# Patient Record
Sex: MALE | Race: WHITE | NOT HISPANIC OR LATINO | Employment: OTHER | URBAN - METROPOLITAN AREA
[De-identification: names, ages, dates, MRNs, and addresses within clinical notes are randomized per-mention and may not be internally consistent; named-entity substitution may affect disease eponyms.]

---

## 2017-01-01 ENCOUNTER — ANESTHESIA (OUTPATIENT)
Dept: PERIOP | Facility: HOSPITAL | Age: 82
End: 2017-01-01

## 2017-01-01 ENCOUNTER — APPOINTMENT (OUTPATIENT)
Dept: GENERAL RADIOLOGY | Facility: HOSPITAL | Age: 82
End: 2017-01-01

## 2017-01-01 ENCOUNTER — TELEPHONE (OUTPATIENT)
Dept: INFECTIOUS DISEASES | Facility: CLINIC | Age: 82
End: 2017-01-01

## 2017-01-01 ENCOUNTER — DOCUMENTATION (OUTPATIENT)
Dept: NEUROSURGERY | Facility: CLINIC | Age: 82
End: 2017-01-01

## 2017-01-01 ENCOUNTER — HOSPITAL ENCOUNTER (INPATIENT)
Facility: HOSPITAL | Age: 82
LOS: 20 days | Discharge: NURSING, REHAB OR LONG-TERM CARE | End: 2017-07-10
Attending: EMERGENCY MEDICINE | Admitting: INTERNAL MEDICINE

## 2017-01-01 ENCOUNTER — ANESTHESIA (OUTPATIENT)
Dept: PAIN MEDICINE | Facility: HOSPITAL | Age: 82
End: 2017-01-01

## 2017-01-01 ENCOUNTER — TELEPHONE (OUTPATIENT)
Dept: NEUROSURGERY | Facility: CLINIC | Age: 82
End: 2017-01-01

## 2017-01-01 ENCOUNTER — APPOINTMENT (OUTPATIENT)
Dept: CT IMAGING | Facility: HOSPITAL | Age: 82
End: 2017-01-01
Attending: INTERNAL MEDICINE

## 2017-01-01 ENCOUNTER — APPOINTMENT (OUTPATIENT)
Dept: CARDIOLOGY | Facility: HOSPITAL | Age: 82
End: 2017-01-01
Attending: INTERNAL MEDICINE

## 2017-01-01 ENCOUNTER — ANESTHESIA EVENT (OUTPATIENT)
Dept: PERIOP | Facility: HOSPITAL | Age: 82
End: 2017-01-01

## 2017-01-01 ENCOUNTER — APPOINTMENT (OUTPATIENT)
Dept: MRI IMAGING | Facility: HOSPITAL | Age: 82
End: 2017-01-01

## 2017-01-01 ENCOUNTER — APPOINTMENT (OUTPATIENT)
Dept: GENERAL RADIOLOGY | Facility: HOSPITAL | Age: 82
End: 2017-01-01
Attending: HOSPITALIST

## 2017-01-01 ENCOUNTER — APPOINTMENT (OUTPATIENT)
Dept: CARDIOLOGY | Facility: HOSPITAL | Age: 82
End: 2017-01-01

## 2017-01-01 ENCOUNTER — HOSPITAL ENCOUNTER (INPATIENT)
Facility: HOSPITAL | Age: 82
LOS: 2 days | Discharge: NURSING, REHAB OR LONG-TERM CARE | End: 2017-08-31
Attending: EMERGENCY MEDICINE | Admitting: HOSPITALIST

## 2017-01-01 ENCOUNTER — APPOINTMENT (OUTPATIENT)
Dept: PAIN MEDICINE | Facility: HOSPITAL | Age: 82
End: 2017-01-01

## 2017-01-01 ENCOUNTER — HOSPITAL ENCOUNTER (INPATIENT)
Facility: HOSPITAL | Age: 82
LOS: 5 days | Discharge: NURSING, REHAB OR LONG-TERM CARE | End: 2017-08-14
Attending: EMERGENCY MEDICINE | Admitting: INTERNAL MEDICINE

## 2017-01-01 ENCOUNTER — ANESTHESIA EVENT (OUTPATIENT)
Dept: PAIN MEDICINE | Facility: HOSPITAL | Age: 82
End: 2017-01-01

## 2017-01-01 ENCOUNTER — APPOINTMENT (OUTPATIENT)
Dept: CT IMAGING | Facility: HOSPITAL | Age: 82
End: 2017-01-01

## 2017-01-01 ENCOUNTER — APPOINTMENT (OUTPATIENT)
Dept: CARDIOLOGY | Facility: HOSPITAL | Age: 82
End: 2017-01-01
Attending: HOSPITALIST

## 2017-01-01 ENCOUNTER — HOSPITAL ENCOUNTER (INPATIENT)
Facility: HOSPITAL | Age: 82
LOS: 1 days | End: 2017-09-13
Attending: HOSPITALIST | Admitting: HOSPITALIST

## 2017-01-01 ENCOUNTER — APPOINTMENT (OUTPATIENT)
Dept: GENERAL RADIOLOGY | Facility: HOSPITAL | Age: 82
End: 2017-01-01
Attending: INTERNAL MEDICINE

## 2017-01-01 ENCOUNTER — OFFICE VISIT (OUTPATIENT)
Dept: NEUROSURGERY | Facility: CLINIC | Age: 82
End: 2017-01-01

## 2017-01-01 ENCOUNTER — HOSPITAL ENCOUNTER (INPATIENT)
Facility: HOSPITAL | Age: 82
LOS: 6 days | End: 2017-09-12
Attending: EMERGENCY MEDICINE | Admitting: HOSPITALIST

## 2017-01-01 ENCOUNTER — APPOINTMENT (OUTPATIENT)
Dept: MRI IMAGING | Facility: HOSPITAL | Age: 82
End: 2017-01-01
Attending: INTERNAL MEDICINE

## 2017-01-01 VITALS
TEMPERATURE: 97.5 F | OXYGEN SATURATION: 99 % | RESPIRATION RATE: 16 BRPM | WEIGHT: 166 LBS | BODY MASS INDEX: 23.24 KG/M2 | SYSTOLIC BLOOD PRESSURE: 107 MMHG | HEIGHT: 71 IN | HEART RATE: 69 BPM | DIASTOLIC BLOOD PRESSURE: 67 MMHG

## 2017-01-01 VITALS
HEIGHT: 72 IN | TEMPERATURE: 97.6 F | DIASTOLIC BLOOD PRESSURE: 59 MMHG | OXYGEN SATURATION: 98 % | WEIGHT: 183.5 LBS | SYSTOLIC BLOOD PRESSURE: 119 MMHG | BODY MASS INDEX: 24.85 KG/M2 | HEART RATE: 74 BPM | RESPIRATION RATE: 18 BRPM

## 2017-01-01 VITALS
BODY MASS INDEX: 25.59 KG/M2 | OXYGEN SATURATION: 92 % | WEIGHT: 188.93 LBS | SYSTOLIC BLOOD PRESSURE: 80 MMHG | HEIGHT: 72 IN | RESPIRATION RATE: 32 BRPM | DIASTOLIC BLOOD PRESSURE: 48 MMHG | HEART RATE: 109 BPM | TEMPERATURE: 98.2 F

## 2017-01-01 VITALS — TEMPERATURE: 97.7 F | SYSTOLIC BLOOD PRESSURE: 71 MMHG | DIASTOLIC BLOOD PRESSURE: 44 MMHG | OXYGEN SATURATION: 56 %

## 2017-01-01 VITALS
BODY MASS INDEX: 24.79 KG/M2 | TEMPERATURE: 97.5 F | SYSTOLIC BLOOD PRESSURE: 132 MMHG | HEART RATE: 80 BPM | DIASTOLIC BLOOD PRESSURE: 64 MMHG | HEIGHT: 72 IN | WEIGHT: 183 LBS | RESPIRATION RATE: 18 BRPM

## 2017-01-01 VITALS
SYSTOLIC BLOOD PRESSURE: 120 MMHG | WEIGHT: 175.04 LBS | BODY MASS INDEX: 23.71 KG/M2 | HEIGHT: 72 IN | DIASTOLIC BLOOD PRESSURE: 64 MMHG | HEART RATE: 105 BPM | TEMPERATURE: 98.6 F | OXYGEN SATURATION: 87 % | RESPIRATION RATE: 18 BRPM

## 2017-01-01 DIAGNOSIS — G06.2 EPIDURAL ABSCESS: ICD-10-CM

## 2017-01-01 DIAGNOSIS — R52 PAIN: ICD-10-CM

## 2017-01-01 DIAGNOSIS — T81.31XA POSTOPERATIVE WOUND DEHISCENCE, INITIAL ENCOUNTER: ICD-10-CM

## 2017-01-01 DIAGNOSIS — L89.154 DECUBITUS ULCER OF SACRAL REGION, STAGE 4 (HCC): ICD-10-CM

## 2017-01-01 DIAGNOSIS — R53.1 WEAKNESS: ICD-10-CM

## 2017-01-01 DIAGNOSIS — D72.829 LEUKOCYTOSIS, UNSPECIFIED TYPE: ICD-10-CM

## 2017-01-01 DIAGNOSIS — R26.81 UNSTEADINESS ON FEET: ICD-10-CM

## 2017-01-01 DIAGNOSIS — IMO0002 COMPRESSION FRACTURE: ICD-10-CM

## 2017-01-01 DIAGNOSIS — Z09 POSTOP CHECK: Primary | ICD-10-CM

## 2017-01-01 DIAGNOSIS — R50.9 FEVER IN ADULT: ICD-10-CM

## 2017-01-01 DIAGNOSIS — R29.898 LEFT LEG WEAKNESS: Chronic | ICD-10-CM

## 2017-01-01 DIAGNOSIS — G06.1 EPIDURAL ABSCESS, L2-L5: ICD-10-CM

## 2017-01-01 DIAGNOSIS — R53.1 GENERALIZED WEAKNESS: Primary | ICD-10-CM

## 2017-01-01 DIAGNOSIS — R53.1 GENERALIZED WEAKNESS: ICD-10-CM

## 2017-01-01 DIAGNOSIS — D72.829 LEUKOCYTOSIS, UNSPECIFIED TYPE: Primary | ICD-10-CM

## 2017-01-01 DIAGNOSIS — E86.0 DEHYDRATION: ICD-10-CM

## 2017-01-01 DIAGNOSIS — N30.01 ACUTE CYSTITIS WITH HEMATURIA: Primary | ICD-10-CM

## 2017-01-01 DIAGNOSIS — L89.159 SACRAL DECUBITUS ULCER, UNSPECIFIED PRESSURE ULCER STAGE: ICD-10-CM

## 2017-01-01 DIAGNOSIS — L89.159 SACRAL DECUBITUS ULCER, UNSPECIFIED PRESSURE ULCER STAGE: Primary | ICD-10-CM

## 2017-01-01 DIAGNOSIS — T82.898A OCCLUDED PICC LINE, INITIAL ENCOUNTER (HCC): ICD-10-CM

## 2017-01-01 DIAGNOSIS — D32.9 MENINGIOMA (HCC): Primary | ICD-10-CM

## 2017-01-01 DIAGNOSIS — G06.2 EPIDURAL ABSCESS: Primary | ICD-10-CM

## 2017-01-01 LAB
25(OH)D3 SERPL-MCNC: 20.4 NG/ML (ref 30–100)
ABO + RH BLD: NORMAL
ABO GROUP BLD: NORMAL
ACANTHOCYTES BLD QL SMEAR: NORMAL
ACANTHOCYTES BLD QL SMEAR: NORMAL
ALBUMIN SERPL-MCNC: 1.5 G/DL (ref 3.5–5.2)
ALBUMIN SERPL-MCNC: 1.9 G/DL (ref 3.5–5.2)
ALBUMIN SERPL-MCNC: 2 G/DL (ref 3.5–5.2)
ALBUMIN SERPL-MCNC: 2.1 G/DL (ref 3.5–5.2)
ALBUMIN SERPL-MCNC: 2.4 G/DL (ref 3.5–5.2)
ALBUMIN SERPL-MCNC: 2.5 G/DL (ref 3.5–5.2)
ALBUMIN SERPL-MCNC: 2.6 G/DL (ref 3.5–5.2)
ALBUMIN SERPL-MCNC: 2.7 G/DL (ref 3.5–5.2)
ALBUMIN SERPL-MCNC: 2.7 G/DL (ref 3.5–5.2)
ALBUMIN SERPL-MCNC: 2.8 G/DL (ref 3.5–5.2)
ALBUMIN SERPL-MCNC: 2.8 G/DL (ref 3.5–5.2)
ALBUMIN SERPL-MCNC: 2.9 G/DL (ref 3.5–5.2)
ALBUMIN SERPL-MCNC: 3 G/DL (ref 3.5–5.2)
ALBUMIN SERPL-MCNC: 3 G/DL (ref 3.5–5.2)
ALBUMIN SERPL-MCNC: 3.4 G/DL (ref 3.5–5.2)
ALBUMIN/GLOB SERPL: 0.6 G/DL
ALBUMIN/GLOB SERPL: 0.7 G/DL
ALBUMIN/GLOB SERPL: 0.7 G/DL
ALBUMIN/GLOB SERPL: 0.8 G/DL
ALBUMIN/GLOB SERPL: 1 G/DL
ALBUMIN/GLOB SERPL: 1.1 G/DL
ALP SERPL-CCNC: 56 U/L (ref 39–117)
ALP SERPL-CCNC: 68 U/L (ref 39–117)
ALP SERPL-CCNC: 73 U/L (ref 39–117)
ALP SERPL-CCNC: 81 U/L (ref 39–117)
ALP SERPL-CCNC: 82 U/L (ref 39–117)
ALP SERPL-CCNC: 92 U/L (ref 39–117)
ALT SERPL W P-5'-P-CCNC: 15 U/L (ref 1–41)
ALT SERPL W P-5'-P-CCNC: 19 U/L (ref 1–41)
ALT SERPL W P-5'-P-CCNC: 19 U/L (ref 1–41)
ALT SERPL W P-5'-P-CCNC: 20 U/L (ref 1–41)
ALT SERPL W P-5'-P-CCNC: 21 U/L (ref 1–41)
ALT SERPL W P-5'-P-CCNC: 23 U/L (ref 1–41)
AMORPH URATE CRY URNS QL MICRO: ABNORMAL /HPF
ANION GAP SERPL CALCULATED.3IONS-SCNC: 10.2 MMOL/L
ANION GAP SERPL CALCULATED.3IONS-SCNC: 10.4 MMOL/L
ANION GAP SERPL CALCULATED.3IONS-SCNC: 10.6 MMOL/L
ANION GAP SERPL CALCULATED.3IONS-SCNC: 10.7 MMOL/L
ANION GAP SERPL CALCULATED.3IONS-SCNC: 10.7 MMOL/L
ANION GAP SERPL CALCULATED.3IONS-SCNC: 11 MMOL/L
ANION GAP SERPL CALCULATED.3IONS-SCNC: 11.3 MMOL/L
ANION GAP SERPL CALCULATED.3IONS-SCNC: 11.3 MMOL/L
ANION GAP SERPL CALCULATED.3IONS-SCNC: 11.4 MMOL/L
ANION GAP SERPL CALCULATED.3IONS-SCNC: 11.6 MMOL/L
ANION GAP SERPL CALCULATED.3IONS-SCNC: 11.7 MMOL/L
ANION GAP SERPL CALCULATED.3IONS-SCNC: 11.7 MMOL/L
ANION GAP SERPL CALCULATED.3IONS-SCNC: 12 MMOL/L
ANION GAP SERPL CALCULATED.3IONS-SCNC: 12 MMOL/L
ANION GAP SERPL CALCULATED.3IONS-SCNC: 12.3 MMOL/L
ANION GAP SERPL CALCULATED.3IONS-SCNC: 12.4 MMOL/L
ANION GAP SERPL CALCULATED.3IONS-SCNC: 12.4 MMOL/L
ANION GAP SERPL CALCULATED.3IONS-SCNC: 12.5 MMOL/L
ANION GAP SERPL CALCULATED.3IONS-SCNC: 12.6 MMOL/L
ANION GAP SERPL CALCULATED.3IONS-SCNC: 12.6 MMOL/L
ANION GAP SERPL CALCULATED.3IONS-SCNC: 12.7 MMOL/L
ANION GAP SERPL CALCULATED.3IONS-SCNC: 12.7 MMOL/L
ANION GAP SERPL CALCULATED.3IONS-SCNC: 12.8 MMOL/L
ANION GAP SERPL CALCULATED.3IONS-SCNC: 13.1 MMOL/L
ANION GAP SERPL CALCULATED.3IONS-SCNC: 13.1 MMOL/L
ANION GAP SERPL CALCULATED.3IONS-SCNC: 13.2 MMOL/L
ANION GAP SERPL CALCULATED.3IONS-SCNC: 13.3 MMOL/L
ANION GAP SERPL CALCULATED.3IONS-SCNC: 13.4 MMOL/L
ANION GAP SERPL CALCULATED.3IONS-SCNC: 13.5 MMOL/L
ANION GAP SERPL CALCULATED.3IONS-SCNC: 13.5 MMOL/L
ANION GAP SERPL CALCULATED.3IONS-SCNC: 13.8 MMOL/L
ANION GAP SERPL CALCULATED.3IONS-SCNC: 13.9 MMOL/L
ANION GAP SERPL CALCULATED.3IONS-SCNC: 14.2 MMOL/L
ANION GAP SERPL CALCULATED.3IONS-SCNC: 14.3 MMOL/L
ANION GAP SERPL CALCULATED.3IONS-SCNC: 14.5 MMOL/L
ANION GAP SERPL CALCULATED.3IONS-SCNC: 14.5 MMOL/L
ANION GAP SERPL CALCULATED.3IONS-SCNC: 14.6 MMOL/L
ANION GAP SERPL CALCULATED.3IONS-SCNC: 14.7 MMOL/L
ANION GAP SERPL CALCULATED.3IONS-SCNC: 16.1 MMOL/L
ANION GAP SERPL CALCULATED.3IONS-SCNC: 16.4 MMOL/L
ANION GAP SERPL CALCULATED.3IONS-SCNC: 16.8 MMOL/L
ANION GAP SERPL CALCULATED.3IONS-SCNC: 8.9 MMOL/L
ANISOCYTOSIS BLD QL: NORMAL
APPEARANCE FLD: ABNORMAL
APTT PPP: 28.7 SECONDS (ref 22.7–35.4)
APTT PPP: 30.1 SECONDS (ref 22.7–35.4)
AST SERPL-CCNC: 12 U/L (ref 1–40)
AST SERPL-CCNC: 17 U/L (ref 1–40)
AST SERPL-CCNC: 18 U/L (ref 1–40)
AST SERPL-CCNC: 20 U/L (ref 1–40)
AST SERPL-CCNC: 28 U/L (ref 1–40)
AST SERPL-CCNC: 29 U/L (ref 1–40)
B PERT DNA SPEC QL NAA+PROBE: NOT DETECTED
BACTERIA SPEC AEROBE CULT: ABNORMAL
BACTERIA SPEC AEROBE CULT: NO GROWTH
BACTERIA SPEC AEROBE CULT: NORMAL
BACTERIA SPEC ANAEROBE CULT: NORMAL
BACTERIA UR QL AUTO: ABNORMAL /HPF
BASOPHILS # BLD AUTO: 0 10*3/MM3 (ref 0–0.2)
BASOPHILS # BLD AUTO: 0.01 10*3/MM3 (ref 0–0.2)
BASOPHILS # BLD AUTO: 0.02 10*3/MM3 (ref 0–0.2)
BASOPHILS # BLD AUTO: 0.03 10*3/MM3 (ref 0–0.2)
BASOPHILS # BLD AUTO: 0.03 10*3/MM3 (ref 0–0.2)
BASOPHILS # BLD AUTO: 0.04 10*3/MM3 (ref 0–0.2)
BASOPHILS # BLD AUTO: 0.04 10*3/MM3 (ref 0–0.2)
BASOPHILS NFR BLD AUTO: 0 % (ref 0–1.5)
BASOPHILS NFR BLD AUTO: 0.1 % (ref 0–1.5)
BASOPHILS NFR BLD AUTO: 0.2 % (ref 0–1.5)
BASOPHILS NFR BLD AUTO: 0.3 % (ref 0–1.5)
BASOPHILS NFR BLD AUTO: 0.4 % (ref 0–1.5)
BH BB BLOOD EXPIRATION DATE: NORMAL
BH BB BLOOD TYPE BARCODE: 600
BH BB DISPENSE STATUS: NORMAL
BH BB PRODUCT CODE: NORMAL
BH BB UNIT NUMBER: NORMAL
BH CV ECHO MEAS - ACS: 2.4 CM
BH CV ECHO MEAS - AO MEAN PG (FULL): 2.7 MMHG
BH CV ECHO MEAS - AO MEAN PG: 5 MMHG
BH CV ECHO MEAS - AO V2 MAX: 134 CM/SEC
BH CV ECHO MEAS - AO V2 MEAN: 101 CM/SEC
BH CV ECHO MEAS - AO V2 VTI: 15.7 CM
BH CV ECHO MEAS - AVA(I,A): 3.3 CM^2
BH CV ECHO MEAS - AVA(I,D): 3.3 CM^2
BH CV ECHO MEAS - BSA(HAYCOCK): 2 M^2
BH CV ECHO MEAS - BSA: 2 M^2
BH CV ECHO MEAS - BZI_BMI: 23.7 KILOGRAMS/M^2
BH CV ECHO MEAS - BZI_METRIC_HEIGHT: 182.9 CM
BH CV ECHO MEAS - BZI_METRIC_WEIGHT: 79.4 KG
BH CV ECHO MEAS - CONTRAST EF 4CH: 57.9 ML/M^2
BH CV ECHO MEAS - EDV(CUBED): 125 ML
BH CV ECHO MEAS - EDV(MOD-SP4): 114 ML
BH CV ECHO MEAS - EDV(TEICH): 118.2 ML
BH CV ECHO MEAS - EF(CUBED): 62.7 %
BH CV ECHO MEAS - EF(MOD-SP4): 57.9 %
BH CV ECHO MEAS - EF(TEICH): 54 %
BH CV ECHO MEAS - ESV(CUBED): 46.7 ML
BH CV ECHO MEAS - ESV(MOD-SP4): 48 ML
BH CV ECHO MEAS - ESV(TEICH): 54.4 ML
BH CV ECHO MEAS - FS: 28 %
BH CV ECHO MEAS - IVS/LVPW: 1.3
BH CV ECHO MEAS - IVSD: 1 CM
BH CV ECHO MEAS - LA DIMENSION: 6 CM
BH CV ECHO MEAS - LAT PEAK E' VEL: 6 CM/SEC
BH CV ECHO MEAS - LV DIASTOLIC VOL/BSA (35-75): 56.6 ML/M^2
BH CV ECHO MEAS - LV MASS(C)D: 158.2 GRAMS
BH CV ECHO MEAS - LV MASS(C)DI: 78.6 GRAMS/M^2
BH CV ECHO MEAS - LV MEAN PG: 2.3 MMHG
BH CV ECHO MEAS - LV SYSTOLIC VOL/BSA (12-30): 23.8 ML/M^2
BH CV ECHO MEAS - LV V1 MAX: 114 CM/SEC
BH CV ECHO MEAS - LV V1 MEAN: 72.9 CM/SEC
BH CV ECHO MEAS - LV V1 VTI: 16.3 CM
BH CV ECHO MEAS - LVIDD: 5 CM
BH CV ECHO MEAS - LVIDS: 3.6 CM
BH CV ECHO MEAS - LVLD AP4: 8.7 CM
BH CV ECHO MEAS - LVLS AP4: 7.6 CM
BH CV ECHO MEAS - LVOT AREA (M): 3.1 CM^2
BH CV ECHO MEAS - LVOT AREA: 3.1 CM^2
BH CV ECHO MEAS - LVOT DIAM: 2 CM
BH CV ECHO MEAS - LVPWD: 0.8 CM
BH CV ECHO MEAS - MED PEAK E' VEL: 11 CM/SEC
BH CV ECHO MEAS - MV A DUR: 0.11 SEC
BH CV ECHO MEAS - MV A MAX VEL: 65.8 CM/SEC
BH CV ECHO MEAS - MV DEC SLOPE: 467 CM/SEC^2
BH CV ECHO MEAS - MV DEC TIME: 0.23 SEC
BH CV ECHO MEAS - MV E MAX VEL: 50.3 CM/SEC
BH CV ECHO MEAS - MV E/A: 0.76
BH CV ECHO MEAS - MV MEAN PG: 1 MMHG
BH CV ECHO MEAS - MV P1/2T MAX VEL: 61.5 CM/SEC
BH CV ECHO MEAS - MV P1/2T: 38.6 MSEC
BH CV ECHO MEAS - MV V2 MEAN: 47.8 CM/SEC
BH CV ECHO MEAS - MV V2 VTI: 12.2 CM
BH CV ECHO MEAS - MVA P1/2T LCG: 3.6 CM^2
BH CV ECHO MEAS - MVA(P1/2T): 5.7 CM^2
BH CV ECHO MEAS - MVA(VTI): 4.2 CM^2
BH CV ECHO MEAS - PA ACC SLOPE: 17.5 CM/SEC^2
BH CV ECHO MEAS - PA ACC TIME: 0.08 SEC
BH CV ECHO MEAS - PA MAX PG: 5.1 MMHG
BH CV ECHO MEAS - PA PR(ACCEL): 44.4 MMHG
BH CV ECHO MEAS - PA V2 MAX: 113 CM/SEC
BH CV ECHO MEAS - SI(CUBED): 38.9 ML/M^2
BH CV ECHO MEAS - SI(LVOT): 25.4 ML/M^2
BH CV ECHO MEAS - SI(MOD-SP4): 32.8 ML/M^2
BH CV ECHO MEAS - SI(TEICH): 31.7 ML/M^2
BH CV ECHO MEAS - SV(CUBED): 78.3 ML
BH CV ECHO MEAS - SV(LVOT): 51.1 ML
BH CV ECHO MEAS - SV(MOD-SP4): 66 ML
BH CV ECHO MEAS - SV(TEICH): 63.8 ML
BH CV ECHO MEAS - TAPSE (>1.6): 2.4 CM2
BH CV LOWER VASCULAR LEFT COMMON FEMORAL AUGMENT: NORMAL
BH CV LOWER VASCULAR LEFT COMMON FEMORAL AUGMENT: NORMAL
BH CV LOWER VASCULAR LEFT COMMON FEMORAL COMPETENT: NORMAL
BH CV LOWER VASCULAR LEFT COMMON FEMORAL COMPETENT: NORMAL
BH CV LOWER VASCULAR LEFT COMMON FEMORAL COMPRESS: NORMAL
BH CV LOWER VASCULAR LEFT COMMON FEMORAL COMPRESS: NORMAL
BH CV LOWER VASCULAR LEFT COMMON FEMORAL PHASIC: NORMAL
BH CV LOWER VASCULAR LEFT COMMON FEMORAL PHASIC: NORMAL
BH CV LOWER VASCULAR LEFT COMMON FEMORAL SPONT: NORMAL
BH CV LOWER VASCULAR LEFT COMMON FEMORAL SPONT: NORMAL
BH CV LOWER VASCULAR LEFT DISTAL FEMORAL COMPRESS: NORMAL
BH CV LOWER VASCULAR LEFT DISTAL FEMORAL COMPRESS: NORMAL
BH CV LOWER VASCULAR LEFT GASTRONEMIUS COMPRESS: NORMAL
BH CV LOWER VASCULAR LEFT GASTRONEMIUS COMPRESS: NORMAL
BH CV LOWER VASCULAR LEFT GREATER SAPH AK COMPRESS: NORMAL
BH CV LOWER VASCULAR LEFT GREATER SAPH AK COMPRESS: NORMAL
BH CV LOWER VASCULAR LEFT GREATER SAPH BK COMPRESS: NORMAL
BH CV LOWER VASCULAR LEFT GREATER SAPH BK COMPRESS: NORMAL
BH CV LOWER VASCULAR LEFT LESSER SAPH COMPRESS: NORMAL
BH CV LOWER VASCULAR LEFT MID FEMORAL AUGMENT: NORMAL
BH CV LOWER VASCULAR LEFT MID FEMORAL AUGMENT: NORMAL
BH CV LOWER VASCULAR LEFT MID FEMORAL COMPETENT: NORMAL
BH CV LOWER VASCULAR LEFT MID FEMORAL COMPETENT: NORMAL
BH CV LOWER VASCULAR LEFT MID FEMORAL COMPRESS: NORMAL
BH CV LOWER VASCULAR LEFT MID FEMORAL COMPRESS: NORMAL
BH CV LOWER VASCULAR LEFT MID FEMORAL PHASIC: NORMAL
BH CV LOWER VASCULAR LEFT MID FEMORAL PHASIC: NORMAL
BH CV LOWER VASCULAR LEFT MID FEMORAL SPONT: NORMAL
BH CV LOWER VASCULAR LEFT MID FEMORAL SPONT: NORMAL
BH CV LOWER VASCULAR LEFT PERONEAL COMPRESS: NORMAL
BH CV LOWER VASCULAR LEFT PERONEAL COMPRESS: NORMAL
BH CV LOWER VASCULAR LEFT POPLITEAL AUGMENT: NORMAL
BH CV LOWER VASCULAR LEFT POPLITEAL AUGMENT: NORMAL
BH CV LOWER VASCULAR LEFT POPLITEAL COMPETENT: NORMAL
BH CV LOWER VASCULAR LEFT POPLITEAL COMPETENT: NORMAL
BH CV LOWER VASCULAR LEFT POPLITEAL COMPRESS: NORMAL
BH CV LOWER VASCULAR LEFT POPLITEAL COMPRESS: NORMAL
BH CV LOWER VASCULAR LEFT POPLITEAL PHASIC: NORMAL
BH CV LOWER VASCULAR LEFT POPLITEAL PHASIC: NORMAL
BH CV LOWER VASCULAR LEFT POPLITEAL SPONT: NORMAL
BH CV LOWER VASCULAR LEFT POPLITEAL SPONT: NORMAL
BH CV LOWER VASCULAR LEFT POSTERIOR TIBIAL COMPRESS: NORMAL
BH CV LOWER VASCULAR LEFT POSTERIOR TIBIAL COMPRESS: NORMAL
BH CV LOWER VASCULAR LEFT PROXIMAL FEMORAL COMPRESS: NORMAL
BH CV LOWER VASCULAR LEFT PROXIMAL FEMORAL COMPRESS: NORMAL
BH CV LOWER VASCULAR LEFT SAPHENOFEMORAL JUNCTION AUGMENT: NORMAL
BH CV LOWER VASCULAR LEFT SAPHENOFEMORAL JUNCTION AUGMENT: NORMAL
BH CV LOWER VASCULAR LEFT SAPHENOFEMORAL JUNCTION COMPETENT: NORMAL
BH CV LOWER VASCULAR LEFT SAPHENOFEMORAL JUNCTION COMPRESS: NORMAL
BH CV LOWER VASCULAR LEFT SAPHENOFEMORAL JUNCTION COMPRESS: NORMAL
BH CV LOWER VASCULAR LEFT SAPHENOFEMORAL JUNCTION PHASIC: NORMAL
BH CV LOWER VASCULAR LEFT SAPHENOFEMORAL JUNCTION PHASIC: NORMAL
BH CV LOWER VASCULAR LEFT SAPHENOFEMORAL JUNCTION SPONT: NORMAL
BH CV LOWER VASCULAR LEFT SAPHENOFEMORAL JUNCTION SPONT: NORMAL
BH CV LOWER VASCULAR RIGHT COMMON FEMORAL AUGMENT: NORMAL
BH CV LOWER VASCULAR RIGHT COMMON FEMORAL AUGMENT: NORMAL
BH CV LOWER VASCULAR RIGHT COMMON FEMORAL COMPETENT: NORMAL
BH CV LOWER VASCULAR RIGHT COMMON FEMORAL COMPETENT: NORMAL
BH CV LOWER VASCULAR RIGHT COMMON FEMORAL COMPRESS: NORMAL
BH CV LOWER VASCULAR RIGHT COMMON FEMORAL COMPRESS: NORMAL
BH CV LOWER VASCULAR RIGHT COMMON FEMORAL PHASIC: NORMAL
BH CV LOWER VASCULAR RIGHT COMMON FEMORAL PHASIC: NORMAL
BH CV LOWER VASCULAR RIGHT COMMON FEMORAL SPONT: NORMAL
BH CV LOWER VASCULAR RIGHT COMMON FEMORAL SPONT: NORMAL
BH CV LOWER VASCULAR RIGHT DISTAL FEMORAL COMPRESS: NORMAL
BH CV LOWER VASCULAR RIGHT GASTRONEMIUS COMPRESS: NORMAL
BH CV LOWER VASCULAR RIGHT GREATER SAPH AK COMPRESS: NORMAL
BH CV LOWER VASCULAR RIGHT GREATER SAPH BK COMPRESS: NORMAL
BH CV LOWER VASCULAR RIGHT LESSER SAPH COMPRESS: NORMAL
BH CV LOWER VASCULAR RIGHT MID FEMORAL AUGMENT: NORMAL
BH CV LOWER VASCULAR RIGHT MID FEMORAL COMPETENT: NORMAL
BH CV LOWER VASCULAR RIGHT MID FEMORAL COMPRESS: NORMAL
BH CV LOWER VASCULAR RIGHT MID FEMORAL PHASIC: NORMAL
BH CV LOWER VASCULAR RIGHT MID FEMORAL SPONT: NORMAL
BH CV LOWER VASCULAR RIGHT PERONEAL COMPRESS: NORMAL
BH CV LOWER VASCULAR RIGHT POPLITEAL AUGMENT: NORMAL
BH CV LOWER VASCULAR RIGHT POPLITEAL COMPETENT: NORMAL
BH CV LOWER VASCULAR RIGHT POPLITEAL COMPRESS: NORMAL
BH CV LOWER VASCULAR RIGHT POPLITEAL PHASIC: NORMAL
BH CV LOWER VASCULAR RIGHT POPLITEAL SPONT: NORMAL
BH CV LOWER VASCULAR RIGHT POSTERIOR TIBIAL COMPRESS: NORMAL
BH CV LOWER VASCULAR RIGHT PROXIMAL FEMORAL COMPRESS: NORMAL
BH CV LOWER VASCULAR RIGHT SAPHENOFEMORAL JUNCTION AUGMENT: NORMAL
BH CV LOWER VASCULAR RIGHT SAPHENOFEMORAL JUNCTION COMPRESS: NORMAL
BH CV LOWER VASCULAR RIGHT SAPHENOFEMORAL JUNCTION PHASIC: NORMAL
BH CV LOWER VASCULAR RIGHT SAPHENOFEMORAL JUNCTION SPONT: NORMAL
BH CV UPPER VENOUS LEFT AXILLARY AUGMENT: NORMAL
BH CV UPPER VENOUS LEFT AXILLARY AUGMENT: NORMAL
BH CV UPPER VENOUS LEFT AXILLARY COMPETENT: NORMAL
BH CV UPPER VENOUS LEFT AXILLARY COMPETENT: NORMAL
BH CV UPPER VENOUS LEFT AXILLARY COMPRESS: NORMAL
BH CV UPPER VENOUS LEFT AXILLARY COMPRESS: NORMAL
BH CV UPPER VENOUS LEFT AXILLARY PHASIC: NORMAL
BH CV UPPER VENOUS LEFT AXILLARY PHASIC: NORMAL
BH CV UPPER VENOUS LEFT AXILLARY SPONT: NORMAL
BH CV UPPER VENOUS LEFT AXILLARY SPONT: NORMAL
BH CV UPPER VENOUS LEFT BASILIC FOREARM COMPRESS: NORMAL
BH CV UPPER VENOUS LEFT BASILIC FOREARM COMPRESS: NORMAL
BH CV UPPER VENOUS LEFT BASILIC UPPER COMPRESS: NORMAL
BH CV UPPER VENOUS LEFT BASILIC UPPER COMPRESS: NORMAL
BH CV UPPER VENOUS LEFT BRACHIAL COMPRESS: NORMAL
BH CV UPPER VENOUS LEFT BRACHIAL COMPRESS: NORMAL
BH CV UPPER VENOUS LEFT CEPHALIC FOREARM COMPRESS: NORMAL
BH CV UPPER VENOUS LEFT CEPHALIC FOREARM COMPRESS: NORMAL
BH CV UPPER VENOUS LEFT CEPHALIC UPPER COMPRESS: NORMAL
BH CV UPPER VENOUS LEFT CEPHALIC UPPER COMPRESS: NORMAL
BH CV UPPER VENOUS LEFT INTERNAL JUGULAR AUGMENT: NORMAL
BH CV UPPER VENOUS LEFT INTERNAL JUGULAR AUGMENT: NORMAL
BH CV UPPER VENOUS LEFT INTERNAL JUGULAR COMPETENT: NORMAL
BH CV UPPER VENOUS LEFT INTERNAL JUGULAR COMPETENT: NORMAL
BH CV UPPER VENOUS LEFT INTERNAL JUGULAR COMPRESS: NORMAL
BH CV UPPER VENOUS LEFT INTERNAL JUGULAR COMPRESS: NORMAL
BH CV UPPER VENOUS LEFT INTERNAL JUGULAR PHASIC: NORMAL
BH CV UPPER VENOUS LEFT INTERNAL JUGULAR PHASIC: NORMAL
BH CV UPPER VENOUS LEFT INTERNAL JUGULAR SPONT: NORMAL
BH CV UPPER VENOUS LEFT INTERNAL JUGULAR SPONT: NORMAL
BH CV UPPER VENOUS LEFT RADIAL COMPRESS: NORMAL
BH CV UPPER VENOUS LEFT RADIAL COMPRESS: NORMAL
BH CV UPPER VENOUS LEFT SUBCLAVIAN AUGMENT: NORMAL
BH CV UPPER VENOUS LEFT SUBCLAVIAN AUGMENT: NORMAL
BH CV UPPER VENOUS LEFT SUBCLAVIAN COMPETENT: NORMAL
BH CV UPPER VENOUS LEFT SUBCLAVIAN COMPETENT: NORMAL
BH CV UPPER VENOUS LEFT SUBCLAVIAN COMPRESS: NORMAL
BH CV UPPER VENOUS LEFT SUBCLAVIAN COMPRESS: NORMAL
BH CV UPPER VENOUS LEFT SUBCLAVIAN PHASIC: NORMAL
BH CV UPPER VENOUS LEFT SUBCLAVIAN PHASIC: NORMAL
BH CV UPPER VENOUS LEFT SUBCLAVIAN SPONT: NORMAL
BH CV UPPER VENOUS LEFT SUBCLAVIAN SPONT: NORMAL
BH CV UPPER VENOUS LEFT ULNAR COMPRESS: NORMAL
BH CV UPPER VENOUS LEFT ULNAR COMPRESS: NORMAL
BH CV UPPER VENOUS RIGHT AXILLARY AUGMENT: NORMAL
BH CV UPPER VENOUS RIGHT AXILLARY COMPETENT: NORMAL
BH CV UPPER VENOUS RIGHT AXILLARY COMPRESS: NORMAL
BH CV UPPER VENOUS RIGHT AXILLARY PHASIC: NORMAL
BH CV UPPER VENOUS RIGHT AXILLARY SPONT: NORMAL
BH CV UPPER VENOUS RIGHT BASILIC FOREARM COMPRESS: NORMAL
BH CV UPPER VENOUS RIGHT BASILIC UPPER COMPRESS: NORMAL
BH CV UPPER VENOUS RIGHT BRACHIAL COMPRESS: NORMAL
BH CV UPPER VENOUS RIGHT CEPHALIC FOREARM COLOR: 1
BH CV UPPER VENOUS RIGHT CEPHALIC FOREARM COMPRESS: NORMAL
BH CV UPPER VENOUS RIGHT CEPHALIC FOREARM THROMBUS: NORMAL
BH CV UPPER VENOUS RIGHT CEPHALIC UPPER COMPRESS: NORMAL
BH CV UPPER VENOUS RIGHT INTERNAL JUGULAR AUGMENT: NORMAL
BH CV UPPER VENOUS RIGHT INTERNAL JUGULAR AUGMENT: NORMAL
BH CV UPPER VENOUS RIGHT INTERNAL JUGULAR COMPETENT: NORMAL
BH CV UPPER VENOUS RIGHT INTERNAL JUGULAR COMPETENT: NORMAL
BH CV UPPER VENOUS RIGHT INTERNAL JUGULAR COMPRESS: NORMAL
BH CV UPPER VENOUS RIGHT INTERNAL JUGULAR COMPRESS: NORMAL
BH CV UPPER VENOUS RIGHT INTERNAL JUGULAR PHASIC: NORMAL
BH CV UPPER VENOUS RIGHT INTERNAL JUGULAR PHASIC: NORMAL
BH CV UPPER VENOUS RIGHT INTERNAL JUGULAR SPONT: NORMAL
BH CV UPPER VENOUS RIGHT INTERNAL JUGULAR SPONT: NORMAL
BH CV UPPER VENOUS RIGHT RADIAL COMPRESS: NORMAL
BH CV UPPER VENOUS RIGHT SUBCLAVIAN AUGMENT: NORMAL
BH CV UPPER VENOUS RIGHT SUBCLAVIAN AUGMENT: NORMAL
BH CV UPPER VENOUS RIGHT SUBCLAVIAN COMPETENT: NORMAL
BH CV UPPER VENOUS RIGHT SUBCLAVIAN COMPETENT: NORMAL
BH CV UPPER VENOUS RIGHT SUBCLAVIAN COMPRESS: NORMAL
BH CV UPPER VENOUS RIGHT SUBCLAVIAN COMPRESS: NORMAL
BH CV UPPER VENOUS RIGHT SUBCLAVIAN PHASIC: NORMAL
BH CV UPPER VENOUS RIGHT SUBCLAVIAN PHASIC: NORMAL
BH CV UPPER VENOUS RIGHT SUBCLAVIAN SPONT: NORMAL
BH CV UPPER VENOUS RIGHT SUBCLAVIAN SPONT: NORMAL
BH CV UPPER VENOUS RIGHT ULNAR COMPRESS: NORMAL
BH CV XLRA - RV BASE: 3.7 CM
BH CV XLRA - TDI S': 20 CM/SEC
BILIRUB SERPL-MCNC: 0.5 MG/DL (ref 0.1–1.2)
BILIRUB SERPL-MCNC: 0.6 MG/DL (ref 0.1–1.2)
BILIRUB SERPL-MCNC: 0.6 MG/DL (ref 0.1–1.2)
BILIRUB SERPL-MCNC: 0.7 MG/DL (ref 0.1–1.2)
BILIRUB SERPL-MCNC: 0.8 MG/DL (ref 0.1–1.2)
BILIRUB SERPL-MCNC: 1.9 MG/DL (ref 0.1–1.2)
BILIRUB UR QL STRIP: NEGATIVE
BLD GP AB SCN SERPL QL: NEGATIVE
BUN BLD-MCNC: 15 MG/DL (ref 8–23)
BUN BLD-MCNC: 16 MG/DL (ref 8–23)
BUN BLD-MCNC: 16 MG/DL (ref 8–23)
BUN BLD-MCNC: 17 MG/DL (ref 8–23)
BUN BLD-MCNC: 18 MG/DL (ref 8–23)
BUN BLD-MCNC: 19 MG/DL (ref 8–23)
BUN BLD-MCNC: 20 MG/DL (ref 8–23)
BUN BLD-MCNC: 20 MG/DL (ref 8–23)
BUN BLD-MCNC: 21 MG/DL (ref 8–23)
BUN BLD-MCNC: 22 MG/DL (ref 8–23)
BUN BLD-MCNC: 23 MG/DL (ref 8–23)
BUN BLD-MCNC: 24 MG/DL (ref 8–23)
BUN BLD-MCNC: 24 MG/DL (ref 8–23)
BUN BLD-MCNC: 25 MG/DL (ref 8–23)
BUN BLD-MCNC: 25 MG/DL (ref 8–23)
BUN BLD-MCNC: 26 MG/DL (ref 8–23)
BUN BLD-MCNC: 26 MG/DL (ref 8–23)
BUN BLD-MCNC: 27 MG/DL (ref 8–23)
BUN BLD-MCNC: 29 MG/DL (ref 8–23)
BUN BLD-MCNC: 30 MG/DL (ref 8–23)
BUN BLD-MCNC: 33 MG/DL (ref 8–23)
BUN BLD-MCNC: 39 MG/DL (ref 8–23)
BUN BLD-MCNC: 43 MG/DL (ref 8–23)
BUN BLD-MCNC: 54 MG/DL (ref 8–23)
BUN/CREAT SERPL: 19.8 (ref 7–25)
BUN/CREAT SERPL: 20.5 (ref 7–25)
BUN/CREAT SERPL: 21.1 (ref 7–25)
BUN/CREAT SERPL: 21.6 (ref 7–25)
BUN/CREAT SERPL: 21.8 (ref 7–25)
BUN/CREAT SERPL: 22.1 (ref 7–25)
BUN/CREAT SERPL: 22.4 (ref 7–25)
BUN/CREAT SERPL: 23.1 (ref 7–25)
BUN/CREAT SERPL: 23.3 (ref 7–25)
BUN/CREAT SERPL: 23.5 (ref 7–25)
BUN/CREAT SERPL: 23.9 (ref 7–25)
BUN/CREAT SERPL: 23.9 (ref 7–25)
BUN/CREAT SERPL: 24.2 (ref 7–25)
BUN/CREAT SERPL: 24.4 (ref 7–25)
BUN/CREAT SERPL: 25 (ref 7–25)
BUN/CREAT SERPL: 25 (ref 7–25)
BUN/CREAT SERPL: 25.3 (ref 7–25)
BUN/CREAT SERPL: 26.1 (ref 7–25)
BUN/CREAT SERPL: 26.2 (ref 7–25)
BUN/CREAT SERPL: 26.2 (ref 7–25)
BUN/CREAT SERPL: 26.5 (ref 7–25)
BUN/CREAT SERPL: 26.7 (ref 7–25)
BUN/CREAT SERPL: 26.8 (ref 7–25)
BUN/CREAT SERPL: 26.9 (ref 7–25)
BUN/CREAT SERPL: 26.9 (ref 7–25)
BUN/CREAT SERPL: 28.2 (ref 7–25)
BUN/CREAT SERPL: 28.8 (ref 7–25)
BUN/CREAT SERPL: 30.5 (ref 7–25)
BUN/CREAT SERPL: 31 (ref 7–25)
BUN/CREAT SERPL: 31.1 (ref 7–25)
BUN/CREAT SERPL: 31.4 (ref 7–25)
BUN/CREAT SERPL: 31.5 (ref 7–25)
BUN/CREAT SERPL: 31.7 (ref 7–25)
BUN/CREAT SERPL: 31.9 (ref 7–25)
BUN/CREAT SERPL: 32.9 (ref 7–25)
BUN/CREAT SERPL: 33.8 (ref 7–25)
BUN/CREAT SERPL: 36.5 (ref 7–25)
BUN/CREAT SERPL: 36.7 (ref 7–25)
BUN/CREAT SERPL: 36.8 (ref 7–25)
BUN/CREAT SERPL: 40.9 (ref 7–25)
BUN/CREAT SERPL: 41.8 (ref 7–25)
BUN/CREAT SERPL: 43.4 (ref 7–25)
BUN/CREAT SERPL: 49.2 (ref 7–25)
BUN/CREAT SERPL: 50 (ref 7–25)
C PNEUM DNA NPH QL NAA+NON-PROBE: NOT DETECTED
CA-I BLD-MCNC: 4.5 MG/DL (ref 4.6–5.4)
CA-I BLD-MCNC: 4.5 MG/DL (ref 4.6–5.4)
CA-I BLD-MCNC: 4.6 MG/DL (ref 4.6–5.4)
CA-I SERPL ISE-MCNC: 1.12 MMOL/L (ref 1.1–1.35)
CA-I SERPL ISE-MCNC: 1.13 MMOL/L (ref 1.1–1.35)
CA-I SERPL ISE-MCNC: 1.14 MMOL/L (ref 1.1–1.35)
CALCIUM SPEC-SCNC: 7 MG/DL (ref 8.6–10.5)
CALCIUM SPEC-SCNC: 7.1 MG/DL (ref 8.6–10.5)
CALCIUM SPEC-SCNC: 7.2 MG/DL (ref 8.6–10.5)
CALCIUM SPEC-SCNC: 7.6 MG/DL (ref 8.6–10.5)
CALCIUM SPEC-SCNC: 7.7 MG/DL (ref 8.6–10.5)
CALCIUM SPEC-SCNC: 7.7 MG/DL (ref 8.6–10.5)
CALCIUM SPEC-SCNC: 7.8 MG/DL (ref 8.6–10.5)
CALCIUM SPEC-SCNC: 7.9 MG/DL (ref 8.6–10.5)
CALCIUM SPEC-SCNC: 8 MG/DL (ref 8.6–10.5)
CALCIUM SPEC-SCNC: 8.1 MG/DL (ref 8.6–10.5)
CALCIUM SPEC-SCNC: 8.2 MG/DL (ref 8.6–10.5)
CALCIUM SPEC-SCNC: 8.3 MG/DL (ref 8.6–10.5)
CALCIUM SPEC-SCNC: 8.4 MG/DL (ref 8.6–10.5)
CALCIUM SPEC-SCNC: 8.5 MG/DL (ref 8.6–10.5)
CALCIUM SPEC-SCNC: 8.5 MG/DL (ref 8.6–10.5)
CALCIUM SPEC-SCNC: 8.7 MG/DL (ref 8.6–10.5)
CHLORIDE SERPL-SCNC: 87 MMOL/L (ref 98–107)
CHLORIDE SERPL-SCNC: 87 MMOL/L (ref 98–107)
CHLORIDE SERPL-SCNC: 88 MMOL/L (ref 98–107)
CHLORIDE SERPL-SCNC: 89 MMOL/L (ref 98–107)
CHLORIDE SERPL-SCNC: 90 MMOL/L (ref 98–107)
CHLORIDE SERPL-SCNC: 91 MMOL/L (ref 98–107)
CHLORIDE SERPL-SCNC: 92 MMOL/L (ref 98–107)
CHLORIDE SERPL-SCNC: 92 MMOL/L (ref 98–107)
CHLORIDE SERPL-SCNC: 93 MMOL/L (ref 98–107)
CHLORIDE SERPL-SCNC: 94 MMOL/L (ref 98–107)
CHLORIDE SERPL-SCNC: 95 MMOL/L (ref 98–107)
CHLORIDE SERPL-SCNC: 95 MMOL/L (ref 98–107)
CHLORIDE SERPL-SCNC: 96 MMOL/L (ref 98–107)
CHLORIDE SERPL-SCNC: 97 MMOL/L (ref 98–107)
CHLORIDE SERPL-SCNC: 99 MMOL/L (ref 98–107)
CK SERPL-CCNC: 62 U/L (ref 20–200)
CLARITY UR: ABNORMAL
CLARITY UR: CLEAR
CO2 SERPL-SCNC: 16.2 MMOL/L (ref 22–29)
CO2 SERPL-SCNC: 18.9 MMOL/L (ref 22–29)
CO2 SERPL-SCNC: 19 MMOL/L (ref 22–29)
CO2 SERPL-SCNC: 19.7 MMOL/L (ref 22–29)
CO2 SERPL-SCNC: 19.8 MMOL/L (ref 22–29)
CO2 SERPL-SCNC: 20.1 MMOL/L (ref 22–29)
CO2 SERPL-SCNC: 20.6 MMOL/L (ref 22–29)
CO2 SERPL-SCNC: 20.6 MMOL/L (ref 22–29)
CO2 SERPL-SCNC: 21.2 MMOL/L (ref 22–29)
CO2 SERPL-SCNC: 21.4 MMOL/L (ref 22–29)
CO2 SERPL-SCNC: 21.7 MMOL/L (ref 22–29)
CO2 SERPL-SCNC: 21.7 MMOL/L (ref 22–29)
CO2 SERPL-SCNC: 22 MMOL/L (ref 22–29)
CO2 SERPL-SCNC: 22.3 MMOL/L (ref 22–29)
CO2 SERPL-SCNC: 22.4 MMOL/L (ref 22–29)
CO2 SERPL-SCNC: 22.5 MMOL/L (ref 22–29)
CO2 SERPL-SCNC: 22.5 MMOL/L (ref 22–29)
CO2 SERPL-SCNC: 22.6 MMOL/L (ref 22–29)
CO2 SERPL-SCNC: 22.6 MMOL/L (ref 22–29)
CO2 SERPL-SCNC: 22.7 MMOL/L (ref 22–29)
CO2 SERPL-SCNC: 22.7 MMOL/L (ref 22–29)
CO2 SERPL-SCNC: 22.9 MMOL/L (ref 22–29)
CO2 SERPL-SCNC: 23 MMOL/L (ref 22–29)
CO2 SERPL-SCNC: 23.3 MMOL/L (ref 22–29)
CO2 SERPL-SCNC: 23.4 MMOL/L (ref 22–29)
CO2 SERPL-SCNC: 23.5 MMOL/L (ref 22–29)
CO2 SERPL-SCNC: 23.6 MMOL/L (ref 22–29)
CO2 SERPL-SCNC: 23.7 MMOL/L (ref 22–29)
CO2 SERPL-SCNC: 23.9 MMOL/L (ref 22–29)
CO2 SERPL-SCNC: 24.5 MMOL/L (ref 22–29)
CO2 SERPL-SCNC: 24.5 MMOL/L (ref 22–29)
CO2 SERPL-SCNC: 24.6 MMOL/L (ref 22–29)
CO2 SERPL-SCNC: 24.7 MMOL/L (ref 22–29)
CO2 SERPL-SCNC: 24.8 MMOL/L (ref 22–29)
CO2 SERPL-SCNC: 25.2 MMOL/L (ref 22–29)
CO2 SERPL-SCNC: 25.3 MMOL/L (ref 22–29)
CO2 SERPL-SCNC: 25.3 MMOL/L (ref 22–29)
CO2 SERPL-SCNC: 26.1 MMOL/L (ref 22–29)
CO2 SERPL-SCNC: 26.8 MMOL/L (ref 22–29)
COLOR FLD: ABNORMAL
COLOR UR: ABNORMAL
COLOR UR: ABNORMAL
COLOR UR: YELLOW
CORTIS SERPL-MCNC: 18.35 MCG/DL
CORTIS SERPL-MCNC: 2.67 MCG/DL
CREAT BLD-MCNC: 0.61 MG/DL (ref 0.76–1.27)
CREAT BLD-MCNC: 0.62 MG/DL (ref 0.76–1.27)
CREAT BLD-MCNC: 0.66 MG/DL (ref 0.76–1.27)
CREAT BLD-MCNC: 0.67 MG/DL (ref 0.76–1.27)
CREAT BLD-MCNC: 0.68 MG/DL (ref 0.76–1.27)
CREAT BLD-MCNC: 0.69 MG/DL (ref 0.76–1.27)
CREAT BLD-MCNC: 0.71 MG/DL (ref 0.76–1.27)
CREAT BLD-MCNC: 0.71 MG/DL (ref 0.76–1.27)
CREAT BLD-MCNC: 0.72 MG/DL (ref 0.76–1.27)
CREAT BLD-MCNC: 0.72 MG/DL (ref 0.76–1.27)
CREAT BLD-MCNC: 0.73 MG/DL (ref 0.76–1.27)
CREAT BLD-MCNC: 0.74 MG/DL (ref 0.76–1.27)
CREAT BLD-MCNC: 0.75 MG/DL (ref 0.76–1.27)
CREAT BLD-MCNC: 0.78 MG/DL (ref 0.76–1.27)
CREAT BLD-MCNC: 0.78 MG/DL (ref 0.76–1.27)
CREAT BLD-MCNC: 0.79 MG/DL (ref 0.76–1.27)
CREAT BLD-MCNC: 0.8 MG/DL (ref 0.76–1.27)
CREAT BLD-MCNC: 0.81 MG/DL (ref 0.76–1.27)
CREAT BLD-MCNC: 0.82 MG/DL (ref 0.76–1.27)
CREAT BLD-MCNC: 0.83 MG/DL (ref 0.76–1.27)
CREAT BLD-MCNC: 0.84 MG/DL (ref 0.76–1.27)
CREAT BLD-MCNC: 0.84 MG/DL (ref 0.76–1.27)
CREAT BLD-MCNC: 0.85 MG/DL (ref 0.76–1.27)
CREAT BLD-MCNC: 0.85 MG/DL (ref 0.76–1.27)
CREAT BLD-MCNC: 0.86 MG/DL (ref 0.76–1.27)
CREAT BLD-MCNC: 0.87 MG/DL (ref 0.76–1.27)
CREAT BLD-MCNC: 0.93 MG/DL (ref 0.76–1.27)
CREAT BLD-MCNC: 0.96 MG/DL (ref 0.76–1.27)
CREAT BLD-MCNC: 0.99 MG/DL (ref 0.76–1.27)
CREAT BLD-MCNC: 1.06 MG/DL (ref 0.76–1.27)
CREAT BLD-MCNC: 1.08 MG/DL (ref 0.76–1.27)
CREAT BLD-MCNC: 1.1 MG/DL (ref 0.76–1.27)
CRP SERPL-MCNC: 11.59 MG/DL (ref 0–0.5)
CRP SERPL-MCNC: 11.93 MG/DL (ref 0–0.5)
CRP SERPL-MCNC: 14.51 MG/DL (ref 0–0.5)
CRP SERPL-MCNC: 18.72 MG/DL (ref 0–0.5)
CRP SERPL-MCNC: 7.68 MG/DL (ref 0–0.5)
CRP SERPL-MCNC: 8.93 MG/DL (ref 0–0.5)
D-LACTATE SERPL-SCNC: 0.9 MMOL/L (ref 0.5–2)
D-LACTATE SERPL-SCNC: 1.2 MMOL/L (ref 0.5–2)
D-LACTATE SERPL-SCNC: 1.7 MMOL/L (ref 0.5–2)
D-LACTATE SERPL-SCNC: 1.8 MMOL/L (ref 0.5–2)
DEPRECATED RDW RBC AUTO: 45.2 FL (ref 37–54)
DEPRECATED RDW RBC AUTO: 45.9 FL (ref 37–54)
DEPRECATED RDW RBC AUTO: 46.2 FL (ref 37–54)
DEPRECATED RDW RBC AUTO: 46.4 FL (ref 37–54)
DEPRECATED RDW RBC AUTO: 46.7 FL (ref 37–54)
DEPRECATED RDW RBC AUTO: 46.9 FL (ref 37–54)
DEPRECATED RDW RBC AUTO: 47.1 FL (ref 37–54)
DEPRECATED RDW RBC AUTO: 47.7 FL (ref 37–54)
DEPRECATED RDW RBC AUTO: 47.8 FL (ref 37–54)
DEPRECATED RDW RBC AUTO: 47.9 FL (ref 37–54)
DEPRECATED RDW RBC AUTO: 48 FL (ref 37–54)
DEPRECATED RDW RBC AUTO: 48.1 FL (ref 37–54)
DEPRECATED RDW RBC AUTO: 48.3 FL (ref 37–54)
DEPRECATED RDW RBC AUTO: 48.4 FL (ref 37–54)
DEPRECATED RDW RBC AUTO: 48.4 FL (ref 37–54)
DEPRECATED RDW RBC AUTO: 48.5 FL (ref 37–54)
DEPRECATED RDW RBC AUTO: 48.6 FL (ref 37–54)
DEPRECATED RDW RBC AUTO: 49 FL (ref 37–54)
DEPRECATED RDW RBC AUTO: 50.8 FL (ref 37–54)
DEPRECATED RDW RBC AUTO: 51.2 FL (ref 37–54)
DEPRECATED RDW RBC AUTO: 51.7 FL (ref 37–54)
DEPRECATED RDW RBC AUTO: 51.8 FL (ref 37–54)
DEPRECATED RDW RBC AUTO: 52.3 FL (ref 37–54)
DEPRECATED RDW RBC AUTO: 52.4 FL (ref 37–54)
DEPRECATED RDW RBC AUTO: 52.6 FL (ref 37–54)
DEPRECATED RDW RBC AUTO: 52.9 FL (ref 37–54)
DEPRECATED RDW RBC AUTO: 53 FL (ref 37–54)
DEPRECATED RDW RBC AUTO: 53.2 FL (ref 37–54)
DEPRECATED RDW RBC AUTO: 53.3 FL (ref 37–54)
DEPRECATED RDW RBC AUTO: 53.4 FL (ref 37–54)
DEPRECATED RDW RBC AUTO: 55.7 FL (ref 37–54)
DEPRECATED RDW RBC AUTO: 55.8 FL (ref 37–54)
E/E' RATIO: 8
EOSINOPHIL # BLD AUTO: 0 10*3/MM3 (ref 0–0.7)
EOSINOPHIL # BLD AUTO: 0.02 10*3/MM3 (ref 0–0.7)
EOSINOPHIL # BLD AUTO: 0.03 10*3/MM3 (ref 0–0.7)
EOSINOPHIL # BLD AUTO: 0.03 10*3/MM3 (ref 0–0.7)
EOSINOPHIL # BLD AUTO: 0.04 10*3/MM3 (ref 0–0.7)
EOSINOPHIL # BLD AUTO: 0.05 10*3/MM3 (ref 0–0.7)
EOSINOPHIL # BLD AUTO: 0.06 10*3/MM3 (ref 0–0.7)
EOSINOPHIL # BLD AUTO: 0.06 10*3/MM3 (ref 0–0.7)
EOSINOPHIL # BLD AUTO: 0.08 10*3/MM3 (ref 0–0.7)
EOSINOPHIL # BLD AUTO: 0.09 10*3/MM3 (ref 0–0.7)
EOSINOPHIL # BLD AUTO: 0.09 10*3/MM3 (ref 0–0.7)
EOSINOPHIL # BLD AUTO: 0.12 10*3/MM3 (ref 0–0.7)
EOSINOPHIL # BLD AUTO: 0.17 10*3/MM3 (ref 0–0.7)
EOSINOPHIL # BLD AUTO: 0.19 10*3/MM3 (ref 0–0.7)
EOSINOPHIL # BLD AUTO: 0.29 10*3/MM3 (ref 0–0.7)
EOSINOPHIL # BLD AUTO: 0.3 10*3/MM3 (ref 0–0.7)
EOSINOPHIL # BLD AUTO: 0.3 10*3/MM3 (ref 0–0.7)
EOSINOPHIL # BLD AUTO: 0.31 10*3/MM3 (ref 0–0.7)
EOSINOPHIL NFR BLD AUTO: 0 % (ref 0.3–6.2)
EOSINOPHIL NFR BLD AUTO: 0.1 % (ref 0.3–6.2)
EOSINOPHIL NFR BLD AUTO: 0.1 % (ref 0.3–6.2)
EOSINOPHIL NFR BLD AUTO: 0.2 % (ref 0.3–6.2)
EOSINOPHIL NFR BLD AUTO: 0.2 % (ref 0.3–6.2)
EOSINOPHIL NFR BLD AUTO: 0.3 % (ref 0.3–6.2)
EOSINOPHIL NFR BLD AUTO: 0.4 % (ref 0.3–6.2)
EOSINOPHIL NFR BLD AUTO: 0.5 % (ref 0.3–6.2)
EOSINOPHIL NFR BLD AUTO: 0.7 % (ref 0.3–6.2)
EOSINOPHIL NFR BLD AUTO: 0.8 % (ref 0.3–6.2)
EOSINOPHIL NFR BLD AUTO: 1 % (ref 0.3–6.2)
EOSINOPHIL NFR BLD AUTO: 1.4 % (ref 0.3–6.2)
EOSINOPHIL NFR BLD AUTO: 1.6 % (ref 0.3–6.2)
EOSINOPHIL NFR BLD AUTO: 2.1 % (ref 0.3–6.2)
EOSINOPHIL NFR BLD AUTO: 2.8 % (ref 0.3–6.2)
EOSINOPHIL NFR BLD AUTO: 3 % (ref 0.3–6.2)
ERYTHROCYTE [DISTWIDTH] IN BLOOD BY AUTOMATED COUNT: 12.4 % (ref 11.5–14.5)
ERYTHROCYTE [DISTWIDTH] IN BLOOD BY AUTOMATED COUNT: 12.6 % (ref 11.5–14.5)
ERYTHROCYTE [DISTWIDTH] IN BLOOD BY AUTOMATED COUNT: 12.7 % (ref 11.5–14.5)
ERYTHROCYTE [DISTWIDTH] IN BLOOD BY AUTOMATED COUNT: 12.8 % (ref 11.5–14.5)
ERYTHROCYTE [DISTWIDTH] IN BLOOD BY AUTOMATED COUNT: 12.8 % (ref 11.5–14.5)
ERYTHROCYTE [DISTWIDTH] IN BLOOD BY AUTOMATED COUNT: 12.9 % (ref 11.5–14.5)
ERYTHROCYTE [DISTWIDTH] IN BLOOD BY AUTOMATED COUNT: 13 % (ref 11.5–14.5)
ERYTHROCYTE [DISTWIDTH] IN BLOOD BY AUTOMATED COUNT: 13 % (ref 11.5–14.5)
ERYTHROCYTE [DISTWIDTH] IN BLOOD BY AUTOMATED COUNT: 13.2 % (ref 11.5–14.5)
ERYTHROCYTE [DISTWIDTH] IN BLOOD BY AUTOMATED COUNT: 13.3 % (ref 11.5–14.5)
ERYTHROCYTE [DISTWIDTH] IN BLOOD BY AUTOMATED COUNT: 13.4 % (ref 11.5–14.5)
ERYTHROCYTE [DISTWIDTH] IN BLOOD BY AUTOMATED COUNT: 14.8 % (ref 11.5–14.5)
ERYTHROCYTE [DISTWIDTH] IN BLOOD BY AUTOMATED COUNT: 14.9 % (ref 11.5–14.5)
ERYTHROCYTE [DISTWIDTH] IN BLOOD BY AUTOMATED COUNT: 15.1 % (ref 11.5–14.5)
ERYTHROCYTE [DISTWIDTH] IN BLOOD BY AUTOMATED COUNT: 16.2 % (ref 11.5–14.5)
ERYTHROCYTE [DISTWIDTH] IN BLOOD BY AUTOMATED COUNT: 16.3 % (ref 11.5–14.5)
ERYTHROCYTE [DISTWIDTH] IN BLOOD BY AUTOMATED COUNT: 16.5 % (ref 11.5–14.5)
ERYTHROCYTE [DISTWIDTH] IN BLOOD BY AUTOMATED COUNT: 16.5 % (ref 11.5–14.5)
ERYTHROCYTE [DISTWIDTH] IN BLOOD BY AUTOMATED COUNT: 16.6 % (ref 11.5–14.5)
ERYTHROCYTE [DISTWIDTH] IN BLOOD BY AUTOMATED COUNT: 16.6 % (ref 11.5–14.5)
ERYTHROCYTE [DISTWIDTH] IN BLOOD BY AUTOMATED COUNT: 16.7 % (ref 11.5–14.5)
ERYTHROCYTE [DISTWIDTH] IN BLOOD BY AUTOMATED COUNT: 16.7 % (ref 11.5–14.5)
ERYTHROCYTE [DISTWIDTH] IN BLOOD BY AUTOMATED COUNT: 16.8 % (ref 11.5–14.5)
ERYTHROCYTE [DISTWIDTH] IN BLOOD BY AUTOMATED COUNT: 16.8 % (ref 11.5–14.5)
ERYTHROCYTE [DISTWIDTH] IN BLOOD BY AUTOMATED COUNT: 16.9 % (ref 11.5–14.5)
ERYTHROCYTE [SEDIMENTATION RATE] IN BLOOD: 36 MM/HR (ref 0–20)
ERYTHROCYTE [SEDIMENTATION RATE] IN BLOOD: 45 MM/HR (ref 0–20)
ERYTHROCYTE [SEDIMENTATION RATE] IN BLOOD: 45 MM/HR (ref 0–20)
FERRITIN SERPL-MCNC: 422.2 NG/ML (ref 30–400)
FLUAV H1 2009 PAND RNA NPH QL NAA+PROBE: NOT DETECTED
FLUAV H1 HA GENE NPH QL NAA+PROBE: NOT DETECTED
FLUAV H3 RNA NPH QL NAA+PROBE: NOT DETECTED
FLUAV SUBTYP SPEC NAA+PROBE: NOT DETECTED
FLUBV RNA ISLT QL NAA+PROBE: NOT DETECTED
FOLATE BLD-MCNC: 292.8 NG/ML
FOLATE RBC-MCNC: 983 NG/ML
FOLATE SERPL-MCNC: 13.09 NG/ML (ref 4.78–24.2)
GFR SERPL CREATININE-BSD FRML MDRD: 101 ML/MIN/1.73
GFR SERPL CREATININE-BSD FRML MDRD: 103 ML/MIN/1.73
GFR SERPL CREATININE-BSD FRML MDRD: 104 ML/MIN/1.73
GFR SERPL CREATININE-BSD FRML MDRD: 104 ML/MIN/1.73
GFR SERPL CREATININE-BSD FRML MDRD: 106 ML/MIN/1.73
GFR SERPL CREATININE-BSD FRML MDRD: 106 ML/MIN/1.73
GFR SERPL CREATININE-BSD FRML MDRD: 110 ML/MIN/1.73
GFR SERPL CREATININE-BSD FRML MDRD: 111 ML/MIN/1.73
GFR SERPL CREATININE-BSD FRML MDRD: 113 ML/MIN/1.73
GFR SERPL CREATININE-BSD FRML MDRD: 115 ML/MIN/1.73
GFR SERPL CREATININE-BSD FRML MDRD: 124 ML/MIN/1.73
GFR SERPL CREATININE-BSD FRML MDRD: 126 ML/MIN/1.73
GFR SERPL CREATININE-BSD FRML MDRD: 64 ML/MIN/1.73
GFR SERPL CREATININE-BSD FRML MDRD: 65 ML/MIN/1.73
GFR SERPL CREATININE-BSD FRML MDRD: 67 ML/MIN/1.73
GFR SERPL CREATININE-BSD FRML MDRD: 72 ML/MIN/1.73
GFR SERPL CREATININE-BSD FRML MDRD: 75 ML/MIN/1.73
GFR SERPL CREATININE-BSD FRML MDRD: 78 ML/MIN/1.73
GFR SERPL CREATININE-BSD FRML MDRD: 84 ML/MIN/1.73
GFR SERPL CREATININE-BSD FRML MDRD: 85 ML/MIN/1.73
GFR SERPL CREATININE-BSD FRML MDRD: 86 ML/MIN/1.73
GFR SERPL CREATININE-BSD FRML MDRD: 86 ML/MIN/1.73
GFR SERPL CREATININE-BSD FRML MDRD: 87 ML/MIN/1.73
GFR SERPL CREATININE-BSD FRML MDRD: 87 ML/MIN/1.73
GFR SERPL CREATININE-BSD FRML MDRD: 88 ML/MIN/1.73
GFR SERPL CREATININE-BSD FRML MDRD: 90 ML/MIN/1.73
GFR SERPL CREATININE-BSD FRML MDRD: 91 ML/MIN/1.73
GFR SERPL CREATININE-BSD FRML MDRD: 92 ML/MIN/1.73
GFR SERPL CREATININE-BSD FRML MDRD: 94 ML/MIN/1.73
GFR SERPL CREATININE-BSD FRML MDRD: 95 ML/MIN/1.73
GFR SERPL CREATININE-BSD FRML MDRD: 95 ML/MIN/1.73
GFR SERPL CREATININE-BSD FRML MDRD: 99 ML/MIN/1.73
GLOBULIN UR ELPH-MCNC: 2.6 GM/DL
GLOBULIN UR ELPH-MCNC: 2.8 GM/DL
GLOBULIN UR ELPH-MCNC: 2.9 GM/DL
GLOBULIN UR ELPH-MCNC: 3 GM/DL
GLOBULIN UR ELPH-MCNC: 3.1 GM/DL
GLOBULIN UR ELPH-MCNC: 3.1 GM/DL
GLUCOSE BLD-MCNC: 100 MG/DL (ref 65–99)
GLUCOSE BLD-MCNC: 103 MG/DL (ref 65–99)
GLUCOSE BLD-MCNC: 106 MG/DL (ref 65–99)
GLUCOSE BLD-MCNC: 107 MG/DL (ref 65–99)
GLUCOSE BLD-MCNC: 108 MG/DL (ref 65–99)
GLUCOSE BLD-MCNC: 110 MG/DL (ref 65–99)
GLUCOSE BLD-MCNC: 111 MG/DL (ref 65–99)
GLUCOSE BLD-MCNC: 112 MG/DL (ref 65–99)
GLUCOSE BLD-MCNC: 113 MG/DL (ref 65–99)
GLUCOSE BLD-MCNC: 114 MG/DL (ref 65–99)
GLUCOSE BLD-MCNC: 116 MG/DL (ref 65–99)
GLUCOSE BLD-MCNC: 118 MG/DL (ref 65–99)
GLUCOSE BLD-MCNC: 121 MG/DL (ref 65–99)
GLUCOSE BLD-MCNC: 122 MG/DL (ref 65–99)
GLUCOSE BLD-MCNC: 124 MG/DL (ref 65–99)
GLUCOSE BLD-MCNC: 126 MG/DL (ref 65–99)
GLUCOSE BLD-MCNC: 127 MG/DL (ref 65–99)
GLUCOSE BLD-MCNC: 128 MG/DL (ref 65–99)
GLUCOSE BLD-MCNC: 133 MG/DL (ref 65–99)
GLUCOSE BLD-MCNC: 134 MG/DL (ref 65–99)
GLUCOSE BLD-MCNC: 139 MG/DL (ref 65–99)
GLUCOSE BLD-MCNC: 151 MG/DL (ref 65–99)
GLUCOSE BLD-MCNC: 172 MG/DL (ref 65–99)
GLUCOSE BLD-MCNC: 90 MG/DL (ref 65–99)
GLUCOSE BLD-MCNC: 92 MG/DL (ref 65–99)
GLUCOSE BLD-MCNC: 92 MG/DL (ref 65–99)
GLUCOSE BLD-MCNC: 95 MG/DL (ref 65–99)
GLUCOSE BLD-MCNC: 95 MG/DL (ref 65–99)
GLUCOSE BLD-MCNC: 96 MG/DL (ref 65–99)
GLUCOSE BLD-MCNC: 98 MG/DL (ref 65–99)
GLUCOSE BLD-MCNC: 98 MG/DL (ref 65–99)
GLUCOSE BLD-MCNC: 99 MG/DL (ref 65–99)
GLUCOSE BLDC GLUCOMTR-MCNC: 114 MG/DL (ref 70–130)
GLUCOSE BLDC GLUCOMTR-MCNC: 119 MG/DL (ref 70–130)
GLUCOSE BLDC GLUCOMTR-MCNC: 123 MG/DL (ref 70–130)
GLUCOSE BLDC GLUCOMTR-MCNC: 124 MG/DL (ref 70–130)
GLUCOSE BLDC GLUCOMTR-MCNC: 136 MG/DL (ref 70–130)
GLUCOSE UR STRIP-MCNC: NEGATIVE MG/DL
GRAM STN SPEC: ABNORMAL
GRAM STN SPEC: NORMAL
GRAN CASTS URNS QL MICRO: ABNORMAL /LPF
HADV DNA SPEC NAA+PROBE: NOT DETECTED
HAPTOGLOB SERPL-MCNC: 319 MG/DL (ref 30–200)
HCOV 229E RNA SPEC QL NAA+PROBE: NOT DETECTED
HCOV HKU1 RNA SPEC QL NAA+PROBE: NOT DETECTED
HCOV NL63 RNA SPEC QL NAA+PROBE: NOT DETECTED
HCOV OC43 RNA SPEC QL NAA+PROBE: NOT DETECTED
HCT VFR BLD AUTO: 20.3 % (ref 40.4–52.2)
HCT VFR BLD AUTO: 21.2 % (ref 40.4–52.2)
HCT VFR BLD AUTO: 21.8 % (ref 40.4–52.2)
HCT VFR BLD AUTO: 22 % (ref 40.4–52.2)
HCT VFR BLD AUTO: 22.4 % (ref 40.4–52.2)
HCT VFR BLD AUTO: 22.5 % (ref 40.4–52.2)
HCT VFR BLD AUTO: 22.9 % (ref 40.4–52.2)
HCT VFR BLD AUTO: 23.5 % (ref 40.4–52.2)
HCT VFR BLD AUTO: 23.5 % (ref 40.4–52.2)
HCT VFR BLD AUTO: 24.3 % (ref 40.4–52.2)
HCT VFR BLD AUTO: 24.4 % (ref 40.4–52.2)
HCT VFR BLD AUTO: 24.4 % (ref 40.4–52.2)
HCT VFR BLD AUTO: 24.5 % (ref 40.4–52.2)
HCT VFR BLD AUTO: 25.3 % (ref 40.4–52.2)
HCT VFR BLD AUTO: 25.3 % (ref 40.4–52.2)
HCT VFR BLD AUTO: 26.3 % (ref 40.4–52.2)
HCT VFR BLD AUTO: 26.4 % (ref 40.4–52.2)
HCT VFR BLD AUTO: 26.4 % (ref 40.4–52.2)
HCT VFR BLD AUTO: 26.7 % (ref 40.4–52.2)
HCT VFR BLD AUTO: 26.9 % (ref 40.4–52.2)
HCT VFR BLD AUTO: 27.2 % (ref 40.4–52.2)
HCT VFR BLD AUTO: 27.6 % (ref 40.4–52.2)
HCT VFR BLD AUTO: 27.7 % (ref 40.4–52.2)
HCT VFR BLD AUTO: 28.5 % (ref 40.4–52.2)
HCT VFR BLD AUTO: 28.9 % (ref 40.4–52.2)
HCT VFR BLD AUTO: 29.5 % (ref 40.4–52.2)
HCT VFR BLD AUTO: 29.6 % (ref 40.4–52.2)
HCT VFR BLD AUTO: 29.6 % (ref 40.4–52.2)
HCT VFR BLD AUTO: 29.8 % (ref 37.5–51)
HCT VFR BLD AUTO: 30.3 % (ref 40.4–52.2)
HCT VFR BLD AUTO: 30.9 % (ref 40.4–52.2)
HCT VFR BLD AUTO: 31 % (ref 40.4–52.2)
HGB BLD-MCNC: 10.2 G/DL (ref 13.7–17.6)
HGB BLD-MCNC: 10.3 G/DL (ref 13.7–17.6)
HGB BLD-MCNC: 10.4 G/DL (ref 13.7–17.6)
HGB BLD-MCNC: 10.4 G/DL (ref 13.7–17.6)
HGB BLD-MCNC: 10.7 G/DL (ref 13.7–17.6)
HGB BLD-MCNC: 10.9 G/DL (ref 13.7–17.6)
HGB BLD-MCNC: 11 G/DL (ref 13.7–17.6)
HGB BLD-MCNC: 6.7 G/DL (ref 13.7–17.6)
HGB BLD-MCNC: 6.9 G/DL (ref 13.7–17.6)
HGB BLD-MCNC: 7.1 G/DL (ref 13.7–17.6)
HGB BLD-MCNC: 7.3 G/DL (ref 13.7–17.6)
HGB BLD-MCNC: 7.4 G/DL (ref 13.7–17.6)
HGB BLD-MCNC: 7.7 G/DL (ref 13.7–17.6)
HGB BLD-MCNC: 7.9 G/DL (ref 13.7–17.6)
HGB BLD-MCNC: 8 G/DL (ref 13.7–17.6)
HGB BLD-MCNC: 8.1 G/DL (ref 13.7–17.6)
HGB BLD-MCNC: 8.1 G/DL (ref 13.7–17.6)
HGB BLD-MCNC: 8.2 G/DL (ref 13.7–17.6)
HGB BLD-MCNC: 8.8 G/DL (ref 13.7–17.6)
HGB BLD-MCNC: 8.9 G/DL (ref 13.7–17.6)
HGB BLD-MCNC: 8.9 G/DL (ref 13.7–17.6)
HGB BLD-MCNC: 9 G/DL (ref 13.7–17.6)
HGB BLD-MCNC: 9.2 G/DL (ref 13.7–17.6)
HGB BLD-MCNC: 9.3 G/DL (ref 13.7–17.6)
HGB BLD-MCNC: 9.4 G/DL (ref 13.7–17.6)
HGB BLD-MCNC: 9.6 G/DL (ref 13.7–17.6)
HGB BLD-MCNC: 9.7 G/DL (ref 13.7–17.6)
HGB BLD-MCNC: 9.9 G/DL (ref 13.7–17.6)
HGB BLD-MCNC: 9.9 G/DL (ref 13.7–17.6)
HGB UR QL STRIP.AUTO: ABNORMAL
HGB UR QL STRIP.AUTO: ABNORMAL
HGB UR QL STRIP.AUTO: NEGATIVE
HMPV RNA NPH QL NAA+NON-PROBE: NOT DETECTED
HOLD SPECIMEN: NORMAL
HPIV1 RNA SPEC QL NAA+PROBE: NOT DETECTED
HPIV2 RNA SPEC QL NAA+PROBE: NOT DETECTED
HPIV3 RNA NPH QL NAA+PROBE: NOT DETECTED
HPIV4 P GENE NPH QL NAA+PROBE: NOT DETECTED
HYALINE CASTS UR QL AUTO: ABNORMAL /LPF
HYPOCHROMIA BLD QL: NORMAL
IMM GRANULOCYTES # BLD: 0.02 10*3/MM3 (ref 0–0.03)
IMM GRANULOCYTES # BLD: 0.02 10*3/MM3 (ref 0–0.03)
IMM GRANULOCYTES # BLD: 0.03 10*3/MM3 (ref 0–0.03)
IMM GRANULOCYTES # BLD: 0.04 10*3/MM3 (ref 0–0.03)
IMM GRANULOCYTES # BLD: 0.04 10*3/MM3 (ref 0–0.03)
IMM GRANULOCYTES # BLD: 0.05 10*3/MM3 (ref 0–0.03)
IMM GRANULOCYTES # BLD: 0.06 10*3/MM3 (ref 0–0.03)
IMM GRANULOCYTES # BLD: 0.08 10*3/MM3 (ref 0–0.03)
IMM GRANULOCYTES # BLD: 0.09 10*3/MM3 (ref 0–0.03)
IMM GRANULOCYTES # BLD: 0.09 10*3/MM3 (ref 0–0.03)
IMM GRANULOCYTES # BLD: 0.11 10*3/MM3 (ref 0–0.03)
IMM GRANULOCYTES # BLD: 0.11 10*3/MM3 (ref 0–0.03)
IMM GRANULOCYTES # BLD: 0.19 10*3/MM3 (ref 0–0.03)
IMM GRANULOCYTES # BLD: 0.2 10*3/MM3 (ref 0–0.03)
IMM GRANULOCYTES # BLD: 0.22 10*3/MM3 (ref 0–0.03)
IMM GRANULOCYTES # BLD: 0.24 10*3/MM3 (ref 0–0.03)
IMM GRANULOCYTES # BLD: 0.3 10*3/MM3 (ref 0–0.03)
IMM GRANULOCYTES # BLD: 0.36 10*3/MM3 (ref 0–0.03)
IMM GRANULOCYTES # BLD: 0.37 10*3/MM3 (ref 0–0.03)
IMM GRANULOCYTES NFR BLD: 0.2 % (ref 0–0.5)
IMM GRANULOCYTES NFR BLD: 0.3 % (ref 0–0.5)
IMM GRANULOCYTES NFR BLD: 0.4 % (ref 0–0.5)
IMM GRANULOCYTES NFR BLD: 0.5 % (ref 0–0.5)
IMM GRANULOCYTES NFR BLD: 0.6 % (ref 0–0.5)
IMM GRANULOCYTES NFR BLD: 0.6 % (ref 0–0.5)
IMM GRANULOCYTES NFR BLD: 0.7 % (ref 0–0.5)
IMM GRANULOCYTES NFR BLD: 0.8 % (ref 0–0.5)
IMM GRANULOCYTES NFR BLD: 0.9 % (ref 0–0.5)
IMM GRANULOCYTES NFR BLD: 1 % (ref 0–0.5)
IMM GRANULOCYTES NFR BLD: 1.2 % (ref 0–0.5)
IMM GRANULOCYTES NFR BLD: 1.7 % (ref 0–0.5)
IMM GRANULOCYTES NFR BLD: 1.8 % (ref 0–0.5)
IMM GRANULOCYTES NFR BLD: 2.4 % (ref 0–0.5)
INR PPP: 1.11 (ref 0.9–1.1)
INR PPP: 1.16 (ref 0.9–1.1)
INR PPP: 1.27 (ref 0.9–1.1)
INR PPP: 1.35 (ref 0.9–1.1)
IRON 24H UR-MRATE: 10 MCG/DL (ref 59–158)
IRON SATN MFR SERPL: 8 % (ref 20–50)
KETONES UR QL STRIP: ABNORMAL
KETONES UR QL STRIP: NEGATIVE
LDH SERPL-CCNC: 164 U/L (ref 135–225)
LEFT ATRIUM VOLUME INDEX: 19 ML/M2
LEFT ATRIUM VOLUME: 41 CM3
LEUKOCYTE ESTERASE UR QL STRIP.AUTO: ABNORMAL
LEUKOCYTE ESTERASE UR QL STRIP.AUTO: NEGATIVE
LIPASE SERPL-CCNC: 17 U/L (ref 13–60)
LYMPHOCYTES # BLD AUTO: 0.61 10*3/MM3 (ref 0.9–4.8)
LYMPHOCYTES # BLD AUTO: 0.74 10*3/MM3 (ref 0.9–4.8)
LYMPHOCYTES # BLD AUTO: 0.83 10*3/MM3 (ref 0.9–4.8)
LYMPHOCYTES # BLD AUTO: 0.84 10*3/MM3 (ref 0.9–4.8)
LYMPHOCYTES # BLD AUTO: 0.96 10*3/MM3 (ref 0.9–4.8)
LYMPHOCYTES # BLD AUTO: 1 10*3/MM3 (ref 0.9–4.8)
LYMPHOCYTES # BLD AUTO: 1.06 10*3/MM3 (ref 0.9–4.8)
LYMPHOCYTES # BLD AUTO: 1.15 10*3/MM3 (ref 0.9–4.8)
LYMPHOCYTES # BLD AUTO: 1.19 10*3/MM3 (ref 0.9–4.8)
LYMPHOCYTES # BLD AUTO: 1.21 10*3/MM3 (ref 0.9–4.8)
LYMPHOCYTES # BLD AUTO: 1.27 10*3/MM3 (ref 0.9–4.8)
LYMPHOCYTES # BLD AUTO: 1.31 10*3/MM3 (ref 0.9–4.8)
LYMPHOCYTES # BLD AUTO: 1.42 10*3/MM3 (ref 0.9–4.8)
LYMPHOCYTES # BLD AUTO: 1.42 10*3/MM3 (ref 0.9–4.8)
LYMPHOCYTES # BLD AUTO: 1.47 10*3/MM3 (ref 0.9–4.8)
LYMPHOCYTES # BLD AUTO: 1.52 10*3/MM3 (ref 0.9–4.8)
LYMPHOCYTES # BLD AUTO: 1.53 10*3/MM3 (ref 0.9–4.8)
LYMPHOCYTES # BLD AUTO: 1.62 10*3/MM3 (ref 0.9–4.8)
LYMPHOCYTES # BLD AUTO: 1.65 10*3/MM3 (ref 0.9–4.8)
LYMPHOCYTES # BLD AUTO: 1.73 10*3/MM3 (ref 0.9–4.8)
LYMPHOCYTES # BLD AUTO: 1.75 10*3/MM3 (ref 0.9–4.8)
LYMPHOCYTES # BLD AUTO: 1.81 10*3/MM3 (ref 0.9–4.8)
LYMPHOCYTES # BLD AUTO: 1.95 10*3/MM3 (ref 0.9–4.8)
LYMPHOCYTES NFR BLD AUTO: 10.4 % (ref 19.6–45.3)
LYMPHOCYTES NFR BLD AUTO: 11.8 % (ref 19.6–45.3)
LYMPHOCYTES NFR BLD AUTO: 12.2 % (ref 19.6–45.3)
LYMPHOCYTES NFR BLD AUTO: 13.2 % (ref 19.6–45.3)
LYMPHOCYTES NFR BLD AUTO: 15 % (ref 19.6–45.3)
LYMPHOCYTES NFR BLD AUTO: 15.4 % (ref 19.6–45.3)
LYMPHOCYTES NFR BLD AUTO: 18 % (ref 19.6–45.3)
LYMPHOCYTES NFR BLD AUTO: 3.5 % (ref 19.6–45.3)
LYMPHOCYTES NFR BLD AUTO: 4.9 % (ref 19.6–45.3)
LYMPHOCYTES NFR BLD AUTO: 5.5 % (ref 19.6–45.3)
LYMPHOCYTES NFR BLD AUTO: 5.8 % (ref 19.6–45.3)
LYMPHOCYTES NFR BLD AUTO: 6 % (ref 19.6–45.3)
LYMPHOCYTES NFR BLD AUTO: 7 % (ref 19.6–45.3)
LYMPHOCYTES NFR BLD AUTO: 7.1 % (ref 19.6–45.3)
LYMPHOCYTES NFR BLD AUTO: 8.2 % (ref 19.6–45.3)
LYMPHOCYTES NFR BLD AUTO: 8.3 % (ref 19.6–45.3)
LYMPHOCYTES NFR BLD AUTO: 8.6 % (ref 19.6–45.3)
LYMPHOCYTES NFR BLD AUTO: 8.9 % (ref 19.6–45.3)
LYMPHOCYTES NFR BLD AUTO: 9.1 % (ref 19.6–45.3)
LYMPHOCYTES NFR BLD AUTO: 9.3 % (ref 19.6–45.3)
LYMPHOCYTES NFR BLD AUTO: 9.3 % (ref 19.6–45.3)
LYMPHOCYTES NFR FLD MANUAL: 8 %
M PNEUMO IGG SER IA-ACNC: NOT DETECTED
MAGNESIUM SERPL-MCNC: 1.7 MG/DL (ref 1.6–2.4)
MCH RBC QN AUTO: 27.3 PG (ref 27–32.7)
MCH RBC QN AUTO: 28 PG (ref 27–32.7)
MCH RBC QN AUTO: 28.2 PG (ref 27–32.7)
MCH RBC QN AUTO: 28.4 PG (ref 27–32.7)
MCH RBC QN AUTO: 28.6 PG (ref 27–32.7)
MCH RBC QN AUTO: 28.7 PG (ref 27–32.7)
MCH RBC QN AUTO: 28.8 PG (ref 27–32.7)
MCH RBC QN AUTO: 28.9 PG (ref 27–32.7)
MCH RBC QN AUTO: 29.8 PG (ref 27–32.7)
MCH RBC QN AUTO: 30.4 PG (ref 27–32.7)
MCH RBC QN AUTO: 30.5 PG (ref 27–32.7)
MCH RBC QN AUTO: 31.1 PG (ref 27–32.7)
MCH RBC QN AUTO: 31.3 PG (ref 27–32.7)
MCH RBC QN AUTO: 31.7 PG (ref 27–32.7)
MCH RBC QN AUTO: 34.6 PG (ref 27–32.7)
MCH RBC QN AUTO: 35.5 PG (ref 27–32.7)
MCH RBC QN AUTO: 35.5 PG (ref 27–32.7)
MCH RBC QN AUTO: 35.7 PG (ref 27–32.7)
MCH RBC QN AUTO: 35.8 PG (ref 27–32.7)
MCH RBC QN AUTO: 35.9 PG (ref 27–32.7)
MCH RBC QN AUTO: 36 PG (ref 27–32.7)
MCH RBC QN AUTO: 36 PG (ref 27–32.7)
MCH RBC QN AUTO: 36.1 PG (ref 27–32.7)
MCH RBC QN AUTO: 36.4 PG (ref 27–32.7)
MCH RBC QN AUTO: 36.4 PG (ref 27–32.7)
MCH RBC QN AUTO: 36.5 PG (ref 27–32.7)
MCH RBC QN AUTO: 36.8 PG (ref 27–32.7)
MCH RBC QN AUTO: 36.8 PG (ref 27–32.7)
MCHC RBC AUTO-ENTMCNC: 32.3 G/DL (ref 32.6–36.4)
MCHC RBC AUTO-ENTMCNC: 32.6 G/DL (ref 32.6–36.4)
MCHC RBC AUTO-ENTMCNC: 32.8 G/DL (ref 32.6–36.4)
MCHC RBC AUTO-ENTMCNC: 32.9 G/DL (ref 32.6–36.4)
MCHC RBC AUTO-ENTMCNC: 33 G/DL (ref 32.6–36.4)
MCHC RBC AUTO-ENTMCNC: 33 G/DL (ref 32.6–36.4)
MCHC RBC AUTO-ENTMCNC: 33.1 G/DL (ref 32.6–36.4)
MCHC RBC AUTO-ENTMCNC: 33.2 G/DL (ref 32.6–36.4)
MCHC RBC AUTO-ENTMCNC: 33.2 G/DL (ref 32.6–36.4)
MCHC RBC AUTO-ENTMCNC: 33.5 G/DL (ref 32.6–36.4)
MCHC RBC AUTO-ENTMCNC: 33.5 G/DL (ref 32.6–36.4)
MCHC RBC AUTO-ENTMCNC: 33.6 G/DL (ref 32.6–36.4)
MCHC RBC AUTO-ENTMCNC: 33.7 G/DL (ref 32.6–36.4)
MCHC RBC AUTO-ENTMCNC: 33.8 G/DL (ref 32.6–36.4)
MCHC RBC AUTO-ENTMCNC: 34.5 G/DL (ref 32.6–36.4)
MCHC RBC AUTO-ENTMCNC: 34.8 G/DL (ref 32.6–36.4)
MCHC RBC AUTO-ENTMCNC: 34.9 G/DL (ref 32.6–36.4)
MCHC RBC AUTO-ENTMCNC: 34.9 G/DL (ref 32.6–36.4)
MCHC RBC AUTO-ENTMCNC: 35.1 G/DL (ref 32.6–36.4)
MCHC RBC AUTO-ENTMCNC: 35.1 G/DL (ref 32.6–36.4)
MCHC RBC AUTO-ENTMCNC: 35.2 G/DL (ref 32.6–36.4)
MCHC RBC AUTO-ENTMCNC: 35.2 G/DL (ref 32.6–36.4)
MCHC RBC AUTO-ENTMCNC: 35.3 G/DL (ref 32.6–36.4)
MCHC RBC AUTO-ENTMCNC: 35.6 G/DL (ref 32.6–36.4)
MCHC RBC AUTO-ENTMCNC: 35.6 G/DL (ref 32.6–36.4)
MCHC RBC AUTO-ENTMCNC: 35.7 G/DL (ref 32.6–36.4)
MCHC RBC AUTO-ENTMCNC: 36 G/DL (ref 32.6–36.4)
MCHC RBC AUTO-ENTMCNC: 36.1 G/DL (ref 32.6–36.4)
MCHC RBC AUTO-ENTMCNC: 36.2 G/DL (ref 32.6–36.4)
MCHC RBC AUTO-ENTMCNC: 36.4 G/DL (ref 32.6–36.4)
MCV RBC AUTO: 100 FL (ref 79.8–96.2)
MCV RBC AUTO: 101.1 FL (ref 79.8–96.2)
MCV RBC AUTO: 101.1 FL (ref 79.8–96.2)
MCV RBC AUTO: 101.7 FL (ref 79.8–96.2)
MCV RBC AUTO: 101.7 FL (ref 79.8–96.2)
MCV RBC AUTO: 102 FL (ref 79.8–96.2)
MCV RBC AUTO: 102.3 FL (ref 79.8–96.2)
MCV RBC AUTO: 102.3 FL (ref 79.8–96.2)
MCV RBC AUTO: 102.8 FL (ref 79.8–96.2)
MCV RBC AUTO: 103.1 FL (ref 79.8–96.2)
MCV RBC AUTO: 103.3 FL (ref 79.8–96.2)
MCV RBC AUTO: 103.4 FL (ref 79.8–96.2)
MCV RBC AUTO: 103.9 FL (ref 79.8–96.2)
MCV RBC AUTO: 83.3 FL (ref 79.8–96.2)
MCV RBC AUTO: 84.8 FL (ref 79.8–96.2)
MCV RBC AUTO: 85 FL (ref 79.8–96.2)
MCV RBC AUTO: 86 FL (ref 79.8–96.2)
MCV RBC AUTO: 86.2 FL (ref 79.8–96.2)
MCV RBC AUTO: 86.5 FL (ref 79.8–96.2)
MCV RBC AUTO: 88 FL (ref 79.8–96.2)
MCV RBC AUTO: 88.3 FL (ref 79.8–96.2)
MCV RBC AUTO: 89.5 FL (ref 79.8–96.2)
MCV RBC AUTO: 90.7 FL (ref 79.8–96.2)
MCV RBC AUTO: 90.9 FL (ref 79.8–96.2)
MCV RBC AUTO: 93.6 FL (ref 79.8–96.2)
MCV RBC AUTO: 94.2 FL (ref 79.8–96.2)
MCV RBC AUTO: 94.9 FL (ref 79.8–96.2)
MCV RBC AUTO: 98.1 FL (ref 79.8–96.2)
MCV RBC AUTO: 98.2 FL (ref 79.8–96.2)
MCV RBC AUTO: 98.7 FL (ref 79.8–96.2)
MCV RBC AUTO: 99.3 FL (ref 79.8–96.2)
MCV RBC AUTO: 99.3 FL (ref 79.8–96.2)
METHOD: ABNORMAL
MONOCYTES # BLD AUTO: 0.58 10*3/MM3 (ref 0.2–1.2)
MONOCYTES # BLD AUTO: 0.68 10*3/MM3 (ref 0.2–1.2)
MONOCYTES # BLD AUTO: 0.83 10*3/MM3 (ref 0.2–1.2)
MONOCYTES # BLD AUTO: 0.84 10*3/MM3 (ref 0.2–1.2)
MONOCYTES # BLD AUTO: 1.01 10*3/MM3 (ref 0.2–1.2)
MONOCYTES # BLD AUTO: 1.03 10*3/MM3 (ref 0.2–1.2)
MONOCYTES # BLD AUTO: 1.17 10*3/MM3 (ref 0.2–1.2)
MONOCYTES # BLD AUTO: 1.25 10*3/MM3 (ref 0.2–1.2)
MONOCYTES # BLD AUTO: 1.27 10*3/MM3 (ref 0.2–1.2)
MONOCYTES # BLD AUTO: 1.32 10*3/MM3 (ref 0.2–1.2)
MONOCYTES # BLD AUTO: 1.45 10*3/MM3 (ref 0.2–1.2)
MONOCYTES # BLD AUTO: 1.57 10*3/MM3 (ref 0.2–1.2)
MONOCYTES # BLD AUTO: 1.65 10*3/MM3 (ref 0.2–1.2)
MONOCYTES # BLD AUTO: 1.95 10*3/MM3 (ref 0.2–1.2)
MONOCYTES # BLD AUTO: 1.97 10*3/MM3 (ref 0.2–1.2)
MONOCYTES # BLD AUTO: 1.97 10*3/MM3 (ref 0.2–1.2)
MONOCYTES # BLD AUTO: 2.17 10*3/MM3 (ref 0.2–1.2)
MONOCYTES # BLD AUTO: 2.22 10*3/MM3 (ref 0.2–1.2)
MONOCYTES # BLD AUTO: 2.23 10*3/MM3 (ref 0.2–1.2)
MONOCYTES # BLD AUTO: 2.37 10*3/MM3 (ref 0.2–1.2)
MONOCYTES # BLD AUTO: 2.38 10*3/MM3 (ref 0.2–1.2)
MONOCYTES # BLD AUTO: 2.41 10*3/MM3 (ref 0.2–1.2)
MONOCYTES # BLD AUTO: 2.83 10*3/MM3 (ref 0.2–1.2)
MONOCYTES NFR BLD AUTO: 10 % (ref 5–12)
MONOCYTES NFR BLD AUTO: 10.1 % (ref 5–12)
MONOCYTES NFR BLD AUTO: 10.2 % (ref 5–12)
MONOCYTES NFR BLD AUTO: 10.8 % (ref 5–12)
MONOCYTES NFR BLD AUTO: 11.1 % (ref 5–12)
MONOCYTES NFR BLD AUTO: 11.3 % (ref 5–12)
MONOCYTES NFR BLD AUTO: 11.8 % (ref 5–12)
MONOCYTES NFR BLD AUTO: 13.4 % (ref 5–12)
MONOCYTES NFR BLD AUTO: 15.6 % (ref 5–12)
MONOCYTES NFR BLD AUTO: 15.9 % (ref 5–12)
MONOCYTES NFR BLD AUTO: 17.1 % (ref 5–12)
MONOCYTES NFR BLD AUTO: 17.6 % (ref 5–12)
MONOCYTES NFR BLD AUTO: 18.3 % (ref 5–12)
MONOCYTES NFR BLD AUTO: 5.7 % (ref 5–12)
MONOCYTES NFR BLD AUTO: 6.8 % (ref 5–12)
MONOCYTES NFR BLD AUTO: 6.8 % (ref 5–12)
MONOCYTES NFR BLD AUTO: 6.9 % (ref 5–12)
MONOCYTES NFR BLD AUTO: 6.9 % (ref 5–12)
MONOCYTES NFR BLD AUTO: 7.2 % (ref 5–12)
MONOCYTES NFR BLD AUTO: 7.7 % (ref 5–12)
MONOCYTES NFR BLD AUTO: 9.3 % (ref 5–12)
MONOCYTES NFR BLD AUTO: 9.4 % (ref 5–12)
MONOCYTES NFR BLD AUTO: 9.4 % (ref 5–12)
NEUTROPHILS # BLD AUTO: 11.06 10*3/MM3 (ref 1.9–8.1)
NEUTROPHILS # BLD AUTO: 11.56 10*3/MM3 (ref 1.9–8.1)
NEUTROPHILS # BLD AUTO: 12.16 10*3/MM3 (ref 1.9–8.1)
NEUTROPHILS # BLD AUTO: 12.21 10*3/MM3 (ref 1.9–8.1)
NEUTROPHILS # BLD AUTO: 14.2 10*3/MM3 (ref 1.9–8.1)
NEUTROPHILS # BLD AUTO: 14.59 10*3/MM3 (ref 1.9–8.1)
NEUTROPHILS # BLD AUTO: 17.1 10*3/MM3 (ref 1.9–8.1)
NEUTROPHILS # BLD AUTO: 25.75 10*3/MM3 (ref 1.9–8.1)
NEUTROPHILS # BLD AUTO: 31.03 10*3/MM3 (ref 1.9–8.1)
NEUTROPHILS # BLD AUTO: 36.35 10*3/MM3 (ref 1.9–8.1)
NEUTROPHILS # BLD AUTO: 6.4 10*3/MM3 (ref 1.9–8.1)
NEUTROPHILS # BLD AUTO: 7.06 10*3/MM3 (ref 1.9–8.1)
NEUTROPHILS # BLD AUTO: 7.29 10*3/MM3 (ref 1.9–8.1)
NEUTROPHILS # BLD AUTO: 7.37 10*3/MM3 (ref 1.9–8.1)
NEUTROPHILS # BLD AUTO: 7.99 10*3/MM3 (ref 1.9–8.1)
NEUTROPHILS # BLD AUTO: 8.32 10*3/MM3 (ref 1.9–8.1)
NEUTROPHILS # BLD AUTO: 8.41 10*3/MM3 (ref 1.9–8.1)
NEUTROPHILS # BLD AUTO: 8.42 10*3/MM3 (ref 1.9–8.1)
NEUTROPHILS # BLD AUTO: 8.92 10*3/MM3 (ref 1.9–8.1)
NEUTROPHILS # BLD AUTO: 9.16 10*3/MM3 (ref 1.9–8.1)
NEUTROPHILS # BLD AUTO: 9.45 10*3/MM3 (ref 1.9–8.1)
NEUTROPHILS # BLD AUTO: 9.58 10*3/MM3 (ref 1.9–8.1)
NEUTROPHILS # BLD AUTO: 9.62 10*3/MM3 (ref 1.9–8.1)
NEUTROPHILS NFR BLD AUTO: 64.8 % (ref 42.7–76)
NEUTROPHILS NFR BLD AUTO: 67.7 % (ref 42.7–76)
NEUTROPHILS NFR BLD AUTO: 70.8 % (ref 42.7–76)
NEUTROPHILS NFR BLD AUTO: 71.5 % (ref 42.7–76)
NEUTROPHILS NFR BLD AUTO: 71.5 % (ref 42.7–76)
NEUTROPHILS NFR BLD AUTO: 71.6 % (ref 42.7–76)
NEUTROPHILS NFR BLD AUTO: 72.7 % (ref 42.7–76)
NEUTROPHILS NFR BLD AUTO: 75.5 % (ref 42.7–76)
NEUTROPHILS NFR BLD AUTO: 76.5 % (ref 42.7–76)
NEUTROPHILS NFR BLD AUTO: 77.3 % (ref 42.7–76)
NEUTROPHILS NFR BLD AUTO: 77.4 % (ref 42.7–76)
NEUTROPHILS NFR BLD AUTO: 78.4 % (ref 42.7–76)
NEUTROPHILS NFR BLD AUTO: 79 % (ref 42.7–76)
NEUTROPHILS NFR BLD AUTO: 81.5 % (ref 42.7–76)
NEUTROPHILS NFR BLD AUTO: 81.8 % (ref 42.7–76)
NEUTROPHILS NFR BLD AUTO: 82.2 % (ref 42.7–76)
NEUTROPHILS NFR BLD AUTO: 83.6 % (ref 42.7–76)
NEUTROPHILS NFR BLD AUTO: 84.4 % (ref 42.7–76)
NEUTROPHILS NFR BLD AUTO: 84.6 % (ref 42.7–76)
NEUTROPHILS NFR BLD AUTO: 85 % (ref 42.7–76)
NEUTROPHILS NFR BLD AUTO: 87.4 % (ref 42.7–76)
NEUTROPHILS NFR BLD AUTO: 87.8 % (ref 42.7–76)
NEUTROPHILS NFR BLD AUTO: 88.5 % (ref 42.7–76)
NEUTROPHILS NFR FLD MANUAL: 92 %
NITRITE UR QL STRIP: NEGATIVE
NITRITE UR QL STRIP: POSITIVE
NITRITE UR QL STRIP: POSITIVE
NRBC BLD MANUAL-RTO: 0 /100 WBC (ref 0–0)
NT-PROBNP SERPL-MCNC: 7023 PG/ML (ref 0–1800)
NUC CELL # FLD: ABNORMAL /MM3
OSMOLALITY SERPL: 261 MOSM/KG (ref 280–301)
OSMOLALITY SERPL: 270 MOSM/KG (ref 280–301)
OSMOLALITY UR: 526 MOSM/KG
OSMOLALITY UR: 586 MOSM/KG
OSMOLALITY UR: 623 MOSM/KG
OVALOCYTES BLD QL SMEAR: NORMAL
PH UR STRIP.AUTO: 6 [PH] (ref 5–8)
PH UR STRIP.AUTO: 6.5 [PH] (ref 5–8)
PH UR STRIP.AUTO: 7.5 [PH] (ref 5–8)
PHOSPHATE SERPL-MCNC: 2.6 MG/DL (ref 2.5–4.5)
PHOSPHATE SERPL-MCNC: 2.8 MG/DL (ref 2.5–4.5)
PHOSPHATE SERPL-MCNC: 3 MG/DL (ref 2.5–4.5)
PHOSPHATE SERPL-MCNC: 3 MG/DL (ref 2.5–4.5)
PHOSPHATE SERPL-MCNC: 3.1 MG/DL (ref 2.5–4.5)
PHOSPHATE SERPL-MCNC: 3.3 MG/DL (ref 2.5–4.5)
PHOSPHATE SERPL-MCNC: 3.4 MG/DL (ref 2.5–4.5)
PHOSPHATE SERPL-MCNC: 3.6 MG/DL (ref 2.5–4.5)
PHOSPHATE SERPL-MCNC: 3.7 MG/DL (ref 2.5–4.5)
PHOSPHATE SERPL-MCNC: 4 MG/DL (ref 2.5–4.5)
PHOSPHATE SERPL-MCNC: 4 MG/DL (ref 2.5–4.5)
PHOSPHATE SERPL-MCNC: 4.1 MG/DL (ref 2.5–4.5)
PHOSPHATE SERPL-MCNC: 4.2 MG/DL (ref 2.5–4.5)
PHOSPHATE SERPL-MCNC: 4.3 MG/DL (ref 2.5–4.5)
PHOSPHATE SERPL-MCNC: 4.4 MG/DL (ref 2.5–4.5)
PHOSPHATE SERPL-MCNC: 4.5 MG/DL (ref 2.5–4.5)
PLAT MORPH BLD: NORMAL
PLAT MORPH BLD: NORMAL
PLATELET # BLD AUTO: 112 10*3/MM3 (ref 140–500)
PLATELET # BLD AUTO: 136 10*3/MM3 (ref 140–500)
PLATELET # BLD AUTO: 151 10*3/MM3 (ref 140–500)
PLATELET # BLD AUTO: 183 10*3/MM3 (ref 140–500)
PLATELET # BLD AUTO: 192 10*3/MM3 (ref 140–500)
PLATELET # BLD AUTO: 207 10*3/MM3 (ref 140–500)
PLATELET # BLD AUTO: 227 10*3/MM3 (ref 140–500)
PLATELET # BLD AUTO: 235 10*3/MM3 (ref 140–500)
PLATELET # BLD AUTO: 268 10*3/MM3 (ref 140–500)
PLATELET # BLD AUTO: 270 10*3/MM3 (ref 140–500)
PLATELET # BLD AUTO: 278 10*3/MM3 (ref 140–500)
PLATELET # BLD AUTO: 289 10*3/MM3 (ref 140–500)
PLATELET # BLD AUTO: 306 10*3/MM3 (ref 140–500)
PLATELET # BLD AUTO: 318 10*3/MM3 (ref 140–500)
PLATELET # BLD AUTO: 320 10*3/MM3 (ref 140–500)
PLATELET # BLD AUTO: 336 10*3/MM3 (ref 140–500)
PLATELET # BLD AUTO: 337 10*3/MM3 (ref 140–500)
PLATELET # BLD AUTO: 356 10*3/MM3 (ref 140–500)
PLATELET # BLD AUTO: 378 10*3/MM3 (ref 140–500)
PLATELET # BLD AUTO: 392 10*3/MM3 (ref 140–500)
PLATELET # BLD AUTO: 416 10*3/MM3 (ref 140–500)
PLATELET # BLD AUTO: 434 10*3/MM3 (ref 140–500)
PLATELET # BLD AUTO: 442 10*3/MM3 (ref 140–500)
PLATELET # BLD AUTO: 456 10*3/MM3 (ref 140–500)
PLATELET # BLD AUTO: 466 10*3/MM3 (ref 140–500)
PLATELET # BLD AUTO: 474 10*3/MM3 (ref 140–500)
PLATELET # BLD AUTO: 562 10*3/MM3 (ref 140–500)
PLATELET # BLD AUTO: 580 10*3/MM3 (ref 140–500)
PLATELET # BLD AUTO: 627 10*3/MM3 (ref 140–500)
PLATELET # BLD AUTO: 674 10*3/MM3 (ref 140–500)
PLATELET # BLD AUTO: 690 10*3/MM3 (ref 140–500)
PLATELET # BLD AUTO: 738 10*3/MM3 (ref 140–500)
PMV BLD AUTO: 10 FL (ref 6–12)
PMV BLD AUTO: 10.2 FL (ref 6–12)
PMV BLD AUTO: 7.6 FL (ref 6–12)
PMV BLD AUTO: 7.7 FL (ref 6–12)
PMV BLD AUTO: 8 FL (ref 6–12)
PMV BLD AUTO: 8.1 FL (ref 6–12)
PMV BLD AUTO: 8.2 FL (ref 6–12)
PMV BLD AUTO: 8.3 FL (ref 6–12)
PMV BLD AUTO: 8.5 FL (ref 6–12)
PMV BLD AUTO: 8.6 FL (ref 6–12)
PMV BLD AUTO: 8.7 FL (ref 6–12)
PMV BLD AUTO: 8.8 FL (ref 6–12)
PMV BLD AUTO: 9.1 FL (ref 6–12)
PMV BLD AUTO: 9.4 FL (ref 6–12)
PMV BLD AUTO: 9.5 FL (ref 6–12)
PMV BLD AUTO: 9.5 FL (ref 6–12)
PMV BLD AUTO: 9.6 FL (ref 6–12)
PMV BLD AUTO: 9.7 FL (ref 6–12)
PMV BLD AUTO: 9.7 FL (ref 6–12)
PMV BLD AUTO: 9.9 FL (ref 6–12)
POIKILOCYTOSIS BLD QL SMEAR: NORMAL
POTASSIUM BLD-SCNC: 3.3 MMOL/L (ref 3.5–5.2)
POTASSIUM BLD-SCNC: 3.4 MMOL/L (ref 3.5–5.2)
POTASSIUM BLD-SCNC: 3.5 MMOL/L (ref 3.5–5.2)
POTASSIUM BLD-SCNC: 3.6 MMOL/L (ref 3.5–5.2)
POTASSIUM BLD-SCNC: 3.6 MMOL/L (ref 3.5–5.2)
POTASSIUM BLD-SCNC: 3.7 MMOL/L (ref 3.5–5.2)
POTASSIUM BLD-SCNC: 3.8 MMOL/L (ref 3.5–5.2)
POTASSIUM BLD-SCNC: 3.9 MMOL/L (ref 3.5–5.2)
POTASSIUM BLD-SCNC: 4 MMOL/L (ref 3.5–5.2)
POTASSIUM BLD-SCNC: 4.1 MMOL/L (ref 3.5–5.2)
POTASSIUM BLD-SCNC: 4.1 MMOL/L (ref 3.5–5.2)
POTASSIUM BLD-SCNC: 4.2 MMOL/L (ref 3.5–5.2)
POTASSIUM BLD-SCNC: 4.3 MMOL/L (ref 3.5–5.2)
POTASSIUM BLD-SCNC: 4.4 MMOL/L (ref 3.5–5.2)
POTASSIUM BLD-SCNC: 4.4 MMOL/L (ref 3.5–5.2)
POTASSIUM BLD-SCNC: 4.5 MMOL/L (ref 3.5–5.2)
POTASSIUM BLD-SCNC: 4.6 MMOL/L (ref 3.5–5.2)
POTASSIUM BLD-SCNC: 4.7 MMOL/L (ref 3.5–5.2)
POTASSIUM BLD-SCNC: 4.7 MMOL/L (ref 3.5–5.2)
POTASSIUM BLD-SCNC: 4.8 MMOL/L (ref 3.5–5.2)
PROCALCITONIN SERPL-MCNC: 0.13 NG/ML (ref 0.1–0.25)
PROCALCITONIN SERPL-MCNC: 0.13 NG/ML (ref 0.1–0.25)
PROCALCITONIN SERPL-MCNC: 0.19 NG/ML (ref 0.1–0.25)
PROCALCITONIN SERPL-MCNC: 0.2 NG/ML (ref 0.1–0.25)
PROT SERPL-MCNC: 4.1 G/DL (ref 6–8.5)
PROT SERPL-MCNC: 4.8 G/DL (ref 6–8.5)
PROT SERPL-MCNC: 4.8 G/DL (ref 6–8.5)
PROT SERPL-MCNC: 5.5 G/DL (ref 6–8.5)
PROT SERPL-MCNC: 5.9 G/DL (ref 6–8.5)
PROT SERPL-MCNC: 6.5 G/DL (ref 6–8.5)
PROT UR QL STRIP: ABNORMAL
PROT UR QL STRIP: NEGATIVE
PROT UR QL STRIP: NEGATIVE
PROTHROMBIN TIME: 13.9 SECONDS (ref 11.7–14.2)
PROTHROMBIN TIME: 14.4 SECONDS (ref 11.7–14.2)
PROTHROMBIN TIME: 15.5 SECONDS (ref 11.7–14.2)
PROTHROMBIN TIME: 16.2 SECONDS (ref 11.7–14.2)
PSA SERPL-MCNC: 8.49 NG/ML (ref 0–4)
RBC # BLD AUTO: 2.14 10*6/MM3 (ref 4.6–6)
RBC # BLD AUTO: 2.28 10*6/MM3 (ref 4.6–6)
RBC # BLD AUTO: 2.28 10*6/MM3 (ref 4.6–6)
RBC # BLD AUTO: 2.31 10*6/MM3 (ref 4.6–6)
RBC # BLD AUTO: 2.35 10*6/MM3 (ref 4.6–6)
RBC # BLD AUTO: 2.46 10*6/MM3 (ref 4.6–6)
RBC # BLD AUTO: 2.47 10*6/MM3 (ref 4.6–6)
RBC # BLD AUTO: 2.56 10*6/MM3 (ref 4.6–6)
RBC # BLD AUTO: 2.58 10*6/MM3 (ref 4.6–6)
RBC # BLD AUTO: 2.58 10*6/MM3 (ref 4.6–6)
RBC # BLD AUTO: 2.61 10*6/MM3 (ref 4.6–6)
RBC # BLD AUTO: 2.61 10*6/MM3 (ref 4.6–6)
RBC # BLD AUTO: 2.69 10*6/MM3 (ref 4.6–6)
RBC # BLD AUTO: 2.71 10*6/MM3 (ref 4.6–6)
RBC # BLD AUTO: 2.72 10*6/MM3 (ref 4.6–6)
RBC # BLD AUTO: 2.74 10*6/MM3 (ref 4.6–6)
RBC # BLD AUTO: 2.82 10*6/MM3 (ref 4.6–6)
RBC # BLD AUTO: 2.82 10*6/MM3 (ref 4.6–6)
RBC # BLD AUTO: 2.85 10*6/MM3 (ref 4.6–6)
RBC # BLD AUTO: 2.85 10*6/MM3 (ref 4.6–6)
RBC # BLD AUTO: 2.86 10*6/MM3 (ref 4.6–6)
RBC # BLD AUTO: 2.87 10*6/MM3 (ref 4.6–6)
RBC # BLD AUTO: 2.91 10*6/MM3 (ref 4.6–6)
RBC # BLD AUTO: 2.91 10*6/MM3 (ref 4.6–6)
RBC # BLD AUTO: 2.93 10*6/MM3 (ref 4.6–6)
RBC # BLD AUTO: 2.93 10*6/MM3 (ref 4.6–6)
RBC # BLD AUTO: 2.96 10*6/MM3 (ref 4.6–6)
RBC # BLD AUTO: 2.99 10*6/MM3 (ref 4.6–6)
RBC # BLD AUTO: 2.99 10*6/MM3 (ref 4.6–6)
RBC # BLD AUTO: 3.04 10*6/MM3 (ref 4.6–6)
RBC # BLD AUTO: 3.11 10*6/MM3 (ref 4.6–6)
RBC # BLD AUTO: 3.36 10*6/MM3 (ref 4.6–6)
RBC # FLD AUTO: 1925 /MM3
RBC # UR: ABNORMAL /HPF
REF LAB TEST METHOD: ABNORMAL
REF LAB TEST METHOD: NORMAL
RETICS/RBC NFR AUTO: 1.27 % (ref 0.5–1.5)
RH BLD: NEGATIVE
RHINOVIRUS RNA SPEC NAA+PROBE: NOT DETECTED
RSV RNA NPH QL NAA+NON-PROBE: NOT DETECTED
SCHISTOCYTES BLD QL SMEAR: NORMAL
SODIUM BLD-SCNC: 123 MMOL/L (ref 136–145)
SODIUM BLD-SCNC: 123 MMOL/L (ref 136–145)
SODIUM BLD-SCNC: 124 MMOL/L (ref 136–145)
SODIUM BLD-SCNC: 125 MMOL/L (ref 136–145)
SODIUM BLD-SCNC: 126 MMOL/L (ref 136–145)
SODIUM BLD-SCNC: 127 MMOL/L (ref 136–145)
SODIUM BLD-SCNC: 128 MMOL/L (ref 136–145)
SODIUM BLD-SCNC: 129 MMOL/L (ref 136–145)
SODIUM BLD-SCNC: 130 MMOL/L (ref 136–145)
SODIUM BLD-SCNC: 131 MMOL/L (ref 136–145)
SODIUM BLD-SCNC: 132 MMOL/L (ref 136–145)
SODIUM UR-SCNC: 133 MMOL/L
SODIUM UR-SCNC: 43 MMOL/L
SODIUM UR-SCNC: 78 MMOL/L
SP GR UR STRIP: 1.01 (ref 1–1.03)
SP GR UR STRIP: 1.02 (ref 1–1.03)
SQUAMOUS #/AREA URNS HPF: ABNORMAL /HPF
TIBC SERPL-MCNC: 127 MCG/DL
TRANSFERRIN SERPL-MCNC: 85 MG/DL (ref 200–360)
TROPONIN T SERPL-MCNC: 0.11 NG/ML (ref 0–0.03)
TROPONIN T SERPL-MCNC: <0.01 NG/ML (ref 0–0.03)
TSH SERPL DL<=0.05 MIU/L-ACNC: 2.05 MIU/ML (ref 0.27–4.2)
TSH SERPL DL<=0.05 MIU/L-ACNC: 3.03 MIU/ML (ref 0.27–4.2)
UNIT  ABO: NORMAL
UNIT  RH: NORMAL
URATE SERPL-MCNC: 4.1 MG/DL (ref 3.4–7)
UROBILINOGEN UR QL STRIP: ABNORMAL
UROBILINOGEN UR QL STRIP: NORMAL
VANCOMYCIN SERPL-MCNC: 21 MCG/ML (ref 5–40)
VANCOMYCIN TROUGH SERPL-MCNC: 10.6 MCG/ML (ref 5–20)
VANCOMYCIN TROUGH SERPL-MCNC: 19.1 MCG/ML (ref 5–20)
VANCOMYCIN TROUGH SERPL-MCNC: 19.2 MCG/ML (ref 5–20)
VANCOMYCIN TROUGH SERPL-MCNC: 20.2 MCG/ML (ref 5–20)
VANCOMYCIN TROUGH SERPL-MCNC: 21.1 MCG/ML (ref 5–20)
VANCOMYCIN TROUGH SERPL-MCNC: 23.1 MCG/ML (ref 5–20)
VIT B12 BLD-MCNC: 226 PG/ML (ref 211–946)
VIT B12 BLD-MCNC: 231 PG/ML (ref 211–946)
VIT B12 BLD-MCNC: <30 PG/ML (ref 211–946)
WBC MORPH BLD: NORMAL
WBC MORPH BLD: NORMAL
WBC NRBC COR # BLD: 10.16 10*3/MM3 (ref 4.5–10.7)
WBC NRBC COR # BLD: 10.2 10*3/MM3 (ref 4.5–10.7)
WBC NRBC COR # BLD: 10.86 10*3/MM3 (ref 4.5–10.7)
WBC NRBC COR # BLD: 11.18 10*3/MM3 (ref 4.5–10.7)
WBC NRBC COR # BLD: 11.76 10*3/MM3 (ref 4.5–10.7)
WBC NRBC COR # BLD: 12.06 10*3/MM3 (ref 4.5–10.7)
WBC NRBC COR # BLD: 12.28 10*3/MM3 (ref 4.5–10.7)
WBC NRBC COR # BLD: 12.44 10*3/MM3 (ref 4.5–10.7)
WBC NRBC COR # BLD: 12.51 10*3/MM3 (ref 4.5–10.7)
WBC NRBC COR # BLD: 12.59 10*3/MM3 (ref 4.5–10.7)
WBC NRBC COR # BLD: 12.94 10*3/MM3 (ref 4.5–10.7)
WBC NRBC COR # BLD: 13.01 10*3/MM3 (ref 4.5–10.7)
WBC NRBC COR # BLD: 14.11 10*3/MM3 (ref 4.5–10.7)
WBC NRBC COR # BLD: 14.12 10*3/MM3 (ref 4.5–10.7)
WBC NRBC COR # BLD: 14.62 10*3/MM3 (ref 4.5–10.7)
WBC NRBC COR # BLD: 14.91 10*3/MM3 (ref 4.5–10.7)
WBC NRBC COR # BLD: 15.3 10*3/MM3 (ref 4.5–10.7)
WBC NRBC COR # BLD: 15.78 10*3/MM3 (ref 4.5–10.7)
WBC NRBC COR # BLD: 17 10*3/MM3 (ref 4.5–10.7)
WBC NRBC COR # BLD: 17.36 10*3/MM3 (ref 4.5–10.7)
WBC NRBC COR # BLD: 18.02 10*3/MM3 (ref 4.5–10.7)
WBC NRBC COR # BLD: 18.86 10*3/MM3 (ref 4.5–10.7)
WBC NRBC COR # BLD: 20.28 10*3/MM3 (ref 4.5–10.7)
WBC NRBC COR # BLD: 30.24 10*3/MM3 (ref 4.5–10.7)
WBC NRBC COR # BLD: 35.45 10*3/MM3 (ref 4.5–10.7)
WBC NRBC COR # BLD: 38.03 10*3/MM3 (ref 4.5–10.7)
WBC NRBC COR # BLD: 41.04 10*3/MM3 (ref 4.5–10.7)
WBC NRBC COR # BLD: 42.55 10*3/MM3 (ref 4.5–10.7)
WBC NRBC COR # BLD: 48.46 10*3/MM3 (ref 4.5–10.7)
WBC NRBC COR # BLD: 8.1 10*3/MM3 (ref 4.5–10.7)
WBC NRBC COR # BLD: 8.97 10*3/MM3 (ref 4.5–10.7)
WBC NRBC COR # BLD: 9.95 10*3/MM3 (ref 4.5–10.7)
WBC UR QL AUTO: ABNORMAL /HPF
WHOLE BLOOD HOLD SPECIMEN: NORMAL

## 2017-01-01 PROCEDURE — 25010000002 VANCOMYCIN 10 G RECONSTITUTED SOLUTION: Performed by: INTERNAL MEDICINE

## 2017-01-01 PROCEDURE — 97110 THERAPEUTIC EXERCISES: CPT

## 2017-01-01 PROCEDURE — 82533 TOTAL CORTISOL: CPT | Performed by: INTERNAL MEDICINE

## 2017-01-01 PROCEDURE — 80053 COMPREHEN METABOLIC PANEL: CPT | Performed by: HOSPITALIST

## 2017-01-01 PROCEDURE — 97530 THERAPEUTIC ACTIVITIES: CPT | Performed by: PHYSICAL THERAPIST

## 2017-01-01 PROCEDURE — 86140 C-REACTIVE PROTEIN: CPT | Performed by: INTERNAL MEDICINE

## 2017-01-01 PROCEDURE — 85045 AUTOMATED RETICULOCYTE COUNT: CPT | Performed by: HOSPITALIST

## 2017-01-01 PROCEDURE — 97161 PT EVAL LOW COMPLEX 20 MIN: CPT

## 2017-01-01 PROCEDURE — 99024 POSTOP FOLLOW-UP VISIT: CPT | Performed by: NEUROLOGICAL SURGERY

## 2017-01-01 PROCEDURE — P9016 RBC LEUKOCYTES REDUCED: HCPCS

## 2017-01-01 PROCEDURE — 85025 COMPLETE CBC W/AUTO DIFF WBC: CPT | Performed by: INTERNAL MEDICINE

## 2017-01-01 PROCEDURE — 80048 BASIC METABOLIC PNL TOTAL CA: CPT | Performed by: INTERNAL MEDICINE

## 2017-01-01 PROCEDURE — 22015 I&D ABSCESS P-SPINE L/S/LS: CPT | Performed by: NEUROLOGICAL SURGERY

## 2017-01-01 PROCEDURE — 84300 ASSAY OF URINE SODIUM: CPT | Performed by: INTERNAL MEDICINE

## 2017-01-01 PROCEDURE — 25010000002 FENTANYL CITRATE (PF) 100 MCG/2ML SOLUTION: Performed by: NURSE ANESTHETIST, CERTIFIED REGISTERED

## 2017-01-01 PROCEDURE — 88185 FLOWCYTOMETRY/TC ADD-ON: CPT | Performed by: INTERNAL MEDICINE

## 2017-01-01 PROCEDURE — 85027 COMPLETE CBC AUTOMATED: CPT | Performed by: HOSPITALIST

## 2017-01-01 PROCEDURE — 25010000002 CEFEPIME: Performed by: INTERNAL MEDICINE

## 2017-01-01 PROCEDURE — 88184 FLOWCYTOMETRY/ TC 1 MARKER: CPT | Performed by: INTERNAL MEDICINE

## 2017-01-01 PROCEDURE — 84550 ASSAY OF BLOOD/URIC ACID: CPT | Performed by: INTERNAL MEDICINE

## 2017-01-01 PROCEDURE — 25010000003 CEFTRIAXONE PER 250 MG: Performed by: HOSPITALIST

## 2017-01-01 PROCEDURE — 83010 ASSAY OF HAPTOGLOBIN QUANT: CPT | Performed by: HOSPITALIST

## 2017-01-01 PROCEDURE — 86901 BLOOD TYPING SEROLOGIC RH(D): CPT | Performed by: EMERGENCY MEDICINE

## 2017-01-01 PROCEDURE — 25010000002 MORPHINE PER 10 MG: Performed by: NURSE PRACTITIONER

## 2017-01-01 PROCEDURE — 85025 COMPLETE CBC W/AUTO DIFF WBC: CPT | Performed by: HOSPITALIST

## 2017-01-01 PROCEDURE — 25010000002 VANCOMYCIN PER 500 MG: Performed by: NEUROLOGICAL SURGERY

## 2017-01-01 PROCEDURE — 80069 RENAL FUNCTION PANEL: CPT | Performed by: INTERNAL MEDICINE

## 2017-01-01 PROCEDURE — 49650 LAP ING HERNIA REPAIR INIT: CPT | Performed by: PHYSICIAN ASSISTANT

## 2017-01-01 PROCEDURE — 82330 ASSAY OF CALCIUM: CPT | Performed by: HOSPITALIST

## 2017-01-01 PROCEDURE — 25010000003 CEFAZOLIN IN DEXTROSE 2-4 GM/100ML-% SOLUTION: Performed by: NEUROLOGICAL SURGERY

## 2017-01-01 PROCEDURE — 87186 SC STD MICRODIL/AGAR DIL: CPT | Performed by: NEUROLOGICAL SURGERY

## 2017-01-01 PROCEDURE — 82962 GLUCOSE BLOOD TEST: CPT

## 2017-01-01 PROCEDURE — 83935 ASSAY OF URINE OSMOLALITY: CPT | Performed by: HOSPITALIST

## 2017-01-01 PROCEDURE — 87040 BLOOD CULTURE FOR BACTERIA: CPT | Performed by: EMERGENCY MEDICINE

## 2017-01-01 PROCEDURE — 99232 SBSQ HOSP IP/OBS MODERATE 35: CPT | Performed by: NURSE PRACTITIONER

## 2017-01-01 PROCEDURE — 87070 CULTURE OTHR SPECIMN AEROBIC: CPT | Performed by: NEUROLOGICAL SURGERY

## 2017-01-01 PROCEDURE — 99285 EMERGENCY DEPT VISIT HI MDM: CPT

## 2017-01-01 PROCEDURE — 84443 ASSAY THYROID STIM HORMONE: CPT | Performed by: INTERNAL MEDICINE

## 2017-01-01 PROCEDURE — 99232 SBSQ HOSP IP/OBS MODERATE 35: CPT | Performed by: INTERNAL MEDICINE

## 2017-01-01 PROCEDURE — 99222 1ST HOSP IP/OBS MODERATE 55: CPT | Performed by: INTERNAL MEDICINE

## 2017-01-01 PROCEDURE — 86923 COMPATIBILITY TEST ELECTRIC: CPT

## 2017-01-01 PROCEDURE — 94799 UNLISTED PULMONARY SVC/PX: CPT | Performed by: NURSE PRACTITIONER

## 2017-01-01 PROCEDURE — 71010 HC CHEST PA OR AP: CPT

## 2017-01-01 PROCEDURE — 25010000002 LORAZEPAM PER 2 MG: Performed by: HOSPITALIST

## 2017-01-01 PROCEDURE — 80202 ASSAY OF VANCOMYCIN: CPT | Performed by: INTERNAL MEDICINE

## 2017-01-01 PROCEDURE — 25010000002 METHYLPREDNISOLONE PER 80 MG: Performed by: ANESTHESIOLOGY

## 2017-01-01 PROCEDURE — 82746 ASSAY OF FOLIC ACID SERUM: CPT | Performed by: HOSPITALIST

## 2017-01-01 PROCEDURE — 85014 HEMATOCRIT: CPT | Performed by: INTERNAL MEDICINE

## 2017-01-01 PROCEDURE — 25010000002 PIPERACILLIN SOD-TAZOBACTAM PER 1 G: Performed by: INTERNAL MEDICINE

## 2017-01-01 PROCEDURE — 25010000002 HYDROMORPHONE PER 4 MG: Performed by: HOSPITALIST

## 2017-01-01 PROCEDURE — 94799 UNLISTED PULMONARY SVC/PX: CPT

## 2017-01-01 PROCEDURE — 93005 ELECTROCARDIOGRAM TRACING: CPT | Performed by: HOSPITALIST

## 2017-01-01 PROCEDURE — 86900 BLOOD TYPING SEROLOGIC ABO: CPT

## 2017-01-01 PROCEDURE — 25010000002 ONDANSETRON PER 1 MG: Performed by: EMERGENCY MEDICINE

## 2017-01-01 PROCEDURE — 25010000002 PENICILLIN G POTASSIUM PER 600000 UNITS: Performed by: INTERNAL MEDICINE

## 2017-01-01 PROCEDURE — 86850 RBC ANTIBODY SCREEN: CPT | Performed by: NURSE PRACTITIONER

## 2017-01-01 PROCEDURE — 87176 TISSUE HOMOGENIZATION CULTR: CPT | Performed by: NEUROLOGICAL SURGERY

## 2017-01-01 PROCEDURE — 25010000002 NEOSTIGMINE PER 0.5 MG: Performed by: NURSE ANESTHETIST, CERTIFIED REGISTERED

## 2017-01-01 PROCEDURE — 87086 URINE CULTURE/COLONY COUNT: CPT | Performed by: HOSPITALIST

## 2017-01-01 PROCEDURE — 99024 POSTOP FOLLOW-UP VISIT: CPT | Performed by: NURSE PRACTITIONER

## 2017-01-01 PROCEDURE — 99221 1ST HOSP IP/OBS SF/LOW 40: CPT | Performed by: PSYCHIATRY & NEUROLOGY

## 2017-01-01 PROCEDURE — 85007 BL SMEAR W/DIFF WBC COUNT: CPT | Performed by: HOSPITALIST

## 2017-01-01 PROCEDURE — 74176 CT ABD & PELVIS W/O CONTRAST: CPT

## 2017-01-01 PROCEDURE — 99222 1ST HOSP IP/OBS MODERATE 55: CPT | Performed by: SURGERY

## 2017-01-01 PROCEDURE — 25010000002 VANCOMYCIN PER 500 MG: Performed by: INTERNAL MEDICINE

## 2017-01-01 PROCEDURE — 25010000002 HYDROMORPHONE PER 4 MG: Performed by: NURSE ANESTHETIST, CERTIFIED REGISTERED

## 2017-01-01 PROCEDURE — 86850 RBC ANTIBODY SCREEN: CPT | Performed by: INTERNAL MEDICINE

## 2017-01-01 PROCEDURE — 76000 FLUOROSCOPY <1 HR PHYS/QHP: CPT

## 2017-01-01 PROCEDURE — 84145 PROCALCITONIN (PCT): CPT | Performed by: HOSPITALIST

## 2017-01-01 PROCEDURE — 25010000002 PHENYLEPHRINE PER 1 ML: Performed by: NURSE ANESTHETIST, CERTIFIED REGISTERED

## 2017-01-01 PROCEDURE — 97162 PT EVAL MOD COMPLEX 30 MIN: CPT

## 2017-01-01 PROCEDURE — 36430 TRANSFUSION BLD/BLD COMPNT: CPT

## 2017-01-01 PROCEDURE — 85007 BL SMEAR W/DIFF WBC COUNT: CPT | Performed by: NURSE PRACTITIONER

## 2017-01-01 PROCEDURE — 25010000002 DEXAMETHASONE PER 1 MG: Performed by: NURSE ANESTHETIST, CERTIFIED REGISTERED

## 2017-01-01 PROCEDURE — 99024 POSTOP FOLLOW-UP VISIT: CPT | Performed by: SURGERY

## 2017-01-01 PROCEDURE — A9577 INJ MULTIHANCE: HCPCS | Performed by: HOSPITALIST

## 2017-01-01 PROCEDURE — 81001 URINALYSIS AUTO W/SCOPE: CPT | Performed by: HOSPITALIST

## 2017-01-01 PROCEDURE — 86900 BLOOD TYPING SEROLOGIC ABO: CPT | Performed by: EMERGENCY MEDICINE

## 2017-01-01 PROCEDURE — 70553 MRI BRAIN STEM W/O & W/DYE: CPT

## 2017-01-01 PROCEDURE — 87486 CHLMYD PNEUM DNA AMP PROBE: CPT | Performed by: INTERNAL MEDICINE

## 2017-01-01 PROCEDURE — 25010000002 ONDANSETRON PER 1 MG: Performed by: INTERNAL MEDICINE

## 2017-01-01 PROCEDURE — 93970 EXTREMITY STUDY: CPT

## 2017-01-01 PROCEDURE — C1713 ANCHOR/SCREW BN/BN,TIS/BN: HCPCS | Performed by: NEUROLOGICAL SURGERY

## 2017-01-01 PROCEDURE — 93306 TTE W/DOPPLER COMPLETE: CPT | Performed by: INTERNAL MEDICINE

## 2017-01-01 PROCEDURE — 83605 ASSAY OF LACTIC ACID: CPT | Performed by: EMERGENCY MEDICINE

## 2017-01-01 PROCEDURE — 94640 AIRWAY INHALATION TREATMENT: CPT

## 2017-01-01 PROCEDURE — 25010000002 MORPHINE PER 10 MG: Performed by: INTERNAL MEDICINE

## 2017-01-01 PROCEDURE — 25010000002 ONDANSETRON PER 1 MG: Performed by: NURSE PRACTITIONER

## 2017-01-01 PROCEDURE — 72020 X-RAY EXAM OF SPINE 1 VIEW: CPT

## 2017-01-01 PROCEDURE — 87186 SC STD MICRODIL/AGAR DIL: CPT | Performed by: HOSPITALIST

## 2017-01-01 PROCEDURE — 25810000003 SODIUM CHLORIDE 0.9 % WITH KCL 20 MEQ 20-0.9 MEQ/L-% SOLUTION: Performed by: INTERNAL MEDICINE

## 2017-01-01 PROCEDURE — 99233 SBSQ HOSP IP/OBS HIGH 50: CPT | Performed by: INTERNAL MEDICINE

## 2017-01-01 PROCEDURE — 93010 ELECTROCARDIOGRAM REPORT: CPT | Performed by: INTERNAL MEDICINE

## 2017-01-01 PROCEDURE — 99232 SBSQ HOSP IP/OBS MODERATE 35: CPT | Performed by: NEUROLOGICAL SURGERY

## 2017-01-01 PROCEDURE — 92610 EVALUATE SWALLOWING FUNCTION: CPT

## 2017-01-01 PROCEDURE — 97535 SELF CARE MNGMENT TRAINING: CPT

## 2017-01-01 PROCEDURE — 73502 X-RAY EXAM HIP UNI 2-3 VIEWS: CPT

## 2017-01-01 PROCEDURE — 63710000001 PROMETHAZINE PER 25 MG: Performed by: INTERNAL MEDICINE

## 2017-01-01 PROCEDURE — 25010000002 FENTANYL CITRATE (PF) 100 MCG/2ML SOLUTION

## 2017-01-01 PROCEDURE — C1781 MESH (IMPLANTABLE): HCPCS | Performed by: SURGERY

## 2017-01-01 PROCEDURE — 72157 MRI CHEST SPINE W/O & W/DYE: CPT

## 2017-01-01 PROCEDURE — 80202 ASSAY OF VANCOMYCIN: CPT | Performed by: HOSPITALIST

## 2017-01-01 PROCEDURE — 99232 SBSQ HOSP IP/OBS MODERATE 35: CPT | Performed by: PSYCHIATRY & NEUROLOGY

## 2017-01-01 PROCEDURE — 72158 MRI LUMBAR SPINE W/O & W/DYE: CPT

## 2017-01-01 PROCEDURE — 99223 1ST HOSP IP/OBS HIGH 75: CPT | Performed by: INTERNAL MEDICINE

## 2017-01-01 PROCEDURE — 97532: CPT

## 2017-01-01 PROCEDURE — 99221 1ST HOSP IP/OBS SF/LOW 40: CPT | Performed by: INTERNAL MEDICINE

## 2017-01-01 PROCEDURE — 0JD70ZZ EXTRACTION OF BACK SUBCUTANEOUS TISSUE AND FASCIA, OPEN APPROACH: ICD-10-PCS | Performed by: NEUROLOGICAL SURGERY

## 2017-01-01 PROCEDURE — 25010000002 MIDAZOLAM PER 1 MG: Performed by: ANESTHESIOLOGY

## 2017-01-01 PROCEDURE — 82330 ASSAY OF CALCIUM: CPT | Performed by: INTERNAL MEDICINE

## 2017-01-01 PROCEDURE — 87186 SC STD MICRODIL/AGAR DIL: CPT | Performed by: EMERGENCY MEDICINE

## 2017-01-01 PROCEDURE — 80048 BASIC METABOLIC PNL TOTAL CA: CPT | Performed by: HOSPITALIST

## 2017-01-01 PROCEDURE — 85025 COMPLETE CBC W/AUTO DIFF WBC: CPT | Performed by: EMERGENCY MEDICINE

## 2017-01-01 PROCEDURE — A9577 INJ MULTIHANCE: HCPCS | Performed by: INTERNAL MEDICINE

## 2017-01-01 PROCEDURE — 25010000002 PROPOFOL 10 MG/ML EMULSION: Performed by: ANESTHESIOLOGY

## 2017-01-01 PROCEDURE — 83935 ASSAY OF URINE OSMOLALITY: CPT | Performed by: INTERNAL MEDICINE

## 2017-01-01 PROCEDURE — 81003 URINALYSIS AUTO W/O SCOPE: CPT | Performed by: NURSE PRACTITIONER

## 2017-01-01 PROCEDURE — 80048 BASIC METABOLIC PNL TOTAL CA: CPT | Performed by: EMERGENCY MEDICINE

## 2017-01-01 PROCEDURE — 83540 ASSAY OF IRON: CPT | Performed by: HOSPITALIST

## 2017-01-01 PROCEDURE — 84300 ASSAY OF URINE SODIUM: CPT | Performed by: HOSPITALIST

## 2017-01-01 PROCEDURE — 0 DIATRIZOATE MEGLUMINE & SODIUM PER 1 ML: Performed by: INTERNAL MEDICINE

## 2017-01-01 PROCEDURE — 87205 SMEAR GRAM STAIN: CPT | Performed by: NEUROLOGICAL SURGERY

## 2017-01-01 PROCEDURE — 25010000002 PROPOFOL 10 MG/ML EMULSION: Performed by: NURSE ANESTHETIST, CERTIFIED REGISTERED

## 2017-01-01 PROCEDURE — 0 IOPAMIDOL 41 % SOLUTION: Performed by: ANESTHESIOLOGY

## 2017-01-01 PROCEDURE — 25010000003 PENICILLIN G POTASSIUM PER 600000 UNITS: Performed by: INTERNAL MEDICINE

## 2017-01-01 PROCEDURE — 85652 RBC SED RATE AUTOMATED: CPT | Performed by: NURSE PRACTITIONER

## 2017-01-01 PROCEDURE — 80053 COMPREHEN METABOLIC PANEL: CPT | Performed by: EMERGENCY MEDICINE

## 2017-01-01 PROCEDURE — 86140 C-REACTIVE PROTEIN: CPT | Performed by: NURSE PRACTITIONER

## 2017-01-01 PROCEDURE — 63710000001 DIPHENHYDRAMINE PER 50 MG: Performed by: HOSPITALIST

## 2017-01-01 PROCEDURE — 25010000002 DIPHENHYDRAMINE PER 50 MG

## 2017-01-01 PROCEDURE — 0JB70ZZ EXCISION OF BACK SUBCUTANEOUS TISSUE AND FASCIA, OPEN APPROACH: ICD-10-PCS | Performed by: SURGERY

## 2017-01-01 PROCEDURE — 86901 BLOOD TYPING SEROLOGIC RH(D): CPT | Performed by: NURSE PRACTITIONER

## 2017-01-01 PROCEDURE — 87581 M.PNEUMON DNA AMP PROBE: CPT | Performed by: INTERNAL MEDICINE

## 2017-01-01 PROCEDURE — 87040 BLOOD CULTURE FOR BACTERIA: CPT | Performed by: INTERNAL MEDICINE

## 2017-01-01 PROCEDURE — 93005 ELECTROCARDIOGRAM TRACING: CPT | Performed by: INTERNAL MEDICINE

## 2017-01-01 PROCEDURE — 00QT0ZZ REPAIR SPINAL MENINGES, OPEN APPROACH: ICD-10-PCS | Performed by: NEUROLOGICAL SURGERY

## 2017-01-01 PROCEDURE — 93306 TTE W/DOPPLER COMPLETE: CPT

## 2017-01-01 PROCEDURE — 87633 RESP VIRUS 12-25 TARGETS: CPT | Performed by: INTERNAL MEDICINE

## 2017-01-01 PROCEDURE — 82607 VITAMIN B-12: CPT | Performed by: INTERNAL MEDICINE

## 2017-01-01 PROCEDURE — 97110 THERAPEUTIC EXERCISES: CPT | Performed by: PHYSICAL THERAPIST

## 2017-01-01 PROCEDURE — 81001 URINALYSIS AUTO W/SCOPE: CPT | Performed by: EMERGENCY MEDICINE

## 2017-01-01 PROCEDURE — 93971 EXTREMITY STUDY: CPT

## 2017-01-01 PROCEDURE — 84484 ASSAY OF TROPONIN QUANT: CPT | Performed by: HOSPITALIST

## 2017-01-01 PROCEDURE — 83880 ASSAY OF NATRIURETIC PEPTIDE: CPT | Performed by: HOSPITALIST

## 2017-01-01 PROCEDURE — 25010000002 HYDROMORPHONE PER 4 MG: Performed by: EMERGENCY MEDICINE

## 2017-01-01 PROCEDURE — 83930 ASSAY OF BLOOD OSMOLALITY: CPT | Performed by: INTERNAL MEDICINE

## 2017-01-01 PROCEDURE — 80053 COMPREHEN METABOLIC PANEL: CPT | Performed by: NURSE PRACTITIONER

## 2017-01-01 PROCEDURE — 82728 ASSAY OF FERRITIN: CPT | Performed by: HOSPITALIST

## 2017-01-01 PROCEDURE — 80053 COMPREHEN METABOLIC PANEL: CPT | Performed by: INTERNAL MEDICINE

## 2017-01-01 PROCEDURE — 0W9J3ZX DRAINAGE OF PELVIC CAVITY, PERCUTANEOUS APPROACH, DIAGNOSTIC: ICD-10-PCS | Performed by: RADIOLOGY

## 2017-01-01 PROCEDURE — 85025 COMPLETE CBC W/AUTO DIFF WBC: CPT | Performed by: NEUROLOGICAL SURGERY

## 2017-01-01 PROCEDURE — 86900 BLOOD TYPING SEROLOGIC ABO: CPT | Performed by: NURSE PRACTITIONER

## 2017-01-01 PROCEDURE — 87077 CULTURE AEROBIC IDENTIFY: CPT | Performed by: NEUROLOGICAL SURGERY

## 2017-01-01 PROCEDURE — 99284 EMERGENCY DEPT VISIT MOD MDM: CPT

## 2017-01-01 PROCEDURE — 0YU64JZ SUPPLEMENT LEFT INGUINAL REGION WITH SYNTHETIC SUBSTITUTE, PERCUTANEOUS ENDOSCOPIC APPROACH: ICD-10-PCS | Performed by: SURGERY

## 2017-01-01 PROCEDURE — 86850 RBC ANTIBODY SCREEN: CPT | Performed by: EMERGENCY MEDICINE

## 2017-01-01 PROCEDURE — 0 GADOBENATE DIMEGLUMINE 529 MG/ML SOLUTION: Performed by: HOSPITALIST

## 2017-01-01 PROCEDURE — 99221 1ST HOSP IP/OBS SF/LOW 40: CPT | Performed by: NURSE PRACTITIONER

## 2017-01-01 PROCEDURE — 87798 DETECT AGENT NOS DNA AMP: CPT | Performed by: INTERNAL MEDICINE

## 2017-01-01 PROCEDURE — 96125 COGNITIVE TEST BY HC PRO: CPT

## 2017-01-01 PROCEDURE — 83605 ASSAY OF LACTIC ACID: CPT | Performed by: HOSPITALIST

## 2017-01-01 PROCEDURE — 89051 BODY FLUID CELL COUNT: CPT | Performed by: INTERNAL MEDICINE

## 2017-01-01 PROCEDURE — 25010000002 MORPHINE PER 10 MG: Performed by: HOSPITALIST

## 2017-01-01 PROCEDURE — 25010000003 CEFAZOLIN IN DEXTROSE 2-4 GM/100ML-% SOLUTION: Performed by: SURGERY

## 2017-01-01 PROCEDURE — 84100 ASSAY OF PHOSPHORUS: CPT | Performed by: HOSPITALIST

## 2017-01-01 PROCEDURE — 83690 ASSAY OF LIPASE: CPT | Performed by: INTERNAL MEDICINE

## 2017-01-01 PROCEDURE — 99231 SBSQ HOSP IP/OBS SF/LOW 25: CPT | Performed by: INTERNAL MEDICINE

## 2017-01-01 PROCEDURE — 82607 VITAMIN B-12: CPT | Performed by: HOSPITALIST

## 2017-01-01 PROCEDURE — 3E0R33Z INTRODUCTION OF ANTI-INFLAMMATORY INTO SPINAL CANAL, PERCUTANEOUS APPROACH: ICD-10-PCS | Performed by: ANESTHESIOLOGY

## 2017-01-01 PROCEDURE — 85652 RBC SED RATE AUTOMATED: CPT | Performed by: EMERGENCY MEDICINE

## 2017-01-01 PROCEDURE — 0 GADOBENATE DIMEGLUMINE 529 MG/ML SOLUTION: Performed by: INTERNAL MEDICINE

## 2017-01-01 PROCEDURE — 87086 URINE CULTURE/COLONY COUNT: CPT | Performed by: EMERGENCY MEDICINE

## 2017-01-01 PROCEDURE — 25010000002 VANCOMYCIN: Performed by: INTERNAL MEDICINE

## 2017-01-01 PROCEDURE — 72110 X-RAY EXAM L-2 SPINE 4/>VWS: CPT

## 2017-01-01 PROCEDURE — 82306 VITAMIN D 25 HYDROXY: CPT | Performed by: INTERNAL MEDICINE

## 2017-01-01 PROCEDURE — 82747 ASSAY OF FOLIC ACID RBC: CPT | Performed by: INTERNAL MEDICINE

## 2017-01-01 PROCEDURE — 77003 FLUOROGUIDE FOR SPINE INJECT: CPT

## 2017-01-01 PROCEDURE — 85610 PROTHROMBIN TIME: CPT | Performed by: NURSE PRACTITIONER

## 2017-01-01 PROCEDURE — 87075 CULTR BACTERIA EXCEPT BLOOD: CPT | Performed by: NEUROLOGICAL SURGERY

## 2017-01-01 PROCEDURE — 97165 OT EVAL LOW COMPLEX 30 MIN: CPT

## 2017-01-01 PROCEDURE — 85027 COMPLETE CBC AUTOMATED: CPT | Performed by: INTERNAL MEDICINE

## 2017-01-01 PROCEDURE — 63267 EXCISE INTRSPINL LESION LMBR: CPT | Performed by: NEUROLOGICAL SURGERY

## 2017-01-01 PROCEDURE — 84153 ASSAY OF PSA TOTAL: CPT | Performed by: HOSPITALIST

## 2017-01-01 PROCEDURE — 74000 HC ABDOMEN KUB: CPT

## 2017-01-01 PROCEDURE — 86140 C-REACTIVE PROTEIN: CPT | Performed by: HOSPITALIST

## 2017-01-01 PROCEDURE — 81001 URINALYSIS AUTO W/SCOPE: CPT | Performed by: NURSE PRACTITIONER

## 2017-01-01 PROCEDURE — 83735 ASSAY OF MAGNESIUM: CPT | Performed by: HOSPITALIST

## 2017-01-01 PROCEDURE — 85610 PROTHROMBIN TIME: CPT | Performed by: EMERGENCY MEDICINE

## 2017-01-01 PROCEDURE — 25010000002 DIPHENHYDRAMINE PER 50 MG: Performed by: ANESTHESIOLOGY

## 2017-01-01 PROCEDURE — 84484 ASSAY OF TROPONIN QUANT: CPT | Performed by: EMERGENCY MEDICINE

## 2017-01-01 PROCEDURE — 83605 ASSAY OF LACTIC ACID: CPT | Performed by: NURSE PRACTITIONER

## 2017-01-01 PROCEDURE — 86901 BLOOD TYPING SEROLOGIC RH(D): CPT | Performed by: INTERNAL MEDICINE

## 2017-01-01 PROCEDURE — 86900 BLOOD TYPING SEROLOGIC ABO: CPT | Performed by: INTERNAL MEDICINE

## 2017-01-01 PROCEDURE — 36415 COLL VENOUS BLD VENIPUNCTURE: CPT | Performed by: EMERGENCY MEDICINE

## 2017-01-01 PROCEDURE — 85652 RBC SED RATE AUTOMATED: CPT | Performed by: HOSPITALIST

## 2017-01-01 PROCEDURE — 85610 PROTHROMBIN TIME: CPT | Performed by: HOSPITALIST

## 2017-01-01 PROCEDURE — 25010000002 ALTEPLASE: Performed by: INTERNAL MEDICINE

## 2017-01-01 PROCEDURE — C1751 CATH, INF, PER/CENT/MIDLINE: HCPCS

## 2017-01-01 PROCEDURE — 82533 TOTAL CORTISOL: CPT | Performed by: HOSPITALIST

## 2017-01-01 PROCEDURE — 02HV33Z INSERTION OF INFUSION DEVICE INTO SUPERIOR VENA CAVA, PERCUTANEOUS APPROACH: ICD-10-PCS | Performed by: INTERNAL MEDICINE

## 2017-01-01 PROCEDURE — 93005 ELECTROCARDIOGRAM TRACING: CPT | Performed by: EMERGENCY MEDICINE

## 2017-01-01 PROCEDURE — 99231 SBSQ HOSP IP/OBS SF/LOW 25: CPT | Performed by: NURSE PRACTITIONER

## 2017-01-01 PROCEDURE — 25010000002 FENTANYL CITRATE (PF) 100 MCG/2ML SOLUTION: Performed by: ANESTHESIOLOGY

## 2017-01-01 PROCEDURE — 81001 URINALYSIS AUTO W/SCOPE: CPT | Performed by: INTERNAL MEDICINE

## 2017-01-01 PROCEDURE — 85730 THROMBOPLASTIN TIME PARTIAL: CPT | Performed by: NURSE PRACTITIONER

## 2017-01-01 PROCEDURE — 82550 ASSAY OF CK (CPK): CPT | Performed by: EMERGENCY MEDICINE

## 2017-01-01 PROCEDURE — 99231 SBSQ HOSP IP/OBS SF/LOW 25: CPT | Performed by: PSYCHIATRY & NEUROLOGY

## 2017-01-01 PROCEDURE — 25010000002 DIGOXIN PER 500 MCG: Performed by: INTERNAL MEDICINE

## 2017-01-01 PROCEDURE — 71020 HC CHEST PA AND LATERAL: CPT

## 2017-01-01 PROCEDURE — 25010000002 NEOSTIGMINE PER 0.5 MG: Performed by: ANESTHESIOLOGY

## 2017-01-01 PROCEDURE — 83930 ASSAY OF BLOOD OSMOLALITY: CPT | Performed by: HOSPITALIST

## 2017-01-01 PROCEDURE — 97110 THERAPEUTIC EXERCISES: CPT | Performed by: OCCUPATIONAL THERAPIST

## 2017-01-01 PROCEDURE — 87147 CULTURE TYPE IMMUNOLOGIC: CPT | Performed by: NEUROLOGICAL SURGERY

## 2017-01-01 PROCEDURE — 85025 COMPLETE CBC W/AUTO DIFF WBC: CPT | Performed by: NURSE PRACTITIONER

## 2017-01-01 PROCEDURE — 49650 LAP ING HERNIA REPAIR INIT: CPT | Performed by: SURGERY

## 2017-01-01 PROCEDURE — 87086 URINE CULTURE/COLONY COUNT: CPT | Performed by: NURSE PRACTITIONER

## 2017-01-01 PROCEDURE — C1755 CATHETER, INTRASPINAL: HCPCS

## 2017-01-01 PROCEDURE — 25010000002 VANCOMYCIN PER 500 MG: Performed by: HOSPITALIST

## 2017-01-01 PROCEDURE — 25010000002 CEFTRIAXONE PER 250 MG: Performed by: HOSPITALIST

## 2017-01-01 PROCEDURE — 84466 ASSAY OF TRANSFERRIN: CPT | Performed by: HOSPITALIST

## 2017-01-01 PROCEDURE — 85018 HEMOGLOBIN: CPT | Performed by: INTERNAL MEDICINE

## 2017-01-01 PROCEDURE — 25010000002 ONDANSETRON PER 1 MG: Performed by: NURSE ANESTHETIST, CERTIFIED REGISTERED

## 2017-01-01 PROCEDURE — 25010000002 MORPHINE PER 10 MG: Performed by: NEUROLOGICAL SURGERY

## 2017-01-01 PROCEDURE — 83615 LACTATE (LD) (LDH) ENZYME: CPT | Performed by: HOSPITALIST

## 2017-01-01 PROCEDURE — 25010000002 MORPHINE PER 10 MG: Performed by: SURGERY

## 2017-01-01 PROCEDURE — 86140 C-REACTIVE PROTEIN: CPT | Performed by: EMERGENCY MEDICINE

## 2017-01-01 PROCEDURE — 87040 BLOOD CULTURE FOR BACTERIA: CPT | Performed by: NURSE PRACTITIONER

## 2017-01-01 PROCEDURE — 99283 EMERGENCY DEPT VISIT LOW MDM: CPT

## 2017-01-01 PROCEDURE — 0QB00ZZ EXCISION OF LUMBAR VERTEBRA, OPEN APPROACH: ICD-10-PCS | Performed by: NEUROLOGICAL SURGERY

## 2017-01-01 PROCEDURE — 72197 MRI PELVIS W/O & W/DYE: CPT

## 2017-01-01 PROCEDURE — 88189 FLOWCYTOMETRY/READ 16 & >: CPT | Performed by: INTERNAL MEDICINE

## 2017-01-01 PROCEDURE — 97164 PT RE-EVAL EST PLAN CARE: CPT

## 2017-01-01 DEVICE — BARD 3DMAX MESH LEFT LARGE
Type: IMPLANTABLE DEVICE | Site: ABDOMEN | Status: FUNCTIONAL
Brand: BARD 3DMAX MESH

## 2017-01-01 DEVICE — SEALANT WND FIBRIN TISSEEL VAPOR/HEAT/PREFIL/SYR 10ML: Type: IMPLANTABLE DEVICE | Site: EPIDURAL SPACE | Status: FUNCTIONAL

## 2017-01-01 RX ORDER — LEVOFLOXACIN 500 MG/1
500 TABLET, FILM COATED ORAL EVERY 24 HOURS
Status: DISCONTINUED | OUTPATIENT
Start: 2017-01-01 | End: 2017-01-01 | Stop reason: HOSPADM

## 2017-01-01 RX ORDER — LEVOFLOXACIN 500 MG/1
500 TABLET, FILM COATED ORAL EVERY 24 HOURS
Qty: 7 TABLET | Refills: 0
Start: 2017-01-01 | End: 2017-01-01

## 2017-01-01 RX ORDER — LORAZEPAM 2 MG/ML
0.5 CONCENTRATE ORAL
Status: DISCONTINUED | OUTPATIENT
Start: 2017-01-01 | End: 2017-01-01 | Stop reason: HOSPADM

## 2017-01-01 RX ORDER — FENTANYL CITRATE 50 UG/ML
50 INJECTION, SOLUTION INTRAMUSCULAR; INTRAVENOUS
Status: DISCONTINUED | OUTPATIENT
Start: 2017-01-01 | End: 2017-01-01 | Stop reason: HOSPADM

## 2017-01-01 RX ORDER — LIDOCAINE 50 MG/G
1 PATCH TOPICAL EVERY 24 HOURS
Status: DISCONTINUED | OUTPATIENT
Start: 2017-01-01 | End: 2017-01-01 | Stop reason: HOSPADM

## 2017-01-01 RX ORDER — CHOLECALCIFEROL (VITAMIN D3) 125 MCG
2000 CAPSULE ORAL DAILY
Status: DISCONTINUED | OUTPATIENT
Start: 2017-01-01 | End: 2017-01-01

## 2017-01-01 RX ORDER — EPHEDRINE SULFATE 50 MG/ML
5 INJECTION, SOLUTION INTRAVENOUS ONCE AS NEEDED
Status: DISCONTINUED | OUTPATIENT
Start: 2017-01-01 | End: 2017-01-01 | Stop reason: HOSPADM

## 2017-01-01 RX ORDER — SODIUM CHLORIDE 0.9 % (FLUSH) 0.9 %
1-10 SYRINGE (ML) INJECTION AS NEEDED
Status: DISCONTINUED | OUTPATIENT
Start: 2017-01-01 | End: 2017-01-01

## 2017-01-01 RX ORDER — TAMSULOSIN HYDROCHLORIDE 0.4 MG/1
0.8 CAPSULE ORAL NIGHTLY
Status: DISCONTINUED | OUTPATIENT
Start: 2017-01-01 | End: 2017-01-01

## 2017-01-01 RX ORDER — TAMSULOSIN HYDROCHLORIDE 0.4 MG/1
0.4 CAPSULE ORAL NIGHTLY
Status: DISCONTINUED | OUTPATIENT
Start: 2017-01-01 | End: 2017-01-01

## 2017-01-01 RX ORDER — ATORVASTATIN CALCIUM 10 MG/1
10 TABLET, FILM COATED ORAL DAILY
Status: DISCONTINUED | OUTPATIENT
Start: 2017-01-01 | End: 2017-01-01

## 2017-01-01 RX ORDER — LORAZEPAM 0.5 MG/1
0.5 TABLET ORAL
Status: DISCONTINUED | OUTPATIENT
Start: 2017-01-01 | End: 2017-01-01 | Stop reason: HOSPADM

## 2017-01-01 RX ORDER — SENNA AND DOCUSATE SODIUM 50; 8.6 MG/1; MG/1
1 TABLET, FILM COATED ORAL NIGHTLY PRN
Status: DISCONTINUED | OUTPATIENT
Start: 2017-01-01 | End: 2017-01-01 | Stop reason: HOSPADM

## 2017-01-01 RX ORDER — HYOSCYAMINE SULFATE 16 OZ
SOLUTION MISCELLANEOUS EVERY 12 HOURS SCHEDULED
Status: DISCONTINUED | OUTPATIENT
Start: 2017-01-01 | End: 2017-01-01 | Stop reason: HOSPADM

## 2017-01-01 RX ORDER — LORAZEPAM 0.5 MG/1
0.5 TABLET ORAL
Status: CANCELLED | OUTPATIENT
Start: 2017-01-01 | End: 2017-09-20

## 2017-01-01 RX ORDER — ONDANSETRON 4 MG/1
4 TABLET, ORALLY DISINTEGRATING ORAL EVERY 6 HOURS PRN
Status: DISCONTINUED | OUTPATIENT
Start: 2017-01-01 | End: 2017-01-01

## 2017-01-01 RX ORDER — TOLVAPTAN 15 MG/1
7.5 TABLET ORAL DAILY
Status: COMPLETED | OUTPATIENT
Start: 2017-01-01 | End: 2017-01-01

## 2017-01-01 RX ORDER — LORAZEPAM 2 MG/ML
2 CONCENTRATE ORAL
Status: CANCELLED | OUTPATIENT
Start: 2017-01-01 | End: 2017-09-20

## 2017-01-01 RX ORDER — SODIUM CHLORIDE, SODIUM LACTATE, POTASSIUM CHLORIDE, CALCIUM CHLORIDE 600; 310; 30; 20 MG/100ML; MG/100ML; MG/100ML; MG/100ML
50 INJECTION, SOLUTION INTRAVENOUS CONTINUOUS
Status: DISCONTINUED | OUTPATIENT
Start: 2017-01-01 | End: 2017-01-01

## 2017-01-01 RX ORDER — ONDANSETRON 2 MG/ML
4 INJECTION INTRAMUSCULAR; INTRAVENOUS ONCE AS NEEDED
Status: DISCONTINUED | OUTPATIENT
Start: 2017-01-01 | End: 2017-01-01 | Stop reason: HOSPADM

## 2017-01-01 RX ORDER — NALOXONE HCL 0.4 MG/ML
0.2 VIAL (ML) INJECTION AS NEEDED
Status: DISCONTINUED | OUTPATIENT
Start: 2017-01-01 | End: 2017-01-01 | Stop reason: HOSPADM

## 2017-01-01 RX ORDER — GLYCOPYRROLATE 0.2 MG/ML
0.2 INJECTION INTRAMUSCULAR; INTRAVENOUS
Status: DISCONTINUED | OUTPATIENT
Start: 2017-01-01 | End: 2017-01-01 | Stop reason: HOSPADM

## 2017-01-01 RX ORDER — MORPHINE SULFATE 2 MG/ML
2 INJECTION, SOLUTION INTRAMUSCULAR; INTRAVENOUS EVERY 4 HOURS PRN
Status: DISCONTINUED | OUTPATIENT
Start: 2017-01-01 | End: 2017-01-01 | Stop reason: HOSPADM

## 2017-01-01 RX ORDER — CASTOR OIL AND BALSAM, PERU 788; 87 MG/G; MG/G
OINTMENT TOPICAL EVERY 12 HOURS SCHEDULED
Status: DISCONTINUED | OUTPATIENT
Start: 2017-01-01 | End: 2017-01-01 | Stop reason: HOSPADM

## 2017-01-01 RX ORDER — MORPHINE SULFATE 10 MG/ML
6 INJECTION INTRAMUSCULAR; INTRAVENOUS; SUBCUTANEOUS
Status: DISCONTINUED | OUTPATIENT
Start: 2017-01-01 | End: 2017-01-01 | Stop reason: HOSPADM

## 2017-01-01 RX ORDER — ACETAMINOPHEN 650 MG/1
650 SUPPOSITORY RECTAL EVERY 4 HOURS PRN
Status: DISCONTINUED | OUTPATIENT
Start: 2017-01-01 | End: 2017-01-01 | Stop reason: HOSPADM

## 2017-01-01 RX ORDER — DILTIAZEM HYDROCHLORIDE 120 MG/1
120 CAPSULE, COATED, EXTENDED RELEASE ORAL
Status: DISCONTINUED | OUTPATIENT
Start: 2017-01-01 | End: 2017-01-01

## 2017-01-01 RX ORDER — TAMSULOSIN HYDROCHLORIDE 0.4 MG/1
0.8 CAPSULE ORAL NIGHTLY
Status: DISCONTINUED | OUTPATIENT
Start: 2017-01-01 | End: 2017-01-01 | Stop reason: HOSPADM

## 2017-01-01 RX ORDER — MORPHINE SULFATE 2 MG/ML
2 INJECTION, SOLUTION INTRAMUSCULAR; INTRAVENOUS
Status: CANCELLED | OUTPATIENT
Start: 2017-01-01 | End: 2017-09-20

## 2017-01-01 RX ORDER — CHOLECALCIFEROL (VITAMIN D3) 125 MCG
1000 CAPSULE ORAL DAILY
Status: DISCONTINUED | OUTPATIENT
Start: 2017-01-01 | End: 2017-01-01 | Stop reason: HOSPADM

## 2017-01-01 RX ORDER — HYDROMORPHONE HYDROCHLORIDE 1 MG/ML
0.5 INJECTION, SOLUTION INTRAMUSCULAR; INTRAVENOUS; SUBCUTANEOUS
Status: DISCONTINUED | OUTPATIENT
Start: 2017-01-01 | End: 2017-01-01 | Stop reason: HOSPADM

## 2017-01-01 RX ORDER — PROMETHAZINE HYDROCHLORIDE 25 MG/1
12.5 TABLET ORAL ONCE AS NEEDED
Status: DISCONTINUED | OUTPATIENT
Start: 2017-01-01 | End: 2017-01-01 | Stop reason: HOSPADM

## 2017-01-01 RX ORDER — HYDROMORPHONE HCL 110MG/55ML
2 PATIENT CONTROLLED ANALGESIA SYRINGE INTRAVENOUS
Status: CANCELLED | OUTPATIENT
Start: 2017-01-01 | End: 2017-09-22

## 2017-01-01 RX ORDER — ACETAMINOPHEN 325 MG/1
650 TABLET ORAL EVERY 4 HOURS PRN
Status: DISCONTINUED | OUTPATIENT
Start: 2017-01-01 | End: 2017-01-01 | Stop reason: HOSPADM

## 2017-01-01 RX ORDER — DIPHENHYDRAMINE HYDROCHLORIDE 50 MG/ML
12.5 INJECTION INTRAMUSCULAR; INTRAVENOUS
Status: DISCONTINUED | OUTPATIENT
Start: 2017-01-01 | End: 2017-01-01 | Stop reason: HOSPADM

## 2017-01-01 RX ORDER — SENNA AND DOCUSATE SODIUM 50; 8.6 MG/1; MG/1
2 TABLET, FILM COATED ORAL NIGHTLY
Status: DISCONTINUED | OUTPATIENT
Start: 2017-01-01 | End: 2017-01-01

## 2017-01-01 RX ORDER — SODIUM CHLORIDE 0.9 % (FLUSH) 0.9 %
10 SYRINGE (ML) INJECTION AS NEEDED
Status: DISCONTINUED | OUTPATIENT
Start: 2017-01-01 | End: 2017-01-01

## 2017-01-01 RX ORDER — RAMIPRIL 5 MG/1
5 CAPSULE ORAL DAILY
Status: DISCONTINUED | OUTPATIENT
Start: 2017-01-01 | End: 2017-01-01 | Stop reason: HOSPADM

## 2017-01-01 RX ORDER — ONDANSETRON 2 MG/ML
4 INJECTION INTRAMUSCULAR; INTRAVENOUS EVERY 6 HOURS PRN
Status: DISCONTINUED | OUTPATIENT
Start: 2017-01-01 | End: 2017-01-01

## 2017-01-01 RX ORDER — HYDROMORPHONE HCL 110MG/55ML
PATIENT CONTROLLED ANALGESIA SYRINGE INTRAVENOUS AS NEEDED
Status: DISCONTINUED | OUTPATIENT
Start: 2017-01-01 | End: 2017-01-01 | Stop reason: SURG

## 2017-01-01 RX ORDER — IBUPROFEN 800 MG/1
800 TABLET ORAL EVERY 6 HOURS PRN
Status: DISCONTINUED | OUTPATIENT
Start: 2017-01-01 | End: 2017-01-01

## 2017-01-01 RX ORDER — FERROUS SULFATE 325(65) MG
325 TABLET ORAL
COMMUNITY

## 2017-01-01 RX ORDER — GLYCOPYRROLATE 0.2 MG/ML
0.4 INJECTION INTRAMUSCULAR; INTRAVENOUS
Status: DISCONTINUED | OUTPATIENT
Start: 2017-01-01 | End: 2017-01-01 | Stop reason: HOSPADM

## 2017-01-01 RX ORDER — LORAZEPAM 1 MG/1
1 TABLET ORAL
Status: CANCELLED | OUTPATIENT
Start: 2017-01-01 | End: 2017-09-20

## 2017-01-01 RX ORDER — GLYCOPYRROLATE 0.2 MG/ML
INJECTION INTRAMUSCULAR; INTRAVENOUS AS NEEDED
Status: DISCONTINUED | OUTPATIENT
Start: 2017-01-01 | End: 2017-01-01 | Stop reason: SURG

## 2017-01-01 RX ORDER — DIPHENOXYLATE HYDROCHLORIDE AND ATROPINE SULFATE 2.5; .025 MG/1; MG/1
1 TABLET ORAL
Status: CANCELLED | OUTPATIENT
Start: 2017-01-01

## 2017-01-01 RX ORDER — DIPHENHYDRAMINE HCL 25 MG
25 CAPSULE ORAL EVERY 6 HOURS PRN
Status: DISCONTINUED | OUTPATIENT
Start: 2017-01-01 | End: 2017-01-01 | Stop reason: HOSPADM

## 2017-01-01 RX ORDER — LORAZEPAM 1 MG/1
1 TABLET ORAL
Status: DISCONTINUED | OUTPATIENT
Start: 2017-01-01 | End: 2017-01-01 | Stop reason: HOSPADM

## 2017-01-01 RX ORDER — PROMETHAZINE HYDROCHLORIDE 25 MG/1
6.25 TABLET ORAL EVERY 4 HOURS PRN
Status: DISCONTINUED | OUTPATIENT
Start: 2017-01-01 | End: 2017-01-01 | Stop reason: HOSPADM

## 2017-01-01 RX ORDER — SODIUM CHLORIDE 9 MG/ML
50 INJECTION, SOLUTION INTRAVENOUS CONTINUOUS
Status: DISCONTINUED | OUTPATIENT
Start: 2017-01-01 | End: 2017-01-01

## 2017-01-01 RX ORDER — CIPROFLOXACIN 500 MG/1
500 TABLET, FILM COATED ORAL EVERY 12 HOURS SCHEDULED
Status: DISCONTINUED | OUTPATIENT
Start: 2017-01-01 | End: 2017-01-01 | Stop reason: CLARIF

## 2017-01-01 RX ORDER — DIPHENHYDRAMINE HYDROCHLORIDE 50 MG/ML
25 INJECTION INTRAMUSCULAR; INTRAVENOUS EVERY 4 HOURS PRN
Status: DISCONTINUED | OUTPATIENT
Start: 2017-01-01 | End: 2017-01-01 | Stop reason: HOSPADM

## 2017-01-01 RX ORDER — LORAZEPAM 2 MG/ML
1 CONCENTRATE ORAL
Status: CANCELLED | OUTPATIENT
Start: 2017-01-01 | End: 2017-09-20

## 2017-01-01 RX ORDER — ACETAMINOPHEN 325 MG/1
650 TABLET ORAL EVERY 6 HOURS PRN
Status: DISCONTINUED | OUTPATIENT
Start: 2017-01-01 | End: 2017-01-01

## 2017-01-01 RX ORDER — CEFAZOLIN SODIUM 2 G/100ML
2 INJECTION, SOLUTION INTRAVENOUS ONCE
Status: COMPLETED | OUTPATIENT
Start: 2017-01-01 | End: 2017-01-01

## 2017-01-01 RX ORDER — CEFTRIAXONE SODIUM 1 G/50ML
1 INJECTION, SOLUTION INTRAVENOUS EVERY 24 HOURS
Status: DISCONTINUED | OUTPATIENT
Start: 2017-01-01 | End: 2017-01-01

## 2017-01-01 RX ORDER — SODIUM CHLORIDE 9 MG/ML
75 INJECTION, SOLUTION INTRAVENOUS CONTINUOUS
Status: DISCONTINUED | OUTPATIENT
Start: 2017-01-01 | End: 2017-01-01

## 2017-01-01 RX ORDER — LIDOCAINE 50 MG/G
1 PATCH TOPICAL EVERY 24 HOURS
Status: CANCELLED | OUTPATIENT
Start: 2017-01-01

## 2017-01-01 RX ORDER — MIDAZOLAM HYDROCHLORIDE 1 MG/ML
1 INJECTION INTRAMUSCULAR; INTRAVENOUS
Status: DISCONTINUED | OUTPATIENT
Start: 2017-01-01 | End: 2017-01-01 | Stop reason: HOSPADM

## 2017-01-01 RX ORDER — DIAZEPAM 5 MG/1
5 TABLET ORAL EVERY 6 HOURS PRN
Status: DISCONTINUED | OUTPATIENT
Start: 2017-01-01 | End: 2017-01-01 | Stop reason: HOSPADM

## 2017-01-01 RX ORDER — DEMECLOCYCLINE HYDROCHLORIDE 150 MG/1
300 TABLET, FILM COATED ORAL EVERY 12 HOURS SCHEDULED
Status: DISCONTINUED | OUTPATIENT
Start: 2017-01-01 | End: 2017-01-01

## 2017-01-01 RX ORDER — VANCOMYCIN HYDROCHLORIDE 1 G/200ML
1000 INJECTION, SOLUTION INTRAVENOUS EVERY 12 HOURS
Status: DISCONTINUED | OUTPATIENT
Start: 2017-01-01 | End: 2017-01-01 | Stop reason: HOSPADM

## 2017-01-01 RX ORDER — PROMETHAZINE HYDROCHLORIDE 25 MG/1
25 SUPPOSITORY RECTAL ONCE AS NEEDED
Status: DISCONTINUED | OUTPATIENT
Start: 2017-01-01 | End: 2017-01-01 | Stop reason: HOSPADM

## 2017-01-01 RX ORDER — PROMETHAZINE HYDROCHLORIDE 25 MG/1
25 TABLET ORAL ONCE
Status: COMPLETED | OUTPATIENT
Start: 2017-01-01 | End: 2017-01-01

## 2017-01-01 RX ORDER — MORPHINE SULFATE 100 MG/5ML
5 SOLUTION ORAL
Status: CANCELLED | OUTPATIENT
Start: 2017-01-01 | End: 2017-09-20

## 2017-01-01 RX ORDER — HYDROMORPHONE HCL 110MG/55ML
2 PATIENT CONTROLLED ANALGESIA SYRINGE INTRAVENOUS
Status: DISCONTINUED | OUTPATIENT
Start: 2017-01-01 | End: 2017-01-01 | Stop reason: HOSPADM

## 2017-01-01 RX ORDER — ROCURONIUM BROMIDE 10 MG/ML
INJECTION, SOLUTION INTRAVENOUS AS NEEDED
Status: DISCONTINUED | OUTPATIENT
Start: 2017-01-01 | End: 2017-01-01 | Stop reason: SURG

## 2017-01-01 RX ORDER — GLYCOPYRROLATE 0.2 MG/ML
0.4 INJECTION INTRAMUSCULAR; INTRAVENOUS
Status: CANCELLED | OUTPATIENT
Start: 2017-01-01

## 2017-01-01 RX ORDER — LORAZEPAM 2 MG/ML
0.5 INJECTION INTRAMUSCULAR
Status: DISCONTINUED | OUTPATIENT
Start: 2017-01-01 | End: 2017-01-01 | Stop reason: HOSPADM

## 2017-01-01 RX ORDER — FLUMAZENIL 0.1 MG/ML
0.2 INJECTION INTRAVENOUS AS NEEDED
Status: DISCONTINUED | OUTPATIENT
Start: 2017-01-01 | End: 2017-01-01 | Stop reason: HOSPADM

## 2017-01-01 RX ORDER — PROMETHAZINE HYDROCHLORIDE 12.5 MG/1
6.25 SUPPOSITORY RECTAL EVERY 4 HOURS PRN
Status: CANCELLED | OUTPATIENT
Start: 2017-01-01

## 2017-01-01 RX ORDER — MAGNESIUM HYDROXIDE 1200 MG/15ML
LIQUID ORAL AS NEEDED
Status: DISCONTINUED | OUTPATIENT
Start: 2017-01-01 | End: 2017-01-01 | Stop reason: HOSPADM

## 2017-01-01 RX ORDER — FENTANYL CITRATE 50 UG/ML
INJECTION, SOLUTION INTRAMUSCULAR; INTRAVENOUS AS NEEDED
Status: DISCONTINUED | OUTPATIENT
Start: 2017-01-01 | End: 2017-01-01 | Stop reason: SURG

## 2017-01-01 RX ORDER — HALOPERIDOL 5 MG/ML
1 INJECTION INTRAMUSCULAR EVERY 4 HOURS PRN
Status: DISCONTINUED | OUTPATIENT
Start: 2017-01-01 | End: 2017-01-01 | Stop reason: HOSPADM

## 2017-01-01 RX ORDER — DILTIAZEM HYDROCHLORIDE 240 MG/1
240 CAPSULE, COATED, EXTENDED RELEASE ORAL
Status: DISCONTINUED | OUTPATIENT
Start: 2017-01-01 | End: 2017-01-01 | Stop reason: HOSPADM

## 2017-01-01 RX ORDER — COSYNTROPIN 0.25 MG/ML
0.25 INJECTION, POWDER, FOR SOLUTION INTRAMUSCULAR; INTRAVENOUS ONCE
Status: DISCONTINUED | OUTPATIENT
Start: 2017-01-01 | End: 2017-01-01

## 2017-01-01 RX ORDER — PANTOPRAZOLE SODIUM 40 MG/1
40 TABLET, DELAYED RELEASE ORAL
Status: DISCONTINUED | OUTPATIENT
Start: 2017-01-01 | End: 2017-01-01 | Stop reason: HOSPADM

## 2017-01-01 RX ORDER — SENNA AND DOCUSATE SODIUM 50; 8.6 MG/1; MG/1
1 TABLET, FILM COATED ORAL NIGHTLY PRN
Start: 2017-01-01

## 2017-01-01 RX ORDER — PROMETHAZINE HYDROCHLORIDE 25 MG/ML
6.25 INJECTION, SOLUTION INTRAMUSCULAR; INTRAVENOUS EVERY 4 HOURS PRN
Status: CANCELLED | OUTPATIENT
Start: 2017-01-01

## 2017-01-01 RX ORDER — HYDROCODONE BITARTRATE AND ACETAMINOPHEN 7.5; 325 MG/1; MG/1
1 TABLET ORAL EVERY 6 HOURS PRN
Status: DISCONTINUED | OUTPATIENT
Start: 2017-01-01 | End: 2017-01-01

## 2017-01-01 RX ORDER — MORPHINE SULFATE 2 MG/ML
2 INJECTION, SOLUTION INTRAMUSCULAR; INTRAVENOUS
Status: DISCONTINUED | OUTPATIENT
Start: 2017-01-01 | End: 2017-01-01 | Stop reason: HOSPADM

## 2017-01-01 RX ORDER — PROMETHAZINE HYDROCHLORIDE 25 MG/ML
6.25 INJECTION, SOLUTION INTRAMUSCULAR; INTRAVENOUS ONCE AS NEEDED
Status: DISCONTINUED | OUTPATIENT
Start: 2017-01-01 | End: 2017-01-01 | Stop reason: HOSPADM

## 2017-01-01 RX ORDER — SODIUM CHLORIDE 0.9 % (FLUSH) 0.9 %
10 SYRINGE (ML) INJECTION AS NEEDED
Status: DISCONTINUED | OUTPATIENT
Start: 2017-01-01 | End: 2017-01-01 | Stop reason: HOSPADM

## 2017-01-01 RX ORDER — HYDROCODONE BITARTRATE AND ACETAMINOPHEN 5; 325 MG/1; MG/1
1 TABLET ORAL EVERY 4 HOURS PRN
Status: DISCONTINUED | OUTPATIENT
Start: 2017-01-01 | End: 2017-01-01 | Stop reason: HOSPADM

## 2017-01-01 RX ORDER — ONDANSETRON 2 MG/ML
INJECTION INTRAMUSCULAR; INTRAVENOUS AS NEEDED
Status: DISCONTINUED | OUTPATIENT
Start: 2017-01-01 | End: 2017-01-01 | Stop reason: SURG

## 2017-01-01 RX ORDER — LIDOCAINE HYDROCHLORIDE 20 MG/ML
INJECTION, SOLUTION INFILTRATION; PERINEURAL AS NEEDED
Status: DISCONTINUED | OUTPATIENT
Start: 2017-01-01 | End: 2017-01-01 | Stop reason: SURG

## 2017-01-01 RX ORDER — MORPHINE SULFATE 100 MG/5ML
10 SOLUTION ORAL
Status: CANCELLED | OUTPATIENT
Start: 2017-01-01 | End: 2017-09-20

## 2017-01-01 RX ORDER — ACETAMINOPHEN 160 MG/5ML
650 SOLUTION ORAL EVERY 4 HOURS PRN
Status: CANCELLED | OUTPATIENT
Start: 2017-01-01

## 2017-01-01 RX ORDER — LORAZEPAM 2 MG/ML
2 INJECTION INTRAMUSCULAR
Status: DISCONTINUED | OUTPATIENT
Start: 2017-01-01 | End: 2017-01-01 | Stop reason: HOSPADM

## 2017-01-01 RX ORDER — SODIUM BICARBONATE 650 MG/1
650 TABLET ORAL 3 TIMES DAILY
Status: DISCONTINUED | OUTPATIENT
Start: 2017-01-01 | End: 2017-01-01

## 2017-01-01 RX ORDER — LORAZEPAM 2 MG/ML
1 INJECTION INTRAMUSCULAR
Status: DISCONTINUED | OUTPATIENT
Start: 2017-01-01 | End: 2017-01-01 | Stop reason: HOSPADM

## 2017-01-01 RX ORDER — MIDAZOLAM HYDROCHLORIDE 1 MG/ML
2 INJECTION INTRAMUSCULAR; INTRAVENOUS
Status: DISCONTINUED | OUTPATIENT
Start: 2017-01-01 | End: 2017-01-01 | Stop reason: HOSPADM

## 2017-01-01 RX ORDER — HYDROMORPHONE HYDROCHLORIDE 1 MG/ML
0.5 INJECTION, SOLUTION INTRAMUSCULAR; INTRAVENOUS; SUBCUTANEOUS ONCE
Status: COMPLETED | OUTPATIENT
Start: 2017-01-01 | End: 2017-01-01

## 2017-01-01 RX ORDER — MELATONIN
5000 DAILY
Status: DISCONTINUED | OUTPATIENT
Start: 2017-01-01 | End: 2017-01-01 | Stop reason: HOSPADM

## 2017-01-01 RX ORDER — FERROUS SULFATE 325(65) MG
325 TABLET ORAL
Status: DISCONTINUED | OUTPATIENT
Start: 2017-01-01 | End: 2017-01-01 | Stop reason: HOSPADM

## 2017-01-01 RX ORDER — MORPHINE SULFATE 2 MG/ML
1 INJECTION, SOLUTION INTRAMUSCULAR; INTRAVENOUS
Status: DISCONTINUED | OUTPATIENT
Start: 2017-01-01 | End: 2017-01-01

## 2017-01-01 RX ORDER — HYDROCODONE BITARTRATE AND ACETAMINOPHEN 5; 325 MG/1; MG/1
1 TABLET ORAL EVERY 4 HOURS PRN
Qty: 18 TABLET | Refills: 0 | Status: SHIPPED | OUTPATIENT
Start: 2017-01-01

## 2017-01-01 RX ORDER — HYDROMORPHONE HCL 110MG/55ML
PATIENT CONTROLLED ANALGESIA SYRINGE INTRAVENOUS
Status: DISPENSED
Start: 2017-01-01 | End: 2017-01-01

## 2017-01-01 RX ORDER — LORAZEPAM 2 MG/ML
0.5 INJECTION INTRAMUSCULAR
Status: CANCELLED | OUTPATIENT
Start: 2017-01-01 | End: 2017-09-20

## 2017-01-01 RX ORDER — CYANOCOBALAMIN 1000 UG/ML
1000 INJECTION, SOLUTION INTRAMUSCULAR; SUBCUTANEOUS DAILY
Status: DISCONTINUED | OUTPATIENT
Start: 2017-01-01 | End: 2017-01-01

## 2017-01-01 RX ORDER — PROMETHAZINE HYDROCHLORIDE 25 MG/1
25 TABLET ORAL ONCE AS NEEDED
Status: DISCONTINUED | OUTPATIENT
Start: 2017-01-01 | End: 2017-01-01 | Stop reason: HOSPADM

## 2017-01-01 RX ORDER — ONDANSETRON 4 MG/1
4 TABLET, ORALLY DISINTEGRATING ORAL EVERY 6 HOURS PRN
Status: DISCONTINUED | OUTPATIENT
Start: 2017-01-01 | End: 2017-01-01 | Stop reason: HOSPADM

## 2017-01-01 RX ORDER — PROMETHAZINE HYDROCHLORIDE 6.25 MG/5ML
6.25 SYRUP ORAL EVERY 4 HOURS PRN
Status: DISCONTINUED | OUTPATIENT
Start: 2017-01-01 | End: 2017-01-01 | Stop reason: HOSPADM

## 2017-01-01 RX ORDER — CHOLECALCIFEROL (VITAMIN D3) 125 MCG
5 CAPSULE ORAL NIGHTLY
Status: DISCONTINUED | OUTPATIENT
Start: 2017-01-01 | End: 2017-01-01 | Stop reason: HOSPADM

## 2017-01-01 RX ORDER — LORAZEPAM 2 MG/ML
2 CONCENTRATE ORAL
Status: DISCONTINUED | OUTPATIENT
Start: 2017-01-01 | End: 2017-01-01 | Stop reason: HOSPADM

## 2017-01-01 RX ORDER — TOLVAPTAN 15 MG/1
15 TABLET ORAL ONCE
Status: COMPLETED | OUTPATIENT
Start: 2017-01-01 | End: 2017-01-01

## 2017-01-01 RX ORDER — ONDANSETRON 2 MG/ML
4 INJECTION INTRAMUSCULAR; INTRAVENOUS ONCE
Status: COMPLETED | OUTPATIENT
Start: 2017-01-01 | End: 2017-01-01

## 2017-01-01 RX ORDER — HYDROMORPHONE HCL 110MG/55ML
1.5 PATIENT CONTROLLED ANALGESIA SYRINGE INTRAVENOUS
Status: DISCONTINUED | OUTPATIENT
Start: 2017-01-01 | End: 2017-01-01 | Stop reason: HOSPADM

## 2017-01-01 RX ORDER — HALOPERIDOL 1 MG/1
1 TABLET ORAL EVERY 4 HOURS PRN
Status: DISCONTINUED | OUTPATIENT
Start: 2017-01-01 | End: 2017-01-01 | Stop reason: HOSPADM

## 2017-01-01 RX ORDER — MORPHINE SULFATE 2 MG/ML
1 INJECTION, SOLUTION INTRAMUSCULAR; INTRAVENOUS ONCE
Status: COMPLETED | OUTPATIENT
Start: 2017-01-01 | End: 2017-01-01

## 2017-01-01 RX ORDER — OXYCODONE AND ACETAMINOPHEN 7.5; 325 MG/1; MG/1
1 TABLET ORAL ONCE AS NEEDED
Status: DISCONTINUED | OUTPATIENT
Start: 2017-01-01 | End: 2017-01-01 | Stop reason: HOSPADM

## 2017-01-01 RX ORDER — DIGOXIN 0.25 MG/ML
250 INJECTION INTRAMUSCULAR; INTRAVENOUS ONCE
Status: COMPLETED | OUTPATIENT
Start: 2017-01-01 | End: 2017-01-01

## 2017-01-01 RX ORDER — NITROGLYCERIN 0.4 MG/1
0.4 TABLET SUBLINGUAL
Status: DISCONTINUED | OUTPATIENT
Start: 2017-01-01 | End: 2017-01-01

## 2017-01-01 RX ORDER — METHYLPREDNISOLONE ACETATE 80 MG/ML
80 INJECTION, SUSPENSION INTRA-ARTICULAR; INTRALESIONAL; INTRAMUSCULAR; SOFT TISSUE ONCE
Status: COMPLETED | OUTPATIENT
Start: 2017-01-01 | End: 2017-01-01

## 2017-01-01 RX ORDER — FAMOTIDINE 10 MG/ML
20 INJECTION, SOLUTION INTRAVENOUS ONCE
Status: COMPLETED | OUTPATIENT
Start: 2017-01-01 | End: 2017-01-01

## 2017-01-01 RX ORDER — PROMETHAZINE HYDROCHLORIDE 25 MG/ML
12.5 INJECTION, SOLUTION INTRAMUSCULAR; INTRAVENOUS ONCE AS NEEDED
Status: DISCONTINUED | OUTPATIENT
Start: 2017-01-01 | End: 2017-01-01 | Stop reason: HOSPADM

## 2017-01-01 RX ORDER — HYOSCYAMINE SULFATE 16 OZ
SOLUTION MISCELLANEOUS EVERY 12 HOURS SCHEDULED
Status: CANCELLED | OUTPATIENT
Start: 2017-01-01

## 2017-01-01 RX ORDER — LORAZEPAM 1 MG/1
2 TABLET ORAL
Status: CANCELLED | OUTPATIENT
Start: 2017-01-01 | End: 2017-09-20

## 2017-01-01 RX ORDER — HYDROCODONE BITARTRATE AND ACETAMINOPHEN 5; 325 MG/1; MG/1
1 TABLET ORAL EVERY 4 HOURS PRN
Qty: 18 TABLET | Refills: 0 | Status: ON HOLD | OUTPATIENT
Start: 2017-01-01 | End: 2017-01-01

## 2017-01-01 RX ORDER — HYDROCODONE BITARTRATE AND ACETAMINOPHEN 7.5; 325 MG/1; MG/1
1 TABLET ORAL ONCE AS NEEDED
Status: DISCONTINUED | OUTPATIENT
Start: 2017-01-01 | End: 2017-01-01 | Stop reason: HOSPADM

## 2017-01-01 RX ORDER — HYDRALAZINE HYDROCHLORIDE 20 MG/ML
5 INJECTION INTRAMUSCULAR; INTRAVENOUS
Status: DISCONTINUED | OUTPATIENT
Start: 2017-01-01 | End: 2017-01-01 | Stop reason: HOSPADM

## 2017-01-01 RX ORDER — LISINOPRIL 20 MG/1
20 TABLET ORAL DAILY
COMMUNITY

## 2017-01-01 RX ORDER — BISACODYL 10 MG
10 SUPPOSITORY, RECTAL RECTAL DAILY PRN
Status: DISCONTINUED | OUTPATIENT
Start: 2017-01-01 | End: 2017-01-01 | Stop reason: SDUPTHER

## 2017-01-01 RX ORDER — MORPHINE SULFATE 10 MG/ML
6 INJECTION INTRAMUSCULAR; INTRAVENOUS; SUBCUTANEOUS
Status: CANCELLED | OUTPATIENT
Start: 2017-01-01 | End: 2017-09-20

## 2017-01-01 RX ORDER — HALOPERIDOL 1 MG/1
1 TABLET ORAL EVERY 4 HOURS PRN
Status: CANCELLED | OUTPATIENT
Start: 2017-01-01

## 2017-01-01 RX ORDER — DILTIAZEM HYDROCHLORIDE 240 MG/1
240 CAPSULE, COATED, EXTENDED RELEASE ORAL
Status: DISCONTINUED | OUTPATIENT
Start: 2017-01-01 | End: 2017-01-01

## 2017-01-01 RX ORDER — LABETALOL HYDROCHLORIDE 5 MG/ML
5 INJECTION, SOLUTION INTRAVENOUS
Status: DISCONTINUED | OUTPATIENT
Start: 2017-01-01 | End: 2017-01-01 | Stop reason: HOSPADM

## 2017-01-01 RX ORDER — DIPHENHYDRAMINE HYDROCHLORIDE 50 MG/ML
25 INJECTION INTRAMUSCULAR; INTRAVENOUS EVERY 4 HOURS PRN
Status: CANCELLED | OUTPATIENT
Start: 2017-01-01

## 2017-01-01 RX ORDER — KETOROLAC TROMETHAMINE 30 MG/ML
15 INJECTION, SOLUTION INTRAMUSCULAR; INTRAVENOUS EVERY 6 HOURS PRN
Status: ACTIVE | OUTPATIENT
Start: 2017-01-01 | End: 2017-01-01

## 2017-01-01 RX ORDER — MORPHINE SULFATE 100 MG/5ML
10 SOLUTION ORAL
Status: DISCONTINUED | OUTPATIENT
Start: 2017-01-01 | End: 2017-01-01 | Stop reason: HOSPADM

## 2017-01-01 RX ORDER — PANTOPRAZOLE SODIUM 40 MG/10ML
40 INJECTION, POWDER, LYOPHILIZED, FOR SOLUTION INTRAVENOUS EVERY 12 HOURS SCHEDULED
Status: DISCONTINUED | OUTPATIENT
Start: 2017-01-01 | End: 2017-01-01 | Stop reason: RX

## 2017-01-01 RX ORDER — POLYETHYLENE GLYCOL 3350 17 G/17G
17 POWDER, FOR SOLUTION ORAL DAILY
Status: DISCONTINUED | OUTPATIENT
Start: 2017-01-01 | End: 2017-01-01

## 2017-01-01 RX ORDER — MORPHINE SULFATE 100 MG/5ML
20 SOLUTION ORAL
Status: CANCELLED | OUTPATIENT
Start: 2017-01-01 | End: 2017-09-20

## 2017-01-01 RX ORDER — IBUPROFEN 800 MG/1
800 TABLET ORAL EVERY 6 HOURS PRN
COMMUNITY
End: 2017-01-01 | Stop reason: HOSPADM

## 2017-01-01 RX ORDER — ONDANSETRON 2 MG/ML
4 INJECTION INTRAMUSCULAR; INTRAVENOUS EVERY 6 HOURS PRN
Status: DISCONTINUED | OUTPATIENT
Start: 2017-01-01 | End: 2017-01-01 | Stop reason: HOSPADM

## 2017-01-01 RX ORDER — ONDANSETRON 4 MG/1
4 TABLET, FILM COATED ORAL EVERY 6 HOURS PRN
Status: DISCONTINUED | OUTPATIENT
Start: 2017-01-01 | End: 2017-01-01 | Stop reason: HOSPADM

## 2017-01-01 RX ORDER — DILTIAZEM HYDROCHLORIDE 240 MG/1
240 CAPSULE, COATED, EXTENDED RELEASE ORAL
Start: 2017-01-01

## 2017-01-01 RX ORDER — LORAZEPAM 2 MG/ML
0.5 CONCENTRATE ORAL
Status: CANCELLED | OUTPATIENT
Start: 2017-01-01 | End: 2017-09-20

## 2017-01-01 RX ORDER — VANCOMYCIN HYDROCHLORIDE 1 G/200ML
1000 INJECTION, SOLUTION INTRAVENOUS EVERY 12 HOURS
Status: DISCONTINUED | OUTPATIENT
Start: 2017-01-01 | End: 2017-01-01 | Stop reason: DRUGHIGH

## 2017-01-01 RX ORDER — LIDOCAINE 50 MG/G
1 PATCH TOPICAL EVERY 24 HOURS
COMMUNITY

## 2017-01-01 RX ORDER — ACETAMINOPHEN 325 MG/1
650 TABLET ORAL EVERY 6 HOURS PRN
Status: DISCONTINUED | OUTPATIENT
Start: 2017-01-01 | End: 2017-01-01 | Stop reason: HOSPADM

## 2017-01-01 RX ORDER — DOCUSATE SODIUM 100 MG/1
100 CAPSULE, LIQUID FILLED ORAL 2 TIMES DAILY PRN
Status: DISCONTINUED | OUTPATIENT
Start: 2017-01-01 | End: 2017-01-01 | Stop reason: HOSPADM

## 2017-01-01 RX ORDER — ATORVASTATIN CALCIUM 10 MG/1
10 TABLET, FILM COATED ORAL DAILY
Status: DISCONTINUED | OUTPATIENT
Start: 2017-01-01 | End: 2017-01-01 | Stop reason: HOSPADM

## 2017-01-01 RX ORDER — ATORVASTATIN CALCIUM 10 MG/1
10 TABLET, FILM COATED ORAL DAILY
COMMUNITY

## 2017-01-01 RX ORDER — HALOPERIDOL 2 MG/ML
1 SOLUTION ORAL EVERY 4 HOURS PRN
Status: DISCONTINUED | OUTPATIENT
Start: 2017-01-01 | End: 2017-01-01 | Stop reason: HOSPADM

## 2017-01-01 RX ORDER — PROMETHAZINE HYDROCHLORIDE 12.5 MG/1
6.25 SUPPOSITORY RECTAL EVERY 4 HOURS PRN
Status: DISCONTINUED | OUTPATIENT
Start: 2017-01-01 | End: 2017-01-01 | Stop reason: HOSPADM

## 2017-01-01 RX ORDER — HYDROCODONE BITARTRATE AND ACETAMINOPHEN 5; 325 MG/1; MG/1
1 TABLET ORAL EVERY 4 HOURS PRN
Status: DISCONTINUED | OUTPATIENT
Start: 2017-01-01 | End: 2017-01-01

## 2017-01-01 RX ORDER — SODIUM CHLORIDE 0.9 % (FLUSH) 0.9 %
1-10 SYRINGE (ML) INJECTION AS NEEDED
Status: DISCONTINUED | OUTPATIENT
Start: 2017-01-01 | End: 2017-01-01 | Stop reason: HOSPADM

## 2017-01-01 RX ORDER — BACITRACIN, NEOMYCIN, POLYMYXIN B 400; 3.5; 5 [USP'U]/G; MG/G; [USP'U]/G
OINTMENT TOPICAL AS NEEDED
Status: DISCONTINUED | OUTPATIENT
Start: 2017-01-01 | End: 2017-01-01 | Stop reason: HOSPADM

## 2017-01-01 RX ORDER — METHOCARBAMOL 500 MG/1
1000 TABLET, FILM COATED ORAL 4 TIMES DAILY PRN
Status: DISCONTINUED | OUTPATIENT
Start: 2017-01-01 | End: 2017-01-01 | Stop reason: HOSPADM

## 2017-01-01 RX ORDER — GLYCOPYRROLATE 0.2 MG/ML
0.2 INJECTION INTRAMUSCULAR; INTRAVENOUS
Status: CANCELLED | OUTPATIENT
Start: 2017-01-01

## 2017-01-01 RX ORDER — SODIUM CHLORIDE 1000 MG
1 TABLET, SOLUBLE MISCELLANEOUS
Status: DISCONTINUED | OUTPATIENT
Start: 2017-01-01 | End: 2017-01-01

## 2017-01-01 RX ORDER — LORAZEPAM 2 MG/ML
2 INJECTION INTRAMUSCULAR
Status: CANCELLED | OUTPATIENT
Start: 2017-01-01 | End: 2017-09-20

## 2017-01-01 RX ORDER — BISACODYL 5 MG/1
5 TABLET, DELAYED RELEASE ORAL DAILY PRN
Status: DISCONTINUED | OUTPATIENT
Start: 2017-01-01 | End: 2017-01-01 | Stop reason: HOSPADM

## 2017-01-01 RX ORDER — PROMETHAZINE HYDROCHLORIDE 25 MG/ML
6.25 INJECTION, SOLUTION INTRAMUSCULAR; INTRAVENOUS EVERY 4 HOURS PRN
Status: DISCONTINUED | OUTPATIENT
Start: 2017-01-01 | End: 2017-01-01 | Stop reason: HOSPADM

## 2017-01-01 RX ORDER — RAMIPRIL 5 MG/1
5 CAPSULE ORAL DAILY
Start: 2017-01-01

## 2017-01-01 RX ORDER — LORAZEPAM 2 MG/ML
1 INJECTION INTRAMUSCULAR
Status: CANCELLED | OUTPATIENT
Start: 2017-01-01 | End: 2017-09-20

## 2017-01-01 RX ORDER — HYDROCODONE BITARTRATE AND ACETAMINOPHEN 5; 325 MG/1; MG/1
1 TABLET ORAL EVERY 4 HOURS PRN
Qty: 20 TABLET | Refills: 0 | Status: SHIPPED | OUTPATIENT
Start: 2017-01-01 | End: 2017-01-01

## 2017-01-01 RX ORDER — DEXAMETHASONE SODIUM PHOSPHATE 10 MG/ML
INJECTION INTRAMUSCULAR; INTRAVENOUS AS NEEDED
Status: DISCONTINUED | OUTPATIENT
Start: 2017-01-01 | End: 2017-01-01 | Stop reason: SURG

## 2017-01-01 RX ORDER — LORAZEPAM 2 MG/ML
1 CONCENTRATE ORAL
Status: DISCONTINUED | OUTPATIENT
Start: 2017-01-01 | End: 2017-01-01 | Stop reason: HOSPADM

## 2017-01-01 RX ORDER — MORPHINE SULFATE 2 MG/ML
2 INJECTION, SOLUTION INTRAMUSCULAR; INTRAVENOUS EVERY 4 HOURS PRN
Status: DISPENSED | OUTPATIENT
Start: 2017-01-01 | End: 2017-01-01

## 2017-01-01 RX ORDER — ACETAMINOPHEN 325 MG/1
650 TABLET ORAL ONCE AS NEEDED
Status: DISCONTINUED | OUTPATIENT
Start: 2017-01-01 | End: 2017-01-01 | Stop reason: HOSPADM

## 2017-01-01 RX ORDER — CALCIUM CARBONATE 200(500)MG
1 TABLET,CHEWABLE ORAL 2 TIMES DAILY PRN
Status: DISCONTINUED | OUTPATIENT
Start: 2017-01-01 | End: 2017-01-01 | Stop reason: HOSPADM

## 2017-01-01 RX ORDER — PROPOFOL 10 MG/ML
VIAL (ML) INTRAVENOUS AS NEEDED
Status: DISCONTINUED | OUTPATIENT
Start: 2017-01-01 | End: 2017-01-01 | Stop reason: SURG

## 2017-01-01 RX ORDER — EPHEDRINE SULFATE 50 MG/ML
INJECTION, SOLUTION INTRAVENOUS AS NEEDED
Status: DISCONTINUED | OUTPATIENT
Start: 2017-01-01 | End: 2017-01-01 | Stop reason: SURG

## 2017-01-01 RX ORDER — MORPHINE SULFATE 100 MG/5ML
5 SOLUTION ORAL
Status: DISCONTINUED | OUTPATIENT
Start: 2017-01-01 | End: 2017-01-01 | Stop reason: HOSPADM

## 2017-01-01 RX ORDER — PROMETHAZINE HYDROCHLORIDE 6.25 MG/5ML
6.25 SYRUP ORAL EVERY 4 HOURS PRN
Status: CANCELLED | OUTPATIENT
Start: 2017-01-01

## 2017-01-01 RX ORDER — DIPHENHYDRAMINE HYDROCHLORIDE 50 MG/ML
INJECTION INTRAMUSCULAR; INTRAVENOUS
Status: COMPLETED
Start: 2017-01-01 | End: 2017-01-01

## 2017-01-01 RX ORDER — NALOXONE HCL 0.4 MG/ML
0.4 VIAL (ML) INJECTION
Status: DISCONTINUED | OUTPATIENT
Start: 2017-01-01 | End: 2017-01-01

## 2017-01-01 RX ORDER — SODIUM CHLORIDE 0.9 % (FLUSH) 0.9 %
10 SYRINGE (ML) INJECTION EVERY 12 HOURS SCHEDULED
Status: DISCONTINUED | OUTPATIENT
Start: 2017-01-01 | End: 2017-01-01 | Stop reason: HOSPADM

## 2017-01-01 RX ORDER — TAMSULOSIN HYDROCHLORIDE 0.4 MG/1
0.8 CAPSULE ORAL NIGHTLY
Qty: 30 CAPSULE
Start: 2017-01-01

## 2017-01-01 RX ORDER — MORPHINE SULFATE 100 MG/5ML
20 SOLUTION ORAL
Status: DISCONTINUED | OUTPATIENT
Start: 2017-01-01 | End: 2017-01-01 | Stop reason: HOSPADM

## 2017-01-01 RX ORDER — HYOSCYAMINE SULFATE 16 OZ
SOLUTION MISCELLANEOUS EVERY 12 HOURS SCHEDULED
COMMUNITY

## 2017-01-01 RX ORDER — ACETAMINOPHEN 650 MG/1
650 SUPPOSITORY RECTAL EVERY 4 HOURS PRN
Status: CANCELLED | OUTPATIENT
Start: 2017-01-01

## 2017-01-01 RX ORDER — MORPHINE SULFATE 2 MG/ML
2 INJECTION, SOLUTION INTRAMUSCULAR; INTRAVENOUS EVERY 4 HOURS PRN
Status: DISCONTINUED | OUTPATIENT
Start: 2017-01-01 | End: 2017-01-01

## 2017-01-01 RX ORDER — LIDOCAINE HYDROCHLORIDE 10 MG/ML
20 INJECTION, SOLUTION INFILTRATION; PERINEURAL ONCE
Status: COMPLETED | OUTPATIENT
Start: 2017-01-01 | End: 2017-01-01

## 2017-01-01 RX ORDER — LORAZEPAM 1 MG/1
2 TABLET ORAL
Status: DISCONTINUED | OUTPATIENT
Start: 2017-01-01 | End: 2017-01-01 | Stop reason: HOSPADM

## 2017-01-01 RX ORDER — FENTANYL CITRATE 50 UG/ML
INJECTION, SOLUTION INTRAMUSCULAR; INTRAVENOUS
Status: COMPLETED
Start: 2017-01-01 | End: 2017-01-01

## 2017-01-01 RX ORDER — SODIUM CHLORIDE, SODIUM LACTATE, POTASSIUM CHLORIDE, CALCIUM CHLORIDE 600; 310; 30; 20 MG/100ML; MG/100ML; MG/100ML; MG/100ML
9 INJECTION, SOLUTION INTRAVENOUS CONTINUOUS
Status: DISCONTINUED | OUTPATIENT
Start: 2017-01-01 | End: 2017-01-01

## 2017-01-01 RX ORDER — FENTANYL CITRATE 50 UG/ML
50 INJECTION, SOLUTION INTRAMUSCULAR; INTRAVENOUS
Status: DISCONTINUED | OUTPATIENT
Start: 2017-01-01 | End: 2017-01-01

## 2017-01-01 RX ORDER — FENTANYL CITRATE 50 UG/ML
INJECTION, SOLUTION INTRAMUSCULAR; INTRAVENOUS
Status: DISPENSED
Start: 2017-01-01 | End: 2017-01-01

## 2017-01-01 RX ORDER — HALOPERIDOL 5 MG/ML
1 INJECTION INTRAMUSCULAR EVERY 4 HOURS PRN
Status: CANCELLED | OUTPATIENT
Start: 2017-01-01

## 2017-01-01 RX ORDER — NALOXONE HCL 0.4 MG/ML
0.4 VIAL (ML) INJECTION
Status: DISCONTINUED | OUTPATIENT
Start: 2017-01-01 | End: 2017-01-01 | Stop reason: SDUPTHER

## 2017-01-01 RX ORDER — DEXTROSE MONOHYDRATE 50 MG/ML
500 INJECTION, SOLUTION INTRAVENOUS CONTINUOUS
Status: ACTIVE | OUTPATIENT
Start: 2017-01-01 | End: 2017-01-01

## 2017-01-01 RX ORDER — FAMOTIDINE 10 MG/ML
20 INJECTION, SOLUTION INTRAVENOUS EVERY 12 HOURS SCHEDULED
Status: DISCONTINUED | OUTPATIENT
Start: 2017-01-01 | End: 2017-01-01

## 2017-01-01 RX ORDER — HALOPERIDOL 2 MG/ML
1 SOLUTION ORAL EVERY 4 HOURS PRN
Status: CANCELLED | OUTPATIENT
Start: 2017-01-01

## 2017-01-01 RX ORDER — VANCOMYCIN HYDROCHLORIDE 1 G/200ML
1000 INJECTION, SOLUTION INTRAVENOUS EVERY 12 HOURS
Refills: 0
Start: 2017-01-01 | End: 2017-01-01

## 2017-01-01 RX ORDER — FLUCONAZOLE 200 MG/1
200 TABLET ORAL EVERY 24 HOURS
Qty: 13 TABLET | Refills: 0
Start: 2017-01-01 | End: 2017-09-14

## 2017-01-01 RX ORDER — DIPHENOXYLATE HYDROCHLORIDE AND ATROPINE SULFATE 2.5; .025 MG/1; MG/1
1 TABLET ORAL
Status: DISCONTINUED | OUTPATIENT
Start: 2017-01-01 | End: 2017-01-01 | Stop reason: HOSPADM

## 2017-01-01 RX ORDER — HYDROMORPHONE HCL 110MG/55ML
1.5 PATIENT CONTROLLED ANALGESIA SYRINGE INTRAVENOUS
Status: CANCELLED | OUTPATIENT
Start: 2017-01-01 | End: 2017-09-22

## 2017-01-01 RX ORDER — ACETAMINOPHEN 160 MG/5ML
650 SOLUTION ORAL EVERY 4 HOURS PRN
Status: DISCONTINUED | OUTPATIENT
Start: 2017-01-01 | End: 2017-01-01 | Stop reason: HOSPADM

## 2017-01-01 RX ORDER — BISACODYL 10 MG
10 SUPPOSITORY, RECTAL RECTAL DAILY PRN
Status: DISCONTINUED | OUTPATIENT
Start: 2017-01-01 | End: 2017-01-01

## 2017-01-01 RX ORDER — FLUCONAZOLE 200 MG/1
200 TABLET ORAL EVERY 24 HOURS
Status: DISCONTINUED | OUTPATIENT
Start: 2017-01-01 | End: 2017-01-01

## 2017-01-01 RX ORDER — RAMIPRIL 10 MG/1
10 CAPSULE ORAL DAILY
Status: DISCONTINUED | OUTPATIENT
Start: 2017-01-01 | End: 2017-01-01 | Stop reason: HOSPADM

## 2017-01-01 RX ORDER — FLUCONAZOLE 200 MG/1
400 TABLET ORAL ONCE
Status: COMPLETED | OUTPATIENT
Start: 2017-01-01 | End: 2017-01-01

## 2017-01-01 RX ORDER — SODIUM CHLORIDE 9 MG/ML
100 INJECTION, SOLUTION INTRAVENOUS CONTINUOUS
Status: DISCONTINUED | OUTPATIENT
Start: 2017-01-01 | End: 2017-01-01

## 2017-01-01 RX ORDER — ONDANSETRON 2 MG/ML
4 INJECTION INTRAMUSCULAR; INTRAVENOUS EVERY 6 HOURS PRN
Status: DISCONTINUED | OUTPATIENT
Start: 2017-01-01 | End: 2017-01-01 | Stop reason: SDUPTHER

## 2017-01-01 RX ORDER — MIDAZOLAM HYDROCHLORIDE 1 MG/ML
1 INJECTION INTRAMUSCULAR; INTRAVENOUS
Status: DISCONTINUED | OUTPATIENT
Start: 2017-01-01 | End: 2017-01-01

## 2017-01-01 RX ORDER — ZOLPIDEM TARTRATE 5 MG/1
5 TABLET ORAL ONCE
Status: COMPLETED | OUTPATIENT
Start: 2017-01-01 | End: 2017-01-01

## 2017-01-01 RX ORDER — IPRATROPIUM BROMIDE AND ALBUTEROL SULFATE 2.5; .5 MG/3ML; MG/3ML
SOLUTION RESPIRATORY (INHALATION)
Status: COMPLETED
Start: 2017-01-01 | End: 2017-01-01

## 2017-01-01 RX ORDER — MORPHINE SULFATE 2 MG/ML
2 INJECTION, SOLUTION INTRAMUSCULAR; INTRAVENOUS
Status: DISCONTINUED | OUTPATIENT
Start: 2017-01-01 | End: 2017-01-01 | Stop reason: SDUPTHER

## 2017-01-01 RX ORDER — ASCORBIC ACID 500 MG
500 TABLET ORAL 2 TIMES DAILY
COMMUNITY

## 2017-01-01 RX ORDER — LIDOCAINE HYDROCHLORIDE 10 MG/ML
1 INJECTION, SOLUTION INFILTRATION; PERINEURAL ONCE
Status: DISCONTINUED | OUTPATIENT
Start: 2017-01-01 | End: 2017-01-01 | Stop reason: HOSPADM

## 2017-01-01 RX ORDER — BISACODYL 10 MG
10 SUPPOSITORY, RECTAL RECTAL DAILY PRN
Status: DISCONTINUED | OUTPATIENT
Start: 2017-01-01 | End: 2017-01-01 | Stop reason: HOSPADM

## 2017-01-01 RX ORDER — PROMETHAZINE HYDROCHLORIDE 25 MG/1
6.25 TABLET ORAL EVERY 4 HOURS PRN
Status: CANCELLED | OUTPATIENT
Start: 2017-01-01

## 2017-01-01 RX ORDER — HYDROCODONE BITARTRATE AND ACETAMINOPHEN 5; 325 MG/1; MG/1
2 TABLET ORAL EVERY 4 HOURS PRN
Status: COMPLETED | OUTPATIENT
Start: 2017-01-01 | End: 2017-01-01

## 2017-01-01 RX ORDER — DIPHENHYDRAMINE HYDROCHLORIDE, ZINC ACETATE 2; .1 G/100G; G/100G
CREAM TOPICAL 3 TIMES DAILY PRN
Status: DISCONTINUED | OUTPATIENT
Start: 2017-01-01 | End: 2017-01-01 | Stop reason: HOSPADM

## 2017-01-01 RX ORDER — ONDANSETRON 4 MG/1
4 TABLET, FILM COATED ORAL EVERY 6 HOURS PRN
Status: DISCONTINUED | OUTPATIENT
Start: 2017-01-01 | End: 2017-01-01

## 2017-01-01 RX ORDER — FLUCONAZOLE 200 MG/1
200 TABLET ORAL EVERY 24 HOURS
Status: DISCONTINUED | OUTPATIENT
Start: 2017-01-01 | End: 2017-01-01 | Stop reason: HOSPADM

## 2017-01-01 RX ORDER — NALOXONE HCL 0.4 MG/ML
0.4 VIAL (ML) INJECTION
Status: DISCONTINUED | OUTPATIENT
Start: 2017-01-01 | End: 2017-01-01 | Stop reason: HOSPADM

## 2017-01-01 RX ORDER — PANTOPRAZOLE SODIUM 40 MG/1
40 TABLET, DELAYED RELEASE ORAL
Start: 2017-01-01

## 2017-01-01 RX ORDER — METHYLPREDNISOLONE SODIUM SUCCINATE 125 MG/2ML
60 INJECTION, POWDER, LYOPHILIZED, FOR SOLUTION INTRAMUSCULAR; INTRAVENOUS ONCE
Status: DISCONTINUED | OUTPATIENT
Start: 2017-01-01 | End: 2017-01-01

## 2017-01-01 RX ORDER — ACETAMINOPHEN 325 MG/1
650 TABLET ORAL EVERY 4 HOURS PRN
Status: CANCELLED | OUTPATIENT
Start: 2017-01-01

## 2017-01-01 RX ORDER — SODIUM CHLORIDE AND POTASSIUM CHLORIDE 150; 900 MG/100ML; MG/100ML
125 INJECTION, SOLUTION INTRAVENOUS CONTINUOUS
Status: DISCONTINUED | OUTPATIENT
Start: 2017-01-01 | End: 2017-01-01

## 2017-01-01 RX ORDER — CEFAZOLIN SODIUM 2 G/100ML
2 INJECTION, SOLUTION INTRAVENOUS EVERY 8 HOURS
Status: COMPLETED | OUTPATIENT
Start: 2017-01-01 | End: 2017-01-01

## 2017-01-01 RX ORDER — LIDOCAINE HYDROCHLORIDE 40 MG/ML
SOLUTION TOPICAL AS NEEDED
Status: DISCONTINUED | OUTPATIENT
Start: 2017-01-01 | End: 2017-01-01 | Stop reason: SURG

## 2017-01-01 RX ORDER — IPRATROPIUM BROMIDE AND ALBUTEROL SULFATE 2.5; .5 MG/3ML; MG/3ML
3 SOLUTION RESPIRATORY (INHALATION)
Status: DISCONTINUED | OUTPATIENT
Start: 2017-01-01 | End: 2017-01-01 | Stop reason: HOSPADM

## 2017-01-01 RX ORDER — BUPIVACAINE HYDROCHLORIDE 2.5 MG/ML
INJECTION, SOLUTION EPIDURAL; INFILTRATION; INTRACAUDAL AS NEEDED
Status: DISCONTINUED | OUTPATIENT
Start: 2017-01-01 | End: 2017-01-01 | Stop reason: HOSPADM

## 2017-01-01 RX ORDER — RAMIPRIL 10 MG/1
10 CAPSULE ORAL DAILY
Status: ON HOLD | COMMUNITY
End: 2017-01-01

## 2017-01-01 RX ADMIN — SODIUM CHLORIDE, POTASSIUM CHLORIDE, SODIUM LACTATE AND CALCIUM CHLORIDE 50 ML/HR: 600; 310; 30; 20 INJECTION, SOLUTION INTRAVENOUS at 06:49

## 2017-01-01 RX ADMIN — VANCOMYCIN HYDROCHLORIDE 1250 MG: 10 INJECTION, POWDER, LYOPHILIZED, FOR SOLUTION INTRAVENOUS at 17:45

## 2017-01-01 RX ADMIN — DEMECLOCYCLINE 300 MG: 150 TABLET ORAL at 08:59

## 2017-01-01 RX ADMIN — PROPOFOL 150 MG: 10 INJECTION, EMULSION INTRAVENOUS at 10:08

## 2017-01-01 RX ADMIN — FENTANYL CITRATE 50 MCG: 50 INJECTION INTRAMUSCULAR; INTRAVENOUS at 09:10

## 2017-01-01 RX ADMIN — TAZOBACTAM SODIUM AND PIPERACILLIN SODIUM 3.38 G: 375; 3 INJECTION, SOLUTION INTRAVENOUS at 18:11

## 2017-01-01 RX ADMIN — HYDROMORPHONE HYDROCHLORIDE 2 MG: 2 INJECTION, SOLUTION INTRAMUSCULAR; INTRAVENOUS; SUBCUTANEOUS at 23:11

## 2017-01-01 RX ADMIN — GLYCOPYRROLATE 0.4 MG: 0.2 INJECTION, SOLUTION INTRAMUSCULAR; INTRAVENOUS at 10:52

## 2017-01-01 RX ADMIN — PANTOPRAZOLE SODIUM 40 MG: 40 TABLET, DELAYED RELEASE ORAL at 16:45

## 2017-01-01 RX ADMIN — PANTOPRAZOLE SODIUM 40 MG: 40 TABLET, DELAYED RELEASE ORAL at 05:55

## 2017-01-01 RX ADMIN — GLYCOPYRROLATE 0.6 MG: 0.2 INJECTION INTRAMUSCULAR; INTRAVENOUS at 11:42

## 2017-01-01 RX ADMIN — CEFEPIME 2 G: 2 INJECTION, POWDER, FOR SOLUTION INTRAVENOUS at 05:20

## 2017-01-01 RX ADMIN — SODIUM CHLORIDE 500 ML: 9 INJECTION, SOLUTION INTRAVENOUS at 15:05

## 2017-01-01 RX ADMIN — CEFEPIME 2 G: 2 INJECTION, POWDER, FOR SOLUTION INTRAVENOUS at 14:41

## 2017-01-01 RX ADMIN — PANTOPRAZOLE SODIUM 40 MG: 40 TABLET, DELAYED RELEASE ORAL at 19:56

## 2017-01-01 RX ADMIN — DIPHENHYDRAMINE HYDROCHLORIDE 25 MG: 25 CAPSULE ORAL at 13:07

## 2017-01-01 RX ADMIN — SODIUM CHLORIDE, POTASSIUM CHLORIDE, SODIUM LACTATE AND CALCIUM CHLORIDE 125 ML/HR: 600; 310; 30; 20 INJECTION, SOLUTION INTRAVENOUS at 05:18

## 2017-01-01 RX ADMIN — VITAMIN D, TAB 1000IU (100/BT) 5000 UNITS: 25 TAB at 09:04

## 2017-01-01 RX ADMIN — FLUCONAZOLE 200 MG: 200 TABLET ORAL at 00:12

## 2017-01-01 RX ADMIN — PANTOPRAZOLE SODIUM 40 MG: 40 TABLET, DELAYED RELEASE ORAL at 17:23

## 2017-01-01 RX ADMIN — HYDROCODONE BITARTRATE AND ACETAMINOPHEN 1 TABLET: 5; 325 TABLET ORAL at 00:44

## 2017-01-01 RX ADMIN — FENTANYL CITRATE 100 MCG: 50 INJECTION INTRAMUSCULAR; INTRAVENOUS at 11:30

## 2017-01-01 RX ADMIN — RAMIPRIL 10 MG: 10 CAPSULE ORAL at 09:43

## 2017-01-01 RX ADMIN — HYDROCODONE BITARTRATE AND ACETAMINOPHEN 1 TABLET: 5; 325 TABLET ORAL at 20:50

## 2017-01-01 RX ADMIN — MORPHINE SULFATE 2 MG: 2 INJECTION, SOLUTION INTRAMUSCULAR; INTRAVENOUS at 02:11

## 2017-01-01 RX ADMIN — CYANOCOBALAMIN TAB 500 MCG 1000 MCG: 500 TAB at 20:00

## 2017-01-01 RX ADMIN — DEMECLOCYCLINE 300 MG: 150 TABLET ORAL at 09:04

## 2017-01-01 RX ADMIN — ATORVASTATIN CALCIUM 10 MG: 10 TABLET, FILM COATED ORAL at 08:51

## 2017-01-01 RX ADMIN — HYDROMORPHONE HYDROCHLORIDE 2 MG: 2 INJECTION, SOLUTION INTRAMUSCULAR; INTRAVENOUS; SUBCUTANEOUS at 14:18

## 2017-01-01 RX ADMIN — DILTIAZEM HYDROCHLORIDE 240 MG: 240 CAPSULE, COATED, EXTENDED RELEASE ORAL at 08:09

## 2017-01-01 RX ADMIN — ATORVASTATIN CALCIUM 10 MG: 10 TABLET, FILM COATED ORAL at 09:12

## 2017-01-01 RX ADMIN — CYANOCOBALAMIN TAB 500 MCG 2000 MCG: 500 TAB at 08:58

## 2017-01-01 RX ADMIN — DOCUSATE SODIUM -SENNOSIDES 2 TABLET: 50; 8.6 TABLET, COATED ORAL at 20:50

## 2017-01-01 RX ADMIN — TAMSULOSIN HYDROCHLORIDE 0.8 MG: 0.4 CAPSULE ORAL at 21:23

## 2017-01-01 RX ADMIN — VANCOMYCIN HYDROCHLORIDE 1500 MG: 1 INJECTION, POWDER, LYOPHILIZED, FOR SOLUTION INTRAVENOUS at 18:03

## 2017-01-01 RX ADMIN — POTASSIUM CHLORIDE AND SODIUM CHLORIDE 125 ML/HR: 900; 150 INJECTION, SOLUTION INTRAVENOUS at 06:11

## 2017-01-01 RX ADMIN — DEMECLOCYCLINE 300 MG: 150 TABLET ORAL at 20:42

## 2017-01-01 RX ADMIN — MORPHINE SULFATE 2 MG: 2 INJECTION, SOLUTION INTRAMUSCULAR; INTRAVENOUS at 01:44

## 2017-01-01 RX ADMIN — VANCOMYCIN HYDROCHLORIDE 1250 MG: 10 INJECTION, POWDER, LYOPHILIZED, FOR SOLUTION INTRAVENOUS at 00:46

## 2017-01-01 RX ADMIN — PANTOPRAZOLE SODIUM 40 MG: 40 TABLET, DELAYED RELEASE ORAL at 16:34

## 2017-01-01 RX ADMIN — HYDROMORPHONE HYDROCHLORIDE 0.4 MG: 2 INJECTION, SOLUTION INTRAMUSCULAR; INTRAVENOUS; SUBCUTANEOUS at 11:31

## 2017-01-01 RX ADMIN — LORAZEPAM 2 MG: 2 INJECTION INTRAMUSCULAR; INTRAVENOUS at 23:11

## 2017-01-01 RX ADMIN — LIDOCAINE HYDROCHLORIDE 60 MG: 20 INJECTION, SOLUTION INFILTRATION; PERINEURAL at 10:08

## 2017-01-01 RX ADMIN — SODIUM CHLORIDE 100 ML/HR: 9 INJECTION, SOLUTION INTRAVENOUS at 09:21

## 2017-01-01 RX ADMIN — GLYCOPYRROLATE 0.4 MG: 0.2 INJECTION INTRAMUSCULAR; INTRAVENOUS at 09:04

## 2017-01-01 RX ADMIN — PANTOPRAZOLE SODIUM 40 MG: 40 TABLET, DELAYED RELEASE ORAL at 17:46

## 2017-01-01 RX ADMIN — CEFEPIME HYDROCHLORIDE 1 G: 1 INJECTION, POWDER, FOR SOLUTION INTRAMUSCULAR; INTRAVENOUS at 09:36

## 2017-01-01 RX ADMIN — ONDANSETRON 4 MG: 2 INJECTION INTRAMUSCULAR; INTRAVENOUS at 10:32

## 2017-01-01 RX ADMIN — DILTIAZEM HYDROCHLORIDE 120 MG: 120 CAPSULE, COATED, EXTENDED RELEASE ORAL at 17:45

## 2017-01-01 RX ADMIN — VANCOMYCIN HYDROCHLORIDE 1500 MG: 10 INJECTION, POWDER, LYOPHILIZED, FOR SOLUTION INTRAVENOUS at 05:48

## 2017-01-01 RX ADMIN — HYDROCODONE BITARTRATE AND ACETAMINOPHEN 1 TABLET: 5; 325 TABLET ORAL at 23:35

## 2017-01-01 RX ADMIN — HYDROCODONE BITARTRATE AND ACETAMINOPHEN 1 TABLET: 5; 325 TABLET ORAL at 00:09

## 2017-01-01 RX ADMIN — TAMSULOSIN HYDROCHLORIDE 0.8 MG: 0.4 CAPSULE ORAL at 21:24

## 2017-01-01 RX ADMIN — PROPOFOL 100 MG: 10 INJECTION, EMULSION INTRAVENOUS at 09:04

## 2017-01-01 RX ADMIN — PANTOPRAZOLE SODIUM 40 MG: 40 TABLET, DELAYED RELEASE ORAL at 09:14

## 2017-01-01 RX ADMIN — GLYCOPYRROLATE 0.5 MG: 0.2 INJECTION INTRAMUSCULAR; INTRAVENOUS at 11:19

## 2017-01-01 RX ADMIN — FLUCONAZOLE 200 MG: 200 TABLET ORAL at 20:48

## 2017-01-01 RX ADMIN — CEFAZOLIN SODIUM 2 G: 2 INJECTION, SOLUTION INTRAVENOUS at 01:30

## 2017-01-01 RX ADMIN — LIDOCAINE 1 PATCH: 50 PATCH CUTANEOUS at 09:13

## 2017-01-01 RX ADMIN — ACETAMINOPHEN 650 MG: 325 TABLET ORAL at 11:55

## 2017-01-01 RX ADMIN — DILTIAZEM HYDROCHLORIDE 240 MG: 240 CAPSULE, COATED, EXTENDED RELEASE ORAL at 08:59

## 2017-01-01 RX ADMIN — VANCOMYCIN HYDROCHLORIDE 1000 MG: 1 INJECTION, SOLUTION INTRAVENOUS at 16:00

## 2017-01-01 RX ADMIN — LIDOCAINE HYDROCHLORIDE 60 MG: 20 INJECTION, SOLUTION INFILTRATION; PERINEURAL at 08:03

## 2017-01-01 RX ADMIN — SODIUM BICARBONATE 650 MG: 650 TABLET, ORALLY DISINTEGRATING ORAL at 09:32

## 2017-01-01 RX ADMIN — CASTOR OIL AND BALSAM, PERU: 788; 87 OINTMENT TOPICAL at 09:51

## 2017-01-01 RX ADMIN — DILTIAZEM HYDROCHLORIDE 240 MG: 240 CAPSULE, COATED, EXTENDED RELEASE ORAL at 08:27

## 2017-01-01 RX ADMIN — SODIUM CHLORIDE 4 MILLION UNITS: 9 INJECTION, SOLUTION INTRAVENOUS at 23:19

## 2017-01-01 RX ADMIN — DILTIAZEM HYDROCHLORIDE 240 MG: 240 CAPSULE, COATED, EXTENDED RELEASE ORAL at 08:31

## 2017-01-01 RX ADMIN — TOLVAPTAN 15 MG: 15 TABLET ORAL at 16:23

## 2017-01-01 RX ADMIN — PANTOPRAZOLE SODIUM 40 MG: 40 TABLET, DELAYED RELEASE ORAL at 08:55

## 2017-01-01 RX ADMIN — VANCOMYCIN HYDROCHLORIDE 1000 MG: 1 INJECTION, SOLUTION INTRAVENOUS at 10:02

## 2017-01-01 RX ADMIN — PANTOPRAZOLE SODIUM 40 MG: 40 TABLET, DELAYED RELEASE ORAL at 12:08

## 2017-01-01 RX ADMIN — PANTOPRAZOLE SODIUM 40 MG: 40 TABLET, DELAYED RELEASE ORAL at 06:56

## 2017-01-01 RX ADMIN — PANTOPRAZOLE SODIUM 40 MG: 40 TABLET, DELAYED RELEASE ORAL at 16:44

## 2017-01-01 RX ADMIN — SODIUM CHLORIDE 75 ML/HR: 9 INJECTION, SOLUTION INTRAVENOUS at 17:42

## 2017-01-01 RX ADMIN — CASTOR OIL AND BALSAM, PERU: 788; 87 OINTMENT TOPICAL at 21:08

## 2017-01-01 RX ADMIN — CEFEPIME HYDROCHLORIDE 1 G: 1 INJECTION, POWDER, FOR SOLUTION INTRAMUSCULAR; INTRAVENOUS at 17:25

## 2017-01-01 RX ADMIN — VANCOMYCIN HYDROCHLORIDE 1000 MG: 1 INJECTION, SOLUTION INTRAVENOUS at 14:51

## 2017-01-01 RX ADMIN — DEMECLOCYCLINE 300 MG: 150 TABLET ORAL at 20:37

## 2017-01-01 RX ADMIN — IOPAMIDOL 10 ML: 408 INJECTION, SOLUTION INTRATHECAL at 12:42

## 2017-01-01 RX ADMIN — COLLAGENASE SANTYL: 250 OINTMENT TOPICAL at 23:35

## 2017-01-01 RX ADMIN — FENTANYL CITRATE 100 MCG: 50 INJECTION INTRAMUSCULAR; INTRAVENOUS at 09:00

## 2017-01-01 RX ADMIN — DILTIAZEM HYDROCHLORIDE 240 MG: 240 CAPSULE, COATED, EXTENDED RELEASE ORAL at 09:01

## 2017-01-01 RX ADMIN — FAMOTIDINE 20 MG: 10 INJECTION, SOLUTION INTRAVENOUS at 08:35

## 2017-01-01 RX ADMIN — TAMSULOSIN HYDROCHLORIDE 0.8 MG: 0.4 CAPSULE ORAL at 20:50

## 2017-01-01 RX ADMIN — ROCURONIUM BROMIDE 40 MG: 10 INJECTION INTRAVENOUS at 08:03

## 2017-01-01 RX ADMIN — SODIUM BICARBONATE 650 MG: 650 TABLET, ORALLY DISINTEGRATING ORAL at 17:27

## 2017-01-01 RX ADMIN — PANTOPRAZOLE SODIUM 40 MG: 40 INJECTION, POWDER, FOR SOLUTION INTRAVENOUS at 23:29

## 2017-01-01 RX ADMIN — FAMOTIDINE 20 MG: 10 INJECTION, SOLUTION INTRAVENOUS at 00:13

## 2017-01-01 RX ADMIN — FLUCONAZOLE 400 MG: 200 TABLET ORAL at 11:17

## 2017-01-01 RX ADMIN — NEOSTIGMINE METHYLSULFATE 3 MG: 1 INJECTION INTRAMUSCULAR; INTRAVENOUS; SUBCUTANEOUS at 11:42

## 2017-01-01 RX ADMIN — PANTOPRAZOLE SODIUM 40 MG: 40 TABLET, DELAYED RELEASE ORAL at 07:30

## 2017-01-01 RX ADMIN — ACETAMINOPHEN 650 MG: 325 TABLET ORAL at 03:26

## 2017-01-01 RX ADMIN — PANTOPRAZOLE SODIUM 40 MG: 40 TABLET, DELAYED RELEASE ORAL at 16:14

## 2017-01-01 RX ADMIN — DEMECLOCYCLINE 300 MG: 150 TABLET ORAL at 08:26

## 2017-01-01 RX ADMIN — VANCOMYCIN HYDROCHLORIDE 1500 MG: 10 INJECTION, POWDER, LYOPHILIZED, FOR SOLUTION INTRAVENOUS at 17:48

## 2017-01-01 RX ADMIN — LIDOCAINE 1 PATCH: 50 PATCH CUTANEOUS at 09:22

## 2017-01-01 RX ADMIN — RAMIPRIL 10 MG: 10 CAPSULE ORAL at 08:26

## 2017-01-01 RX ADMIN — HYDROCODONE BITARTRATE AND ACETAMINOPHEN 1 TABLET: 7.5; 325 TABLET ORAL at 20:31

## 2017-01-01 RX ADMIN — HYOSCYAMINE SULFATE 473 ML: 16 SOLUTION at 06:28

## 2017-01-01 RX ADMIN — RAMIPRIL 5 MG: 5 CAPSULE ORAL at 09:12

## 2017-01-01 RX ADMIN — HYDROMORPHONE HYDROCHLORIDE 0.4 MG: 2 INJECTION, SOLUTION INTRAMUSCULAR; INTRAVENOUS; SUBCUTANEOUS at 10:38

## 2017-01-01 RX ADMIN — SODIUM BICARBONATE 650 MG: 650 TABLET, ORALLY DISINTEGRATING ORAL at 17:45

## 2017-01-01 RX ADMIN — DEMECLOCYCLINE 300 MG: 150 TABLET ORAL at 13:20

## 2017-01-01 RX ADMIN — CEFAZOLIN SODIUM 2 G: 2 INJECTION, SOLUTION INTRAVENOUS at 10:07

## 2017-01-01 RX ADMIN — VANCOMYCIN HYDROCHLORIDE 750 MG: 750 INJECTION, POWDER, LYOPHILIZED, FOR SOLUTION INTRAVENOUS at 06:28

## 2017-01-01 RX ADMIN — CEFTRIAXONE SODIUM 1 G: 1 INJECTION, SOLUTION INTRAVENOUS at 23:36

## 2017-01-01 RX ADMIN — HYOSCYAMINE SULFATE: 16 SOLUTION at 15:45

## 2017-01-01 RX ADMIN — VANCOMYCIN 125 MG: KIT at 23:43

## 2017-01-01 RX ADMIN — HYOSCYAMINE SULFATE 473 ML: 16 SOLUTION at 08:21

## 2017-01-01 RX ADMIN — MORPHINE SULFATE 4 MG: 4 INJECTION, SOLUTION INTRAMUSCULAR; INTRAVENOUS at 06:27

## 2017-01-01 RX ADMIN — CEFEPIME HYDROCHLORIDE 1 G: 1 INJECTION, POWDER, FOR SOLUTION INTRAMUSCULAR; INTRAVENOUS at 23:43

## 2017-01-01 RX ADMIN — SODIUM CHLORIDE, POTASSIUM CHLORIDE, SODIUM LACTATE AND CALCIUM CHLORIDE 50 ML/HR: 600; 310; 30; 20 INJECTION, SOLUTION INTRAVENOUS at 05:16

## 2017-01-01 RX ADMIN — HYOSCYAMINE SULFATE 473 ML: 16 SOLUTION at 18:11

## 2017-01-01 RX ADMIN — VANCOMYCIN HYDROCHLORIDE 1500 MG: 10 INJECTION, POWDER, LYOPHILIZED, FOR SOLUTION INTRAVENOUS at 05:04

## 2017-01-01 RX ADMIN — FLUCONAZOLE 200 MG: 200 TABLET ORAL at 20:50

## 2017-01-01 RX ADMIN — Medication 1 G: at 17:10

## 2017-01-01 RX ADMIN — PANTOPRAZOLE SODIUM 40 MG: 40 TABLET, DELAYED RELEASE ORAL at 17:33

## 2017-01-01 RX ADMIN — RAMIPRIL 5 MG: 5 CAPSULE ORAL at 09:51

## 2017-01-01 RX ADMIN — VANCOMYCIN HYDROCHLORIDE 1000 MG: 1 INJECTION, SOLUTION INTRAVENOUS at 12:08

## 2017-01-01 RX ADMIN — TAMSULOSIN HYDROCHLORIDE 0.8 MG: 0.4 CAPSULE ORAL at 21:27

## 2017-01-01 RX ADMIN — CASTOR OIL AND BALSAM, PERU: 788; 87 OINTMENT TOPICAL at 08:10

## 2017-01-01 RX ADMIN — DIPHENHYDRAMINE HYDROCHLORIDE 25 MG: 25 CAPSULE ORAL at 02:38

## 2017-01-01 RX ADMIN — CASTOR OIL AND BALSAM, PERU: 788; 87 OINTMENT TOPICAL at 09:12

## 2017-01-01 RX ADMIN — Medication 5 MG: at 21:02

## 2017-01-01 RX ADMIN — CYANOCOBALAMIN TAB 500 MCG 2000 MCG: 500 TAB at 08:40

## 2017-01-01 RX ADMIN — LEVOFLOXACIN 500 MG: 500 TABLET, FILM COATED ORAL at 11:17

## 2017-01-01 RX ADMIN — HYDROCODONE BITARTRATE AND ACETAMINOPHEN 1 TABLET: 7.5; 325 TABLET ORAL at 16:07

## 2017-01-01 RX ADMIN — VITAMIN D, TAB 1000IU (100/BT) 5000 UNITS: 25 TAB at 11:02

## 2017-01-01 RX ADMIN — RAMIPRIL 10 MG: 10 CAPSULE ORAL at 08:31

## 2017-01-01 RX ADMIN — PANTOPRAZOLE SODIUM 40 MG: 40 TABLET, DELAYED RELEASE ORAL at 06:06

## 2017-01-01 RX ADMIN — VANCOMYCIN HYDROCHLORIDE 750 MG: 750 INJECTION, POWDER, LYOPHILIZED, FOR SOLUTION INTRAVENOUS at 05:16

## 2017-01-01 RX ADMIN — CEFEPIME HYDROCHLORIDE 1 G: 1 INJECTION, POWDER, FOR SOLUTION INTRAMUSCULAR; INTRAVENOUS at 02:02

## 2017-01-01 RX ADMIN — SODIUM CHLORIDE 50 ML/HR: 9 INJECTION, SOLUTION INTRAVENOUS at 14:42

## 2017-01-01 RX ADMIN — FAMOTIDINE 20 MG: 10 INJECTION, SOLUTION INTRAVENOUS at 10:08

## 2017-01-01 RX ADMIN — HYDROCODONE BITARTRATE AND ACETAMINOPHEN 1 TABLET: 5; 325 TABLET ORAL at 20:52

## 2017-01-01 RX ADMIN — CEFAZOLIN SODIUM 2 G: 2 INJECTION, SOLUTION INTRAVENOUS at 18:38

## 2017-01-01 RX ADMIN — CASTOR OIL AND BALSAM, PERU: 788; 87 OINTMENT TOPICAL at 10:02

## 2017-01-01 RX ADMIN — LIDOCAINE 1 PATCH: 50 PATCH CUTANEOUS at 08:51

## 2017-01-01 RX ADMIN — RAMIPRIL 10 MG: 10 CAPSULE ORAL at 09:31

## 2017-01-01 RX ADMIN — HYOSCYAMINE SULFATE: 16 SOLUTION at 21:29

## 2017-01-01 RX ADMIN — Medication 10 ML: at 10:08

## 2017-01-01 RX ADMIN — CYANOCOBALAMIN TAB 500 MCG 2000 MCG: 500 TAB at 08:27

## 2017-01-01 RX ADMIN — VITAMIN D, TAB 1000IU (100/BT) 5000 UNITS: 25 TAB at 08:32

## 2017-01-01 RX ADMIN — FENTANYL CITRATE 50 MCG: 50 INJECTION INTRAMUSCULAR; INTRAVENOUS at 09:38

## 2017-01-01 RX ADMIN — VITAMIN D, TAB 1000IU (100/BT) 5000 UNITS: 25 TAB at 10:27

## 2017-01-01 RX ADMIN — VITAMIN D, TAB 1000IU (100/BT) 5000 UNITS: 25 TAB at 09:43

## 2017-01-01 RX ADMIN — CYANOCOBALAMIN TAB 500 MCG 2000 MCG: 500 TAB at 09:32

## 2017-01-01 RX ADMIN — SODIUM CHLORIDE, POTASSIUM CHLORIDE, SODIUM LACTATE AND CALCIUM CHLORIDE: 600; 310; 30; 20 INJECTION, SOLUTION INTRAVENOUS at 08:56

## 2017-01-01 RX ADMIN — FAMOTIDINE 20 MG: 10 INJECTION, SOLUTION INTRAVENOUS at 23:44

## 2017-01-01 RX ADMIN — ONDANSETRON 4 MG: 2 INJECTION INTRAMUSCULAR; INTRAVENOUS at 14:00

## 2017-01-01 RX ADMIN — RAMIPRIL 10 MG: 10 CAPSULE ORAL at 08:58

## 2017-01-01 RX ADMIN — HYOSCYAMINE SULFATE 473 ML: 16 SOLUTION at 05:16

## 2017-01-01 RX ADMIN — MORPHINE SULFATE 4 MG: 4 INJECTION, SOLUTION INTRAMUSCULAR; INTRAVENOUS at 15:00

## 2017-01-01 RX ADMIN — LIDOCAINE HYDROCHLORIDE 1 EACH: 40 SPRAY LARYNGEAL; TRANSTRACHEAL at 08:07

## 2017-01-01 RX ADMIN — LORAZEPAM 2 MG: 2 INJECTION INTRAMUSCULAR; INTRAVENOUS at 10:53

## 2017-01-01 RX ADMIN — NEOSTIGMINE METHYLSULFATE 3 MG: 1 INJECTION INTRAMUSCULAR; INTRAVENOUS; SUBCUTANEOUS at 11:19

## 2017-01-01 RX ADMIN — PANTOPRAZOLE SODIUM 40 MG: 40 TABLET, DELAYED RELEASE ORAL at 05:24

## 2017-01-01 RX ADMIN — GADOBENATE DIMEGLUMINE 16 ML: 529 INJECTION, SOLUTION INTRAVENOUS at 17:29

## 2017-01-01 RX ADMIN — HYDROMORPHONE HYDROCHLORIDE 0.4 MG: 2 INJECTION, SOLUTION INTRAMUSCULAR; INTRAVENOUS; SUBCUTANEOUS at 11:47

## 2017-01-01 RX ADMIN — CEFEPIME HYDROCHLORIDE 1 G: 1 INJECTION, POWDER, FOR SOLUTION INTRAMUSCULAR; INTRAVENOUS at 16:57

## 2017-01-01 RX ADMIN — PANTOPRAZOLE SODIUM 40 MG: 40 TABLET, DELAYED RELEASE ORAL at 08:26

## 2017-01-01 RX ADMIN — CEFTRIAXONE SODIUM 1 G: 1 INJECTION, SOLUTION INTRAVENOUS at 21:57

## 2017-01-01 RX ADMIN — HYDROMORPHONE HYDROCHLORIDE 0.4 MG: 2 INJECTION, SOLUTION INTRAMUSCULAR; INTRAVENOUS; SUBCUTANEOUS at 11:08

## 2017-01-01 RX ADMIN — TOLVAPTAN 7.5 MG: 15 TABLET ORAL at 10:48

## 2017-01-01 RX ADMIN — PANTOPRAZOLE SODIUM 40 MG: 40 TABLET, DELAYED RELEASE ORAL at 17:48

## 2017-01-01 RX ADMIN — TAMSULOSIN HYDROCHLORIDE 0.8 MG: 0.4 CAPSULE ORAL at 20:32

## 2017-01-01 RX ADMIN — HYOSCYAMINE SULFATE 1 APPLICATION: 16 SOLUTION at 23:37

## 2017-01-01 RX ADMIN — MORPHINE SULFATE 2 MG: 2 INJECTION, SOLUTION INTRAMUSCULAR; INTRAVENOUS at 12:24

## 2017-01-01 RX ADMIN — CASTOR OIL AND BALSAM, PERU: 788; 87 OINTMENT TOPICAL at 08:41

## 2017-01-01 RX ADMIN — VANCOMYCIN HYDROCHLORIDE 1250 MG: 10 INJECTION, POWDER, LYOPHILIZED, FOR SOLUTION INTRAVENOUS at 17:33

## 2017-01-01 RX ADMIN — ONDANSETRON 4 MG: 2 INJECTION INTRAMUSCULAR; INTRAVENOUS at 11:29

## 2017-01-01 RX ADMIN — PROMETHAZINE HYDROCHLORIDE 25 MG: 25 TABLET ORAL at 20:34

## 2017-01-01 RX ADMIN — RAMIPRIL 10 MG: 10 CAPSULE ORAL at 09:03

## 2017-01-01 RX ADMIN — LORAZEPAM 2 MG: 2 INJECTION INTRAMUSCULAR; INTRAVENOUS at 11:10

## 2017-01-01 RX ADMIN — IPRATROPIUM BROMIDE AND ALBUTEROL SULFATE 3 ML: .5; 3 SOLUTION RESPIRATORY (INHALATION) at 11:46

## 2017-01-01 RX ADMIN — HYDROCODONE BITARTRATE AND ACETAMINOPHEN 1 TABLET: 5; 325 TABLET ORAL at 10:13

## 2017-01-01 RX ADMIN — ONDANSETRON 4 MG: 2 INJECTION INTRAMUSCULAR; INTRAVENOUS at 20:31

## 2017-01-01 RX ADMIN — PANTOPRAZOLE SODIUM 40 MG: 40 TABLET, DELAYED RELEASE ORAL at 18:03

## 2017-01-01 RX ADMIN — CEFEPIME 2 G: 2 INJECTION, POWDER, FOR SOLUTION INTRAVENOUS at 20:52

## 2017-01-01 RX ADMIN — DILTIAZEM HYDROCHLORIDE 240 MG: 240 CAPSULE, COATED, EXTENDED RELEASE ORAL at 08:35

## 2017-01-01 RX ADMIN — HYDROCODONE BITARTRATE AND ACETAMINOPHEN 1 TABLET: 7.5; 325 TABLET ORAL at 10:51

## 2017-01-01 RX ADMIN — SODIUM CHLORIDE, POTASSIUM CHLORIDE, SODIUM LACTATE AND CALCIUM CHLORIDE 1000 ML: 600; 310; 30; 20 INJECTION, SOLUTION INTRAVENOUS at 20:35

## 2017-01-01 RX ADMIN — CASTOR OIL AND BALSAM, PERU: 788; 87 OINTMENT TOPICAL at 09:32

## 2017-01-01 RX ADMIN — VITAMIN D, TAB 1000IU (100/BT) 5000 UNITS: 25 TAB at 08:05

## 2017-01-01 RX ADMIN — SODIUM CHLORIDE, POTASSIUM CHLORIDE, SODIUM LACTATE AND CALCIUM CHLORIDE: 600; 310; 30; 20 INJECTION, SOLUTION INTRAVENOUS at 07:54

## 2017-01-01 RX ADMIN — CEFEPIME 2 G: 2 INJECTION, POWDER, FOR SOLUTION INTRAVENOUS at 13:53

## 2017-01-01 RX ADMIN — CEFEPIME HYDROCHLORIDE 1 G: 1 INJECTION, POWDER, FOR SOLUTION INTRAMUSCULAR; INTRAVENOUS at 17:10

## 2017-01-01 RX ADMIN — MORPHINE SULFATE 4 MG: 4 INJECTION, SOLUTION INTRAMUSCULAR; INTRAVENOUS at 22:04

## 2017-01-01 RX ADMIN — HYDROCODONE BITARTRATE AND ACETAMINOPHEN 1 TABLET: 5; 325 TABLET ORAL at 10:11

## 2017-01-01 RX ADMIN — VANCOMYCIN HYDROCHLORIDE 1000 MG: 1 INJECTION, SOLUTION INTRAVENOUS at 21:23

## 2017-01-01 RX ADMIN — DOCUSATE SODIUM 100 MG: 100 CAPSULE, LIQUID FILLED ORAL at 02:32

## 2017-01-01 RX ADMIN — HYDROCODONE BITARTRATE AND ACETAMINOPHEN 1 TABLET: 5; 325 TABLET ORAL at 15:46

## 2017-01-01 RX ADMIN — SODIUM CHLORIDE 100 ML/HR: 9 INJECTION, SOLUTION INTRAVENOUS at 16:56

## 2017-01-01 RX ADMIN — VANCOMYCIN HYDROCHLORIDE 1500 MG: 1 INJECTION, POWDER, LYOPHILIZED, FOR SOLUTION INTRAVENOUS at 01:34

## 2017-01-01 RX ADMIN — RAMIPRIL 10 MG: 10 CAPSULE ORAL at 14:38

## 2017-01-01 RX ADMIN — CYANOCOBALAMIN TAB 500 MCG 1000 MCG: 500 TAB at 08:09

## 2017-01-01 RX ADMIN — CASTOR OIL AND BALSAM, PERU: 788; 87 OINTMENT TOPICAL at 08:31

## 2017-01-01 RX ADMIN — VANCOMYCIN HYDROCHLORIDE 1000 MG: 1 INJECTION, SOLUTION INTRAVENOUS at 02:30

## 2017-01-01 RX ADMIN — PANTOPRAZOLE SODIUM 40 MG: 40 TABLET, DELAYED RELEASE ORAL at 06:35

## 2017-01-01 RX ADMIN — CYANOCOBALAMIN TAB 500 MCG 2000 MCG: 500 TAB at 20:59

## 2017-01-01 RX ADMIN — FAMOTIDINE 20 MG: 10 INJECTION, SOLUTION INTRAVENOUS at 20:49

## 2017-01-01 RX ADMIN — CYANOCOBALAMIN TAB 500 MCG 1000 MCG: 500 TAB at 10:02

## 2017-01-01 RX ADMIN — LORAZEPAM 2 MG: 2 INJECTION INTRAMUSCULAR; INTRAVENOUS at 22:05

## 2017-01-01 RX ADMIN — VITAMIN D, TAB 1000IU (100/BT) 5000 UNITS: 25 TAB at 09:51

## 2017-01-01 RX ADMIN — Medication 10 ML: at 21:28

## 2017-01-01 RX ADMIN — HYOSCYAMINE SULFATE: 16 SOLUTION at 10:07

## 2017-01-01 RX ADMIN — LIDOCAINE 1 PATCH: 50 PATCH CUTANEOUS at 08:19

## 2017-01-01 RX ADMIN — PANTOPRAZOLE SODIUM 40 MG: 40 TABLET, DELAYED RELEASE ORAL at 20:49

## 2017-01-01 RX ADMIN — HYDROCODONE BITARTRATE AND ACETAMINOPHEN 1 TABLET: 7.5; 325 TABLET ORAL at 11:38

## 2017-01-01 RX ADMIN — VANCOMYCIN HYDROCHLORIDE 1500 MG: 1 INJECTION, POWDER, LYOPHILIZED, FOR SOLUTION INTRAVENOUS at 05:43

## 2017-01-01 RX ADMIN — MORPHINE SULFATE 2 MG: 2 INJECTION, SOLUTION INTRAMUSCULAR; INTRAVENOUS at 00:26

## 2017-01-01 RX ADMIN — GLYCOPYRROLATE 0.4 MG: 0.2 INJECTION, SOLUTION INTRAMUSCULAR; INTRAVENOUS at 18:49

## 2017-01-01 RX ADMIN — CASTOR OIL AND BALSAM, PERU: 788; 87 OINTMENT TOPICAL at 08:43

## 2017-01-01 RX ADMIN — HYDROCODONE BITARTRATE AND ACETAMINOPHEN 1 TABLET: 5; 325 TABLET ORAL at 02:14

## 2017-01-01 RX ADMIN — HYDROCODONE BITARTRATE AND ACETAMINOPHEN 1 TABLET: 5; 325 TABLET ORAL at 22:28

## 2017-01-01 RX ADMIN — ATORVASTATIN CALCIUM 10 MG: 10 TABLET, FILM COATED ORAL at 08:25

## 2017-01-01 RX ADMIN — TAMSULOSIN HYDROCHLORIDE 0.8 MG: 0.4 CAPSULE ORAL at 00:12

## 2017-01-01 RX ADMIN — PANTOPRAZOLE SODIUM 40 MG: 40 TABLET, DELAYED RELEASE ORAL at 08:05

## 2017-01-01 RX ADMIN — DOCUSATE SODIUM -SENNOSIDES 2 TABLET: 50; 8.6 TABLET, COATED ORAL at 20:48

## 2017-01-01 RX ADMIN — IPRATROPIUM BROMIDE AND ALBUTEROL SULFATE 3 ML: 2.5; .5 SOLUTION RESPIRATORY (INHALATION) at 11:46

## 2017-01-01 RX ADMIN — EPHEDRINE SULFATE 5 MG: 50 INJECTION INTRAMUSCULAR; INTRAVENOUS; SUBCUTANEOUS at 09:24

## 2017-01-01 RX ADMIN — MORPHINE SULFATE 2 MG: 2 INJECTION, SOLUTION INTRAMUSCULAR; INTRAVENOUS at 06:11

## 2017-01-01 RX ADMIN — DILTIAZEM HYDROCHLORIDE 240 MG: 240 CAPSULE, COATED, EXTENDED RELEASE ORAL at 09:31

## 2017-01-01 RX ADMIN — VITAMIN D, TAB 1000IU (100/BT) 5000 UNITS: 25 TAB at 08:26

## 2017-01-01 RX ADMIN — DIPHENHYDRAMINE HYDROCHLORIDE 25 MG: 25 CAPSULE ORAL at 20:37

## 2017-01-01 RX ADMIN — VITAMIN D, TAB 1000IU (100/BT) 5000 UNITS: 25 TAB at 08:47

## 2017-01-01 RX ADMIN — PANTOPRAZOLE SODIUM 40 MG: 40 TABLET, DELAYED RELEASE ORAL at 17:44

## 2017-01-01 RX ADMIN — CYANOCOBALAMIN TAB 500 MCG 1000 MCG: 500 TAB at 09:52

## 2017-01-01 RX ADMIN — DILTIAZEM HYDROCHLORIDE 240 MG: 240 CAPSULE, COATED, EXTENDED RELEASE ORAL at 10:02

## 2017-01-01 RX ADMIN — FAMOTIDINE 20 MG: 10 INJECTION, SOLUTION INTRAVENOUS at 07:37

## 2017-01-01 RX ADMIN — MORPHINE SULFATE 2 MG: 2 INJECTION, SOLUTION INTRAMUSCULAR; INTRAVENOUS at 17:54

## 2017-01-01 RX ADMIN — VITAMIN D, TAB 1000IU (100/BT) 5000 UNITS: 25 TAB at 09:12

## 2017-01-01 RX ADMIN — DILTIAZEM HYDROCHLORIDE 240 MG: 240 CAPSULE, COATED, EXTENDED RELEASE ORAL at 08:48

## 2017-01-01 RX ADMIN — ONDANSETRON 4 MG: 2 INJECTION INTRAMUSCULAR; INTRAVENOUS at 08:58

## 2017-01-01 RX ADMIN — MORPHINE SULFATE 4 MG: 4 INJECTION, SOLUTION INTRAMUSCULAR; INTRAVENOUS at 18:52

## 2017-01-01 RX ADMIN — ATORVASTATIN CALCIUM 10 MG: 10 TABLET, FILM COATED ORAL at 10:04

## 2017-01-01 RX ADMIN — MORPHINE SULFATE 2 MG: 2 INJECTION, SOLUTION INTRAMUSCULAR; INTRAVENOUS at 12:11

## 2017-01-01 RX ADMIN — SODIUM CHLORIDE, POTASSIUM CHLORIDE, SODIUM LACTATE AND CALCIUM CHLORIDE 9 ML/HR: 600; 310; 30; 20 INJECTION, SOLUTION INTRAVENOUS at 07:37

## 2017-01-01 RX ADMIN — HYDROCODONE BITARTRATE AND ACETAMINOPHEN 1 TABLET: 5; 325 TABLET ORAL at 02:11

## 2017-01-01 RX ADMIN — LORAZEPAM 2 MG: 2 INJECTION INTRAMUSCULAR; INTRAVENOUS at 15:00

## 2017-01-01 RX ADMIN — HYDROCODONE BITARTRATE AND ACETAMINOPHEN 1 TABLET: 7.5; 325 TABLET ORAL at 00:26

## 2017-01-01 RX ADMIN — HYDROCODONE BITARTRATE AND ACETAMINOPHEN 1 TABLET: 7.5; 325 TABLET ORAL at 17:45

## 2017-01-01 RX ADMIN — HYOSCYAMINE SULFATE 473 ML: 16 SOLUTION at 09:10

## 2017-01-01 RX ADMIN — MORPHINE SULFATE 4 MG: 4 INJECTION, SOLUTION INTRAMUSCULAR; INTRAVENOUS at 20:24

## 2017-01-01 RX ADMIN — RAMIPRIL 10 MG: 10 CAPSULE ORAL at 08:09

## 2017-01-01 RX ADMIN — FAMOTIDINE 20 MG: 10 INJECTION INTRAVENOUS at 09:20

## 2017-01-01 RX ADMIN — CEFEPIME 2 G: 2 INJECTION, POWDER, FOR SOLUTION INTRAVENOUS at 04:43

## 2017-01-01 RX ADMIN — TAMSULOSIN HYDROCHLORIDE 0.8 MG: 0.4 CAPSULE ORAL at 20:48

## 2017-01-01 RX ADMIN — CYANOCOBALAMIN TAB 500 MCG 2000 MCG: 500 TAB at 08:05

## 2017-01-01 RX ADMIN — HYDROCODONE BITARTRATE AND ACETAMINOPHEN 1 TABLET: 5; 325 TABLET ORAL at 10:53

## 2017-01-01 RX ADMIN — HYDROCODONE BITARTRATE AND ACETAMINOPHEN 1 TABLET: 5; 325 TABLET ORAL at 08:25

## 2017-01-01 RX ADMIN — ATORVASTATIN CALCIUM 10 MG: 10 TABLET, FILM COATED ORAL at 09:13

## 2017-01-01 RX ADMIN — TAMSULOSIN HYDROCHLORIDE 0.4 MG: 0.4 CAPSULE ORAL at 23:18

## 2017-01-01 RX ADMIN — SODIUM CHLORIDE, POTASSIUM CHLORIDE, SODIUM LACTATE AND CALCIUM CHLORIDE: 600; 310; 30; 20 INJECTION, SOLUTION INTRAVENOUS at 11:50

## 2017-01-01 RX ADMIN — GADOBENATE DIMEGLUMINE 17 ML: 529 INJECTION, SOLUTION INTRAVENOUS at 11:01

## 2017-01-01 RX ADMIN — SODIUM BICARBONATE 650 MG: 650 TABLET, ORALLY DISINTEGRATING ORAL at 08:42

## 2017-01-01 RX ADMIN — CYANOCOBALAMIN TAB 500 MCG 2000 MCG: 500 TAB at 09:01

## 2017-01-01 RX ADMIN — RAMIPRIL 10 MG: 10 CAPSULE ORAL at 18:14

## 2017-01-01 RX ADMIN — TAZOBACTAM SODIUM AND PIPERACILLIN SODIUM 3.38 G: 375; 3 INJECTION, SOLUTION INTRAVENOUS at 20:47

## 2017-01-01 RX ADMIN — SODIUM BICARBONATE 650 MG: 650 TABLET, ORALLY DISINTEGRATING ORAL at 21:04

## 2017-01-01 RX ADMIN — HYDROCODONE BITARTRATE AND ACETAMINOPHEN 1 TABLET: 5; 325 TABLET ORAL at 10:07

## 2017-01-01 RX ADMIN — POTASSIUM CHLORIDE AND SODIUM CHLORIDE 125 ML/HR: 900; 150 INJECTION, SOLUTION INTRAVENOUS at 18:44

## 2017-01-01 RX ADMIN — HYDROCODONE BITARTRATE AND ACETAMINOPHEN 1 TABLET: 5; 325 TABLET ORAL at 22:00

## 2017-01-01 RX ADMIN — HYDROCODONE BITARTRATE AND ACETAMINOPHEN 1 TABLET: 5; 325 TABLET ORAL at 20:36

## 2017-01-01 RX ADMIN — CEFEPIME HYDROCHLORIDE 1 G: 1 INJECTION, POWDER, FOR SOLUTION INTRAMUSCULAR; INTRAVENOUS at 02:45

## 2017-01-01 RX ADMIN — HYDROCODONE BITARTRATE AND ACETAMINOPHEN 1 TABLET: 7.5; 325 TABLET ORAL at 09:39

## 2017-01-01 RX ADMIN — PANTOPRAZOLE SODIUM 40 MG: 40 TABLET, DELAYED RELEASE ORAL at 18:14

## 2017-01-01 RX ADMIN — ALTEPLASE 2 MG: 2.2 INJECTION, POWDER, LYOPHILIZED, FOR SOLUTION INTRAVENOUS at 01:46

## 2017-01-01 RX ADMIN — PANTOPRAZOLE SODIUM 40 MG: 40 TABLET, DELAYED RELEASE ORAL at 18:38

## 2017-01-01 RX ADMIN — COLLAGENASE SANTYL: 250 OINTMENT TOPICAL at 21:28

## 2017-01-01 RX ADMIN — FAMOTIDINE 20 MG: 10 INJECTION, SOLUTION INTRAVENOUS at 09:26

## 2017-01-01 RX ADMIN — FERROUS SULFATE TAB 325 MG (65 MG ELEMENTAL FE) 325 MG: 325 (65 FE) TAB at 08:22

## 2017-01-01 RX ADMIN — HYDROCODONE BITARTRATE AND ACETAMINOPHEN 1 TABLET: 5; 325 TABLET ORAL at 21:36

## 2017-01-01 RX ADMIN — HYDROCODONE BITARTRATE AND ACETAMINOPHEN 1 TABLET: 5; 325 TABLET ORAL at 14:43

## 2017-01-01 RX ADMIN — CEFEPIME HYDROCHLORIDE 1 G: 1 INJECTION, POWDER, FOR SOLUTION INTRAMUSCULAR; INTRAVENOUS at 02:40

## 2017-01-01 RX ADMIN — CASTOR OIL AND BALSAM, PERU: 788; 87 OINTMENT TOPICAL at 20:32

## 2017-01-01 RX ADMIN — SODIUM CHLORIDE 1000 ML: 9 INJECTION, SOLUTION INTRAVENOUS at 13:37

## 2017-01-01 RX ADMIN — HYDROCODONE BITARTRATE AND ACETAMINOPHEN 1 TABLET: 7.5; 325 TABLET ORAL at 22:22

## 2017-01-01 RX ADMIN — GADOBENATE DIMEGLUMINE 18 ML: 529 INJECTION, SOLUTION INTRAVENOUS at 16:10

## 2017-01-01 RX ADMIN — PROPOFOL 150 MG: 10 INJECTION, EMULSION INTRAVENOUS at 08:03

## 2017-01-01 RX ADMIN — DILTIAZEM HYDROCHLORIDE 240 MG: 240 CAPSULE, COATED, EXTENDED RELEASE ORAL at 19:59

## 2017-01-01 RX ADMIN — VANCOMYCIN HYDROCHLORIDE 1000 MG: 1 INJECTION, SOLUTION INTRAVENOUS at 12:03

## 2017-01-01 RX ADMIN — BISACODYL 10 MG: 10 SUPPOSITORY RECTAL at 12:22

## 2017-01-01 RX ADMIN — SODIUM CHLORIDE 4 MILLION UNITS: 9 INJECTION, SOLUTION INTRAVENOUS at 15:46

## 2017-01-01 RX ADMIN — RAMIPRIL 5 MG: 5 CAPSULE ORAL at 19:57

## 2017-01-01 RX ADMIN — SODIUM CHLORIDE 500 ML: 9 INJECTION, SOLUTION INTRAVENOUS at 16:27

## 2017-01-01 RX ADMIN — RAMIPRIL 10 MG: 10 CAPSULE ORAL at 08:59

## 2017-01-01 RX ADMIN — HYDROCODONE BITARTRATE AND ACETAMINOPHEN 1 TABLET: 5; 325 TABLET ORAL at 05:03

## 2017-01-01 RX ADMIN — CEFAZOLIN SODIUM 2 G: 2 INJECTION, SOLUTION INTRAVENOUS at 09:10

## 2017-01-01 RX ADMIN — VITAMIN D, TAB 1000IU (100/BT) 5000 UNITS: 25 TAB at 08:31

## 2017-01-01 RX ADMIN — Medication 1 G: at 08:48

## 2017-01-01 RX ADMIN — CASTOR OIL AND BALSAM, PERU: 788; 87 OINTMENT TOPICAL at 08:58

## 2017-01-01 RX ADMIN — FERROUS SULFATE TAB 325 MG (65 MG ELEMENTAL FE) 325 MG: 325 (65 FE) TAB at 08:51

## 2017-01-01 RX ADMIN — VANCOMYCIN HYDROCHLORIDE 1750 MG: 1 INJECTION, POWDER, LYOPHILIZED, FOR SOLUTION INTRAVENOUS at 13:31

## 2017-01-01 RX ADMIN — MORPHINE SULFATE 4 MG: 4 INJECTION, SOLUTION INTRAMUSCULAR; INTRAVENOUS at 09:22

## 2017-01-01 RX ADMIN — CASTOR OIL AND BALSAM, PERU: 788; 87 OINTMENT TOPICAL at 20:50

## 2017-01-01 RX ADMIN — VANCOMYCIN HYDROCHLORIDE 1000 MG: 1 INJECTION, SOLUTION INTRAVENOUS at 02:33

## 2017-01-01 RX ADMIN — VANCOMYCIN HYDROCHLORIDE 1500 MG: 10 INJECTION, POWDER, LYOPHILIZED, FOR SOLUTION INTRAVENOUS at 16:58

## 2017-01-01 RX ADMIN — EPHEDRINE SULFATE 5 MG: 50 INJECTION INTRAMUSCULAR; INTRAVENOUS; SUBCUTANEOUS at 10:18

## 2017-01-01 RX ADMIN — HYDROCODONE BITARTRATE AND ACETAMINOPHEN 1 TABLET: 7.5; 325 TABLET ORAL at 22:48

## 2017-01-01 RX ADMIN — FAMOTIDINE 20 MG: 10 INJECTION, SOLUTION INTRAVENOUS at 08:25

## 2017-01-01 RX ADMIN — DIPHENHYDRAMINE HYDROCHLORIDE 25 MG: 25 CAPSULE ORAL at 00:03

## 2017-01-01 RX ADMIN — MORPHINE SULFATE 4 MG: 4 INJECTION, SOLUTION INTRAMUSCULAR; INTRAVENOUS at 14:00

## 2017-01-01 RX ADMIN — SODIUM CHLORIDE 50 ML/HR: 9 INJECTION, SOLUTION INTRAVENOUS at 17:29

## 2017-01-01 RX ADMIN — RAMIPRIL 10 MG: 10 CAPSULE ORAL at 08:48

## 2017-01-01 RX ADMIN — BISACODYL 10 MG: 10 SUPPOSITORY RECTAL at 18:10

## 2017-01-01 RX ADMIN — PANTOPRAZOLE SODIUM 40 MG: 40 TABLET, DELAYED RELEASE ORAL at 16:33

## 2017-01-01 RX ADMIN — VITAMIN D, TAB 1000IU (100/BT) 5000 UNITS: 25 TAB at 09:31

## 2017-01-01 RX ADMIN — CEFEPIME HYDROCHLORIDE 1 G: 1 INJECTION, POWDER, FOR SOLUTION INTRAMUSCULAR; INTRAVENOUS at 10:48

## 2017-01-01 RX ADMIN — METHYLPREDNISOLONE ACETATE 80 MG: 80 INJECTION, SUSPENSION INTRA-ARTICULAR; INTRALESIONAL; INTRAMUSCULAR; SOFT TISSUE at 12:42

## 2017-01-01 RX ADMIN — ACETAMINOPHEN 650 MG: 325 TABLET ORAL at 05:58

## 2017-01-01 RX ADMIN — DIATRIZOATE MEGLUMINE AND DIATRIZOATE SODIUM 30 ML: 600; 100 SOLUTION ORAL; RECTAL at 12:18

## 2017-01-01 RX ADMIN — MORPHINE SULFATE 2 MG: 2 INJECTION, SOLUTION INTRAMUSCULAR; INTRAVENOUS at 21:20

## 2017-01-01 RX ADMIN — ROCURONIUM BROMIDE 20 MG: 10 INJECTION INTRAVENOUS at 10:38

## 2017-01-01 RX ADMIN — PANTOPRAZOLE SODIUM 40 MG: 40 TABLET, DELAYED RELEASE ORAL at 06:41

## 2017-01-01 RX ADMIN — CEFEPIME 2 G: 2 INJECTION, POWDER, FOR SOLUTION INTRAVENOUS at 12:07

## 2017-01-01 RX ADMIN — SODIUM CHLORIDE, POTASSIUM CHLORIDE, SODIUM LACTATE AND CALCIUM CHLORIDE 9 ML/HR: 600; 310; 30; 20 INJECTION, SOLUTION INTRAVENOUS at 09:21

## 2017-01-01 RX ADMIN — HYDROMORPHONE HYDROCHLORIDE 0.5 MG: 1 INJECTION, SOLUTION INTRAMUSCULAR; INTRAVENOUS; SUBCUTANEOUS at 10:07

## 2017-01-01 RX ADMIN — CYANOCOBALAMIN TAB 500 MCG 2000 MCG: 500 TAB at 08:59

## 2017-01-01 RX ADMIN — HYDROCODONE BITARTRATE AND ACETAMINOPHEN 1 TABLET: 5; 325 TABLET ORAL at 22:03

## 2017-01-01 RX ADMIN — ATORVASTATIN CALCIUM 10 MG: 10 TABLET, FILM COATED ORAL at 19:57

## 2017-01-01 RX ADMIN — VANCOMYCIN HYDROCHLORIDE 1250 MG: 10 INJECTION, POWDER, LYOPHILIZED, FOR SOLUTION INTRAVENOUS at 05:37

## 2017-01-01 RX ADMIN — DIPHENHYDRAMINE HYDROCHLORIDE 25 MG: 25 CAPSULE ORAL at 22:44

## 2017-01-01 RX ADMIN — TAZOBACTAM SODIUM AND PIPERACILLIN SODIUM 3.38 G: 375; 3 INJECTION, SOLUTION INTRAVENOUS at 02:09

## 2017-01-01 RX ADMIN — FAMOTIDINE 20 MG: 10 INJECTION, SOLUTION INTRAVENOUS at 20:50

## 2017-01-01 RX ADMIN — ROCURONIUM BROMIDE 35 MG: 10 INJECTION INTRAVENOUS at 10:08

## 2017-01-01 RX ADMIN — VITAMIN D, TAB 1000IU (100/BT) 5000 UNITS: 25 TAB at 10:01

## 2017-01-01 RX ADMIN — CEFTRIAXONE SODIUM 1 G: 1 INJECTION, SOLUTION INTRAVENOUS at 13:07

## 2017-01-01 RX ADMIN — DILTIAZEM HYDROCHLORIDE 240 MG: 240 CAPSULE, COATED, EXTENDED RELEASE ORAL at 08:22

## 2017-01-01 RX ADMIN — VANCOMYCIN HYDROCHLORIDE 1750 MG: 1 INJECTION, POWDER, LYOPHILIZED, FOR SOLUTION INTRAVENOUS at 16:21

## 2017-01-01 RX ADMIN — FENTANYL CITRATE 50 MCG: 50 INJECTION INTRAMUSCULAR; INTRAVENOUS at 10:01

## 2017-01-01 RX ADMIN — DOCUSATE SODIUM 100 MG: 100 CAPSULE, LIQUID FILLED ORAL at 08:55

## 2017-01-01 RX ADMIN — VANCOMYCIN HYDROCHLORIDE 1250 MG: 10 INJECTION, POWDER, LYOPHILIZED, FOR SOLUTION INTRAVENOUS at 06:08

## 2017-01-01 RX ADMIN — MORPHINE SULFATE 1 MG: 2 INJECTION, SOLUTION INTRAMUSCULAR; INTRAVENOUS at 21:39

## 2017-01-01 RX ADMIN — ONDANSETRON 4 MG: 2 INJECTION INTRAMUSCULAR; INTRAVENOUS at 08:57

## 2017-01-01 RX ADMIN — HYDROMORPHONE HYDROCHLORIDE 2 MG: 2 INJECTION, SOLUTION INTRAMUSCULAR; INTRAVENOUS; SUBCUTANEOUS at 12:45

## 2017-01-01 RX ADMIN — DILTIAZEM HYDROCHLORIDE 240 MG: 240 CAPSULE, COATED, EXTENDED RELEASE ORAL at 08:40

## 2017-01-01 RX ADMIN — NEOSTIGMINE METHYLSULFATE 3 MG: 1 INJECTION INTRAMUSCULAR; INTRAVENOUS; SUBCUTANEOUS at 09:04

## 2017-01-01 RX ADMIN — DILTIAZEM HYDROCHLORIDE 240 MG: 240 CAPSULE, COATED, EXTENDED RELEASE ORAL at 08:05

## 2017-01-01 RX ADMIN — HYDROMORPHONE HYDROCHLORIDE 0.5 MG: 1 INJECTION, SOLUTION INTRAMUSCULAR; INTRAVENOUS; SUBCUTANEOUS at 09:49

## 2017-01-01 RX ADMIN — SODIUM CHLORIDE, POTASSIUM CHLORIDE, SODIUM LACTATE AND CALCIUM CHLORIDE 9 ML/HR: 600; 310; 30; 20 INJECTION, SOLUTION INTRAVENOUS at 08:45

## 2017-01-01 RX ADMIN — MIDAZOLAM 1 MG: 1 INJECTION INTRAMUSCULAR; INTRAVENOUS at 09:20

## 2017-01-01 RX ADMIN — DIPHENHYDRAMINE HYDROCHLORIDE 25 MG: 25 CAPSULE ORAL at 21:36

## 2017-01-01 RX ADMIN — CEFEPIME 2 G: 2 INJECTION, POWDER, FOR SOLUTION INTRAVENOUS at 05:24

## 2017-01-01 RX ADMIN — CEFEPIME HYDROCHLORIDE 1 G: 1 INJECTION, POWDER, FOR SOLUTION INTRAMUSCULAR; INTRAVENOUS at 08:48

## 2017-01-01 RX ADMIN — HYDROCODONE BITARTRATE AND ACETAMINOPHEN 1 TABLET: 5; 325 TABLET ORAL at 05:20

## 2017-01-01 RX ADMIN — PANTOPRAZOLE SODIUM 40 MG: 40 TABLET, DELAYED RELEASE ORAL at 06:38

## 2017-01-01 RX ADMIN — CEFEPIME 2 G: 2 INJECTION, POWDER, FOR SOLUTION INTRAVENOUS at 16:19

## 2017-01-01 RX ADMIN — PANTOPRAZOLE SODIUM 40 MG: 40 TABLET, DELAYED RELEASE ORAL at 17:27

## 2017-01-01 RX ADMIN — CEFEPIME HYDROCHLORIDE 1 G: 1 INJECTION, POWDER, FOR SOLUTION INTRAMUSCULAR; INTRAVENOUS at 09:12

## 2017-01-01 RX ADMIN — ATORVASTATIN CALCIUM 10 MG: 10 TABLET, FILM COATED ORAL at 09:51

## 2017-01-01 RX ADMIN — PANTOPRAZOLE SODIUM 40 MG: 40 TABLET, DELAYED RELEASE ORAL at 17:10

## 2017-01-01 RX ADMIN — HYDROCODONE BITARTRATE AND ACETAMINOPHEN 1 TABLET: 5; 325 TABLET ORAL at 17:23

## 2017-01-01 RX ADMIN — LIDOCAINE 1 PATCH: 50 PATCH CUTANEOUS at 02:54

## 2017-01-01 RX ADMIN — SODIUM CHLORIDE 4 MILLION UNITS: 9 INJECTION, SOLUTION INTRAVENOUS at 12:25

## 2017-01-01 RX ADMIN — BISACODYL 10 MG: 10 SUPPOSITORY RECTAL at 08:40

## 2017-01-01 RX ADMIN — VANCOMYCIN HYDROCHLORIDE 1500 MG: 1 INJECTION, POWDER, LYOPHILIZED, FOR SOLUTION INTRAVENOUS at 17:40

## 2017-01-01 RX ADMIN — LORAZEPAM 1 MG: 2 INJECTION INTRAMUSCULAR; INTRAVENOUS at 18:36

## 2017-01-01 RX ADMIN — RAMIPRIL 10 MG: 10 CAPSULE ORAL at 09:01

## 2017-01-01 RX ADMIN — CYANOCOBALAMIN TAB 500 MCG 1000 MCG: 500 TAB at 09:12

## 2017-01-01 RX ADMIN — DILTIAZEM HYDROCHLORIDE 240 MG: 240 CAPSULE, COATED, EXTENDED RELEASE ORAL at 09:04

## 2017-01-01 RX ADMIN — CASTOR OIL AND BALSAM, PERU: 788; 87 OINTMENT TOPICAL at 21:36

## 2017-01-01 RX ADMIN — POTASSIUM CHLORIDE AND SODIUM CHLORIDE 125 ML/HR: 900; 150 INJECTION, SOLUTION INTRAVENOUS at 15:13

## 2017-01-01 RX ADMIN — DOCUSATE SODIUM 100 MG: 100 CAPSULE, LIQUID FILLED ORAL at 17:33

## 2017-01-01 RX ADMIN — DEMECLOCYCLINE 300 MG: 150 TABLET ORAL at 00:03

## 2017-01-01 RX ADMIN — TAZOBACTAM SODIUM AND PIPERACILLIN SODIUM 3.38 G: 375; 3 INJECTION, SOLUTION INTRAVENOUS at 09:26

## 2017-01-01 RX ADMIN — CASTOR OIL AND BALSAM, PERU: 788; 87 OINTMENT TOPICAL at 16:34

## 2017-01-01 RX ADMIN — DIPHENHYDRAMINE HYDROCHLORIDE 25 MG: 25 CAPSULE ORAL at 00:31

## 2017-01-01 RX ADMIN — FENTANYL CITRATE: 50 INJECTION, SOLUTION INTRAMUSCULAR; INTRAVENOUS at 13:32

## 2017-01-01 RX ADMIN — HYDROCODONE BITARTRATE AND ACETAMINOPHEN 1 TABLET: 5; 325 TABLET ORAL at 05:07

## 2017-01-01 RX ADMIN — HYOSCYAMINE SULFATE: 16 SOLUTION at 16:19

## 2017-01-01 RX ADMIN — CYANOCOBALAMIN TAB 500 MCG 1000 MCG: 500 TAB at 08:31

## 2017-01-01 RX ADMIN — LORAZEPAM 2 MG: 2 INJECTION INTRAMUSCULAR; INTRAVENOUS at 18:52

## 2017-01-01 RX ADMIN — MAGNESIUM HYDROXIDE 10 ML: 2400 SUSPENSION ORAL at 08:05

## 2017-01-01 RX ADMIN — HYDROCODONE BITARTRATE AND ACETAMINOPHEN 1 TABLET: 7.5; 325 TABLET ORAL at 21:00

## 2017-01-01 RX ADMIN — PANTOPRAZOLE SODIUM 40 MG: 40 TABLET, DELAYED RELEASE ORAL at 06:43

## 2017-01-01 RX ADMIN — SODIUM CHLORIDE 500 ML: 9 INJECTION, SOLUTION INTRAVENOUS at 18:15

## 2017-01-01 RX ADMIN — TOLVAPTAN 7.5 MG: 15 TABLET ORAL at 12:38

## 2017-01-01 RX ADMIN — VANCOMYCIN HYDROCHLORIDE 1000 MG: 1 INJECTION, SOLUTION INTRAVENOUS at 14:41

## 2017-01-01 RX ADMIN — CEFEPIME 2 G: 2 INJECTION, POWDER, FOR SOLUTION INTRAVENOUS at 13:41

## 2017-01-01 RX ADMIN — ATORVASTATIN CALCIUM 10 MG: 10 TABLET, FILM COATED ORAL at 08:35

## 2017-01-01 RX ADMIN — ZOLPIDEM TARTRATE 5 MG: 5 TABLET, FILM COATED ORAL at 21:50

## 2017-01-01 RX ADMIN — CYANOCOBALAMIN TAB 500 MCG 2000 MCG: 500 TAB at 08:31

## 2017-01-01 RX ADMIN — DEXAMETHASONE SODIUM PHOSPHATE 8 MG: 10 INJECTION INTRAMUSCULAR; INTRAVENOUS at 08:57

## 2017-01-01 RX ADMIN — HYDROCODONE BITARTRATE AND ACETAMINOPHEN 1 TABLET: 5; 325 TABLET ORAL at 17:38

## 2017-01-01 RX ADMIN — SODIUM BICARBONATE 650 MG: 650 TABLET, ORALLY DISINTEGRATING ORAL at 20:33

## 2017-01-01 RX ADMIN — CYANOCOBALAMIN TAB 500 MCG 2000 MCG: 500 TAB at 09:04

## 2017-01-01 RX ADMIN — CASTOR OIL AND BALSAM, PERU: 788; 87 OINTMENT TOPICAL at 21:23

## 2017-01-01 RX ADMIN — CYANOCOBALAMIN TAB 500 MCG 2000 MCG: 500 TAB at 08:47

## 2017-01-01 RX ADMIN — LIDOCAINE 1 PATCH: 50 PATCH CUTANEOUS at 08:25

## 2017-01-01 RX ADMIN — FLUCONAZOLE 200 MG: 200 TABLET ORAL at 23:44

## 2017-01-01 RX ADMIN — COLLAGENASE SANTYL: 250 OINTMENT TOPICAL at 10:06

## 2017-01-01 RX ADMIN — DILTIAZEM HYDROCHLORIDE 240 MG: 240 CAPSULE, COATED, EXTENDED RELEASE ORAL at 09:43

## 2017-01-01 RX ADMIN — PANTOPRAZOLE SODIUM 40 MG: 40 TABLET, DELAYED RELEASE ORAL at 08:59

## 2017-01-01 RX ADMIN — LORAZEPAM 2 MG: 2 INJECTION INTRAMUSCULAR; INTRAVENOUS at 14:18

## 2017-01-01 RX ADMIN — RAMIPRIL 5 MG: 5 CAPSULE ORAL at 10:02

## 2017-01-01 RX ADMIN — LORAZEPAM 2 MG: 2 INJECTION INTRAMUSCULAR; INTRAVENOUS at 05:35

## 2017-01-01 RX ADMIN — PROPOFOL 20 MG: 10 INJECTION, EMULSION INTRAVENOUS at 10:38

## 2017-01-01 RX ADMIN — HYDROCODONE BITARTRATE AND ACETAMINOPHEN 1 TABLET: 5; 325 TABLET ORAL at 03:20

## 2017-01-01 RX ADMIN — TAMSULOSIN HYDROCHLORIDE 0.8 MG: 0.4 CAPSULE ORAL at 23:44

## 2017-01-01 RX ADMIN — MORPHINE SULFATE 4 MG: 4 INJECTION, SOLUTION INTRAMUSCULAR; INTRAVENOUS at 05:35

## 2017-01-01 RX ADMIN — VITAMIN D, TAB 1000IU (100/BT) 5000 UNITS: 25 TAB at 09:00

## 2017-01-01 RX ADMIN — SODIUM BICARBONATE 650 MG: 650 TABLET, ORALLY DISINTEGRATING ORAL at 21:06

## 2017-01-01 RX ADMIN — TAZOBACTAM SODIUM AND PIPERACILLIN SODIUM 3.38 G: 375; 3 INJECTION, SOLUTION INTRAVENOUS at 02:18

## 2017-01-01 RX ADMIN — VITAMIN D, TAB 1000IU (100/BT) 5000 UNITS: 25 TAB at 08:55

## 2017-01-01 RX ADMIN — CYANOCOBALAMIN TAB 500 MCG 1000 MCG: 500 TAB at 11:02

## 2017-01-01 RX ADMIN — CEFEPIME 2 G: 2 INJECTION, POWDER, FOR SOLUTION INTRAVENOUS at 23:35

## 2017-01-01 RX ADMIN — PANTOPRAZOLE SODIUM 40 MG: 40 TABLET, DELAYED RELEASE ORAL at 05:58

## 2017-01-01 RX ADMIN — HYOSCYAMINE SULFATE 473 ML: 16 SOLUTION at 23:47

## 2017-01-01 RX ADMIN — ATORVASTATIN CALCIUM 10 MG: 10 TABLET, FILM COATED ORAL at 10:02

## 2017-01-01 RX ADMIN — HYDROMORPHONE HYDROCHLORIDE 2 MG: 2 INJECTION, SOLUTION INTRAMUSCULAR; INTRAVENOUS; SUBCUTANEOUS at 18:48

## 2017-01-01 RX ADMIN — ATORVASTATIN CALCIUM 10 MG: 10 TABLET, FILM COATED ORAL at 08:23

## 2017-01-01 RX ADMIN — RAMIPRIL 10 MG: 10 CAPSULE ORAL at 08:05

## 2017-01-01 RX ADMIN — GLYCOPYRROLATE 0.4 MG: 0.2 INJECTION, SOLUTION INTRAMUSCULAR; INTRAVENOUS at 05:35

## 2017-01-01 RX ADMIN — HYDROCODONE BITARTRATE AND ACETAMINOPHEN 1 TABLET: 7.5; 325 TABLET ORAL at 10:15

## 2017-01-01 RX ADMIN — MIDAZOLAM 1 MG: 1 INJECTION INTRAMUSCULAR; INTRAVENOUS at 08:44

## 2017-01-01 RX ADMIN — DEMECLOCYCLINE 300 MG: 150 TABLET ORAL at 20:31

## 2017-01-01 RX ADMIN — DILTIAZEM HYDROCHLORIDE 240 MG: 240 CAPSULE, COATED, EXTENDED RELEASE ORAL at 08:55

## 2017-01-01 RX ADMIN — ONDANSETRON 4 MG: 2 INJECTION INTRAMUSCULAR; INTRAVENOUS at 20:30

## 2017-01-01 RX ADMIN — DOCUSATE SODIUM 100 MG: 100 CAPSULE, LIQUID FILLED ORAL at 08:40

## 2017-01-01 RX ADMIN — DIPHENHYDRAMINE HYDROCHLORIDE 12.5 MG: 50 INJECTION INTRAMUSCULAR; INTRAVENOUS at 12:14

## 2017-01-01 RX ADMIN — CEFEPIME 2 G: 2 INJECTION, POWDER, FOR SOLUTION INTRAVENOUS at 23:32

## 2017-01-01 RX ADMIN — HYDROCODONE BITARTRATE AND ACETAMINOPHEN 1 TABLET: 7.5; 325 TABLET ORAL at 14:14

## 2017-01-01 RX ADMIN — SODIUM CHLORIDE 50 ML/HR: 9 INJECTION, SOLUTION INTRAVENOUS at 13:40

## 2017-01-01 RX ADMIN — CEFEPIME 2 G: 2 INJECTION, POWDER, FOR SOLUTION INTRAVENOUS at 20:36

## 2017-01-01 RX ADMIN — ROCURONIUM BROMIDE 40 MG: 10 INJECTION INTRAVENOUS at 09:05

## 2017-01-01 RX ADMIN — ROCURONIUM BROMIDE 15 MG: 10 INJECTION INTRAVENOUS at 10:42

## 2017-01-01 RX ADMIN — VANCOMYCIN HYDROCHLORIDE 750 MG: 750 INJECTION, POWDER, LYOPHILIZED, FOR SOLUTION INTRAVENOUS at 18:11

## 2017-01-01 RX ADMIN — RAMIPRIL 5 MG: 5 CAPSULE ORAL at 08:22

## 2017-01-01 RX ADMIN — GADOBENATE DIMEGLUMINE 17 ML: 529 INJECTION, SOLUTION INTRAVENOUS at 22:27

## 2017-01-01 RX ADMIN — MORPHINE SULFATE 4 MG: 4 INJECTION, SOLUTION INTRAMUSCULAR; INTRAVENOUS at 11:10

## 2017-01-01 RX ADMIN — SODIUM CHLORIDE 4 MILLION UNITS: 9 INJECTION, SOLUTION INTRAVENOUS at 02:44

## 2017-01-01 RX ADMIN — ONDANSETRON 4 MG: 2 INJECTION INTRAMUSCULAR; INTRAVENOUS at 18:14

## 2017-01-01 RX ADMIN — MORPHINE SULFATE 6 MG: 10 INJECTION INTRAVENOUS at 10:52

## 2017-01-01 RX ADMIN — PANTOPRAZOLE SODIUM 40 MG: 40 TABLET, DELAYED RELEASE ORAL at 06:34

## 2017-01-01 RX ADMIN — DEMECLOCYCLINE 300 MG: 150 TABLET ORAL at 09:43

## 2017-01-01 RX ADMIN — Medication 10 ML: at 10:07

## 2017-01-01 RX ADMIN — SODIUM CHLORIDE, POTASSIUM CHLORIDE, SODIUM LACTATE AND CALCIUM CHLORIDE 50 ML/HR: 600; 310; 30; 20 INJECTION, SOLUTION INTRAVENOUS at 20:00

## 2017-01-01 RX ADMIN — PANTOPRAZOLE SODIUM 40 MG: 40 TABLET, DELAYED RELEASE ORAL at 09:00

## 2017-01-01 RX ADMIN — CYANOCOBALAMIN TAB 500 MCG 2000 MCG: 500 TAB at 09:03

## 2017-01-01 RX ADMIN — DEMECLOCYCLINE 300 MG: 150 TABLET ORAL at 21:00

## 2017-01-01 RX ADMIN — CYANOCOBALAMIN TAB 500 MCG 2000 MCG: 500 TAB at 09:43

## 2017-01-01 RX ADMIN — ATORVASTATIN CALCIUM 10 MG: 10 TABLET, FILM COATED ORAL at 11:01

## 2017-01-01 RX ADMIN — PANTOPRAZOLE SODIUM 40 MG: 40 TABLET, DELAYED RELEASE ORAL at 08:19

## 2017-01-01 RX ADMIN — HYDROCODONE BITARTRATE AND ACETAMINOPHEN 1 TABLET: 5; 325 TABLET ORAL at 22:22

## 2017-01-01 RX ADMIN — VANCOMYCIN HYDROCHLORIDE 1000 MG: 1 INJECTION, SOLUTION INTRAVENOUS at 03:38

## 2017-01-01 RX ADMIN — RAMIPRIL 10 MG: 10 CAPSULE ORAL at 08:55

## 2017-01-01 RX ADMIN — RAMIPRIL 5 MG: 5 CAPSULE ORAL at 11:02

## 2017-01-01 RX ADMIN — BISACODYL 10 MG: 10 SUPPOSITORY RECTAL at 21:00

## 2017-01-01 RX ADMIN — PHENYLEPHRINE HYDROCHLORIDE 100 MCG: 10 INJECTION INTRAVENOUS at 10:23

## 2017-01-01 RX ADMIN — PANTOPRAZOLE SODIUM 40 MG: 40 TABLET, DELAYED RELEASE ORAL at 17:45

## 2017-01-01 RX ADMIN — RAMIPRIL 10 MG: 10 CAPSULE ORAL at 09:32

## 2017-01-01 RX ADMIN — VANCOMYCIN HYDROCHLORIDE 1000 MG: 1 INJECTION, SOLUTION INTRAVENOUS at 00:21

## 2017-01-01 RX ADMIN — DIPHENHYDRAMINE HYDROCHLORIDE 25 MG: 25 CAPSULE ORAL at 21:00

## 2017-01-01 RX ADMIN — HYDROCODONE BITARTRATE AND ACETAMINOPHEN 1 TABLET: 7.5; 325 TABLET ORAL at 02:38

## 2017-01-01 RX ADMIN — PANTOPRAZOLE SODIUM 40 MG: 40 INJECTION, POWDER, FOR SOLUTION INTRAVENOUS at 12:11

## 2017-01-01 RX ADMIN — VANCOMYCIN HYDROCHLORIDE 750 MG: 750 INJECTION, POWDER, LYOPHILIZED, FOR SOLUTION INTRAVENOUS at 20:48

## 2017-01-01 RX ADMIN — CASTOR OIL AND BALSAM, PERU: 788; 87 OINTMENT TOPICAL at 21:24

## 2017-01-01 RX ADMIN — PANTOPRAZOLE SODIUM 40 MG: 40 TABLET, DELAYED RELEASE ORAL at 16:01

## 2017-01-01 RX ADMIN — RAMIPRIL 10 MG: 10 CAPSULE ORAL at 08:39

## 2017-01-01 RX ADMIN — LORAZEPAM 2 MG: 2 INJECTION INTRAMUSCULAR; INTRAVENOUS at 20:24

## 2017-01-01 RX ADMIN — RAMIPRIL 5 MG: 5 CAPSULE ORAL at 08:31

## 2017-01-01 RX ADMIN — SODIUM CHLORIDE, POTASSIUM CHLORIDE, SODIUM LACTATE AND CALCIUM CHLORIDE 50 ML/HR: 600; 310; 30; 20 INJECTION, SOLUTION INTRAVENOUS at 15:58

## 2017-01-01 RX ADMIN — TAZOBACTAM SODIUM AND PIPERACILLIN SODIUM 3.38 G: 375; 3 INJECTION, SOLUTION INTRAVENOUS at 10:11

## 2017-01-01 RX ADMIN — CASTOR OIL AND BALSAM, PERU: 788; 87 OINTMENT TOPICAL at 21:02

## 2017-01-01 RX ADMIN — CEFEPIME HYDROCHLORIDE 1 G: 1 INJECTION, POWDER, FOR SOLUTION INTRAMUSCULAR; INTRAVENOUS at 16:14

## 2017-01-01 RX ADMIN — LIDOCAINE 1 PATCH: 50 PATCH CUTANEOUS at 08:35

## 2017-01-01 RX ADMIN — DILTIAZEM HYDROCHLORIDE 240 MG: 240 CAPSULE, COATED, EXTENDED RELEASE ORAL at 10:04

## 2017-01-01 RX ADMIN — VANCOMYCIN HYDROCHLORIDE 1000 MG: 1 INJECTION, SOLUTION INTRAVENOUS at 02:52

## 2017-01-01 RX ADMIN — FAMOTIDINE 20 MG: 10 INJECTION, SOLUTION INTRAVENOUS at 08:43

## 2017-01-01 RX ADMIN — DILTIAZEM HYDROCHLORIDE 120 MG: 120 CAPSULE, COATED, EXTENDED RELEASE ORAL at 09:32

## 2017-01-01 RX ADMIN — DEMECLOCYCLINE 300 MG: 150 TABLET ORAL at 20:32

## 2017-01-01 RX ADMIN — VITAMIN D, TAB 1000IU (100/BT) 5000 UNITS: 25 TAB at 08:59

## 2017-01-01 RX ADMIN — TAMSULOSIN HYDROCHLORIDE 0.8 MG: 0.4 CAPSULE ORAL at 23:35

## 2017-01-01 RX ADMIN — DIPHENHYDRAMINE HYDROCHLORIDE 25 MG: 25 CAPSULE ORAL at 20:32

## 2017-01-01 RX ADMIN — ONDANSETRON 4 MG: 2 INJECTION INTRAMUSCULAR; INTRAVENOUS at 18:42

## 2017-01-01 RX ADMIN — PANTOPRAZOLE SODIUM 40 MG: 40 TABLET, DELAYED RELEASE ORAL at 08:09

## 2017-01-01 RX ADMIN — VANCOMYCIN 125 MG: KIT at 06:31

## 2017-01-01 RX ADMIN — RAMIPRIL 10 MG: 10 CAPSULE ORAL at 09:04

## 2017-01-01 RX ADMIN — PANTOPRAZOLE SODIUM 40 MG: 40 TABLET, DELAYED RELEASE ORAL at 18:12

## 2017-01-01 RX ADMIN — HYDROCODONE BITARTRATE AND ACETAMINOPHEN 1 TABLET: 5; 325 TABLET ORAL at 23:47

## 2017-01-01 RX ADMIN — FENTANYL CITRATE 50 MCG: 50 INJECTION INTRAMUSCULAR; INTRAVENOUS at 09:13

## 2017-01-01 RX ADMIN — SODIUM BICARBONATE 650 MG: 650 TABLET, ORALLY DISINTEGRATING ORAL at 20:31

## 2017-01-01 RX ADMIN — RAMIPRIL 10 MG: 10 CAPSULE ORAL at 08:41

## 2017-01-01 RX ADMIN — GLYCOPYRROLATE 0.4 MG: 0.2 INJECTION, SOLUTION INTRAMUSCULAR; INTRAVENOUS at 23:10

## 2017-01-01 RX ADMIN — MORPHINE SULFATE 4 MG: 4 INJECTION, SOLUTION INTRAMUSCULAR; INTRAVENOUS at 08:28

## 2017-01-01 RX ADMIN — DILTIAZEM HYDROCHLORIDE 240 MG: 240 CAPSULE, COATED, EXTENDED RELEASE ORAL at 09:03

## 2017-01-01 RX ADMIN — DIGOXIN 250 MCG: 0.25 INJECTION INTRAMUSCULAR; INTRAVENOUS at 05:18

## 2017-01-01 RX ADMIN — DEXTROSE MONOHYDRATE 500 ML: 50 INJECTION, SOLUTION INTRAVENOUS at 01:24

## 2017-01-01 RX ADMIN — DIAZEPAM 5 MG: 5 TABLET ORAL at 19:46

## 2017-01-01 RX ADMIN — HYDROMORPHONE HYDROCHLORIDE 0.5 MG: 1 INJECTION, SOLUTION INTRAMUSCULAR; INTRAVENOUS; SUBCUTANEOUS at 18:42

## 2017-01-01 RX ADMIN — HYDROCODONE BITARTRATE AND ACETAMINOPHEN 1 TABLET: 5; 325 TABLET ORAL at 15:42

## 2017-01-01 RX ADMIN — VITAMIN D, TAB 1000IU (100/BT) 5000 UNITS: 25 TAB at 08:39

## 2017-01-01 RX ADMIN — PANTOPRAZOLE SODIUM 40 MG: 40 TABLET, DELAYED RELEASE ORAL at 17:32

## 2017-01-01 RX ADMIN — LIDOCAINE HYDROCHLORIDE 60 MG: 20 INJECTION, SOLUTION INFILTRATION; PERINEURAL at 09:04

## 2017-01-01 RX ADMIN — PANTOPRAZOLE SODIUM 40 MG: 40 TABLET, DELAYED RELEASE ORAL at 18:08

## 2017-01-01 RX ADMIN — DILTIAZEM HYDROCHLORIDE 240 MG: 240 CAPSULE, COATED, EXTENDED RELEASE ORAL at 09:51

## 2017-01-01 RX ADMIN — DIPHENHYDRAMINE HYDROCHLORIDE 25 MG: 25 CAPSULE ORAL at 21:02

## 2017-01-01 RX ADMIN — GLYCOPYRROLATE 0.4 MG: 0.2 INJECTION, SOLUTION INTRAMUSCULAR; INTRAVENOUS at 21:00

## 2017-01-01 RX ADMIN — CASTOR OIL AND BALSAM, PERU: 788; 87 OINTMENT TOPICAL at 20:52

## 2017-01-01 RX ADMIN — SODIUM CHLORIDE, POTASSIUM CHLORIDE, SODIUM LACTATE AND CALCIUM CHLORIDE 125 ML/HR: 600; 310; 30; 20 INJECTION, SOLUTION INTRAVENOUS at 23:00

## 2017-01-01 RX ADMIN — LORAZEPAM 2 MG: 2 INJECTION INTRAMUSCULAR; INTRAVENOUS at 18:48

## 2017-01-01 RX ADMIN — CYANOCOBALAMIN TAB 500 MCG 2000 MCG: 500 TAB at 08:42

## 2017-01-01 RX ADMIN — VITAMIN D, TAB 1000IU (100/BT) 5000 UNITS: 25 TAB at 08:27

## 2017-01-01 RX ADMIN — HYDROCODONE BITARTRATE AND ACETAMINOPHEN 1 TABLET: 5; 325 TABLET ORAL at 06:57

## 2017-01-01 RX ADMIN — DILTIAZEM HYDROCHLORIDE 240 MG: 240 CAPSULE, COATED, EXTENDED RELEASE ORAL at 09:12

## 2017-01-01 RX ADMIN — PANTOPRAZOLE SODIUM 40 MG: 40 TABLET, DELAYED RELEASE ORAL at 09:03

## 2017-01-01 RX ADMIN — CEFEPIME 2 G: 2 INJECTION, POWDER, FOR SOLUTION INTRAVENOUS at 20:59

## 2017-01-01 RX ADMIN — DOCUSATE SODIUM -SENNOSIDES 2 TABLET: 50; 8.6 TABLET, COATED ORAL at 00:11

## 2017-01-01 RX ADMIN — SODIUM CHLORIDE 100 ML/HR: 9 INJECTION, SOLUTION INTRAVENOUS at 21:50

## 2017-01-01 RX ADMIN — ACETAMINOPHEN 650 MG: 325 TABLET ORAL at 22:53

## 2017-01-01 RX ADMIN — CYANOCOBALAMIN TAB 500 MCG 2000 MCG: 500 TAB at 08:55

## 2017-01-01 RX ADMIN — HYDROCODONE BITARTRATE AND ACETAMINOPHEN 1 TABLET: 5; 325 TABLET ORAL at 14:34

## 2017-01-01 RX ADMIN — PANTOPRAZOLE SODIUM 40 MG: 40 TABLET, DELAYED RELEASE ORAL at 18:36

## 2017-01-01 RX ADMIN — VITAMIN D, TAB 1000IU (100/BT) 5000 UNITS: 25 TAB at 19:56

## 2017-01-01 RX ADMIN — TAMSULOSIN HYDROCHLORIDE 0.4 MG: 0.4 CAPSULE ORAL at 21:08

## 2017-01-01 RX ADMIN — MORPHINE SULFATE 2 MG: 2 INJECTION, SOLUTION INTRAMUSCULAR; INTRAVENOUS at 00:21

## 2017-01-01 RX ADMIN — VITAMIN D, TAB 1000IU (100/BT) 5000 UNITS: 25 TAB at 08:09

## 2017-01-01 RX ADMIN — HYDROCODONE BITARTRATE AND ACETAMINOPHEN 1 TABLET: 5; 325 TABLET ORAL at 00:03

## 2017-01-01 RX ADMIN — DIGOXIN 250 MCG: 0.25 INJECTION INTRAMUSCULAR; INTRAVENOUS at 23:32

## 2017-01-01 RX ADMIN — CEFEPIME HYDROCHLORIDE 1 G: 1 INJECTION, POWDER, FOR SOLUTION INTRAMUSCULAR; INTRAVENOUS at 02:52

## 2017-01-01 RX ADMIN — CEFEPIME 2 G: 2 INJECTION, POWDER, FOR SOLUTION INTRAVENOUS at 04:26

## 2017-01-01 RX ADMIN — HYDROCODONE BITARTRATE AND ACETAMINOPHEN 1 TABLET: 5; 325 TABLET ORAL at 23:33

## 2017-01-01 RX ADMIN — TAZOBACTAM SODIUM AND PIPERACILLIN SODIUM 3.38 G: 375; 3 INJECTION, SOLUTION INTRAVENOUS at 10:42

## 2017-01-01 RX ADMIN — LORAZEPAM 2 MG: 2 INJECTION INTRAMUSCULAR; INTRAVENOUS at 08:28

## 2017-01-01 RX ADMIN — DIPHENHYDRAMINE HYDROCHLORIDE: 50 INJECTION INTRAMUSCULAR; INTRAVENOUS at 13:33

## 2017-01-01 RX ADMIN — CEFEPIME 2 G: 2 INJECTION, POWDER, FOR SOLUTION INTRAVENOUS at 12:15

## 2017-01-01 RX ADMIN — DILTIAZEM HYDROCHLORIDE 240 MG: 240 CAPSULE, COATED, EXTENDED RELEASE ORAL at 08:25

## 2017-01-01 RX ADMIN — HYDROCODONE BITARTRATE AND ACETAMINOPHEN 1 TABLET: 5; 325 TABLET ORAL at 06:34

## 2017-01-01 RX ADMIN — EPHEDRINE SULFATE 10 MG: 50 INJECTION INTRAMUSCULAR; INTRAVENOUS; SUBCUTANEOUS at 09:02

## 2017-01-01 RX ADMIN — HYDROCODONE BITARTRATE AND ACETAMINOPHEN 1 TABLET: 5; 325 TABLET ORAL at 01:44

## 2017-01-01 RX ADMIN — PANTOPRAZOLE SODIUM 40 MG: 40 TABLET, DELAYED RELEASE ORAL at 06:53

## 2017-01-01 RX ADMIN — HYDROCODONE BITARTRATE AND ACETAMINOPHEN 1 TABLET: 5; 325 TABLET ORAL at 02:32

## 2017-01-01 RX ADMIN — HYDROMORPHONE HYDROCHLORIDE 0.4 MG: 2 INJECTION, SOLUTION INTRAMUSCULAR; INTRAVENOUS; SUBCUTANEOUS at 11:46

## 2017-01-01 RX ADMIN — SODIUM CHLORIDE 50 ML/HR: 9 INJECTION, SOLUTION INTRAVENOUS at 00:26

## 2017-01-01 RX ADMIN — CASTOR OIL AND BALSAM, PERU: 788; 87 OINTMENT TOPICAL at 22:44

## 2017-01-01 RX ADMIN — SODIUM CHLORIDE 500 ML: 9 INJECTION, SOLUTION INTRAVENOUS at 09:35

## 2017-01-01 RX ADMIN — ATORVASTATIN CALCIUM 10 MG: 10 TABLET, FILM COATED ORAL at 08:31

## 2017-01-01 RX ADMIN — COLLAGENASE SANTYL: 250 OINTMENT TOPICAL at 23:43

## 2017-01-01 RX ADMIN — SODIUM CHLORIDE 4 MILLION UNITS: 9 INJECTION, SOLUTION INTRAVENOUS at 09:54

## 2017-01-01 RX ADMIN — LIDOCAINE HYDROCHLORIDE 20 ML: 10 INJECTION, SOLUTION INFILTRATION; PERINEURAL at 13:46

## 2017-01-01 RX ADMIN — MORPHINE SULFATE 2 MG: 2 INJECTION, SOLUTION INTRAMUSCULAR; INTRAVENOUS at 02:45

## 2017-01-01 RX ADMIN — COLLAGENASE SANTYL: 250 OINTMENT TOPICAL at 08:22

## 2017-06-20 PROBLEM — N18.2 CKD (CHRONIC KIDNEY DISEASE) STAGE 2, GFR 60-89 ML/MIN: Chronic | Status: ACTIVE | Noted: 2017-01-01

## 2017-06-20 PROBLEM — R29.898 LEFT LEG WEAKNESS: Chronic | Status: ACTIVE | Noted: 2017-01-01

## 2017-06-20 PROBLEM — S22.080A CLOSED WEDGE COMPRESSION FRACTURE OF ELEVENTH THORACIC VERTEBRA (HCC): Status: ACTIVE | Noted: 2017-01-01

## 2017-06-20 PROBLEM — R11.2 NAUSEA & VOMITING: Status: ACTIVE | Noted: 2017-01-01

## 2017-06-20 PROBLEM — E87.1 DEHYDRATION WITH HYPONATREMIA: Status: ACTIVE | Noted: 2017-01-01

## 2017-06-20 PROBLEM — R19.7 DIARRHEA: Status: ACTIVE | Noted: 2017-01-01

## 2017-06-20 PROBLEM — R53.1 GENERALIZED WEAKNESS: Status: ACTIVE | Noted: 2017-01-01

## 2017-06-20 PROBLEM — C90.30 PLASMOCYTOMA (HCC): Chronic | Status: ACTIVE | Noted: 2017-01-01

## 2017-06-20 PROBLEM — Z91.14 NONCOMPLIANCE WITH MEDICATION REGIMEN: Status: ACTIVE | Noted: 2017-01-01

## 2017-06-20 PROBLEM — E86.0 DEHYDRATION WITH HYPONATREMIA: Status: ACTIVE | Noted: 2017-01-01
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
## 2017-06-21 PROBLEM — E53.8 B12 DEFICIENCY: Status: ACTIVE | Noted: 2017-01-01

## 2017-06-22 NOTE — ANESTHESIA PROCEDURE NOTES
PAIN Epidural block    Patient location during procedure: pain clinic  Indication:procedure for pain  Performed By  Anesthesiologist: JASMINA BARRIENTOS  Preanesthetic Checklist  Completed: patient identified, site marked, surgical consent, pre-op evaluation, timeout performed, risks and benefits discussed and monitors and equipment checked  Additional Notes  Depomedrol - 80mg    Needle position confirmed by fluoroscopy and epidurogram using 2cc of [id number].    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  Pt Tolerance:patient tolerated the procedure well with no apparent complications  Complications:no

## 2017-06-23 PROBLEM — I48.91 A-FIB (HCC): Status: ACTIVE | Noted: 2017-01-01

## 2017-06-23 PROBLEM — E87.20 METABOLIC ACIDOSIS: Status: ACTIVE | Noted: 2017-01-01

## 2017-06-23 PROBLEM — E83.51 HYPOCALCEMIA: Status: ACTIVE | Noted: 2017-01-01

## 2017-06-24 PROBLEM — K40.90 LEFT INGUINAL HERNIA: Status: ACTIVE | Noted: 2017-01-01

## 2017-06-24 PROBLEM — D32.9 MENINGIOMA (HCC): Status: ACTIVE | Noted: 2017-01-01

## 2017-06-24 NOTE — ANESTHESIA PREPROCEDURE EVALUATION
Anesthesia Evaluation            Airway   Mallampati: II  no difficulty expected  Dental    (+) upper dentures and poor dentition        Pulmonary    Cardiovascular     ECG reviewed  Rhythm: irregular  Rate: normal    (+) dysrhythmias Atrial Fib,       Neuro/Psych  GI/Hepatic/Renal/Endo    (+)  renal disease CRI,     Musculoskeletal     Abdominal    Substance History      OB/GYN          Other                                      Anesthesia Plan    ASA 3     general     intravenous induction   Anesthetic plan and risks discussed with patient.

## 2017-06-24 NOTE — ANESTHESIA POSTPROCEDURE EVALUATION
Patient: Pavel Melgoza    Procedure Summary     Date Anesthesia Start Anesthesia Stop Room / Location    06/24/17 0958 1132  [facility]       Procedure Diagnosis Surgeon Provider    LAPAROSCOPIC LEFT INGUINAL HERNIA REPAIR WITH MESH (Left Abdomen) No diagnosis on file. MD Oleg Angel MD          Anesthesia Type: general  Last vitals  /86 (06/24/17 1200)    Temp      Pulse 75 (06/24/17 1200)   Resp 14 (06/24/17 1215)    SpO2 (!) 84 % (06/24/17 1200)      Post Anesthesia Care and Evaluation    Patient location during evaluation: PACU  Patient participation: complete - patient participated  Level of consciousness: awake and alert  Pain management: adequate  Airway patency: patent  Anesthetic complications: No anesthetic complications    Cardiovascular status: acceptable  Respiratory status: acceptable  Hydration status: acceptable

## 2017-06-24 NOTE — ANESTHESIA PROCEDURE NOTES
Airway  Urgency: elective      General Information and Staff    Patient location during procedure: OR  Anesthesiologist: EMILI VELAZQUEZ    Indications and Patient Condition  Indications for airway management: airway protection    Preoxygenated: yes  MILS maintained throughout  Mask difficulty assessment: 1 - vent by mask    Final Airway Details  Final airway type: endotracheal airway      Successful airway: ETT  Cuffed: yes   Successful intubation technique: direct laryngoscopy  Endotracheal tube insertion site: oral  Blade: Kel  Blade size: #3  ETT size: 8.0 mm  Cormack-Lehane Classification: grade I - full view of glottis  Placement verified by: chest auscultation   Measured from: lips  ETT to lips (cm): 22  Number of attempts at approach: 1

## 2017-06-25 PROBLEM — E22.2 SIADH (SYNDROME OF INAPPROPRIATE ADH PRODUCTION) (HCC): Status: ACTIVE | Noted: 2017-01-01

## 2017-06-26 PROBLEM — N39.0 UTI (URINARY TRACT INFECTION): Status: ACTIVE | Noted: 2017-01-01

## 2017-06-26 PROBLEM — E55.9 VITAMIN D DEFICIENCY: Status: ACTIVE | Noted: 2017-01-01

## 2017-06-26 PROBLEM — E87.1 HYPONATREMIA: Status: ACTIVE | Noted: 2017-01-01

## 2017-06-28 PROBLEM — A49.8 PSEUDOMONAS INFECTION: Status: ACTIVE | Noted: 2017-01-01

## 2017-07-01 NOTE — THERAPY TREATMENT NOTE
Acute Care - Physical Therapy Treatment Note  [facility]     Patient Name: Pavel Melgoza  : 1934  MRN: [id number]  Today's Date: 2017  Onset of Illness/Injury or Date of Surgery Date: 17     Referring Physician: Dr. Marx    Admit Date: 2017    Visit Dx:    ICD-10-CM ICD-9-CM   1. Generalized weakness R53.1 780.79   2. Dehydration E86.0 276.51   3. Compression fracture T14.8 829.0   4. Pain R52 780.96     Patient Active Problem List   Diagnosis   • Generalized weakness   • Plasmocytoma   • Nausea & vomiting   • Diarrhea   • Fall   • Noncompliance with medication regimen   • Left leg weakness   • Closed wedge compression fracture of eleventh thoracic vertebra   • CKD (chronic kidney disease) stage 2, GFR 60-89 ml/min   • B12 deficiency   • Metabolic acidosis   • Hypocalcemia   • A-fib   • Left inguinal hernia   • Meningioma   • SIADH (syndrome of inappropriate ADH production)   • Hyponatremia   • Vitamin D deficiency   • UTI (urinary tract infection)   • Pseudomonas infection               Adult Rehabilitation Note       17 1014 17 1140 17 1419    Rehab Assessment/Intervention    Discipline physical therapist  -AL --   PT Student  -TYESHA,AA,EE2 occupational therapist  -SG    Document Type therapy note (daily note)  -AL therapy note (daily note)  -TYESHA,AA,EE2 therapy note (daily note)  -SG    Subjective Information agree to therapy;complains of;pain;weakness  -AL agree to therapy;complains of;pain  -TYESHA,AA,EE2 agree to therapy  -SG    Patient Effort, Rehab Treatment adequate  -AL good  -TYESHA,AA,EE2 good  -SG    Symptoms Noted During/After Treatment increased pain  -AL      Symptoms Noted Comment  Pt continues to become agitated with instructions for transfers.   -EE,AA,EE2     Precautions/Limitations fall precautions  -AL fall precautions  -TYESHA,AA,EE2 fall precautions  -SG    Specific Treatment Considerations  Nsg stated OK to see pt today.  -TYESHA,AA,EE2     Patient Response to  Treatment  tolerated but anxious personality and agitation with transfers  -EE,AA,EE2     Recorded by [AL] Latanya Rodriguez, PT [EE,AA,EE2] Eunice Post, PT (r) Skyla Blanco, PT Student (t) Eunice Post PT (c) [SG] Ofe Mclean, MATYR    Pain Assessment    Pain Assessment 0-10  -AL Sood-Estrada FACES  -EE,AA,EE2 No/denies pain  -SG    Sood-Estrada FACES Pain Rating  6  -EE,AA,EE2     Pain Score 5  -AL      Pain Location Back  -AL Back  -EE,AA,EE2     Pain Orientation Lower  -AL Lower  -EE,AA,EE2     Pain Intervention(s) Ambulation/increased activity  -AL Ambulation/increased activity;Repositioned  -EE,AA,EE2     Recorded by [AL] Latanya Rodriguez, PT [EE,AA,EE2] Eunice Post PT (r) Skyla Blanco, PT Student (t) Eunice Post PT (c) [SG] Ofe Mclean, ADILENE    Cognitive Assessment/Intervention    Current Cognitive/Communication Assessment impaired  -AL impaired  -EE,AA,EE2     Orientation Status oriented to;person;place;time  -AL oriented to;place;time;person  -EE,AA,EE2 oriented to;person;place;time  -SG    Follows Commands/Answers Questions 100% of the time;able to follow single-step instructions  -AL 50% of the time   difficulty with single step instructions  -EE,AA,EE2 100% of the time;able to follow single-step instructions;needs repetition   pt easily distracted. Cues to stay on task  -SG    Personal Safety moderate impairment  -AL moderate impairment;at risk behaviors demonstrated;decreased awareness, need for safety;impulsive  -EE,AA,EE2     Personal Safety Interventions fall prevention program maintained;gait belt;nonskid shoes/slippers when out of bed  -AL gait belt;fall prevention program maintained;nonskid shoes/slippers when out of bed  -EE,AA,EE2     Recorded by [AL] Latanya Rodriguez, PT [EE,AA,EE2] Eunice Post, PT (r) Skyla Blanco, PT Student (t) Eunice Post PT (c) [SG] Ofe Mclean, MATYR    ROM (Range of Motion)    General ROM Detail   BUE's WFL  -SG    Recorded by   [SG] ADILENE Young    Bed  Mobility, Assessment/Treatment    Bed Mobility, Assistive Device  head of bed elevated;bed rails  -EE,AA,EE2     Bed Mob, Supine to Sit, [place] minimum assist (75% patient effort);2 person assist required;verbal cues required;nonverbal cues required (demo/gesture)  -AL moderate assist (50% patient effort);verbal cues required  -EE,AA,EE2     Bed Mob, Sit to Supine, [place] not tested   sitting up in chair  -AL not tested  -EE,AA,EE2     Bed Mobility, Safety Issues  decreased use of arms for pushing/pulling;decreased use of legs for bridging/pushing;cognitive deficits limit understanding  -EE,AA,EE2     Bed Mobility, Impairments strength decreased;impaired balance  -AL strength decreased  -EE,AA,EE2     Bed Mobility, Comment Verbal cues to perform tasks  -AL Pt requires max verbal cueing to use hand rails. Pt unable to follow directions for optimal bed mobility. Pt agitated with directions/activity   -EE,AA,EE2 sitting in chair  -SG    Recorded by [AL] Latanya Rodriguez, PT [EE,AA,EE2] Eunice Post, PT (r) Skyla Blanco PT Student (t) Eunice Post, PT (c) [SG] Ofe Mclean, OTR    Transfer Assessment/Treatment    Transfers, Bed-Chair [place] moderate assist (50% patient effort);2 person assist required   pivot transfer  -AL moderate assist (50% patient effort);2 person assist required;verbal cues required  -EE,AA,EE2     Transfers, Chair-Bed [place]  not tested  -EE,AA,EE2     Transfers, Bed-Chair-Bed, Assist Device  rolling walker  -EE,AA,EE2     Transfers, Sit-Stand [place] minimum assist (75% patient effort);2 person assist required;verbal cues required;nonverbal cues required (demo/gesture)  -AL minimum assist (75% patient effort);2 person assist required;verbal cues required   Pt refuses to push from bed; stands up by use RW  -EE,AA,EE2     Transfers, Stand-Sit [place] minimum assist (75% patient effort);2 person assist required;verbal cues required;nonverbal cues required  (demo/gesture)  -AL minimum assist (75% patient effort);2 person assist required;verbal cues required  -EE,AA,EE2     Transfers, Sit-Stand-Sit, Assist Device rolling walker  -AL rolling walker  -EE,AA,EE2     Transfer, Safety Issues  weight-shifting ability decreased;impulsivity;knees buckling   L knee steven  -EE,AA,EE2     Transfer, Impairments strength decreased;impaired balance  -AL strength decreased;impaired balance  -EE,AA,EE2     Transfer, Comment  Pt unable to follow directions to push from the bed resulting in poor transfer technique. sit <> stand X2 with second transfer with better technique secondary to pt pushing form the bed.  -EE,AA,EE2     Recorded by [AL] Latanya Rodriguez, PT [EE,AA,EE2] Eunice Post, PT (r) Skyla Blanco, PT Student (t) Eunice Post, PT (c)     Gait Assessment/Treatment    Gait, [place] Level not appropriate to assess  -AL not appropriate to assess  -EE,AA,EE2     Gait, Comment --   Attempted to take a few steps to chair; unable.  -AL Pt has significant L knee buckling secondary to history of cancer in LLE resulting in flacidity. Pt may benefit from a L knee immobilizer for transfers/gait to prevent knee buckling.  -EE,AA,EE2     Recorded by [AL] Latanya Rodriguez, PT [EE,AA,EE2] Eunice Post, PT (r) Skyla Blanco, PT Student (t) Eunice Post, PT (c)     Upper Body Bathing Assessment/Training    UB Bathing Assess/Train, Position   sitting  -SG    UB Bathing Assess/Train, [place] Level   stand by assist  -SG    UB Bathing Assess/Train, Comment   simulated task  -SG    Recorded by   [SG] ADILENE Young    Lower Body Bathing Assessment/Training    LB Bathing Assess/Train, [place] Level   moderate assist (50% patient effort)   simulated task  -SG    LB Bathing Assess/Train, Comment   discussed long handled bath brush to assist and back safety  -SG    Recorded by   [SG] Ofe Mclean OTR    Upper Body Dressing Assessment/Training    UB Dressing Assess/Train, Clothing  Type   doffing:;donning:;hospital gown  -SG    UB Dressing Assess/Train, Position   sitting  -SG    UB Dressing Assess/Train, [place]   supervision required;verbal cues required;set up required  -SG    Recorded by   [SG] ADILENE Young    Lower Body Dressing Assessment/Training    LB Dressing Assess/Train, Comment   pt states has reacher and long handled shoe horn, states spouse assist if needed  -SG    Recorded by   [SG] ADILENE Young    Balance Skills Training    Sitting-Level of Assistance  Minimum assistance  -EE,AA,EE2     Sitting-Balance Support  Right upper extremity supported;Left upper extremity supported;Feet supported  -EE,AA,EE2     Standing-Level of Assistance  Moderate assistance;x2  -EE,AA,EE2     Static Standing Balance Support  assistive device  -EE,AA,EE2     Standing Balance # of Minutes  1  -EE,AA,EE2     Recorded by  [EE,AA,EE2] Eunice Post, PT (r) Skyla Blanco, PT Student (t) Eunice Post, PT (c)     Positioning and Restraints    Pre-Treatment Position in bed  -AL in bed  -EE,AA,EE2 sitting in chair/recliner  -SG    Post Treatment Position chair  -AL chair  -EE,AA,EE2 chair  -SG    In Chair reclined;sitting;call light within reach;with family/caregiver  -AL sitting;reclined;call light within reach;encouraged to call for assist;exit alarm on;with family/caregiver  -EE,AA,EE2 sitting;call light within reach;encouraged to call for assist;exit alarm on;with family/caregiver  -SG    Recorded by [AL] Latanya Rodriguez, PT [EE,AA,EE2] Eunice Post, PT (r) Skyla Blanco, PT Student (t) Eunice Post, PT (c) [SG] Ofe Mclean, ADILENE      06/28/17 1320          Rehab Assessment/Intervention    Discipline physical therapist  -EE      Document Type therapy note (daily note)  -EE      Subjective Information agree to therapy;no complaints  -EE      Patient Effort, Rehab Treatment good  -EE      Symptoms Noted During/After Treatment none  -EE      Precautions/Limitations fall precautions   -EE      Recorded by [EE] Eunice Post, PT      Pain Assessment    Pain Assessment No/denies pain  -EE      Recorded by [EE] Eunice Post, PT      Cognitive Assessment/Intervention    Current Cognitive/Communication Assessment impaired  -EE      Orientation Status oriented to;person;time;place  -EE      Follows Commands/Answers Questions 75% of the time;able to follow single-step instructions;needs cueing;needs repetition  -EE      Personal Safety moderate impairment;at risk behaviors demonstrated;impulsive;decreased awareness, need for assist;decreased awareness, need for safety  -EE      Personal Safety Interventions fall prevention program maintained;gait belt;muscle strengthening facilitated;nonskid shoes/slippers when out of bed;supervised activity  -EE      Recorded by [EE] Eunice Post, PT      Bed Mobility, Assessment/Treatment    Bed Mobility, Assistive Device bed rails;head of bed elevated  -EE      Bed Mob, Supine to Sit, [place] minimum assist (75% patient effort);verbal cues required  -EE      Bed Mob, Sit to Supine, [place] not tested   deferred, pt up in chair  -EE      Bed Mobility, Safety Issues decreased use of legs for bridging/pushing  -EE      Bed Mobility, Impairments strength decreased  -EE      Bed Mobility, Comment verbal cueing to use bedrails  -EE      Recorded by [EE] Eunice Post, PT      Transfer Assessment/Treatment    Transfers, Sit-Stand [place] minimum assist (75% patient effort);2 person assist required;verbal cues required;nonverbal cues required (demo/gesture)  -EE      Transfers, Stand-Sit [place] minimum assist (75% patient effort);2 person assist required;verbal cues required;nonverbal cues required (demo/gesture)  -EE      Transfers, Sit-Stand-Sit, Assist Device rolling walker;elevated surface  -EE      Transfer, Impairments strength decreased;impaired balance  -EE      Transfer, Comment verbal cues required for hand placement; L knee in hyperextension upon  "stance  -EE      Recorded by [EE] Eunice Post, PT      Gait Assessment/Treatment    Gait, [place] Level minimum assist (75% patient effort);2 person assist required;verbal cues required  -EE      Gait, Assistive Device rolling walker  -EE      Gait, Distance (Feet) 3  -EE      Gait, Gait Deviations forward flexed posture;jasbir decreased;bilateral:;decreased heel strike;step length decreased;left:;weight-shifting ability decreased;toe-to-floor clearance decreased   L knee hyperextension  -EE      Gait, Safety Issues step length decreased;weight-shifting ability decreased;balance decreased during turns;sequencing ability decreased  -EE      Gait, Impairments strength decreased;impaired balance  -EE      Gait, Comment L knee hyperextension noted throughout; verbal cues to correct posture and for safety with AD placement  -EE      Recorded by [EE] Eunice Post, LESLEE      Therapy Exercises    Right Lower Extremity AROM:;15 reps;sitting;ankle pumps/circles;LAQ;hip flexion  -EE      Left Lower Extremity --   declined L LE exercises \"I can't do anything w/that leg\"  -EE      Recorded by [EE] Eunice Post PT      Positioning and Restraints    Pre-Treatment Position in bed  -EE      Post Treatment Position chair  -EE      In Chair sitting;call light within reach;encouraged to call for assist;with family/caregiver;exit alarm on  -EE      Recorded by [EE] Eunice Post PT        User Key  (r) = Recorded By, (t) = Taken By, (c) = Cosigned By    Initials Name Effective Dates    BAIRON Mclean, OTR 04/13/15 -     EE Eunice Post, PT 12/01/15 -     LOUIE Rodriguez, PT 12/01/15 -     AA Skyla Blanco, PT Student 06/01/17 -                 IP PT Goals       06/30/17 1414 06/30/17 1404 06/25/17 1156    Bed Mobility PT LTG    Bed Mobility PT LTG, Time to Achieve 1 wk  -EE (r) AA (t) EE (c)  1 wk  -CH    Bed Mobility PT LTG, Activity Type   all bed mobility  -CH    Bed Mobility PT LTG, [place] Level   contact guard assist "  -CH    Bed Mobility PT LTG, Date Goal Reviewed  06/30/17  -EE (r) AA (t) EE (c)     Bed Mobility PT LTG, Outcome  goal ongoing  -EE (r) AA (t) EE (c) goal revised  -CH    Bed Mobility PT LTG, Reason Goal Not Met  progress slower than expected  -EE (r) AA (t) EE (c)     Transfer Training PT LTG    Transfer Training PT LTG, Time to Achieve 1 wk  -EE (r) AA (t) EE (c)  1 wk  -CH    Transfer Training PT LTG, Activity Type   all transfers  -CH    Transfer Training PT LTG, [place] Level   minimum assist (75% patient effort);2 person assist required  -CH    Transfer Training PT LTG, Assist Device   walker, rolling  -CH    Transfer Training PT  LTG, Date Goal Reviewed  06/30/17  -EE (r) AA (t) EE (c)     Transfer Training PT LTG, Outcome  goal ongoing   with safe transfer technique  -EE (r) AA (t) EE (c) goal ongoing  -CH    Transfer Training PT LTG, Reason Goal Not Met  progress slower than expected  -EE (r) AA (t) EE (c)       06/21/17 1200          Bed Mobility PT LTG    Bed Mobility PT LTG, Date Established 06/21/17  -EE (r) AA (t) EE (c)      Bed Mobility PT LTG, Time to Achieve 1 wk  -EE (r) AA (t) EE (c)      Bed Mobility PT LTG, Activity Type all bed mobility  -EE (r) AA (t) EE (c)      Bed Mobility PT LTG, [place] Level minimum assist (75% patient effort)  -EE (r) AA (t) EE (c)      Transfer Training PT LTG    Transfer Training PT LTG, Date Established 06/21/17  -EE (r) AA (t) EE (c)      Transfer Training PT LTG, Time to Achieve 1 wk  -EE (r) AA (t) EE (c)      Transfer Training PT LTG, Activity Type all transfers  -EE (r) AA (t) EE (c)      Transfer Training PT LTG, [place] Level moderate assist (50% patient effort)   with proper technique and safety  -EE (r) AA (t) EE (c)      Transfer Training PT LTG, Assist Device walker, rolling  -EE (r) AA (t) EE (c)        User Key  (r) = Recorded By, (t) = Taken By, (c) = Cosigned By    Initials Name Provider Type    MARLINE Garcia, PT Physical  Therapist    TYESHA Post, PT Physical Therapist    LEN Blanco PT Student PT Student          Physical Therapy Education     Title: PT OT SLP Therapies (Active)     Topic: Physical Therapy (Active)     Point: Mobility training (Active)    Learning Progress Summary    Learner Readiness Method Response Comment Documented by Status   Patient Acceptance E NR  AL 07/01/17 1103 Active    Acceptance E VU,NR,[facility] 06/30/17 1202 Done    Acceptance E,TB VU,[facility] 06/28/17 2237 Done    Acceptance D,E VU,[facility] 06/28/17 1345 Done    Acceptance E NR,[facility] 06/27/17 1551 Active    Acceptance E VU,[facility] 06/26/17 1200 Done    Acceptance E,TB,D VU,[facility] 06/25/17 1155 Done    Nonacceptance E,D [facility] 06/22/17 1433 Active    Nonacceptance E [facility] 06/21/17 1159 Active   Family Acceptance E,TB VU,[facility] 06/28/17 2237 Done    Acceptance E VU,[facility] 06/26/17 1200 Done               Point: Home exercise program (Done)    Learning Progress Summary    Learner Readiness Method Response Comment Documented by Status   Patient Acceptance E VU,NR,[facility] 06/30/17 1202 Done    Acceptance E,TB VU,[facility] 06/28/17 2237 Done    Acceptance D,E VU,[facility] 06/28/17 1345 Done    Acceptance E ,[facility] 06/27/17 1551 Active    Acceptance E VU,[facility] 06/26/17 1200 Done    Nonacceptance E,D [facility] 06/22/17 1433 Active    Nonacceptance E ,[facility] 06/21/17 1159 Active   Family Acceptance E,TB VU,[facility] 06/28/17 2237 Done    Acceptance E VU,[facility] 06/26/17 1200 Done               Point: Body mechanics (Active)    Learning Progress Summary    Learner Readiness Method Response Comment Documented by Status   Patient Acceptance E NR  AL 07/01/17 1103 Active    Acceptance E VU,NR,[facility] 06/30/17 1202 Done    Acceptance E,TB VU,[facility] 06/28/17 2237 Done    Acceptance D,E VU,[facility] 06/28/17 1345 Done    Acceptance E NR,[facility] 06/27/17 1551 Active    Acceptance E VU,[facility] 06/26/17 1200 Done    Acceptance E,TB,D VU,[facility] 06/25/17 1155 Done     Nonacceptance E,D NR,NL   06/22/17 1433 Active    Nonacceptance E NR,NL   06/21/17 1159 Active   Family Acceptance E,TB VU,NR   06/28/17 2237 Done    Acceptance E VU,NR   06/26/17 1200 Done               Point: Precautions (Active)    Learning Progress Summary    Learner Readiness Method Response Comment Documented by Status   Patient Acceptance E NR  AL 07/01/17 1103 Active    Acceptance E VU,NR,[place]   06/30/17 1202 Done    Acceptance E,TB VU,NR   06/28/17 2237 Done    Acceptance D,E VU,NR   06/28/17 1345 Done    Acceptance E NR,[facility] 06/27/17 1551 Active    Acceptance E VU,NR   06/26/17 1200 Done    Acceptance E,TB,D VU,NR   06/25/17 1155 Done    Nonacceptance E,D NR,[facility] 06/22/17 1433 Active    Nonacceptance E NR,[facility] 06/21/17 1159 Active   Family Acceptance E,TB VU,NR   06/28/17 2237 Done    Acceptance E VU,NR   06/26/17 1200 Done                      User Key     Initials Effective Dates Name Provider Type Discipline     12/01/15 -  Maricel Garcia, PT Physical Therapist PT     12/01/15 -  Eunice Post, PT Physical Therapist PT    AL 12/01/15 -  Latanya Rodriguez, PT Physical Therapist PT     04/06/17 -  Yana Mercado, RN Registered Nurse Nurse     05/08/17 -  Humble Armando, PT Student PT Student PT     06/01/17 -  Skyla Blanco, PT Student PT Student PT                    PT Recommendation and Plan  Anticipated Equipment Needs At Discharge:  (L knee immobilizer)  Anticipated Discharge Disposition: skilled nursing facility  Planned Therapy Interventions: balance training, bed mobility training, gait training, home exercise program, patient/family education, transfer training  PT Frequency: daily  Plan of Care Review  Plan Of Care Reviewed With: patient  Progress: improving  Outcome Summary/Follow up Plan: Patient continues to have decreased LE strength; left knee steven on patient and unable to take a few steps. Able to transfer from bed to chair. Will continue to progress  patient towards goals.          Outcome Measures       07/01/17 1100 06/30/17 1200 06/28/17 1430    How much help from another person do you currently need...    Turning from your back to your side while in flat bed without using bedrails? 3  -AL 3  -EE (r) AA (t) EE (c)     Moving from lying on back to sitting on the side of a flat bed without bedrails? 3  -AL 3  -EE (r) AA (t) EE (c)     Moving to and from a bed to a chair (including a wheelchair)? 2  -AL 2  -EE (r) AA (t) EE (c)     Standing up from a chair using your arms (e.g., wheelchair, bedside chair)? 3  -AL 3  -EE (r) AA (t) EE (c)     Climbing 3-5 steps with a railing? 1  -AL 1  -EE (r) AA (t) EE (c)     To walk in hospital room? 1  -AL 1  -EE (r) AA (t) EE (c)     AM-PAC 6 Clicks Score 13  -AL 13  -EE (r) AA (t)     How much help from another is currently needed...    Putting on and taking off regular lower body clothing?   2  -SG    Bathing (including washing, rinsing, and drying)   2  -SG    Toileting (which includes using toilet bed pan or urinal)   2  -SG    Putting on and taking off regular upper body clothing   3  -SG    Taking care of personal grooming (such as brushing teeth)   3  -SG    Eating meals   4  -SG    Score   16  -SG    Functional Assessment    Outcome Measure Options AM-PAC 6 Clicks Basic Mobility (PT)  -AL AM-PAC 6 Clicks Basic Mobility (PT)  -EE (r) AA (t) EE (c)       06/28/17 1300          How much help from another person do you currently need...    Turning from your back to your side while in flat bed without using bedrails? 3  -EE      Moving from lying on back to sitting on the side of a flat bed without bedrails? 3  -EE      Moving to and from a bed to a chair (including a wheelchair)? 2  -EE      Standing up from a chair using your arms (e.g., wheelchair, bedside chair)? 2  -EE      Climbing 3-5 steps with a railing? 1  -EE      To walk in hospital room? 2  -EE      AM-PAC 6 Clicks Score 13  -EE      Functional Assessment     Outcome Measure Options AM-PAC 6 Clicks Basic Mobility (PT)  -EE        User Key  (r) = Recorded By, (t) = Taken By, (c) = Cosigned By    Initials Name Provider Type    SG Ofe Mclean, OTR Occupational Therapist    EE Eunice Post, PT Physical Therapist    AL Latanya Rodriguez, PT Physical Therapist    LEN Blanco, PT Student PT Student           Time Calculation:         PT Charges       07/01/17 1105          Time Calculation    Start Time 1014  -AL      Stop Time 1028  -AL      Time Calculation (min) 14 min  -AL      PT Received On 07/01/17  -AL      PT - Next Appointment 07/02/17  -AL        User Key  (r) = Recorded By, (t) = Taken By, (c) = Cosigned By    Initials Name Provider Type    LOUIE Rodriguez, PT Physical Therapist          Therapy Charges for Today     Code Description Service Date Service Provider Modifiers Qty    01012697277 HC PT THER SUPP EA 15 MIN 7/1/2017 Latanya Rodriguez, PT GP 1    29435859244 HC PT THER PROC EA 15 MIN 7/1/2017 Latanya Rodriguez, PT GP 1          PT G-Codes  Outcome Measure Options: AM-PAC 6 Clicks Basic Mobility (PT)    Latanya Rodriguez, PT  7/1/2017

## 2017-07-01 NOTE — CONSULTS
Referring Provider: BROWN Marx MD    Reason for Consultation: leukocytosis    History of present illness:  Pavel is a 84 YO who I am asked to evaluate and give opinion for leukocytosis. History is obtained from the patient, wife, Dr Marx, and review of the old medical records which I summarize/synthesize as follows: He presented to the ER on 6/20 with lower back pain. He has recently had some falls due to weakness. He had an MRI that did not show any fracture. He was seen by NSGY and underwent epidural steroid injection with some relief. He is not surgical candidate. He had a CT done that showed an inguinal hernia and that was repair surgically on 6/23 by Dr Mei. That has healed perfectly well.    Around the day of surgery he began having a slowly increasing WBC. He has not had any fevers. He is not having diarrhea. He had a UA done that was negative but the culture grew Pseudomonas so he was given 3 days of cefepime. He outright denies any urinary symptoms. He can empty his bladder. I did a prostate exam and it was non-tender. He is not having any abdominal pain. His incisions are well-healed. He is eager to leave soon but the WBC is keeping him in. Of note, he received 1 dose of steroids but that was about 8 days ago.    He did have an MRI brain that showed possible meningioma. A CT A/P also showed some possible L psoas and R paraspinal musculature fluid.    PMH:  Plasmacytoma s/p XRT  B Hip replacement      Social History:        Family History:  No family history of inguinal hernias    Allergies:  NKDA    Medications:    Current Facility-Administered Medications:   •  acetaminophen (TYLENOL) tablet 650 mg, 650 mg, Oral, Q6H PRN, Malathi Rubin MD, 650 mg at 06/22/17 2254  •  bisacodyl (DULCOLAX) suppository 10 mg, 10 mg, Rectal, Daily PRN, Malathi Rubin MD, 10 mg at 06/28/17 1810  •  calcium carbonate (TUMS) chewable tablet 500 mg (200 mg elemental), 1 tablet, Oral, BID  PRN, Malathi Rubin MD  •  cholecalciferol (VITAMIN D3) tablet 5,000 Units, 5,000 Units, Oral, Daily, Cedrick Posada MD, 5,000 Units at 17 0943  •  demeclocycline (DECLOMYCIN) tablet 300 mg, 300 mg, Oral, Q12H, Cedrick Posada MD, 300 mg at 17 0943  •  diltiaZEM CD (CARDIZEM CD) 24 hr capsule 240 mg, 240 mg, Oral, Q24H, Tiffany Dudley MD, 240 mg at 17 0943  •  diphenhydrAMINE (BENADRYL) capsule 25 mg, 25 mg, Oral, Q6H PRN, Gilbert Marx MD, 25 mg at 17 2100  •  fentaNYL citrate (PF) (SUBLIMAZE) injection 50 mcg, 50 mcg, Intravenous, Q5 Min PRN, Shane Perez MD, 100 mcg at 17 1130  •  HYDROcodone-acetaminophen (NORCO) 7.5-325 MG per tablet 1 tablet, 1 tablet, Oral, Q6H PRN, Erwin Mei MD, 1 tablet at 17 1051  •  ibuprofen (ADVIL,MOTRIN) tablet 800 mg, 800 mg, Oral, Q6H PRN, Gilbert Marx MD  •  lidocaine (XYLOCAINE) 1 % injection 1 mL, 1 mL, Intradermal, Once, Shane Perez MD  •  magnesium hydroxide (MILK OF MAGNESIA) suspension 2400 mg/10mL 10 mL, 10 mL, Oral, Daily PRN, Malathi Rubin MD, 10 mL at 17 0805  •  midazolam (VERSED) injection 1 mg, 1 mg, Intravenous, Q5 Min PRN, Shane Perez MD  •  morphine injection 2 mg, 2 mg, Intravenous, Q4H PRN, Erwin Mei MD, 2 mg at 17 1224  •  [] morphine injection 2 mg, 2 mg, Intravenous, Q4H PRN, 2 mg at 17 0245 **AND** naloxone (NARCAN) injection 0.4 mg, 0.4 mg, Intravenous, Q5 Min PRN, Malathi Rubin MD  •  ondansetron (ZOFRAN) injection 4 mg, 4 mg, Intravenous, Q6H PRN, Malathi Rubin MD, 4 mg at 17 1032  •  pantoprazole (PROTONIX) EC tablet 40 mg, 40 mg, Oral, BID AC, Malathi Rubin MD, 40 mg at 17 0638  •  ramipril (ALTACE) capsule 10 mg, 10 mg, Oral, Daily, Gilbert Selwyn Marx MD, 10 mg at 17 0943  •  sodium chloride 0.9 % flush 1-10 mL, 1-10 mL, Intravenous, PRN, Malathi Rubin MD  •   sodium chloride 0.9 % flush 1-10 mL, 1-10 mL, Intravenous, PRN, Shane Perez MD  •  sodium chloride 0.9 % flush 10 mL, 10 mL, Intravenous, PRN, Davonte Carty MD  •  vitamin B-12 (CYANOCOBALAMIN) tablet 2,000 mcg, 2,000 mcg, Oral, Daily, Gilbert Selwyn Marx MD, 2,000 mcg at 07/01/17 0943      Review of Systems  All systems were reviewed and are negative unless otherwise stated above in the HPI    Objective   Vital Signs   Temp:  [98 °F (36.7 °C)-99.6 °F (37.6 °C)] 98.4 °F (36.9 °C)  Heart Rate:  [76-90] 90  Resp:  [16-20] 18  BP: (109-138)/(57-84) 109/66    Physical Exam:   General: awake, alert, NAD   Head: Normocephalic, atraumatic  Eyes: PERRL, EOMI, no scleral icterus, no conjunctival pallor, no conjunctival hemorrhages.   ENT: MMM, OP clear, no thrush. Fair dentition.   Neck: Supple, no visible thyromegaly  Cardiovascular: NR, RR, no murmurs, rubs, or gallops; no LE edema  Respiratory: Lungs are clear to ascultation bilaterally, no rales or wheezing; normal work of breathing on ambient air  GI: Abdomen w/ well-healing port sites, it is soft, non-tender, mildly distended, normal bowel sounds in all four quadrants; no hepatosplenomegaly, no masses palpated  : no Gong catheter present; no prostate tenderness though it is enlarged  Musculoskeletal: no joint abnormalities, normal musculature  Skin: No rashes, lesions, or embolic phenomenon  Neurological: Alert and oriented x 3, cranial nerves 2-12 grossly intact, motor strength 5/5 in all four extremities  Psychiatric: Normal mood and affect   Lymph: no pre-auricular, post-auricular, submandibular, cervical, supraclavicular  LAD  Vasc: no cyanosis; PIV w/o erythema    Labs:     Lab Results   Component Value Date    WBC 18.02 (H) 07/01/2017    HGB 11.0 (L) 07/01/2017    HCT 30.9 (L) 07/01/2017    .3 (H) 07/01/2017     (H) 07/01/2017       Lab Results   Component Value Date    GLUCOSE 103 (H) 07/01/2017    BUN 29 (H) 07/01/2017    CREATININE  0.79 07/01/2017    EGFRIFNONA 94 07/01/2017    BCR 36.7 (H) 07/01/2017    CO2 23.3 07/01/2017    CALCIUM 8.5 (L) 07/01/2017    ALBUMIN 2.90 (L) 07/01/2017    LABIL2 1.1 06/20/2017    AST 20 06/20/2017    ALT 19 06/20/2017     PSA 8  Procal 0.13  UA 0-2 WBCs    Microbiology:  BCx: none  UCx: >100k pan-sensitive Pseudomonas    Radiology (personally reviewed images/report):  CXR negative for PNA  MRI brain concerning for meningioma  CT A/P with inguinal hernia, ? Hyperdense focus in L psoas, and right paraspinal musculature    Assessment/Plan   1. Leukocytosis  2. Lumbar back pain  3. Inguinal hernia s/p repair  4. Hyponatremia  5. Elevated PSA  6. Pseudomonas bacteriuria  7. History of plasmacytoma s/p radiation to back/pelvis > 30 years ago    Though WBC is elevated, I see no clear source of infection. There is definitely no pneumonia. His UA was not consistent with infection. I checked his prostate and it was not tender so he does not have prostatitis. He had an abnormal paraspinal findings on CT. If WBC still going up tomorrow then I will likely repeat the the CT A/P and the MRI L-spine to look for a developing source especially given the previous slightly abnormal findings. He is quite stable and asymptomatic so I see no role for empiric antibiotics at this time. If he has a fever, we will check blood cultures.     Of note, I do not see any further NSGY comment on the finding of a possible brain menigioma. This might be worth investigating further.    Thank you for this consult. ID will follow. I discussed the patients findings and my recommendations with Dr Marx.

## 2017-07-01 NOTE — PLAN OF CARE
Problem: Patient Care Overview (Adult)  Goal: Plan of Care Review  Outcome: Ongoing (interventions implemented as appropriate)    07/01/17 0404   Coping/Psychosocial Response Interventions   Plan Of Care Reviewed With patient   Patient Care Overview   Progress improving   Outcome Evaluation   Outcome Summary/Follow up Plan Patients vitals stable. Patient denies pain and discomfort this shift. Patient still on 1500ml fluid restriction and tolerating it well. Will continue to monitor.         Problem: Fall Risk (Adult)  Goal: Absence of Falls  Outcome: Ongoing (interventions implemented as appropriate)    Problem: Pain, Chronic (Adult)  Goal: Acceptable Pain Control/Comfort Level  Outcome: Ongoing (interventions implemented as appropriate)

## 2017-07-01 NOTE — PLAN OF CARE
Problem: Patient Care Overview (Adult)  Goal: Plan of Care Review  Outcome: Ongoing (interventions implemented as appropriate)    07/01/17 1103 07/01/17 1338 07/01/17 1342   Coping/Psychosocial Response Interventions   Plan Of Care Reviewed With --  patient --    Patient Care Overview   Progress improving --  --    Outcome Evaluation   Outcome Summary/Follow up Plan --  --  Patient dealing with back pain today. PRN medication used. Up to chair with PT. Sodium 123 and lower from yesterday. Continue to monitor.        Goal: Adult Individualization and Mutuality  Outcome: Ongoing (interventions implemented as appropriate)  Goal: Discharge Needs Assessment  Outcome: Ongoing (interventions implemented as appropriate)    Problem: Fall Risk (Adult)  Goal: Absence of Falls  Outcome: Ongoing (interventions implemented as appropriate)  Goal: Absence of Falls  Outcome: Ongoing (interventions implemented as appropriate)    Problem: Pain, Chronic (Adult)  Goal: Acceptable Pain Control/Comfort Level  Outcome: Ongoing (interventions implemented as appropriate)

## 2017-07-01 NOTE — PROGRESS NOTES
LOS: 11 days   Patient Care Team:  Amanda Tanner MD as PCP - General (Family Medicine)    Chief Complaint: hyponatremia    Subjective     Fall     Hip Pain      Back Pain     Extremity Pain          Subjective    Patient is a 83 year old male with hyponatremia secondary to siadh    Objective     Vital Signs  Temp:  [98 °F (36.7 °C)-99.6 °F (37.6 °C)] 98.2 °F (36.8 °C)  Heart Rate:  [76-86] 76  Resp:  [16-20] 20  BP: (121-138)/(57-84) 132/84    Objective  General Appearance:  In no acute distress.    HEENT: Normal HEENT exam.    Lungs:  Normal respiratory rate and normal effort.  He is not in respiratory distress.  Breath sounds clear to auscultation.  No wheezes, rales or rhonchi.    Heart: Normal rate.  Regular rhythm.  S1 normal and S2 normal.  No murmur or gallop.   Chest: Asymmetric chest wall expansion.   Extremities: Normal range of motion.  There is no deformity, effusion or dependent edema.    Neurological: Patient is alert and oriented to person, place and time.    Skin:  Warm and dry.  No rash.   Abdomen: Abdomen is soft and non-distended.  There are no signs of ascites.  There is no abdominal tenderness.    Results Review:     I reviewed the patient's new clinical results.    Medication Review:   No current facility-administered medications on file prior to encounter.      No current outpatient prescriptions on file prior to encounter.       Assessment/Plan     Principal Problem:    Closed wedge compression fracture of eleventh thoracic vertebra  Active Problems:    Generalized weakness    Plasmocytoma    Nausea & vomiting    Diarrhea    Fall    Noncompliance with medication regimen    Left leg weakness    CKD (chronic kidney disease) stage 2, GFR 60-89 ml/min    B12 deficiency    Metabolic acidosis    Hypocalcemia    A-fib    Left inguinal hernia    Meningioma    SIADH (syndrome of inappropriate ADH production)    Hyponatremia    Vitamin D deficiency    UTI (urinary tract infection)     Pseudomonas infection      Assessment & Plan  1. Hyponatremia  2. SIADH  3. Diarrhea  4. Hernia repair  5. hypocalcemia    Patient seen and examined. Sodium lower today at 123. No symptoms. Patient asymptomatic.  Discussed fluid restriction. Will continue to monitor.     Prisca Morales MD  07/01/17  12:14 PM

## 2017-07-02 PROBLEM — K40.90 LEFT INGUINAL HERNIA: Status: RESOLVED | Noted: 2017-01-01 | Resolved: 2017-01-01

## 2017-07-02 PROBLEM — A49.8 PSEUDOMONAS INFECTION: Status: RESOLVED | Noted: 2017-01-01 | Resolved: 2017-01-01

## 2017-07-02 PROBLEM — N39.0 UTI (URINARY TRACT INFECTION): Status: RESOLVED | Noted: 2017-01-01 | Resolved: 2017-01-01

## 2017-07-02 PROBLEM — E87.20 METABOLIC ACIDOSIS: Status: RESOLVED | Noted: 2017-01-01 | Resolved: 2017-01-01

## 2017-07-02 NOTE — PLAN OF CARE
Problem: Patient Care Overview (Adult)  Goal: Plan of Care Review  Outcome: Ongoing (interventions implemented as appropriate)    07/02/17 0423   Coping/Psychosocial Response Interventions   Plan Of Care Reviewed With patient   Patient Care Overview   Progress improving   Outcome Evaluation   Outcome Summary/Follow up Plan Patients vitals stable. Patient reports pain to back and PRN medication given. Patient still on fluid restriction. Will continue to monitor.         Problem: Fall Risk (Adult)  Goal: Absence of Falls  Outcome: Ongoing (interventions implemented as appropriate)    Problem: Pain, Chronic (Adult)  Goal: Acceptable Pain Control/Comfort Level  Outcome: Ongoing (interventions implemented as appropriate)

## 2017-07-02 NOTE — THERAPY TREATMENT NOTE
Acute Care - Physical Therapy Treatment Note  [facility]     Patient Name: Pavel Melgoza  : 1934  MRN: [id number]  Today's Date: 2017  Onset of Illness/Injury or Date of Surgery Date: 17     Referring Physician: Dr. Marx    Admit Date: 2017    Visit Dx:    ICD-10-CM ICD-9-CM   1. Generalized weakness R53.1 780.79   2. Dehydration E86.0 276.51   3. Compression fracture T14.8 829.0   4. Pain R52 780.96     Patient Active Problem List   Diagnosis   • Generalized weakness   • Plasmocytoma   • Nausea & vomiting   • Diarrhea   • Fall   • Noncompliance with medication regimen   • Left leg weakness   • Closed wedge compression fracture of eleventh thoracic vertebra   • CKD (chronic kidney disease) stage 2, GFR 60-89 ml/min   • B12 deficiency   • Metabolic acidosis   • Hypocalcemia   • A-fib   • Left inguinal hernia   • Meningioma   • SIADH (syndrome of inappropriate ADH production)   • Hyponatremia   • Vitamin D deficiency   • UTI (urinary tract infection)   • Pseudomonas infection               Adult Rehabilitation Note       17 1056 17 1014 17 1140    Rehab Assessment/Intervention    Discipline physical therapist  -AL physical therapist  -AL --   PT Student  -EE,AA,EE2    Document Type therapy note (daily note)  -AL therapy note (daily note)  -AL therapy note (daily note)  -EE,AA,EE2    Subjective Information agree to therapy;complains of;pain  -AL agree to therapy;complains of;pain;weakness  -AL agree to therapy;complains of;pain  -EE,AA,EE2    Patient Effort, Rehab Treatment adequate  -AL adequate  -AL good  -EE,AA,EE2    Symptoms Noted During/After Treatment increased pain  -AL increased pain  -AL     Symptoms Noted Comment   Pt continues to become agitated with instructions for transfers.   -EE,AA,EE2    Precautions/Limitations fall precautions  -AL fall precautions  -AL fall precautions  -EE,AA,EE2    Specific Treatment Considerations   Nsg stated OK to see pt today.   -EE,AA,EE2    Patient Response to Treatment   tolerated but anxious personality and agitation with transfers  -EE,AA,EE2    Recorded by [AL] Latanya Rodriguez, PT [AL] Latanya Rodriguez, PT [EE,AA,EE2] Eunice Post, PT (r) Skyla Blanco, PT Student (t) Eunice Post, PT (c)    Pain Assessment    Pain Assessment 0-10  -AL 0-10  -AL Sood-Baker FACES  -EE,AA,EE2    Sood-Estrada FACES Pain Rating   6  -EE,AA,EE2    Pain Score 6  -AL 5  -AL     Pain Location Hip  -AL Back  -AL Back  -EE,AA,EE2    Pain Orientation Right  -AL Lower  -AL Lower  -EE,AA,EE2    Pain Intervention(s)  Ambulation/increased activity  -AL Ambulation/increased activity;Repositioned  -EE,AA,EE2    Recorded by [AL] Latanya Rodriguez, PT [AL] Latanya Rodriguez, PT [EE,AA,EE2] Eunice Post PT (r) Skyla Blanco, PT Student (t) Eunice Post PT (c)    Cognitive Assessment/Intervention    Current Cognitive/Communication Assessment impaired  -AL impaired  -AL impaired  -EE,AA,EE2    Orientation Status oriented to;person;place  -AL oriented to;person;place;time  -AL oriented to;place;time;person  -EE,AA,EE2    Follows Commands/Answers Questions 100% of the time;able to follow single-step instructions  -% of the time;able to follow single-step instructions  -AL 50% of the time   difficulty with single step instructions  -EE,AA,EE2    Personal Safety moderate impairment  -AL moderate impairment  -AL moderate impairment;at risk behaviors demonstrated;decreased awareness, need for safety;impulsive  -EE,AA,EE2    Personal Safety Interventions fall prevention program maintained;gait belt;nonskid shoes/slippers when out of bed  -AL fall prevention program maintained;gait belt;nonskid shoes/slippers when out of bed  -AL gait belt;fall prevention program maintained;nonskid shoes/slippers when out of bed  -EE,AA,EE2    Recorded by [AL] Latanya Rodriguez, PT [AL] Latanya Rodriguez, PT [EE,AA,EE2] Eunice Post, PT (r) Skyla Blanco, PT Student (t) Eunice Post, PT (c)    Bed Mobility,  Assessment/Treatment    Bed Mobility, Assistive Device   head of bed elevated;bed rails  -EE,AA,EE2    Bed Mob, Supine to Sit, Quinn minimum assist (75% patient effort);verbal cues required;nonverbal cues required (demo/gesture)  -AL minimum assist (75% patient effort);2 person assist required;verbal cues required;nonverbal cues required (demo/gesture)  -AL moderate assist (50% patient effort);verbal cues required  -EE,AA,EE2    Bed Mob, Sit to Supine, Quinn not tested   up in chair  -AL not tested   sitting up in chair  -AL not tested  -EE,AA,EE2    Bed Mobility, Safety Issues   decreased use of arms for pushing/pulling;decreased use of legs for bridging/pushing;cognitive deficits limit understanding  -EE,AA,EE2    Bed Mobility, Impairments strength decreased  -AL strength decreased;impaired balance  -AL strength decreased  -EE,AA,EE2    Bed Mobility, Comment  Verbal cues to perform tasks  -AL Pt requires max verbal cueing to use hand rails. Pt unable to follow directions for optimal bed mobility. Pt agitated with directions/activity   -EE,AA,EE2    Recorded by [AL] Latanya Rodriguez, PT [AL] Latanya Rodriguez, PT [EE,AA,EE2] Eunice Post, PT (r) Skyla Blanco PT Student (t) Eunice Post, PT (c)    Transfer Assessment/Treatment    Transfers, Bed-Chair Quinn minimum assist (75% patient effort);2 person assist required;verbal cues required;nonverbal cues required (demo/gesture)  -AL moderate assist (50% patient effort);2 person assist required   pivot transfer  -AL moderate assist (50% patient effort);2 person assist required;verbal cues required  -EE,AA,EE2    Transfers, Chair-Bed Quinn   not tested  -EE,AA,EE2    Transfers, Bed-Chair-Bed, Assist Device   rolling walker  -EE,AA,EE2    Transfers, Sit-Stand Quinn minimum assist (75% patient effort);2 person assist required;verbal cues required;nonverbal cues required (demo/gesture)  -AL minimum assist (75% patient effort);2 person assist  required;verbal cues required;nonverbal cues required (demo/gesture)  -AL minimum assist (75% patient effort);2 person assist required;verbal cues required   Pt refuses to push from bed; stands up by use RW  -EE,AA,EE2    Transfers, Stand-Sit Parke minimum assist (75% patient effort);2 person assist required;verbal cues required;nonverbal cues required (demo/gesture)  -AL minimum assist (75% patient effort);2 person assist required;verbal cues required;nonverbal cues required (demo/gesture)  -AL minimum assist (75% patient effort);2 person assist required;verbal cues required  -EE,AA,EE2    Transfers, Sit-Stand-Sit, Assist Device  rolling walker  -AL rolling walker  -EE,AA,EE2    Transfer, Safety Issues   weight-shifting ability decreased;impulsivity;knees buckling   L knee steven  -EE,AA,EE2    Transfer, Impairments  strength decreased;impaired balance  -AL strength decreased;impaired balance  -EE,AA,EE2    Transfer, Comment --   Needed less assistance with bed to chair transfer this visit  -AL  Pt unable to follow directions to push from the bed resulting in poor transfer technique. sit <> stand X2 with second transfer with better technique secondary to pt pushing form the bed.  -EE,AA,EE2    Recorded by [AL] Latanya Rodriguez, PT [AL] Latanya Rodriguez, PT [EE,AA,EE2] Eunice Post, PT (r) Skyla Blanco, PT Student (t) Eunice Post, PT (c)    Gait Assessment/Treatment    Gait, Parke Level not appropriate to assess;unable to perform  -AL not appropriate to assess  -AL not appropriate to assess  -EE,AA,EE2    Gait, Comment  --   Attempted to take a few steps to chair; unable.  -AL Pt has significant L knee buckling secondary to history of cancer in LLE resulting in flacidity. Pt may benefit from a L knee immobilizer for transfers/gait to prevent knee buckling.  -EE,AA,EE2    Recorded by [AL] Latanya Rodriguez, PT [AL] Latanya Rodriguez, PT [EE,AA,EE2] Eunice Post, PT (r) Skyla Blanco, PT Student (t) Eunice Post, PT  (c)    Balance Skills Training    Sitting-Level of Assistance   Minimum assistance  -EE,AA,EE2    Sitting-Balance Support   Right upper extremity supported;Left upper extremity supported;Feet supported  -EE,AA,EE2    Standing-Level of Assistance   Moderate assistance;x2  -EE,AA,EE2    Static Standing Balance Support   assistive device  -EE,AA,EE2    Standing Balance # of Minutes   1  -EE,AA,EE2    Recorded by   [EE,AA,EE2] Eunice Pots, PT (r) Skyla Blanco, PT Student (t) Eunice Post, LESLEE (c)    Positioning and Restraints    Pre-Treatment Position in bed  -AL in bed  -AL in bed  -EE,AA,EE2    Post Treatment Position chair  -AL chair  -AL chair  -EE,AA,EE2    In Chair sitting;call light within reach;exit alarm on;with family/caregiver  -AL reclined;sitting;call light within reach;with family/caregiver  -AL sitting;reclined;call light within reach;encouraged to call for assist;exit alarm on;with family/caregiver  -EE,AA,EE2    Recorded by [AL] Latanya Rodriguez, PT [AL] Latanya Rodriguez, PT [EE,AA,EE2] Eunice Post, PT (r) Skyla Blanco, PT Student (t) Eunice Post PT (c)      User Key  (r) = Recorded By, (t) = Taken By, (c) = Cosigned By    Initials Name Effective Dates    EE Eunice Post, PT 12/01/15 -     AL Latanya Rodriguez, PT 12/01/15 -     LEN Blanco, PT Student 06/01/17 -                 IP PT Goals       06/30/17 1414 06/30/17 1404 06/25/17 1156    Bed Mobility PT LTG    Bed Mobility PT LTG, Time to Achieve 1 wk  -EE (r) AA (t) EE (c)  1 wk  -CH    Bed Mobility PT LTG, Activity Type   all bed mobility  -CH    Bed Mobility PT LTG, [place] Level   contact guard assist  -CH    Bed Mobility PT LTG, Date Goal Reviewed  06/30/17  -EE (r) AA (t) EE (c)     Bed Mobility PT LTG, Outcome  goal ongoing  -EE (r) AA (t) EE (c) goal revised  -CH    Bed Mobility PT LTG, Reason Goal Not Met  progress slower than expected  -EE (r) AA (t) EE (c)     Transfer Training PT LTG    Transfer Training PT LTG, Time to Achieve 1  wk  -EE (r) AA (t) EE (c)  1 wk  -CH    Transfer Training PT LTG, Activity Type   all transfers  -CH    Transfer Training PT LTG, Ran[place] Level   minimum assist (75% patient effort);2 person assist required  -CH    Transfer Training PT LTG, Assist Device   walker, rolling  -CH    Transfer Training PT  LTG, Date Goal Reviewed  06/30/17  -EE (r) AA (t) EE (c)     Transfer Training PT LTG, Outcome  goal ongoing   with safe transfer technique  -EE (r) AA (t) EE (c) goal ongoing  -CH    Transfer Training PT LTG, Reason Goal Not Met  progress slower than expected  -EE (r) AA (t) EE (c)       06/21/17 1200          Bed Mobility PT LTG    Bed Mobility PT LTG, Date Established 06/21/17  -EE (r) AA (t) EE (c)      Bed Mobility PT LTG, Time to Achieve 1 wk  -EE (r) AA (t) EE (c)      Bed Mobility PT LTG, Activity Type all bed mobility  -EE (r) AA (t) EE (c)      Bed Mobility PT LTG, [place] Level minimum assist (75% patient effort)  -EE (r) AA (t) EE (c)      Transfer Training PT LTG    Transfer Training PT LTG, Date Established 06/21/17  -EE (r) AA (t) EE (c)      Transfer Training PT LTG, Time to Achieve 1 wk  -EE (r) AA (t) EE (c)      Transfer Training PT LTG, Activity Type all transfers  -EE (r) AA (t) EE (c)      Transfer Training PT LTG, [place] Level moderate assist (50% patient effort)   with proper technique and safety  -EE (r) AA (t) EE (c)      Transfer Training PT LTG, Assist Device walker, rolling  -EE (r) AA (t) EE (c)        User Key  (r) = Recorded By, (t) = Taken By, (c) = Cosigned By    Initials Name Provider Type    MARLINE Garcia, PT Physical Therapist    TYESHA Post, PT Physical Therapist    LEN Blanco, PT Student PT Student          Physical Therapy Education     Title: PT OT SLP Therapies (Done)     Topic: Physical Therapy (Done)     Point: Mobility training (Done)    Learning Progress Summary    Learner Readiness Method Response Comment Documented by Status   Patient  Acceptance E VU,NR  AL 07/02/17 1138 Done    Acceptance E NR  AL 07/01/17 1103 Active    Acceptance E VU,NR,[facility] 06/30/17 1202 Done    Acceptance E,TB VU,NR   06/28/17 2237 Done    Acceptance D,E VU,NR   06/28/17 1345 Done    Acceptance E NR,[facility] 06/27/17 1551 Active    Acceptance E VU,[facility] 06/26/17 1200 Done    Acceptance E,TB,D VU,NR   06/25/17 1155 Done    Nonacceptance E,D NR,[facility] 06/22/17 1433 Active    Nonacceptance E NR,[facility] 06/21/17 1159 Active   Family Acceptance E,TB VU,[facility] 06/28/17 2237 Done    Acceptance E VU,[facility] 06/26/17 1200 Done               Point: Home exercise program (Done)    Learning Progress Summary    Learner Readiness Method Response Comment Documented by Status   Patient Acceptance E VU,NR,[facility] 06/30/17 1202 Done    Acceptance E,TB VU,[facility] 06/28/17 2237 Done    Acceptance D,E VU,[facility] 06/28/17 1345 Done    Acceptance E NR,[facility] 06/27/17 1551 Active    Acceptance E VU,[facility] 06/26/17 1200 Done    [facility]acceptance E,[facility] 06/22/17 1433 Active    [facility] E [facility] 06/21/17 1159 Active   Family Acceptance E,TB VU,[facility] 06/28/17 2237 Done    Acceptance E VU,[facility] 06/26/17 1200 Done               Point: Body mechanics (Done)    Learning Progress Summary    Learner Readiness Method Response Comment Documented by Status   Patient Acceptance E VU,NR  AL 07/02/17 1138 Done    Acceptance E NR  AL 07/01/17 1103 Active    Acceptance E VU,NR,[facility] 06/30/17 1202 Done    Acceptance E,TB VU,[facility] 06/28/17 2237 Done    Acceptance D,E VU,[facility] 06/28/17 1345 Done    Acceptance E NR,[facility] 06/27/17 1551 Active    Acceptance E VU,[facility] 06/26/17 1200 Done    Acceptance E,TB,D VU,[facility] 06/25/17 1155 Done    Nonacceptance E,D NR,[facility] 06/22/17 1433 Active    Nonacceptance E NR,[facility] 06/21/17 1159 Active   Family Acceptance [facility]   06/28/17 2237 Done    Acceptance E [facility]  BRIANA 06/26/17 1200 Done               Point: Precautions (Done)    Learning Progress Summary     Learner Readiness Method Response Comment Documented by Status   Patient Acceptance E VU,NR  AL 07/02/17 1138 Done    Acceptance E NR  AL 07/01/17 1103 Active    Acceptance E VU,NR,NL   06/30/17 1202 Done    Acceptance E,TB VU,NR   06/28/17 2237 Done    Acceptance D,E VU,NR   06/28/17 1345 Done    Acceptance E NR,NL   06/27/17 1551 Active    Acceptance E VU,NR   06/26/17 1200 Done    Acceptance E,TB,D VU,NR   06/25/17 1155 Done    Nonacceptance E,D NR,NL   06/22/17 1433 Active    Nonacceptance E NR,NL   06/21/17 1159 Active   Family Acceptance E,TB VU,NR   06/28/17 2237 Done    Acceptance E VU,NR   06/26/17 1200 Done                      User Key     Initials Effective Dates Name Provider Type Discipline     12/01/15 -  Maricel Garcia, PT Physical Therapist PT     12/01/15 -  Eunice Post, PT Physical Therapist PT    AL 12/01/15 -  Latanya Rodriguez, PT Physical Therapist PT     04/06/17 -  Yana Mercado, RN Registered Nurse Nurse     05/08/17 -  Humble Armando, PT Student PT Student PT     06/01/17 -  Skyla Blanco, PT Student PT Student PT                    PT Recommendation and Plan  Anticipated Equipment Needs At Discharge:  (L knee immobilizer)  Anticipated Discharge Disposition: skilled nursing facility  Planned Therapy Interventions: balance training, bed mobility training, gait training, home exercise program, patient/family education, transfer training  PT Frequency: daily  Plan of Care Review  Plan Of Care Reviewed With: patient  Progress: improving  Outcome Summary/Follow up Plan: Patient participated with skilled PT. Able to transfer from bed to chair with less assistance from PT; continues to have severe weakness in the left LE. Will continue to progress patient towards goals.          Outcome Measures       07/02/17 1100 07/01/17 1100 06/30/17 1200    How much help from another person do you currently need...    Turning from your back to your side while in flat bed  without using bedrails? 3  -AL 3  -AL 3  -EE (r) AA (t) EE (c)    Moving from lying on back to sitting on the side of a flat bed without bedrails? 3  -AL 3  -AL 3  -EE (r) AA (t) EE (c)    Moving to and from a bed to a chair (including a wheelchair)? 2  -AL 2  -AL 2  -EE (r) AA (t) EE (c)    Standing up from a chair using your arms (e.g., wheelchair, bedside chair)? 3  -AL 3  -AL 3  -EE (r) AA (t) EE (c)    Climbing 3-5 steps with a railing? 1  -AL 1  -AL 1  -EE (r) AA (t) EE (c)    To walk in hospital room? 1  -AL 1  -AL 1  -EE (r) AA (t) EE (c)    AM-PAC 6 Clicks Score 13  -AL 13  -AL 13  -EE (r) AA (t)    Functional Assessment    Outcome Measure Options AM-PAC 6 Clicks Basic Mobility (PT)  -AL AM-PAC 6 Clicks Basic Mobility (PT)  -AL AM-PAC 6 Clicks Basic Mobility (PT)  -EE (r) AA (t) EE (c)      User Key  (r) = Recorded By, (t) = Taken By, (c) = Cosigned By    Initials Name Provider Type    TYESHA Post, PT Physical Therapist    LOUIE Rodriguez, PT Physical Therapist    LEN Blanco, PT Student PT Student           Time Calculation:         PT Charges       07/02/17 1140          Time Calculation    Start Time 1056  -AL      Stop Time 1107  -AL      Time Calculation (min) 11 min  -AL      PT Received On 07/02/17  -AL      PT - Next Appointment 07/03/17  -AL        User Key  (r) = Recorded By, (t) = Taken By, (c) = Cosigned By    Initials Name Provider Type    LOUIE Rodriguez, PT Physical Therapist          Therapy Charges for Today     Code Description Service Date Service Provider Modifiers Qty    95532691105  PT THER SUPP EA 15 MIN 7/1/2017 Latanya Rodriguez, PT GP 1    61632620756 HC PT THER PROC EA 15 MIN 7/1/2017 Latanya Rodriguez, PT GP 1    91033405524 HC PT THER SUPP EA 15 MIN 7/2/2017 Latanya Rodriguez, PT GP 1    88440342299 HC PT THER PROC EA 15 MIN 7/2/2017 Latanya Rodriguez, PT GP 1          PT G-Codes  Outcome Measure Options: AM-PAC 6 Clicks Basic Mobility (PT)    Latanya Rodriguez, PT  7/2/2017

## 2017-07-02 NOTE — CONSULTS
Consults    Patient Care Team:  Amanda Tanner MD as PCP - General (Family Medicine)    Chief complaint: Incidental discovery of a meningioma    Subjective     HPI Comments: We had seen this patient a few weeks ago as a consult for a thoracic compression fracture and some low back pain.  We have been asked to see him again because of the incidental discovery what appears to be a right parafalcine frontal meningioma.  There is some concern that this is a dural based metastasis but I rather doubt it.  There is no history of any kind of systemic cancer.  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  I told the patient and his family that we will should just watch this.  I don't recommend any intervention.    Fall     Hip Pain      Back Pain     Extremity Pain          Review of Systems   Constitutional: Negative.    HENT: Negative.    Eyes: Negative.    Respiratory: Negative.    Cardiovascular: Negative.    Gastrointestinal: Negative.    Endocrine: Negative.    Genitourinary: Negative.    Musculoskeletal: Positive for back pain.   Neurological: Negative.    Hematological: Negative.    Psychiatric/Behavioral: Negative.    All other systems reviewed and are negative.       Past Medical History:   Diagnosis Date   • Plasmacytoma    ,   Past Surgical History:   Procedure Laterality Date   • FRACTURE SURGERY     • INGUINAL HERNIA REPAIR Left 6/24/2017    Procedure: LAPAROSCOPIC LEFT INGUINAL HERNIA REPAIR WITH MESH;  Surgeon: Erwin Mei MD;  Location: [facility];  Service:    • JOINT REPLACEMENT      bilateral hips   , History reviewed. No pertinent family history.,   Social History   Substance Use Topics   • Smoking status: Former Smoker   • Smokeless tobacco: None   • Alcohol use No   ,   Prescriptions Prior to Admission   Medication Sig Dispense Refill Last Dose   • atorvastatin (LIPITOR) 10 MG tablet Take 10 mg by mouth Daily.   Taking at am   •  ibuprofen (ADVIL,MOTRIN) 800 MG tablet Take 800 mg by mouth Every 6 (Six) Hours As Needed for Mild Pain (1-3).   Taking at am   • ramipril (ALTACE) 10 MG capsule Take 10 mg by mouth Daily.   Taking at am   , Scheduled Meds:    cholecalciferol 5,000 Units Oral Daily   demeclocycline 300 mg Oral Q12H   diltiaZEM  mg Oral Q24H   lidocaine 1 mL Intradermal Once   pantoprazole 40 mg Oral BID AC   ramipril 10 mg Oral Daily   vitamin B-12 2,000 mcg Oral Daily   , Continuous Infusions:   , PRN Meds:  •  acetaminophen  •  bisacodyl  •  calcium carbonate  •  diphenhydrAMINE  •  diphenhydrAMINE-zinc acetate  •  fentanyl  •  HYDROcodone-acetaminophen  •  ibuprofen  •  magnesium hydroxide  •  midazolam  •  Morphine  •  [] Morphine **AND** naloxone  •  ondansetron  •  sodium chloride  •  sodium chloride  •  sodium chloride and Allergies:  Review of patient's allergies indicates no known allergies.  WBC   Date Value Ref Range Status   2017 18.02 (H) 4.50 - 10.70 10*3/mm3 Final     RBC   Date Value Ref Range Status   2017 2.99 (L) 4.60 - 6.00 10*6/mm3 Final     Hemoglobin   Date Value Ref Range Status   2017 11.0 (L) 13.7 - 17.6 g/dL Final     Hematocrit   Date Value Ref Range Status   2017 30.9 (L) 40.4 - 52.2 % Final     MCV   Date Value Ref Range Status   2017 103.3 (H) 79.8 - 96.2 fL Final     MCH   Date Value Ref Range Status   2017 36.8 (H) 27.0 - 32.7 pg Final     MCHC   Date Value Ref Range Status   2017 35.6 32.6 - 36.4 g/dL Final     RDW   Date Value Ref Range Status   2017 12.9 11.5 - 14.5 % Final     RDW-SD   Date Value Ref Range Status   2017 48.4 37.0 - 54.0 fl Final     MPV   Date Value Ref Range Status   2017 8.7 6.0 - 12.0 fL Final     Platelets   Date Value Ref Range Status   2017 690 (H) 140 - 500 10*3/mm3 Final       Lab Results   Component Value Date    GLUCOSE 107 (H) 2017    BUN 39 (H) 2017    CREATININE 1.06 2017     EGFRIFNONA 67 07/02/2017    BCR 36.8 (H) 07/02/2017    K 4.6 07/02/2017    CO2 22.4 07/02/2017    CALCIUM 8.5 (L) 07/02/2017    ALBUMIN 2.90 (L) 07/02/2017    LABIL2 1.1 06/20/2017    AST 20 06/20/2017    ALT 19 06/20/2017         Objective      Vital Signs  Temp:  [98.2 °F (36.8 °C)-98.8 °F (37.1 °C)] 98.5 °F (36.9 °C)  Heart Rate:  [74-86] 84  Resp:  [16-20] 18  BP: (116-121)/(57-77) 116/77    Physical Exam   Constitutional: He appears well-developed and well-nourished.   HENT:   Head: Normocephalic and atraumatic.   Cardiovascular: Normal rate.    Pulmonary/Chest: Effort normal.   Abdominal: Soft.   Musculoskeletal: Normal range of motion.   Neurological: He is alert. He has normal reflexes. He displays normal reflexes. No cranial nerve deficit. He exhibits normal muscle tone. Coordination normal.   Skin: Skin is warm and dry.   Psychiatric: He has a normal mood and affect.   Nursing note and vitals reviewed.      Results Review:    I reviewed the patient's new clinical results.  I reviewed the patient's new imaging results and agree with the interpretation.  I reviewed the patient's other test results and agree with the interpretation        Assessment/Plan     Principal Problem:    Closed wedge compression fracture of eleventh thoracic vertebra  Active Problems:    Diarrhea    CKD (chronic kidney disease) stage 2, GFR 60-89 ml/min    B12 deficiency    Hypocalcemia    A-fib    Meningioma    SIADH (syndrome of inappropriate ADH production)    Vitamin D deficiency      Assessment:    Condition: In stable condition.   (Incidental  discovery of a right frontal meningioma.  I doubt that this is a dural based metastasis.  Nothing recommended in terms of intervention.  Does need follow-up imaging.).     Plan:   (We'll arrange for follow-up imaging and follow-up in the office later).       I discussed the patients findings and my recommendations with patient, family, nursing staff and primary care team    Julian  MD Mc  07/02/17  5:56 PM    Time:

## 2017-07-02 NOTE — PLAN OF CARE
Problem: Patient Care Overview (Adult)  Goal: Plan of Care Review    07/02/17 1138 07/02/17 1400 07/02/17 1417   Coping/Psychosocial Response Interventions   Plan Of Care Reviewed With --  patient --    Patient Care Overview   Progress improving --  --    Outcome Evaluation   Outcome Summary/Follow up Plan --  --  Fluid restriction still in place. VSS. NS. Rm air. No falls. Neurosurgery consulted. Up to chair with PT. CTM.        Goal: Discharge Needs Assessment  Outcome: Ongoing (interventions implemented as appropriate)    Problem: Fall Risk (Adult)  Goal: Absence of Falls  Outcome: Ongoing (interventions implemented as appropriate)  Goal: Absence of Falls  Outcome: Ongoing (interventions implemented as appropriate)    Problem: Pain, Chronic (Adult)  Goal: Acceptable Pain Control/Comfort Level  Outcome: Ongoing (interventions implemented as appropriate)

## 2017-07-02 NOTE — PROGRESS NOTES
LOS: 12 days   Patient Care Team:  Amanda Tanner MD as PCP - General (Family Medicine)    Chief Complaint: hyponatremia    Subjective     Fall     Hip Pain      Back Pain     Extremity Pain          Subjective    Patient is a 83 year old male with hyponatremia secondary to siadh    Objective     Vital Signs  Temp:  [98.2 °F (36.8 °C)-98.8 °F (37.1 °C)] 98.8 °F (37.1 °C)  Heart Rate:  [74-90] 86  Resp:  [16-20] 16  BP: (109-121)/(57-66) 119/59    Objective  General Appearance:  In no acute distress.    HEENT: Normal HEENT exam.    Lungs:  Normal respiratory rate and normal effort.  He is not in respiratory distress.  Breath sounds clear to auscultation.  No wheezes, rales or rhonchi.    Heart: Normal rate.  Regular rhythm.  S1 normal and S2 normal.  No murmur or gallop.   Chest: Asymmetric chest wall expansion.   Extremities: Normal range of motion.  There is no deformity, effusion or dependent edema.    Neurological: Patient is alert and oriented to person, place and time.    Skin:  Warm and dry.  No rash.   Abdomen: Abdomen is soft and non-distended.  There are no signs of ascites.  There is no abdominal tenderness.    Results Review:     I reviewed the patient's new clinical results.    Medication Review:   No current facility-administered medications on file prior to encounter.      No current outpatient prescriptions on file prior to encounter.       Assessment/Plan     Principal Problem:    Closed wedge compression fracture of eleventh thoracic vertebra  Active Problems:    Generalized weakness    Plasmocytoma    Nausea & vomiting    Diarrhea    Fall    Noncompliance with medication regimen    Left leg weakness    CKD (chronic kidney disease) stage 2, GFR 60-89 ml/min    B12 deficiency    Metabolic acidosis    Hypocalcemia    A-fib    Left inguinal hernia    Meningioma    SIADH (syndrome of inappropriate ADH production)    Hyponatremia    Vitamin D deficiency    UTI (urinary tract infection)     Pseudomonas infection      Assessment & Plan  1. Hyponatremia  2. SIADH  3. Diarrhea  4. Hernia repair  5. hypocalcemia    Patient seen and examined. Sodium stable at 124. No symptoms. No issues with fluid restriction. Increase demeclocycline to TID    Prisca Morales MD  07/02/17  11:15 AM

## 2017-07-02 NOTE — PROGRESS NOTES
[facility]               ASSOCIATES     LOS: 12 days     Name: Pavel Melgoza  Age: 83 y.o.  Sex: male  :  1934  MRN: [id number]         Primary Care Physician: Amanda Tanner MD    Subjective    Patient no new complaints.    Diet Regular; Daily Fluid Restriction; 1500 mL Fluid    Objective   Temp:  [98.2 °F (36.8 °C)-98.8 °F (37.1 °C)] 98.5 °F (36.9 °C)  Heart Rate:  [74-86] 84  Resp:  [16-20] 18  BP: (116-121)/(57-77) 116/77  SpO2:  [95 %] 95 %  on   ;   O2 Device: room air  Body mass index is 24.41 kg/(m^2).    Physical Exam   Constitutional: No distress.   Cardiovascular: Normal rate.  An irregular rhythm present.   Pulmonary/Chest: Effort normal and breath sounds normal. No respiratory distress.   Abdominal: Soft. He exhibits no distension. There is no tenderness. There is no rebound and no guarding.   Musculoskeletal: He exhibits no edema.   Neurological: He is alert. He has normal strength.   Skin: Skin is warm and dry. No erythema.   Psychiatric: He is not aggressive.     Reviewed medications and new clinical results    cholecalciferol 5,000 Units Oral Daily   demeclocycline 300 mg Oral Q12H   diltiaZEM  mg Oral Q24H   lidocaine 1 mL Intradermal Once   pantoprazole 40 mg Oral BID AC   ramipril 10 mg Oral Daily   vitamin B-12 2,000 mcg Oral Daily        Results from last 7 days  Lab Units 17  0455 17  0508 17  0536 17  0401 17  0019 17  0553   WBC 10*3/mm3 18.02* 17.00* 11.18* 12.28* 12.94* 13.01*   HEMOGLOBIN g/dL 11.0* 10.3* 10.2* 10.7* 10.9* 9.9*   PLATELETS 10*3/mm3 690* 627* 562* 466 337 227       Results from last 7 days  Lab Units 17  0524 17  0455 17  0508 17  0536 17  0401 17  0019 17  1759   SODIUM mmol/L 124* 123* 125* 125* 129* 127*  127* 127*   POTASSIUM mmol/L 4.6 4.5 4.8 4.7 4.6 4.6  4.6 4.7   CHLORIDE mmol/L 87* 88* 90* 89* 91* 88*  88* 89*   CO2 mmol/L  22.4 23.3 21.7* 25.3 25.2 24.5  24.5 25.3   BUN mg/dL 39* 29* 25* 27* 26* 22  22 23   CREATININE mg/dL 1.06 0.79 0.82 0.87 0.79 0.84  0.84 0.73*   CALCIUM mg/dL 8.5* 8.5* 8.2* 8.1* 8.1* 8.2*  8.2* 8.2*   GLUCOSE mg/dL 107* 103* 100* 103* 113* 108*  108* 118*     Estimated Creatinine Clearance: 58 mL/min (by C-G formula based on Cr of 1.06).     Lab Results   Component Value Date    URINECX >100,000 CFU/mL Pseudomonas aeruginosa (A) 06/25/2017     Assessment/Plan   Active Hospital Problems (** Indicates Principal Problem)    Diagnosis Date Noted   • **Closed wedge compression fracture of eleventh thoracic vertebra [S22.080A] 06/20/2017   • Vitamin D deficiency [E55.9] 06/26/2017   • SIADH (syndrome of inappropriate ADH production) [E22.2] 06/25/2017   • Meningioma [D32.9] 06/24/2017   • Hypocalcemia [E83.51] 06/23/2017   • A-fib [I48.91] 06/23/2017   • B12 deficiency [E53.8] 06/21/2017   • Diarrhea [R19.7] 06/20/2017   • CKD (chronic kidney disease) stage 2, GFR 60-89 ml/min [N18.2] 06/20/2017      Resolved Hospital Problems    Diagnosis Date Noted Date Resolved   • Pseudomonas infection [B96.5] 06/28/2017 07/02/2017   • UTI (urinary tract infection) [N39.0] 06/26/2017 07/02/2017   • Left inguinal hernia [K40.90] 06/24/2017 07/02/2017   • Metabolic acidosis [E87.2] 06/23/2017 07/02/2017     · S/P RAHUL 6/22/17.  · S/P Laparoscopic left inguinal hernia repair, transabdominal preperitoneal approach 6/23/17  · B12 replacement  · SIADH per nephrology. Demeclocycline increased  · Afib: not anticoag candidate d/t falls. Rate control per card.  · UTI with pseudomonas, finished 3 days of cefepime.   · Leukocytosis: no obvious infection. Appreciate ID. Discussed with Dr. Ponce yesterday. Recheck CBC  · Ask LELAND to comment on meningioma  · [facility] on discharge ([facility])  · Discussed with wife.    Gilbert Marx MD   07/02/17  2:47 PM

## 2017-07-02 NOTE — PLAN OF CARE
Problem: Patient Care Overview (Adult)  Goal: Plan of Care Review  Outcome: Ongoing (interventions implemented as appropriate)    07/02/17 1138   Coping/Psychosocial Response Interventions   Plan Of Care Reviewed With patient   Patient Care Overview   Progress improving   Outcome Evaluation   Outcome Summary/Follow up Plan Patient participated with skilled PT. Able to transfer from bed to chair with less assistance from PT; continues to have severe weakness in the left LE. Will continue to progress patient towards goals.

## 2017-07-03 NOTE — PROGRESS NOTES
LOS: 13 days   Patient Care Team:  Amanda Tanner MD as PCP - General (Family Medicine)    Chief Complaint:     Low back and R lateral hip pain  Leukocytosis  Large lumbar epidural abscess       Subjective     Hip Pain    The incident occurred more than 1 week ago. Injury mechanism: Fell at home. The pain is present in the right hip. The quality of the pain is described as aching. The pain is moderate. Pain course: Pain is the same as it was when he was admitted.  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 The symptoms are aggravated by movement.   Back Pain   The current episode started 1 to 4 weeks ago. The problem occurs constantly. The problem is unchanged. The pain is present in the lumbar spine and gluteal. The quality of the pain is described as aching. Radiates to: R lateral hip. The pain is moderate. The symptoms are aggravated by position. Associated symptoms include a fever, numbness (Chronic L leg numbness due to previous plasmacytoma/radiation) and weakness (Chronic L leg weakness due to previous plasmacytoma/radiation). Pertinent negatives include no abdominal pain. (Recent UTI - culture + pseudomonas) Risk factors: Hx of plasmacytoma.       Subjective:  Symptoms:  He reports weakness (Chronic L leg weakness due to previous plasmacytoma/radiation).    Diet:  No nausea or vomiting.    Activity level: Impaired due to weakness.    Pain:  He complains of pain that is moderate.  He reports pain is unchanged.  Pain is well controlled.        History taken from: patient chart    Objective     Vital Signs  Temp:  [97.5 °F (36.4 °C)-98 °F (36.7 °C)] 98 °F (36.7 °C)  Heart Rate:  [72-74] 74  Resp:  [18] 18  BP: (124-127)/(61-69)  "127/64    Objective:  General Appearance:  Comfortable.    Vital signs: (most recent): Blood pressure 127/64, pulse 74, temperature 98 °F (36.7 °C), temperature source Oral, resp. rate 18, height 72.01\" (182.9 cm), weight 181 lb 5 oz (82.2 kg), SpO2 97 %.  Fever.    Output: Producing urine (Wearing a brief).    Lungs:  Normal effort.    Heart: Normal rate.    Chest: Symmetric chest wall expansion.   Neurological: Patient is alert.  GCS score is 15.  (Awake, alert, non-toxic appearing. Has normal 5/5 strength in R leg. Has 0?5 strength L iliopsoas and quad; 2/5 strength in L tibialis anterior and gastroc (same as before). Decreased sensation in left leg also same as before. + SLR on the R at 30 degrees. Tender with palpation in L > R lower abdomen. ).    Skin:  Warm and dry.          Results Review:     I reviewed the patient's new clinical results.  I reviewed the patient's new imaging results and agree with the interpretation.  Discussed with Dr. Bentley     MRI OF THE LUMBAR SPINE WITH AND WITHOUT CONTRAST      CLINICAL HISTORY: Chronic low back pain for 3-5 days. History of  plasmacytoma.      TECHNIQUE: MRI of the lumbar spine was obtained with sagittal pre and  postgadolinium T1, proton-density, and T2-weighted images. Additionally,  there are axial pre and postgadolinium T1 and T2-weighted images through  the lumbar spine.      FINDINGS:  There is an epidural fluid collection within the left lateral aspect of  the spinal canal extending from the level of the L3 vertebral body down  to the level of the L5 vertebral body measuring 1.9 x 1.1 cm in greatest axial dimensions and 4.7 cm in greatest craniocaudad dimensions. The finding is  representative of a large epidural abscess; new when compared to the previous study dated 06/20/2017. Multiple fluid collections appreciated within the left psoas muscle measuring 2.5 x 2.3 cmcompatible with multiple psoas abscesses. Again, these are new when compared to the " previous  study. Multiple fluid collections identified within the right posterior paraspinal soft tissues. The largest of these measures 1.9 x 1.7 cm again compatible with a posterior paraspinal muscular abscess. Again, these findings are new when compared to the previous study. There is a fluid collection appreciated within the right posterior subcutaneous fat extending from the level of the T12-L1  interspace down to the level of the L3-4 interspace. This collection  measures up to approximately 5.3 x 2.0 cm in greatest axial dimensions.  Again, this finding is new when compared to the previous exam and is  compatible with a posterior subcutaneous abscess.         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 A chronic  deformity is identified within the L5 vertebral body and the sacrum.          There is an abdominal aortic aneurysm which measures up to approximately  3.8 x 4.2 cm in greatest axial dimensions.      There is a markedly distended urinary bladder with a trabeculated  appearance. There is bilateral pelvocaliectasis as well as  ureterectasis.        Assessment/Plan     Principal Problem:    Closed wedge compression fracture of eleventh thoracic vertebra  Active Problems:    Diarrhea    CKD (chronic kidney disease) stage 2, GFR 60-89 ml/min    B12 deficiency    Hypocalcemia    A-fib    Meningioma    SIADH (syndrome of inappropriate ADH production)    Vitamin D deficiency      Assessment & Plan     Back pain; R lateral hip pain  New finding of L3-L5 epidural abscess with involvement of L psoas and paraspinous muscles.   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 Hernandez to evaluate patient for surgical intervention  (Dr. Khalil has spoken to Dr. Hernandez who has agreed to take care of this).   Check stat coags, ESR, CRP    Addendum:     Spoke with Dr. Hernandez regarding the above. Dr. Hernandez has evaluated the patient; he is in agreement with fernandez placement and urology consult.       Milly Rock, ANITRA  07/03/17  1:39 PM

## 2017-07-03 NOTE — PROGRESS NOTES
Name: Pavel Melgoza ADMIT: 2017   : 1934  PCP: Amanda Tanner MD    MRN: [id number] LOS: 13 days   AGE/SEX: 83 y.o. male  ROOM: [facility]   Subjective   Subjective  Cc- back pain    Pain is moderate  Improved with medications  Just had MRI  Also with LE dopplers this afternoon    ROS  No f/c  No n/v  No CP/palp  No soa/cough  Objective   Vital Signs  Temp:  [97.5 °F (36.4 °C)-98 °F (36.7 °C)] 98 °F (36.7 °C)  Heart Rate:  [72-74] 74  Resp:  [18] 18  BP: (124-127)/(61-69) 127/64  SpO2:  [96 %-97 %] 97 %  on   ;   O2 Device: room air  Body mass index is 24.58 kg/(m^2).    Alert, nad  Supple, no jvd  Soft, nt  Lungs clear no resp distress  RRR, no murmurs  No edema or cyanosis  Pleasant though poor memory    Physical Exam    Results Review:       I reviewed the patient's new clinical results.    Results from last 7 days  Lab Units 17  0541 17  0455 17  0508 17  0536 17  0401 17  0019   WBC 10*3/mm3 18.86* 18.02* 17.00* 11.18* 12.28* 12.94*   HEMOGLOBIN g/dL 10.4* 11.0* 10.3* 10.2* 10.7* 10.9*   PLATELETS 10*3/mm3 738* 690* 627* 562* 466 337     Results from last 7 days  Lab Units 17  0541 17  0524 17  0455 17  0508 17  0536 17  0401 17  0019   SODIUM mmol/L 125* 124* 123* 125* 125* 129* 127*  127*   POTASSIUM mmol/L 4.6 4.6 4.5 4.8 4.7 4.6 4.6  4.6   CHLORIDE mmol/L 87* 87* 88* 90* 89* 91* 88*  88*   CO2 mmol/L 24.8 22.4 23.3 21.7* 25.3 25.2 24.5  24.5   BUN mg/dL 54* 39* 29* 25* 27* 26* 22  22   CREATININE mg/dL 1.08 1.06 0.79 0.82 0.87 0.79 0.84  0.84   GLUCOSE mg/dL 107* 107* 103* 100* 103* 113* 108*  108*   Estimated Creatinine Clearance: 56.9 mL/min (by C-G formula based on Cr of 1.08).  Results from last 7 days  Lab Units 17  0541 17  0524 17  0455 17  0508 17  0536 17  0401 17  0019   CALCIUM mg/dL 8.7 8.5* 8.5* 8.2* 8.1* 8.1* 8.2*  8.2*   ALBUMIN g/dL 3.00*  2.90* 2.90* 2.80* 2.90* 2.80* 3.00*   PHOSPHORUS mg/dL 4.4 4.1 3.7 3.3 3.7 4.2 4.3         cholecalciferol 5,000 Units Oral Daily   demeclocycline 300 mg Oral Q12H   diltiaZEM  mg Oral Q24H   lidocaine 1 mL Intradermal Once   pantoprazole 40 mg Oral BID AC   ramipril 10 mg Oral Daily   vitamin B-12 2,000 mcg Oral Daily      Diet Regular; Daily Fluid Restriction; 1500 mL Fluid      Assessment/Plan   Assessment:     Active Hospital Problems (** Indicates Principal Problem)    Diagnosis Date Noted   • **Closed wedge compression fracture of eleventh thoracic vertebra [S22.080A] 06/20/2017   • Vitamin D deficiency [E55.9] 06/26/2017   • SIADH (syndrome of inappropriate ADH production) [E22.2] 06/25/2017   • Meningioma [D32.9] 06/24/2017   • Hypocalcemia [E83.51] 06/23/2017   • A-fib [I48.91] 06/23/2017   • B12 deficiency [E53.8] 06/21/2017   • Diarrhea [R19.7] 06/20/2017   • CKD (chronic kidney disease) stage 2, GFR 60-89 ml/min [N18.2] 06/20/2017      Resolved Hospital Problems    Diagnosis Date Noted Date Resolved   • Pseudomonas infection [B96.5] 06/28/2017 07/02/2017   • UTI (urinary tract infection) [N39.0] 06/26/2017 07/02/2017   • Left inguinal hernia [K40.90] 06/24/2017 07/02/2017   • Metabolic acidosis [E87.2] 06/23/2017 07/02/2017       Plan:   · S/P RAHUL 6/22/17.  · S/P Laparoscopic left inguinal hernia repair, transabdominal preperitoneal approach 6/23/17  · B12 replacement  · SIADH per nephrology. Na slowly improving  · Afib: not anticoag candidate d/t falls. Rate control per card.  · UTI with pseudomonas, finished 3 days of cefepime.   · Leukocytosis/ Epidural abscess- plan for OR on 7/4 with Dr. Hernandez. Plan to start abx after surgery.  ·     D/W Dr. Dr. David Ponce, family, RN    Greater than 40 minutes spent with greater than 50% counseling and coordinating care    (of note- pt seen on 7/3, due to EPIC/computer issue the finalization of the note was done on 7/4)    Disposition  SNF on discharge  ([facility])       Wagner Charles MD  [facility]  07/03/17  1:08 PM

## 2017-07-03 NOTE — PROGRESS NOTES
EV doppler completed.  Preliminary results:   LT leg is negative for DVT.  Results given to SHANEL Yuan.

## 2017-07-03 NOTE — PLAN OF CARE
Problem: Patient Care Overview (Adult)  Goal: Plan of Care Review  Outcome: Ongoing (interventions implemented as appropriate)    07/03/17 1930   Coping/Psychosocial Response Interventions   Plan Of Care Reviewed With patient   Patient Care Overview   Progress no change   Outcome Evaluation   Outcome Summary/Follow up Plan Pt continues to be stable with little complaint of pain except when working with PT. Pt has had no appetitie and is maintaining his fluid restriction well. Plans to drain spinal abcess tomorrow and for lamenectomy on thursday. Doppler of LLE was negative for DVT. Will continue to monitor vitals and labs.       Goal: Adult Individualization and Mutuality  Outcome: Ongoing (interventions implemented as appropriate)  Goal: Discharge Needs Assessment  Outcome: Ongoing (interventions implemented as appropriate)    Problem: Fall Risk (Adult)  Goal: Absence of Falls  Outcome: Ongoing (interventions implemented as appropriate)  Goal: Absence of Falls  Outcome: Ongoing (interventions implemented as appropriate)    Problem: Pain, Chronic (Adult)  Goal: Acceptable Pain Control/Comfort Level  Outcome: Ongoing (interventions implemented as appropriate)

## 2017-07-03 NOTE — PROGRESS NOTES
LOS: 13 days   Patient Care Team:  Amanda Tanner MD as PCP - General (Family Medicine)    Chief Complaint: hyponatremia    Subjective     Fall     Hip Pain      Back Pain     Extremity Pain          Subjective    Patient with hyponatremia secondary with siadh    Objective     Vital Signs  Temp:  [97.5 °F (36.4 °C)-98.5 °F (36.9 °C)] 98 °F (36.7 °C)  Heart Rate:  [72-84] 74  Resp:  [18] 18  BP: (116-127)/(61-77) 127/64    Objective      Results Review:     I reviewed the patient's new clinical results.    Medication Review:   Scheduled Meds:  cholecalciferol 5,000 Units Oral Daily   demeclocycline 300 mg Oral Q12H   diltiaZEM  mg Oral Q24H   lidocaine 1 mL Intradermal Once   pantoprazole 40 mg Oral BID AC   ramipril 10 mg Oral Daily   vitamin B-12 2,000 mcg Oral Daily     Continuous Infusions:   PRN Meds:.•  acetaminophen  •  bisacodyl  •  calcium carbonate  •  diphenhydrAMINE  •  diphenhydrAMINE-zinc acetate  •  fentanyl  •  HYDROcodone-acetaminophen  •  ibuprofen  •  magnesium hydroxide  •  midazolam  •  Morphine  •  [] Morphine **AND** naloxone  •  ondansetron  •  sodium chloride  •  sodium chloride  •  sodium chloride    Assessment/Plan     Principal Problem:    Closed wedge compression fracture of eleventh thoracic vertebra  Active Problems:    Diarrhea    CKD (chronic kidney disease) stage 2, GFR 60-89 ml/min    B12 deficiency    Hypocalcemia    A-fib    Meningioma    SIADH (syndrome of inappropriate ADH production)    Vitamin D deficiency      Assessment & Plan  1. Hyponatremia  2. Siadh  3. Meningioma  4. Compression fracture    Patient off the floor. Discussed with wife. Sodium back to 125. Will continue current meds and follow    Evette Rowell MD  17  9:43 AM

## 2017-07-03 NOTE — SIGNIFICANT NOTE
07/03/17 1041   Rehab Treatment   Discipline physical therapist   Treatment Not Performed patient unavailable for treatment  (Off the floor to MRI. Will follow up later today)   Recommendation   PT - Next Appointment 07/03/17

## 2017-07-03 NOTE — SIGNIFICANT NOTE
07/03/17 1503   Rehab Treatment   Discipline occupational therapist   Treatment Not Performed patient unavailable for treatment  (Pt off floor, at vascular)

## 2017-07-03 NOTE — PROGRESS NOTES
LOS: 13 days     Chief Complaint:  Follow-up leukocytosis    Interval History:  No acute events. No fevers. No new symptoms. He says chronic back pain is a little above baseline. He has no neuro deficits. Incisions on abdomen are well-healed. He is very eager for DC soon.    ROS: no n/v/d    Vital Signs  Temp:  [97.5 °F (36.4 °C)-98.5 °F (36.9 °C)] 98 °F (36.7 °C)  Heart Rate:  [72-84] 74  Resp:  [18] 18  BP: (116-127)/(61-77) 127/64    Physical Exam:  General: awake, alert, NAD   Head: Normocephalic, atraumatic  Eyes: PERRL, EOMI, no scleral icterus  ENT: MMM, OP clear, no thrush. Fair dentition.   Neck: Supple, no visible thyromegaly  Cardiovascular: NR, RR,  no LE edema  Respiratory: normal work of breathing on ambient air  GI: Abdomen w/ well-healing port sites, it is soft, non-tender, mildly distended, normal bowel sounds in all four quadrants  : no Gong catheter present; no prostate tenderness though it is enlarged  Musculoskeletal: no joint abnormalities, normal musculature  Skin: No rashes, lesions, or embolic phenomenon  Neurological: Alert and oriented x 3, motor strength 5/5 in all four extremities  Psychiatric: Normal mood and affect   Vasc: no cyanosis; PIV w/o erythema    Medications:    Current Facility-Administered Medications:   •  acetaminophen (TYLENOL) tablet 650 mg, 650 mg, Oral, Q6H PRN, Malathi Rubin MD, 650 mg at 06/22/17 2253  •  bisacodyl (DULCOLAX) suppository 10 mg, 10 mg, Rectal, Daily PRN, Malathi Rubin MD, 10 mg at 06/28/17 1810  •  calcium carbonate (TUMS) chewable tablet 500 mg (200 mg elemental), 1 tablet, Oral, BID PRN, Malathi Rubin MD  •  cholecalciferol (VITAMIN D3) tablet 5,000 Units, 5,000 Units, Oral, Daily, Cedrick Posada MD, 5,000 Units at 07/03/17 0904  •  demeclocycline (DECLOMYCIN) tablet 300 mg, 300 mg, Oral, Q12H, Cedrick Posada MD, 300 mg at 07/03/17 0904  •  diltiaZEM CD (CARDIZEM CD) 24 hr capsule 240 mg, 240 mg, Oral,  Q24H, Tiffany Dudley MD, 240 mg at 17 0904  •  diphenhydrAMINE (BENADRYL) capsule 25 mg, 25 mg, Oral, Q6H PRN, Gilbert Marx MD, 25 mg at 17 2037  •  diphenhydrAMINE-zinc acetate 2-0.1 % cream, , Topical, TID PRN, Gilbert Marx MD  •  fentaNYL citrate (PF) (SUBLIMAZE) injection 50 mcg, 50 mcg, Intravenous, Q5 Min PRN, Shane Perez MD, 100 mcg at 17 1130  •  HYDROcodone-acetaminophen (NORCO) 7.5-325 MG per tablet 1 tablet, 1 tablet, Oral, Q6H PRN, Erwin Mei MD, 1 tablet at 17 1745  •  ibuprofen (ADVIL,MOTRIN) tablet 800 mg, 800 mg, Oral, Q6H PRN, Gilbert Marx MD  •  lidocaine (XYLOCAINE) 1 % injection 1 mL, 1 mL, Intradermal, Once, Shane Perez MD  •  magnesium hydroxide (MILK OF MAGNESIA) suspension 2400 mg/10mL 10 mL, 10 mL, Oral, Daily PRN, Malathi Rubin MD, 10 mL at 17 0805  •  midazolam (VERSED) injection 1 mg, 1 mg, Intravenous, Q5 Min PRN, Shane Perez MD  •  morphine injection 2 mg, 2 mg, Intravenous, Q4H PRN, Erwin Mei MD, 2 mg at 17 1224  •  [] morphine injection 2 mg, 2 mg, Intravenous, Q4H PRN, 2 mg at 17 0245 **AND** naloxone (NARCAN) injection 0.4 mg, 0.4 mg, Intravenous, Q5 Min PRN, Malathi Rubin MD  •  ondansetron (ZOFRAN) injection 4 mg, 4 mg, Intravenous, Q6H PRN, Malathi Rubin MD, 4 mg at 17 1032  •  pantoprazole (PROTONIX) EC tablet 40 mg, 40 mg, Oral, BID AC, Malathi Rubin MD, 40 mg at 17 0635  •  ramipril (ALTACE) capsule 10 mg, 10 mg, Oral, Daily, Gilbert Marx MD, 10 mg at 17 0904  •  sodium chloride 0.9 % flush 1-10 mL, 1-10 mL, Intravenous, PRN, Malathi Rubin MD  •  sodium chloride 0.9 % flush 1-10 mL, 1-10 mL, Intravenous, PRN, Shane Perez MD  •  sodium chloride 0.9 % flush 10 mL, 10 mL, Intravenous, PRN, Davonte Carty MD  •  vitamin B-12 (CYANOCOBALAMIN) tablet 2,000 mcg, 2,000 mcg, Oral, Daily, Gilbert Loc  MD Araseli, 2,000 mcg at 07/03/17 0904    LABS:  CBC, diff, BMP reviewed today  Lab Results   Component Value Date    WBC 18.86 (H) 07/03/2017    HGB 10.4 (L) 07/03/2017    HCT 29.6 (L) 07/03/2017    .7 (H) 07/03/2017     (H) 07/03/2017     Lab Results   Component Value Date    GLUCOSE 107 (H) 07/03/2017    BUN 54 (H) 07/03/2017    CREATININE 1.08 07/03/2017    EGFRIFNONA 65 07/03/2017    BCR 50.0 (H) 07/03/2017    CO2 24.8 07/03/2017    CALCIUM 8.7 07/03/2017    ALBUMIN 3.00 (L) 07/03/2017    LABIL2 1.1 06/20/2017    AST 20 06/20/2017    ALT 19 06/20/2017     PSA 8  Procal 0.13  UA 0-2 WBCs     Microbiology:  BCx: none  UCx: >100k pan-sensitive Pseudomonas     Radiology (prior):  CXR negative for PNA  MRI brain concerning for meningioma  CT A/P with inguinal hernia, ? Hyperdense focus in L psoas, and right paraspinal musculature    Assessment/Plan   1. Leukocytosis  2. Lumbar back pain  3. Inguinal hernia s/p repair  4. Hyponatremia  5. Elevated PSA  6. Pseudomonas bacteriuria  7. 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 Probable meningioma     Though WBC is elevated, I see no clear source of infection and he has no infectious symptoms such as fever. He does have back pain above his baseline and with some abnormal findings on the prior MRI L spine and in the psoas/paraspinal muscles on CT then I am going to repeat the L spine MRI to make sure there is no abscess. If that test is negative, I would say that we don't need further work-up into the WBC as he is stable off of antibiotics and this could be followed up at his PCP in 2 weeks. As mentioned in prior note, he does not have prostatitis which was my other consideration.     Thank you for this consult. ID will follow.      ADDENDUM:  Spoke w/ radiology and there are multiple paraspinal abscesses and an epidural abscess. I relayed info to hospitalist and will call neurosurgeon. Hold antibiotics until fluid can be either  surgically removed or IR-drained.

## 2017-07-03 NOTE — PROGRESS NOTES
[facility] PROGRESS NOTE    PATIENT IDENTIFICATION:   Name:  Pavel Melgoza      MRN:  [id number]     83 y.o.  male               CC:Large lumbar epidural abscess/leukocytosis/chronic left lower extremity weakness      Subjective     Interval History: has complaints of ongoing LLE weakness, unchanged, at baseline; no c/o pain, numbness/tingling in R leg, feels OK overall. Pt wife reports incontinence of bowel    Objective     Vital signs in last 24 hours:  Temp:  [97.5 °F (36.4 °C)-98 °F (36.7 °C)] 98 °F (36.7 °C)  Heart Rate:  [72-74] 74  Resp:  [18] 18  BP: (124-127)/(61-69) 127/64  ICP ranges-    Intake/Output last 3 shifts:  I/O last 3 completed shifts:  In: 1196 [P.O.:1196]  Out: 200 [Urine:200]    Intake/Output this shift:  I/O this shift:  In: 120 [P.O.:120]  Out: -       Physical Exam:  General:   Awake, alert, and oriented x 3. NAD  Appears non-toxic  CN III IV VI: Extraocular movements are full , PERRL   CN V: Normal facial sensation and strength of muscles of mastication.  CN VII: Facial movements are symmetric. No weakness.  Diffuse weakness LLE all distributions, decreased sensation throughout left lower extremity as well.  Minimal left calf and ankle edema  Normal strength right lower extremity 5/5  Non tender to palpation lumbar spine/ B buttock  1+ reflexes patellar B   Neg drift BUE and RLE  Sensation: Normal to light touch, pinprick, vibration, temperature, and proprioception in arms and legs. Normal graphesthesia and no extinction on DSS: RLE only  Coordination: Finger to nose test shows no dysmetria.   Extremities:  Patient is wearing KAITLYNN, no SCDs  LABS:    Lab Results (last 24 hours)     Procedure Component Value Units Date/Time    Renal Function Panel [[id number]]  (Abnormal) Collected:  07/03/17 0541    Specimen:  Blood Updated:  07/03/17 0635     Glucose 107 (H) mg/dL      BUN 54 (H) mg/dL      Creatinine 1.08 mg/dL      Sodium 125 (L) mmol/L      Potassium 4.6  mmol/L      Chloride 87 (L) mmol/L      CO2 24.8 mmol/L      Calcium 8.7 mg/dL      Albumin 3.00 (L) g/dL      Phosphorus 4.4 mg/dL      Anion Gap 13.2 mmol/L      BUN/Creatinine Ratio 50.0 (H)     eGFR Non African Amer 65 mL/min/1.73     Narrative:       The MDRD GFR formula is only valid for adults with stable renal function between ages 18 and 70.    CBC & Differential [[id number]] Collected:  07/03/17 0541    Specimen:  Blood Updated:  07/03/17 0614    Narrative:       The following orders were created for panel order CBC & Differential.  Procedure                               Abnormality         Status                     ---------                               -----------         ------                     CBC Auto Differential[[id number]]        Abnormal            Final result                 Please view results for these tests on the individual orders.    CBC Auto Differential [[id number]]  (Abnormal) Collected:  07/03/17 0541    Specimen:  Blood Updated:  07/03/17 0614     WBC 18.86 (H) 10*3/mm3      RBC 2.91 (L) 10*6/mm3      Hemoglobin 10.4 (L) g/dL      Hematocrit 29.6 (L) %      .7 (H) fL      MCH 35.7 (H) pg      MCHC 35.1 g/dL      RDW 12.9 %      RDW-SD 47.9 fl      MPV 8.6 fL      Platelets 738 (H) 10*3/mm3      Neutrophil % 77.3 (H) %      Lymphocyte % 9.3 (L) %      Monocyte % 11.8 %      Eosinophil % 0.5 %      Basophil % 0.1 %      Immature Grans % 1.0 (H) %      Neutrophils, Absolute 14.59 (H) 10*3/mm3      Lymphocytes, Absolute 1.75 10*3/mm3      Monocytes, Absolute 2.23 (H) 10*3/mm3      Eosinophils, Absolute 0.09 10*3/mm3      Basophils, Absolute 0.01 10*3/mm3      Immature Grans, Absolute 0.19 (H) 10*3/mm3     Blood Culture [[id number]] Collected:  07/03/17 1212    Specimen:  Blood from Arm, Right Updated:  07/03/17 1219    Blood Culture [[id number]] Collected:  07/03/17 1259    Specimen:  Blood from Arm, Right Updated:  07/03/17 1314    C-reactive Protein [[id number]]  (Abnormal)  Collected:  07/03/17 1301    Specimen:  Blood Updated:  07/03/17 1341     C-Reactive Protein 7.68 (H) mg/dL     Sedimentation Rate [[id number]]  (Abnormal) Collected:  07/03/17 1301    Specimen:  Blood Updated:  07/03/17 1345     Sed Rate 45 (H) mm/hr     Protime-INR [[id number]]  (Abnormal) Collected:  07/03/17 1301    Specimen:  Blood Updated:  07/03/17 1332     Protime 13.9 Seconds      INR 1.11 (H)    aPTT [[id number]]  (Normal) Collected:  07/03/17 1301    Specimen:  Blood Updated:  07/03/17 1332     PTT 30.1 seconds           IMAGING STUDIES:    I personally viewed and interpreted the patient's lumbar MRI which reveals a large epidural abscess resulting in severe stenosis at the lateral aspect of the thecal sac and extending to the L3-4, L4-5 disc space    Also noted to have multiple abscesses identified in the left psoas muscle      Meds reviewed/changed: Yes    Current Facility-Administered Medications   Medication Dose Route Frequency Provider Last Rate Last Dose   • acetaminophen (TYLENOL) tablet 650 mg  650 mg Oral Q6H PRN Malathi Rubin MD   650 mg at 06/22/17 2253   • bisacodyl (DULCOLAX) suppository 10 mg  10 mg Rectal Daily PRN Malathi Rubin MD   10 mg at 06/28/17 1810   • calcium carbonate (TUMS) chewable tablet 500 mg (200 mg elemental)  1 tablet Oral BID PRN Malathi Rubin MD       • cholecalciferol (VITAMIN D3) tablet 5,000 Units  5,000 Units Oral Daily Cedrick Posada MD   5,000 Units at 07/03/17 0904   • demeclocycline (DECLOMYCIN) tablet 300 mg  300 mg Oral Q12H Cedrick Posada MD   300 mg at 07/03/17 0904   • diltiaZEM CD (CARDIZEM CD) 24 hr capsule 240 mg  240 mg Oral Q24H Tiffany Dudley MD   240 mg at 07/03/17 0904   • diphenhydrAMINE (BENADRYL) capsule 25 mg  25 mg Oral Q6H PRN Gilbert Marx MD   25 mg at 07/02/17 2037   • diphenhydrAMINE-zinc acetate 2-0.1 % cream   Topical TID PRN Gilbert Marx MD       • fentaNYL citrate (PF) (SUBLIMAZE) injection  50 mcg  50 mcg Intravenous Q5 Min PRN Shane Perez MD   100 mcg at 06/24/17 1130   • HYDROcodone-acetaminophen (NORCO) 7.5-325 MG per tablet 1 tablet  1 tablet Oral Q6H PRN Erwin Mei MD   1 tablet at 07/03/17 1138   • ibuprofen (ADVIL,MOTRIN) tablet 800 mg  800 mg Oral Q6H PRN Gilbert Marx MD       • lidocaine (XYLOCAINE) 1 % injection 1 mL  1 mL Intradermal Once Shane Perez MD       • magnesium hydroxide (MILK OF MAGNESIA) suspension 2400 mg/10mL 10 mL  10 mL Oral Daily PRN Malathi Rubin MD   10 mL at 06/28/17 0805   • midazolam (VERSED) injection 1 mg  1 mg Intravenous Q5 Min PRN Shane Perez MD       • morphine injection 2 mg  2 mg Intravenous Q4H PRN Erwin Mei MD   2 mg at 07/01/17 1224   • naloxone (NARCAN) injection 0.4 mg  0.4 mg Intravenous Q5 Min PRN Malathi Rubin MD       • ondansetron (ZOFRAN) injection 4 mg  4 mg Intravenous Q6H PRN Malathi Rubin MD   4 mg at 06/24/17 1032   • pantoprazole (PROTONIX) EC tablet 40 mg  40 mg Oral BID AC Malathi Rubin MD   40 mg at 07/03/17 0635   • ramipril (ALTACE) capsule 10 mg  10 mg Oral Daily Gilbert Marx MD   10 mg at 07/03/17 0904   • sodium chloride 0.9 % flush 1-10 mL  1-10 mL Intravenous PRN Malathi Rubin MD       • sodium chloride 0.9 % flush 1-10 mL  1-10 mL Intravenous PRN Shane Perez MD       • sodium chloride 0.9 % flush 10 mL  10 mL Intravenous PRN Davonte Carty MD       • [START ON 7/4/2017] sodium chloride 0.9 % infusion  50 mL/hr Intravenous Continuous Nolberto Hernandez IV, MD       • vitamin B-12 (CYANOCOBALAMIN) tablet 2,000 mcg  2,000 mcg Oral Daily Gilbert Marx MD   2,000 mcg at 07/03/17 0904       Assessment/Plan     ASSESSMENT:    Principal Problem:    Closed wedge compression fracture of eleventh thoracic vertebra  Active Problems:    Diarrhea    CKD (chronic kidney disease) stage 2, GFR 60-89 ml/min    B12 deficiency     Hypocalcemia    A-fib    Meningioma    SIADH (syndrome of inappropriate ADH production)    Vitamin D deficiency      PLAN: Dr Hernandez to do lumbar laminectomy, discectomy and decompression posteriorly tomorrow morning at the request of Dr ANDREA, secondary to new lumbar abscess.  UA with culture has been ordered to further evaluate recent history of Pseudomonas UTI.  Stat left lower extremity Doppler ordered to rule out superficial/deep venous thrombosis. Pre-op orders are in, he will made NPO now for possible psoas muscle abscess drainage. Long discussion with Dr Hernandez, pt and his wife at bedside regarding OR, risks and benefits. They agree to proceed forward as arranged.     I discussed the patients findings and my recommendations with patient, family, nursing staff, primary care team, consulting provider and Dr Hernandez       LOS: 13 days       Abigail Ramirez, APRN  7/3/2017  1:53 PM

## 2017-07-03 NOTE — SIGNIFICANT NOTE
07/03/17 1437   Rehab Treatment   Discipline physical therapy assistant   Treatment Not Performed patient unavailable for treatment  (Pt off the floor for a test PT to follow up tomorrow)   Recommendation   PT - Next Appointment 07/04/17

## 2017-07-04 PROBLEM — G06.2 EPIDURAL ABSCESS: Status: ACTIVE | Noted: 2017-01-01

## 2017-07-04 NOTE — PROGRESS NOTES
Continued Stay Note  [facility]     Patient Name: Pavel Melgoza  MRN: [id number]  Today's Date: 7/4/2017    Admit Date: 6/20/2017          Discharge Plan       07/04/17 1505    Case Management/Social Work Plan    Additional Comments Transfer from 5S following surgery for to 5P 07/04/17 @ 1432.  S/p Lumbar laminectomy for resection of epidural abscess.  [facility] plan is to [facility] per notes.  CCP to follow-up tomorrow.                     Expected Discharge Date and Time     Expected Discharge Date Expected Discharge Time    July 6, 2017             Danielle Singh RN

## 2017-07-04 NOTE — PROGRESS NOTES
Name: Pavel Melgoza ADMIT: 2017   : 1934  PCP: Amanda Tanner MD    MRN: [id number] LOS: 14 days   AGE/SEX: 83 y.o. male  ROOM: [facility]   Subjective   Subjective  Cc- back pain    S/p OR today  Pain is moderate  On IV abx    ROS  No f/c  No n/v  No CP/palp  No soa/cough  Objective   Vital Signs  Temp:  [97.4 °F (36.3 °C)-98.8 °F (37.1 °C)] 97.4 °F (36.3 °C)  Heart Rate:  [66-85] 77  Resp:  [18-21] 18  BP: (105-137)/() 105/61  SpO2:  [91 %-100 %] 91 %  on  Flow (L/min):  [2-4] 2;   O2 Device: nasal cannula  Body mass index is 24.21 kg/(m^2).    Alert, nad  Supple, no jvd  Soft, nt  Lungs clear no resp distress  RRR, no murmurs  No edema or cyanosis   SCDs  Pleasant     Physical Exam    Results Review:       I reviewed the patient's new clinical results.    Results from last 7 days  Lab Units 17  0538 17  0541 17  0455 17  0508 17  0536 17  0401   WBC 10*3/mm3 14.11* 18.86* 18.02* 17.00* 11.18* 12.28*   HEMOGLOBIN g/dL 9.4* 10.4* 11.0* 10.3* 10.2* 10.7*   PLATELETS 10*3/mm3 674* 738* 690* 627* 562* 466       Results from last 7 days  Lab Units 17  0538 17  0541 17  0524 17  0455 17  0508 17  0536 17  0401   SODIUM mmol/L 125* 125* 124* 123* 125* 125* 129*   POTASSIUM mmol/L 4.4 4.6 4.6 4.5 4.8 4.7 4.6   CHLORIDE mmol/L 90* 87* 87* 88* 90* 89* 91*   CO2 mmol/L 23.4 24.8 22.4 23.3 21.7* 25.3 25.2   BUN mg/dL 43* 54* 39* 29* 25* 27* 26*   CREATININE mg/dL 0.99 1.08 1.06 0.79 0.82 0.87 0.79   GLUCOSE mg/dL 90 107* 107* 103* 100* 103* 113*   Estimated Creatinine Clearance: 62.1 mL/min (by C-G formula based on Cr of 0.99).    Results from last 7 days  Lab Units 17  0538 17  0541 17  0524 17  0455 17  0508 17  0536 17  0401   CALCIUM mg/dL 8.3* 8.7 8.5* 8.5* 8.2* 8.1* 8.1*   ALBUMIN g/dL 2.50* 3.00* 2.90* 2.90* 2.80* 2.90* 2.80*   PHOSPHORUS mg/dL 4.5 4.4 4.1 3.7 3.3 3.7  4.2         ceFAZolin 2 g Intravenous Q8H   cefepime 2 g Intravenous Q8H   cholecalciferol 5,000 Units Oral Daily   demeclocycline 300 mg Oral Q12H   diltiaZEM  mg Oral Q24H   fentaNYL citrate (PF)      HYDROmorphone      lidocaine 1 mL Intradermal Once   pantoprazole 40 mg Oral BID AC   ramipril 10 mg Oral Daily   [START ON 7/5/2017] vancomycin 1,000 mg Intravenous Q12H   vancomycin 20 mg/kg Intravenous Once   vitamin B-12 2,000 mcg Oral Daily       Pharmacy to dose vancomycin     sodium chloride 50 mL/hr Last Rate: 50 mL/hr (07/04/17 1340)   Diet Clear Liquid      Assessment/Plan   Assessment:     Active Hospital Problems (** Indicates Principal Problem)    Diagnosis Date Noted   • **Closed wedge compression fracture of eleventh thoracic vertebra [S22.080A] 06/20/2017   • Epidural abscess [G06.2] 07/04/2017   • Vitamin D deficiency [E55.9] 06/26/2017   • SIADH (syndrome of inappropriate ADH production) [E22.2] 06/25/2017   • Meningioma [D32.9] 06/24/2017   • Hypocalcemia [E83.51] 06/23/2017   • A-fib [I48.91] 06/23/2017   • B12 deficiency [E53.8] 06/21/2017   • Diarrhea [R19.7] 06/20/2017   • CKD (chronic kidney disease) stage 2, GFR 60-89 ml/min [N18.2] 06/20/2017      Resolved Hospital Problems    Diagnosis Date Noted Date Resolved   • Pseudomonas infection [B96.5] 06/28/2017 07/02/2017   • UTI (urinary tract infection) [N39.0] 06/26/2017 07/02/2017   • Left inguinal hernia [K40.90] 06/24/2017 07/02/2017   • Metabolic acidosis [E87.2] 06/23/2017 07/02/2017       Plan:   · S/P RAHUL 6/22/17.  · S/P Laparoscopic left inguinal hernia repair, transabdominal preperitoneal approach 6/23/17  · B12 replacement  · SIADH per nephrology. Na slowly improving  · Afib: not anticoag candidate d/t falls. Rate control per card.  · UTI with pseudomonas, finished 3 days of cefepime.   · Leukocytosis/ Epidural abscess/Psoas abscess-  OR on 7/4 with Dr. Hernandez. On ABX per ID, plan for IR drainage of psoas abscess  ·     D/W   SHANEL Hernandez      Disposition  SNF on discharge ([facility]) eventually      Wagner Charles MD  [facility]  07/04/17  6:10 PM

## 2017-07-04 NOTE — ANESTHESIA PREPROCEDURE EVALUATION
Anesthesia Evaluation            Airway   Mallampati: II  no difficulty expected  Dental    (+) upper dentures    Pulmonary    (+) a smoker Former,   Cardiovascular     ECG reviewed  Rhythm: irregular    (+) dysrhythmias Atrial Fib,       Neuro/Psych  GI/Hepatic/Renal/Endo      Musculoskeletal     Abdominal    Substance History      OB/GYN          Other                                      Anesthesia Plan    ASA 3 - emergent     general   (Partial upper dentures are out  Urinary retention now catheterized)  intravenous induction   Anesthetic plan and risks discussed with patient.

## 2017-07-04 NOTE — BRIEF OP NOTE
LUMBAR LAMINECTOMY DISCECTOMY DECOMPRESSION POSTERIOR 1-2 LEVELS  Procedure Note    Pavel Melgoza  6/20/2017 - 7/4/2017    Pre-op Diagnosis:   * No pre-op diagnosis entered * Epidural lumbar abscess    Post-op Diagnosis:     * No Diagnosis Codes entered * Same    Procedure/CPT® Codes:      Procedure(s):  LUMBAR LAMINECTOMY FOR RESECTION OF EPIDURAL ABCESS     Surgeon(s):  Nolberto Hernandez IV, MD    Anesthesia: General    Staff:   Circulator: Ana Vidales RN; Marianne Virgen RN  Radiology Technologist: Kacey Santiago  Scrub Person: Bre Rivera  Assistant: Tiffany Escalona CSA    Estimated Blood Loss: 100 mL  Urine Voided: 550 mL    Specimens:                  ID Type Source Tests Collected by Time Destination   1 : paraspinal abscess Wound Spine, Lumbar ANAEROBIC CULTURE, WOUND CULTURE Nolberto Hernandez IV, MD 7/4/2017 0938    2 : paraspinal abscess Wound Spine, Lumbar ANAEROBIC CULTURE, WOUND CULTURE Nolberto Hernandez IV, MD 7/4/2017 0939    3 : epidural abscess Wound Spine, Lumbar ANAEROBIC CULTURE, WOUND CULTURE Nolberto Hernandez IV, MD 7/4/2017 1010    4 : epidural abscess Wound Spine, Lumbar ANAEROBIC CULTURE, WOUND CULTURE Nolberto Hernandez IV, MD 7/4/2017 1010    5 : epidural abscess Wound Spine, Lumbar ANAEROBIC CULTURE, WOUND CULTURE Nolberto Hernandez IV, MD 7/4/2017 1019    6 : epidural abscess Tissue Spine, Lumbar ANAEROBIC CULTURE, TISSUE/BONE CULTURE Nolberto Hernandez IV, MD 7/4/2017 1022          Drains:   Urethral Catheter 07/03/17 1412 100% silicone 16 10 10 (Active)   Daily Indications Urologist on the case and cannot remove without order 7/4/2017 12:26 AM   Securement secured to upper leg with adhesive device 7/4/2017 12:26 AM   Catheter care done Yes 7/4/2017 12:26 AM   Tolerance no signs/symptoms of discomfort 7/4/2017 12:26 AM   Urine Output (mL) 1800 7/4/2017  5:00 AM           Findings: liquid pus and phlegmon, vital bone    Complications: incidental durotomy  secondary to adherent phlegmon to dura, closed with 6-0 prolene and valsalva negative for leak at conclusion of case      Nolberto Hernandez IV, MD     Date: 7/4/2017  Time: 12:08 PM

## 2017-07-04 NOTE — PROGRESS NOTES
"Pharmacokinetic Consult - Vancomycin Dosing (Initial Note)    Pavel Abad has been consulted for pharmacy to dose vancomycin for bone and joint infection. Patient with an epidural and retroperitoneal abscess. S/P lumbar laminectomy 7/4/17  Pharmacy dosing vancomycin per Dr. Ponce's request.   Goal trough: 15-20 mg/L   Other antimicrobials: cefepime 2 gm iv q8h    Relevant clinical data and objective history reviewed:  83 y.o. male 72.01\" (182.9 cm) 183 lb 8 oz (83.2 kg)    Past Medical History:   Diagnosis    • Plasmacytoma      Creatinine   Date Value Ref Range Status   07/04/2017 0.99 0.76 - 1.27 mg/dL Final   07/03/2017 1.08 0.76 - 1.27 mg/dL Final   07/02/2017 1.06 0.76 - 1.27 mg/dL Final     BUN   Date Value Ref Range Status   07/04/2017 43 (H) 8 - 23 mg/dL Final     Estimated Creatinine Clearance: 62.1 mL/min (by C-G formula based on Cr of 0.99).    Lab Results   Component Value Date    WBC 14.11 (H) 07/04/2017     Vital Signs (last 24 hours)       07/03 0700  -  07/04 0659 07/04 0700  -  07/04 1324   Most Recent    Temp (°F) 98 -  98.8    98.1 -  98.7     98.1 (36.7)    Heart Rate 67 -  74    75 -  85     77    Resp 16 -  18    18 -  21     18    /58 -  137/66    107/60 -  133/62     (!) 132/104    SpO2 (%) 96 -  97    94 -  100     100        Baseline culture/source/susceptibility:   7/4: Tissue from Spine AFB- pending  7/4: Tissue from Spine- GPC  7/4: Wound from Spine anaerobic- pending  7/4: Wound from spine- no organisms seen  7/3: Urine- No Growth  7/3: Blood from Arm, Right- NGTD x 2    Assessment/Plan  Reviewed chart.  Renal function is stable.     1. Loading dose of Vancomycin 1.75 gm (20 mg/kg) Iv x 1 dose followed by vancomycin 1000 mg  (12 mg/kg) iv q12h maintenance dose  2. Vancomycin trough level with 4th dose of maintenance regimen  3. Creatinine Q24H for the first 3 days then every 48 hours per dosing recommendations     Pharmacy will continue to follow daily while on " vancomycin and adjust as needed.     Tracy Christie, PharmD

## 2017-07-04 NOTE — PERIOPERATIVE NURSING NOTE
Pt came to Holding with andrés hose that were too short and after removing them discovered pressure ulcers on both feet at bend of the feet. Called , who in turn called primary RN Kwabena to come and take pictures for documentation. Daughter states they have never been removed since admission on the 6/20/17. Dr. Hernandez at bedside and surgery today to proceed as scheduled. Andrés hose left off and SCD's sent to surgery.

## 2017-07-04 NOTE — CONSULTS
FIRST UROLOGY CONSULT      Patient Identification:  NAME:  Pavel Melgoza  Age:  83 y.o.   Sex:  male   :  1934   MRN:  [id number]       Chief complaint: retention    History of present illness:  82yo man with lumbar abscess and urinary retention      Past medical history:  Past Medical History:   Diagnosis Date   • Plasmacytoma        Past surgical history:  Past Surgical History:   Procedure Laterality Date   • FRACTURE SURGERY     • INGUINAL HERNIA REPAIR Left 2017    Procedure: LAPAROSCOPIC LEFT INGUINAL HERNIA REPAIR WITH MESH;  Surgeon: Erwin Mei MD;  Location: [facility];  Service:    • JOINT REPLACEMENT      bilateral hips       Allergies:  Review of patient's allergies indicates no known allergies.    Home medications:  Prescriptions Prior to Admission   Medication Sig Dispense Refill Last Dose   • atorvastatin (LIPITOR) 10 MG tablet Take 10 mg by mouth Daily.   Taking at am   • ibuprofen (ADVIL,MOTRIN) 800 MG tablet Take 800 mg by mouth Every 6 (Six) Hours As Needed for Mild Pain (1-3).   Taking at am   • ramipril (ALTACE) 10 MG capsule Take 10 mg by mouth Daily.   Taking at am        Hospital medications:    cholecalciferol 5,000 Units Oral Daily   demeclocycline 300 mg Oral Q12H   diltiaZEM  mg Oral Q24H   lidocaine 1 mL Intradermal Once   pantoprazole 40 mg Oral BID AC   ramipril 10 mg Oral Daily   vitamin B-12 2,000 mcg Oral Daily       sodium chloride 50 mL/hr Last Rate: 50 mL/hr (17 0026)     •  acetaminophen  •  bisacodyl  •  calcium carbonate  •  diphenhydrAMINE  •  diphenhydrAMINE-zinc acetate  •  fentanyl  •  HYDROcodone-acetaminophen  •  ibuprofen  •  magnesium hydroxide  •  midazolam  •  Morphine  •  [] Morphine **AND** naloxone  •  ondansetron  •  sodium chloride  •  sodium chloride  •  sodium chloride    Family history:  History reviewed. No pertinent family history.    Social history:  Social History   Substance Use Topics   • Smoking  status: Former Smoker   • Smokeless tobacco: None   • Alcohol use No       Review of systems:    Negative 12-system ROS except for the following:  Urinary retention      Objective:  TMax 24 hours:   Temp (24hrs), Av.4 °F (36.9 °C), Min:98 °F (36.7 °C), Max:98.8 °F (37.1 °C)      Vitals Ranges:   Temp:  [98 °F (36.7 °C)-98.8 °F (37.1 °C)] 98.8 °F (37.1 °C)  Heart Rate:  [67-74] 67  Resp:  [16-18] 18  BP: (102-137)/(58-66) 137/66    Intake/Output Last 3 shifts:  I/O last 3 completed shifts:  In: 2076 [P.O.:1076; I.V.:1000]  Out: 1100 [Urine:1100]     Physical Exam:       General Appearance:    Alert, cooperative, in no acute distress   Head:    Normocephalic, without obvious abnormality, atraumatic   Eyes:          PERRL, conjunctivae and corneas clear   Ears:    Normal external inspection   Throat:   No oral lesions, oral mucosa moist   Neck:   Supple, no LAD, trachea midline   Back:     No CVA tenderness   Lungs:     Respirations unlabored, symmetric excursion    Heart:    RRR, intact peripheral pulses   Abdomen:     Soft, NDNT, no masses, no guarding   :    Testes descended bilaterally, no nodules.  Penis normal.  No scrotal or penile rashes noted   Extremities:   No edema, no deformity   Skin:   No bleeding, bruising or rashes   Neuro/Psych:   Orientation intact, mood/affect pleasant, no focal findings       Results review:   I reviewed the patient's new clinical results.    Data review:  Lab Results (last 24 hours)     Procedure Component Value Units Date/Time    Protime-INR [[id number]]  (Abnormal) Collected:  17 1301    Specimen:  Blood Updated:  17 1332     Protime 13.9 Seconds      INR 1.11 (H)    aPTT [[id number]]  (Normal) Collected:  17 1301    Specimen:  Blood Updated:  17 1332     PTT 30.1 seconds     C-reactive Protein [[id number]]  (Abnormal) Collected:  17 1301    Specimen:  Blood Updated:  17 1341     C-Reactive Protein 7.68 (H) mg/dL     Sedimentation Rate  [[id number]]  (Abnormal) Collected:  07/03/17 1301    Specimen:  Blood Updated:  07/03/17 1345     Sed Rate 45 (H) mm/hr     Urine Culture [[id number]] Collected:  07/03/17 1429    Specimen:  Urine from Urine, Clean Catch Updated:  07/03/17 1429    Urinalysis With / Microscopic If Indicated [[id number]]  (Normal) Collected:  07/03/17 1429    Specimen:  Urine from Urine, Clean Catch Updated:  07/03/17 1441     Color, UA Yellow     Appearance, UA Clear     pH, UA 6.0     Specific Gravity, UA 1.017     Glucose, UA Negative     Ketones, UA Negative     Bilirubin, UA Negative     Blood, UA Negative     Protein, UA Negative     Leuk Esterase, UA Negative     Nitrite, UA Negative     Urobilinogen, UA 0.2 E.U./dL    Narrative:       Urine microscopic not indicated.    Blood Culture [[id number]]  (Normal) Collected:  07/03/17 1212    Specimen:  Blood from Arm, Right Updated:  07/04/17 0101     Blood Culture No growth at less than 24 hours    Blood Culture [[id number]]  (Normal) Collected:  07/03/17 1259    Specimen:  Blood from Arm, Right Updated:  07/04/17 0201     Blood Culture No growth at less than 24 hours    CBC (No Diff) [[id number]] Collected:  07/04/17 0538    Specimen:  Blood Updated:  07/04/17 0646           Imaging:  Imaging Results (last 24 hours)     Procedure Component Value Units Date/Time    MRI Lumbar Spine With & Without Contrast [[id number]] Collected:  07/03/17 1223     Updated:  07/03/17 1434    Narrative:       MRI OF THE LUMBAR SPINE WITH AND WITHOUT CONTRAST     CLINICAL HISTORY: Chronic low back pain for 3-5 days. History of  plasmacytoma.     TECHNIQUE: MRI of the lumbar spine was obtained with sagittal pre and  postgadolinium T1, proton-density, and T2-weighted images. Additionally,  there are axial pre and postgadolinium T1 and T2-weighted images through  the lumbar spine.     Comparison is made to previous MRI of the lumbar spine dated 06/20/2017.     FINDINGS:  There is an epidural fluid collection  within the left lateral aspect of  the spinal canal extending from the level of the L3 vertebral body down  to the level of the L5 vertebral body. This collection measures up to  approximately 1.9 x 1.1 cm in greatest axial dimensions and  approximately 4.7 cm in greatest craniocaudad dimensions. The finding is  most likely representative of a large epidural abscess and is new when  compared to the previous study dated 06/20/2017. Furthermore, there were  multiple fluid collections appreciated within the left psoas muscle. The  largest of these measures up to approximately 2.5 x 2.3 cm in greatest  axial dimensions and these collections are most compatible with multiple  psoas abscesses. Again, these are new when compared to the previous  study. Additionally, there are multiple fluid collections identified  within the right posterior paraspinal soft tissues. The largest of these  collections measures up to approximately 1.9 x 1.7 cm in greatest axial  dimensions and is again compatible with a posterior paraspinal muscular  abscess. Again, these findings are new when compared to the previous  study. To a lesser extent, a new small fluid collection is seen within  the left posterior paraspinal soft tissues at the L4-5 level measuring  up to 8 mm in diameter. Again, this is consistent with a small abscess.  There is a fluid collection appreciated within the right posterior  subcutaneous fat extending from the level of the T12-L1 interspace down  to the level of the L3-4 interspace. This collection measures up to  approximately 5.3 x 2.0 cm in greatest axial dimensions. Again, this  finding is new when compared to the previous exam and is compatible with  a posterior subcutaneous abscess.     Prominent chronic appearing Schmorl's nodes are identified within the  superior and inferior endplates of T12, the superior endplate of L2, the  superior endplate of L3, superior endplate of L4, and the superior  endplate of L5.      At T12-L1, there is mild facet arthropathy.     At L1-2, again mild facet arthritic changes are noted. The conus  medullaris terminates at the L1-2 level and has normal signal intensity.     At L2-3, there is mild facet arthropathy.     At L3-4, there is a moderate to severe degree of central canal stenosis  secondary to the epidural fluid collection deviating the thecal sac to  the right. There is moderate facet arthropathy.     At L5-S1, there is moderate to severe facet arthritic change. Again,  there is moderate to severe central canal stenosis secondary to the  epidural fluid collection deviating the thecal sac to the right. There  is mild-to-moderate right foraminal narrowing secondary to disc bulging.     At L5-S1, there is moderate right foraminal narrowing secondary to  bulging disc material.     Extensive signal abnormalities are identified within the sacrum and to a  lesser extent the medial iliac bones as well as the posterior aspects of  the L5 vertebral body and posterior elements of L5 compatible with the  stated history of a treated plasmacytoma in this region. These extensive  signal abnormalities within the sacrum, L5, and medial iliac bones are  stable when compared to the prior exam. Again noted is a deformity and a  fluid signal intensity cleft within the sacrum. The fluid signal  intensity cleft measures up to approximately 7.6 x 1.3 cm in greatest  axial dimensions and was also evident on the prior study. The sacral  spinal canal is very difficult to evaluate  but may be considerably  compromised by these changes of the treated plasmacytoma.     There is an abdominal aortic aneurysm which measures up to approximately  3.8 x 4.2 cm in greatest axial dimensions.     There is a markedly distended urinary bladder with a trabeculated  appearance. There is bilateral pelvocaliectasis as well as  ureterectasis.       Impression:       There are findings consistent with a large epidural abscess that  has  developed in the interval since the prior exam. This causes severe canal  stenosis and is seen along the left posterior lateral aspect of the  thecal sac extending from the level of the L3-4 disc space down to the  level of the L4-5 disc space. Additionally, multiple abscesses are  identified in the left psoas muscle, the posterior paraspinal muscles,  right greater than left, and the posterior subcutaneous fat along the  right side of the lumbar spine extending from the level of the T12-L1  disc space down to the level of the L3-4 disc space.     Extensive signal abnormalities are identified within the sacrum and to a  lesser extent the medial iliac bones as well as the posterior aspects of  the L5 vertebral body and posterior elements of L5 compatible with the  stated history of a treated plasmacytoma in this region. These extensive  signal abnormalities within the sacrum, L5, and medial iliac bones are  stable when compared to the prior exam. Again noted is a deformity and a  fluid signal intensity cleft within the sacrum. The sacral spinal canal  is very difficult to evaluate but may be considerably compromised by  these changes of the treated plasmacytoma.     3.8 x 4.2 cm abdominal aortic aneurysm.     Markedly distended and trabeculated appearing urinary bladder with  bilateral pelvocaliectasis and ureterectasis.     These findings were discussed with Dr. Ponce on 07/03/2017 at  approximately 11:40 AM.     This report was finalized on 7/3/2017 2:31 PM by Dr. Nate Michelle MD.                Assessment:     Principal Problem:    Closed wedge compression fracture of eleventh thoracic vertebra  Active Problems:    Diarrhea    CKD (chronic kidney disease) stage 2, GFR 60-89 ml/min    B12 deficiency    Hypocalcemia    A-fib    Meningioma    SIADH (syndrome of inappropriate ADH production)    Vitamin D deficiency    Nothing acute re urinary system    Plan:     Gong  Manage retroperitoneal abscess    Nolberto  TANIKA Fraser Jr., MD  07/04/17  6:48 AM

## 2017-07-04 NOTE — ANESTHESIA PROCEDURE NOTES
Airway  Urgency: elective    Date/Time: 7/4/2017 9:08 AM  Airway not difficult    General Information and Staff    Patient location during procedure: OR  Anesthesiologist: ABDOUL VILLASENOR  CRNA: MARI TAYLOR    Indications and Patient Condition  Indications for airway management: airway protection    Preoxygenated: yes  MILS not maintained throughout  Mask difficulty assessment: 1 - vent by mask    Final Airway Details  Final airway type: endotracheal airway      Successful airway: ETT and reinforced tube  Cuffed: yes   Successful intubation technique: direct laryngoscopy  Facilitating devices/methods: intubating stylet  Endotracheal tube insertion site: oral  Blade: Kel  Blade size: #3  ETT size: 8.0 mm  Cormack-Lehane Classification: grade I - full view of glottis  Placement verified by: chest auscultation and capnometry   Cuff volume (mL): 7  Measured from: lips  ETT to lips (cm): 23  Number of attempts at approach: 1    Additional Comments  Pt preoxygenated prior to induction, easy mask airway, atraumatic intubation,+ ETCO2, + bs bilat,  ETT secured and connected to ventilator.

## 2017-07-04 NOTE — PLAN OF CARE
Problem: Perioperative Period (Adult)  Goal: Signs and Symptoms of Listed Potential Problems Will be Absent or Manageable (Perioperative Period)  Outcome: Ongoing (interventions implemented as appropriate)    07/04/17 0807   Perioperative Period   Problems Assessed (Perioperative Period) pain   Problems Present (Perioperative Period) pain

## 2017-07-04 NOTE — PLAN OF CARE
Problem: Patient Care Overview (Adult)  Goal: Plan of Care Review  Outcome: Ongoing (interventions implemented as appropriate)    07/03/17 1732 07/04/17 0346   Patient Care Overview   Progress no change --    Outcome Evaluation   Outcome Summary/Follow up Plan --  Patients vitals stable. Patient reports pain and given PRN pain medication. Patient NPO at midnight for lamenectomy. Will continue to monitor.         Problem: Fall Risk (Adult)  Goal: Absence of Falls  Outcome: Ongoing (interventions implemented as appropriate)    Problem: Pain, Chronic (Adult)  Goal: Acceptable Pain Control/Comfort Level  Outcome: Ongoing (interventions implemented as appropriate)

## 2017-07-04 NOTE — PLAN OF CARE
Problem: Perioperative Period (Adult)  Goal: Signs and Symptoms of Listed Potential Problems Will be Absent or Manageable (Perioperative Period)  Outcome: Ongoing (interventions implemented as appropriate)    07/04/17 1238   Perioperative Period   Problems Assessed (Perioperative Period) all

## 2017-07-04 NOTE — ANESTHESIA POSTPROCEDURE EVALUATION
Patient: Pavel Melgoza    Procedure Summary     Date Anesthesia Start Anesthesia Stop Room / Location    07/04/17 0856 1219  [facility] / [facility]       Procedure Diagnosis Surgeon Provider    LUMBAR LAMINECTOMY FOR RESECTION OF EPIDURAL ABCESS  (N/A Spine Lumbar) No diagnosis on file. MD Duy Dove IV, MD          Anesthesia Type: general  Last vitals  BP (!) 132/104 (07/04/17 1305)    Temp 36.7 °C (98.1 °F) (07/04/17 1216)    Pulse 77 (07/04/17 1305)   Resp 18 (07/04/17 1305)    SpO2 100 % (07/04/17 1305)      Post Anesthesia Care and Evaluation    Patient location during evaluation: PACU  Patient participation: complete - patient participated  Level of consciousness: awake and alert  Pain management: adequate  Airway patency: patent  Anesthetic complications: No anesthetic complications    Cardiovascular status: acceptable  Respiratory status: acceptable  Hydration status: acceptable

## 2017-07-04 NOTE — PROGRESS NOTES
LOS: 14 days     Chief Complaint:  Follow-up leukocytosis    Interval History:  MRI with epidural abscess so went to OR today for drainage. He is sedated in PACU at the time of my interview so history can't be obtained. Gram stain from op culture with GPCs. I'm starting antibiotics now.    ROS: can't obtain due to mental status    Vital Signs  Temp:  [98.1 °F (36.7 °C)-98.8 °F (37.1 °C)] 98.1 °F (36.7 °C)  Heart Rate:  [67-85] 77  Resp:  [16-21] 18  BP: (102-137)/() 132/104    Physical Exam:  General: sedated in PACU  Head: Normocephalic, atraumatic  Eyes: PERRL, EOMI, no scleral icterus  ENT: airway in place, MM dry, OP clear, no thrush. Fair dentition.   Neck: Supple  Cardiovascular: NR, RR,  no LE edema  Respiratory: normal work of breathing on nasal cannula  GI: Abdomen w/ well-healing port sites, it is soft, non-tender, mildly distended, normal bowel sounds in all four quadrants  : Gong catheter present; no prostate tenderness on recent exam though it was enlarged  Musculoskeletal: no joint abnormalities, normal musculature  Skin: No rashes, lesions, or embolic phenomenon  Psychiatric: sedated  Vasc: no cyanosis; PIV w/o erythema    Medications:  •  cefepime (MAXIPIME) 2 g/100 mL 0.9% NS (mbp), 2 g, Intravenous, Q8H, Chnadra Ponce MD    LABS:  CBC, BMP, micro reviewed today  Lab Results   Component Value Date    WBC 14.11 (H) 07/04/2017    HGB 9.4 (L) 07/04/2017    HCT 26.7 (L) 07/04/2017    .3 (H) 07/04/2017     (H) 07/04/2017     Lab Results   Component Value Date    GLUCOSE 90 07/04/2017    BUN 43 (H) 07/04/2017    CREATININE 0.99 07/04/2017    EGFRIFNONA 72 07/04/2017    BCR 43.4 (H) 07/04/2017    CO2 23.4 07/04/2017    CALCIUM 8.3 (L) 07/04/2017    ALBUMIN 2.50 (L) 07/04/2017    LABIL2 1.1 06/20/2017    AST 20 06/20/2017    ALT 19 06/20/2017    CRP 7.68 (H) 07/03/2017     PSA 8  Procal 0.13  UA 0-2 WBCs     Microbiology:  7/4 OR Back Cx: GPCs on gram stain  7/3 BCx:  NGTD  6/25 UCx: >100k pan-sensitive Pseudomonas     Radiology (reviewed report and discussed w/ radiologist and surgeon):  MRI with epidural abscess and psoas abscess     Assessment/Plan   1. Epidural and retroperitoneal abscess  -s/p lumbar laminectomy on 7/4/17  -cultures are pending; GPCs on gram stain  -start empiric vancomycin and cefepime while awaiting cultures  -check CRP w/ AM labs  -tomorrow will order IR-guided drainage of psoas abscess  -will place PICC in the coming days for long-term access    2. Probable meningioma  -f/u with NSGY as an outpatient which he will be doing anyways for the back    Case d/w Dr Hernandez.

## 2017-07-04 NOTE — PROGRESS NOTES
Pt currently in OR, chart reviewed  Sodium stable at 125, on IVFs perioperatively, stop fluids once tolerating PO this evening  Will follow-up in AM with repeat labs  Continue demeclocycline

## 2017-07-04 NOTE — H&P (VIEW-ONLY)
[facility] PROGRESS NOTE    PATIENT IDENTIFICATION:   Name:  Pavel Melgoza      MRN:  [id number]     83 y.o.  male               CC:Large lumbar epidural abscess/leukocytosis/chronic left lower extremity weakness      Subjective     Interval History: has complaints of ongoing LLE weakness, unchanged, at baseline; no c/o pain, numbness/tingling in R leg, feels OK overall. Pt wife reports incontinence of bowel    Objective     Vital signs in last 24 hours:  Temp:  [97.5 °F (36.4 °C)-98 °F (36.7 °C)] 98 °F (36.7 °C)  Heart Rate:  [72-74] 74  Resp:  [18] 18  BP: (124-127)/(61-69) 127/64  ICP ranges-    Intake/Output last 3 shifts:  I/O last 3 completed shifts:  In: 1196 [P.O.:1196]  Out: 200 [Urine:200]    Intake/Output this shift:  I/O this shift:  In: 120 [P.O.:120]  Out: -       Physical Exam:  General:   Awake, alert, and oriented x 3. NAD  Appears non-toxic  CN III IV VI: Extraocular movements are full , PERRL   CN V: Normal facial sensation and strength of muscles of mastication.  CN VII: Facial movements are symmetric. No weakness.  Diffuse weakness LLE all distributions, decreased sensation throughout left lower extremity as well.  Minimal left calf and ankle edema  Normal strength right lower extremity 5/5  Non tender to palpation lumbar spine/ B buttock  1+ reflexes patellar B   Neg drift BUE and RLE  Sensation: Normal to light touch, pinprick, vibration, temperature, and proprioception in arms and legs. Normal graphesthesia and no extinction on DSS: RLE only  Coordination: Finger to nose test shows no dysmetria.   Extremities:  Patient is wearing KAITLYNN, no SCDs  LABS:    Lab Results (last 24 hours)     Procedure Component Value Units Date/Time    Renal Function Panel [[id number]]  (Abnormal) Collected:  07/03/17 0541    Specimen:  Blood Updated:  07/03/17 0635     Glucose 107 (H) mg/dL      BUN 54 (H) mg/dL      Creatinine 1.08 mg/dL      Sodium 125 (L) mmol/L      Potassium 4.6  mmol/L      Chloride 87 (L) mmol/L      CO2 24.8 mmol/L      Calcium 8.7 mg/dL      Albumin 3.00 (L) g/dL      Phosphorus 4.4 mg/dL      Anion Gap 13.2 mmol/L      BUN/Creatinine Ratio 50.0 (H)     eGFR Non African Amer 65 mL/min/1.73     Narrative:       The MDRD GFR formula is only valid for adults with stable renal function between ages 18 and 70.    CBC & Differential [[id number]] Collected:  07/03/17 0541    Specimen:  Blood Updated:  07/03/17 0614    Narrative:       The following orders were created for panel order CBC & Differential.  Procedure                               Abnormality         Status                     ---------                               -----------         ------                     CBC Auto Differential[[id number]]        Abnormal            Final result                 Please view results for these tests on the individual orders.    CBC Auto Differential [[id number]]  (Abnormal) Collected:  07/03/17 0541    Specimen:  Blood Updated:  07/03/17 0614     WBC 18.86 (H) 10*3/mm3      RBC 2.91 (L) 10*6/mm3      Hemoglobin 10.4 (L) g/dL      Hematocrit 29.6 (L) %      .7 (H) fL      MCH 35.7 (H) pg      MCHC 35.1 g/dL      RDW 12.9 %      RDW-SD 47.9 fl      MPV 8.6 fL      Platelets 738 (H) 10*3/mm3      Neutrophil % 77.3 (H) %      Lymphocyte % 9.3 (L) %      Monocyte % 11.8 %      Eosinophil % 0.5 %      Basophil % 0.1 %      Immature Grans % 1.0 (H) %      Neutrophils, Absolute 14.59 (H) 10*3/mm3      Lymphocytes, Absolute 1.75 10*3/mm3      Monocytes, Absolute 2.23 (H) 10*3/mm3      Eosinophils, Absolute 0.09 10*3/mm3      Basophils, Absolute 0.01 10*3/mm3      Immature Grans, Absolute 0.19 (H) 10*3/mm3     Blood Culture [[id number]] Collected:  07/03/17 1212    Specimen:  Blood from Arm, Right Updated:  07/03/17 1219    Blood Culture [[id number]] Collected:  07/03/17 1259    Specimen:  Blood from Arm, Right Updated:  07/03/17 1314    C-reactive Protein [[id number]]  (Abnormal)  Collected:  07/03/17 1301    Specimen:  Blood Updated:  07/03/17 1341     C-Reactive Protein 7.68 (H) mg/dL     Sedimentation Rate [[id number]]  (Abnormal) Collected:  07/03/17 1301    Specimen:  Blood Updated:  07/03/17 1345     Sed Rate 45 (H) mm/hr     Protime-INR [[id number]]  (Abnormal) Collected:  07/03/17 1301    Specimen:  Blood Updated:  07/03/17 1332     Protime 13.9 Seconds      INR 1.11 (H)    aPTT [[id number]]  (Normal) Collected:  07/03/17 1301    Specimen:  Blood Updated:  07/03/17 1332     PTT 30.1 seconds           IMAGING STUDIES:    I personally viewed and interpreted the patient's lumbar MRI which reveals a large epidural abscess resulting in severe stenosis at the lateral aspect of the thecal sac and extending to the L3-4, L4-5 disc space    Also noted to have multiple abscesses identified in the left psoas muscle      Meds reviewed/changed: Yes    Current Facility-Administered Medications   Medication Dose Route Frequency Provider Last Rate Last Dose   • acetaminophen (TYLENOL) tablet 650 mg  650 mg Oral Q6H PRN Malathi Rubin MD   650 mg at 06/22/17 2253   • bisacodyl (DULCOLAX) suppository 10 mg  10 mg Rectal Daily PRN Malathi Rubin MD   10 mg at 06/28/17 1810   • calcium carbonate (TUMS) chewable tablet 500 mg (200 mg elemental)  1 tablet Oral BID PRN Malathi Rubin MD       • cholecalciferol (VITAMIN D3) tablet 5,000 Units  5,000 Units Oral Daily Cedrick Posada MD   5,000 Units at 07/03/17 0904   • demeclocycline (DECLOMYCIN) tablet 300 mg  300 mg Oral Q12H Cedrick Posada MD   300 mg at 07/03/17 0904   • diltiaZEM CD (CARDIZEM CD) 24 hr capsule 240 mg  240 mg Oral Q24H Tiffany Dudley MD   240 mg at 07/03/17 0904   • diphenhydrAMINE (BENADRYL) capsule 25 mg  25 mg Oral Q6H PRN Gilbert Marx MD   25 mg at 07/02/17 2037   • diphenhydrAMINE-zinc acetate 2-0.1 % cream   Topical TID PRN Gilbert Marx MD       • fentaNYL citrate (PF) (SUBLIMAZE) injection  50 mcg  50 mcg Intravenous Q5 Min PRN Shane Perez MD   100 mcg at 06/24/17 1130   • HYDROcodone-acetaminophen (NORCO) 7.5-325 MG per tablet 1 tablet  1 tablet Oral Q6H PRN Erwin Mei MD   1 tablet at 07/03/17 1138   • ibuprofen (ADVIL,MOTRIN) tablet 800 mg  800 mg Oral Q6H PRN Gilbert Marx MD       • lidocaine (XYLOCAINE) 1 % injection 1 mL  1 mL Intradermal Once Shane Perez MD       • magnesium hydroxide (MILK OF MAGNESIA) suspension 2400 mg/10mL 10 mL  10 mL Oral Daily PRN Malathi Rubin MD   10 mL at 06/28/17 0805   • midazolam (VERSED) injection 1 mg  1 mg Intravenous Q5 Min PRN Shane Perez MD       • morphine injection 2 mg  2 mg Intravenous Q4H PRN Erwin Mei MD   2 mg at 07/01/17 1224   • naloxone (NARCAN) injection 0.4 mg  0.4 mg Intravenous Q5 Min PRN Malathi Rubin MD       • ondansetron (ZOFRAN) injection 4 mg  4 mg Intravenous Q6H PRN Malathi Rubin MD   4 mg at 06/24/17 1032   • pantoprazole (PROTONIX) EC tablet 40 mg  40 mg Oral BID AC Malathi Rubin MD   40 mg at 07/03/17 0635   • ramipril (ALTACE) capsule 10 mg  10 mg Oral Daily Gilbert Marx MD   10 mg at 07/03/17 0904   • sodium chloride 0.9 % flush 1-10 mL  1-10 mL Intravenous PRN Malathi Rubin MD       • sodium chloride 0.9 % flush 1-10 mL  1-10 mL Intravenous PRN Shane Perez MD       • sodium chloride 0.9 % flush 10 mL  10 mL Intravenous PRN Davonte Carty MD       • [START ON 7/4/2017] sodium chloride 0.9 % infusion  50 mL/hr Intravenous Continuous Nolberto Hernandez IV, MD       • vitamin B-12 (CYANOCOBALAMIN) tablet 2,000 mcg  2,000 mcg Oral Daily Gilbert Marx MD   2,000 mcg at 07/03/17 0904       Assessment/Plan     ASSESSMENT:    Principal Problem:    Closed wedge compression fracture of eleventh thoracic vertebra  Active Problems:    Diarrhea    CKD (chronic kidney disease) stage 2, GFR 60-89 ml/min    B12 deficiency     Hypocalcemia    A-fib    Meningioma    SIADH (syndrome of inappropriate ADH production)    Vitamin D deficiency      PLAN: Dr Hernandez to do lumbar laminectomy, discectomy and decompression posteriorly tomorrow morning at the request of Dr ANDREA, secondary to new lumbar abscess.  UA with culture has been ordered to further evaluate recent history of Pseudomonas UTI.  Stat left lower extremity Doppler ordered to rule out superficial/deep venous thrombosis. Pre-op orders are in, he will made NPO now for possible psoas muscle abscess drainage. Long discussion with Dr Hernandez, pt and his wife at bedside regarding OR, risks and benefits. They agree to proceed forward as arranged.     I discussed the patients findings and my recommendations with patient, family, nursing staff, primary care team, consulting provider and Dr Hernandez       LOS: 13 days       Abigail Ramirez, APRN  7/3/2017  1:53 PM

## 2017-07-05 NOTE — PROGRESS NOTES
LOS: 15 days     Chief Complaint:  Follow-up leukocytosis    Interval History:  No acute events. Dull intermittent back pain worse w/ movement and relieved w/ rest. No associated fevers. Back cultures are pending.    ROS: no n/v/d    Vital Signs  Temp:  [97.4 °F (36.3 °C)-98.6 °F (37 °C)] 97.8 °F (36.6 °C)  Heart Rate:  [66-89] 89  Resp:  [18] 18  BP: (105-145)/() 138/72    Physical Exam:  General: awake alert, nad  Head: Normocephalic, atraumatic  Eyes: PERRL, EOMI, no scleral icterus  ENT: MMM, OP clear, no thrush. Fair dentition.   Neck: Supple  Cardiovascular: NR, RR,  no LE edema  Respiratory: normal work of breathing on nasal cannula  GI: Abdomen w/ well-healing port sites, it is soft, non-tender, mildly distended, normal bowel sounds in all four quadrants  : Gong catheter present; no prostate tenderness on recent exam though it was enlarged  Musculoskeletal: no joint abnormalities, normal musculature  Skin: No rashes, lesions, or embolic phenomenon  Psychiatric: calm, good memory  Vasc: no cyanosis; PIV w/o erythema    Medications:  •  cefepime (MAXIPIME) 2 g/100 mL 0.9% NS (mbp), 2 g, Intravenous, Q8H, Chandra Ponce MD  -vancomycin    LABS:  CBC, BMP, micro reviewed today  Lab Results   Component Value Date    WBC 17.36 (H) 07/05/2017    HGB 8.8 (L) 07/05/2017    HCT 25.3 (L) 07/05/2017    .8 (H) 07/05/2017     (H) 07/05/2017     Lab Results   Component Value Date    CREATININE 0.79 07/05/2017     Lab Results   Component Value Date    CRP 7.68 (H) 07/03/2017   No results found for: VANCOPEAK, VANCOTROUGH, VANCORANDOM      PSA 8  Procal 0.13  UA 0-2 WBCs     Microbiology:  7/4 OR Back Cx: GPCs on gram stain  7/3 BCx: NGTD  6/25 UCx: >100k pan-sensitive Pseudomonas     Radiology (prior):  MRI with epidural abscess and psoas abscess     Assessment/Plan   1. Epidural and retroperitoneal abscess  -s/p lumbar laminectomy on 7/4/17  -cultures are pending; GPCs on gram  stain  -continue empiric vancomycin and cefepime while awaiting cultures  -I have ordered IR-guided drainage of psoas abscess  -will place PICC in the coming days for long-term access    2. Probable meningioma  -f/u with NSGY as an outpatient which he will be doing anyways for the back    3. Urinary retention    ID will follow. Plan d/w RN.

## 2017-07-05 NOTE — PLAN OF CARE
Problem: Patient Care Overview (Adult)  Goal: Plan of Care Review  Outcome: Ongoing (interventions implemented as appropriate)    07/05/17 0830 07/05/17 9245   Coping/Psychosocial Response Interventions   Plan Of Care Reviewed With patient;spouse --    Patient Care Overview   Progress --  improving   Outcome Evaluation   Outcome Summary/Follow up Plan --  pt vss, able to raise HOB today with no headaches, sat on side of bed x 1 with RN, norco 5mg a few times throughout the day, wound care seen today over pressure ulcers       Goal: Adult Individualization and Mutuality  Outcome: Ongoing (interventions implemented as appropriate)  Goal: Discharge Needs Assessment  Outcome: Ongoing (interventions implemented as appropriate)    Problem: Fall Risk (Adult)  Goal: Absence of Falls  Outcome: Ongoing (interventions implemented as appropriate)  Goal: Absence of Falls  Outcome: Ongoing (interventions implemented as appropriate)    Problem: Pain, Chronic (Adult)  Goal: Acceptable Pain Control/Comfort Level  Outcome: Ongoing (interventions implemented as appropriate)    Problem: Perioperative Period (Adult)  Goal: Signs and Symptoms of Listed Potential Problems Will be Absent or Manageable (Perioperative Period)  Outcome: Ongoing (interventions implemented as appropriate)

## 2017-07-05 NOTE — SIGNIFICANT NOTE
07/05/17 1327   Rehab Treatment   Discipline occupational therapist   Treatment Not Performed patient unavailable for treatment  (CT)   Recommendation   OT - Next Appointment 07/06/17

## 2017-07-05 NOTE — PROGRESS NOTES
[facility] PROGRESS NOTE    PATIENT IDENTIFICATION:   Name:  Pavel Melgoza      MRN:  [id number]     83 y.o.  male                   Principal Problem:    Closed wedge compression fracture of eleventh thoracic vertebra  Active Problems:    Diarrhea    CKD (chronic kidney disease) stage 2, GFR 60-89 ml/min    B12 deficiency    Hypocalcemia    A-fib    Meningioma    SIADH (syndrome of inappropriate ADH production)    Vitamin D deficiency    Epidural abscess        Subjective   CC: sore back  Interval History: patient denies headache or weakness.  Gong remains in for retention.    Objective     Vital signs in last 24 hours:  Temp:  [97.4 °F (36.3 °C)-98.6 °F (37 °C)] 98 °F (36.7 °C)  Heart Rate:  [66-89] 76  Resp:  [18-24] 24  BP: (105-145)/() 120/58      Intake/Output last 3 shifts:  I/O last 3 completed shifts:  In: 1576 [I.V.:1576]  Out: 4050 [Urine:3950; Blood:100]  Intake/Output this shift:  I/O this shift:  In: 640 [P.O.:640]  Out: -     LABS    Results from last 7 days  Lab Units 07/03/17  1301   INR  1.11*     Lab Results   Component Value Date    CALCIUM 8.1 (L) 07/05/2017    PHOS 4.0 07/05/2017     Results from last 7 days  Lab Units 07/05/17  0427 07/04/17  0538 07/03/17  0541   SODIUM mmol/L 126* 125* 125*   POTASSIUM mmol/L 4.6 4.4 4.6   CHLORIDE mmol/L 90* 90* 87*   CO2 mmol/L 22.5 23.4 24.8   BUN mg/dL 33* 43* 54*   CREATININE mg/dL 0.79 0.99 1.08   GLUCOSE mg/dL 92 90 107*   CALCIUM mg/dL 8.1* 8.3* 8.7   WBC 10*3/mm3 17.36* 14.11* 18.86*   HEMOGLOBIN g/dL 8.8* 9.4* 10.4*   PLATELETS 10*3/mm3 580* 674* 738*     Physical Exam:  Dressing with serosang d/c but dry.  I changed.      4 - Opens eyes on own  6 - Follows simple motor commands  5 - Alert and oriented  RLE 5/5  LLE stable      ASSESSMENT  Assessment/Plan     Will elevate HOB today     LOS: 15 days     Psoas abscess drainage today  Will require long term ABX and I agree with picc  Will follow    Nolberto Lara  David IV, MD

## 2017-07-05 NOTE — PROGRESS NOTES
"   LOS: 15 days   Patient Care Team:  Amanda Tanner MD as PCP - General (Family Medicine)    Chief Complaint/ Reason for encounter: Hyponatremia, SIADH        Subjective     Fall   Pertinent negatives include no fever or nausea.   Hip Pain      Back Pain   Pertinent negatives include no chest pain, fever or weakness.   Extremity Pain    Pertinent negatives include no fever.       Subjective:  Symptoms:  Stable.  No shortness of breath, chest pain or weakness.  (No new complaints today  Still complaining some back pain  Status post CT guided abscess drainage and radiology earlier today).    Diet:  Adequate intake.  No nausea.    Activity level: Returning to normal.    Pain:  He reports no pain.          History taken from: Patient and chart    Objective     Vital Signs  Temp:  [97.8 °F (36.6 °C)-99.5 °F (37.5 °C)] 99.5 °F (37.5 °C)  Heart Rate:  [76-94] 85  Resp:  [16-24] 18  BP: (116-145)/(58-79) 133/65       Wt Readings from Last 1 Encounters:   07/04/17 0500 183 lb 8 oz (83.2 kg)   07/03/17 0500 181 lb 5 oz (82.2 kg)   07/02/17 0500 180 lb (81.6 kg)   07/01/17 0510 174 lb 2 oz (79 kg)   06/29/17 1900 191 lb (86.6 kg)   06/28/17 1900 190 lb 4.8 oz (86.3 kg)   06/28/17 1711 184 lb 15.5 oz (83.9 kg)   06/28/17 0422 185 lb (83.9 kg)   06/27/17 0500 187 lb 9.6 oz (85.1 kg)   06/26/17 0523 190 lb 11.2 oz (86.5 kg)   06/24/17 0600 192 lb (87.1 kg)   06/23/17 0413 194 lb 6.4 oz (88.2 kg)   06/22/17 1157 198 lb (89.8 kg)   06/22/17 0452 198 lb 8 oz (90 kg)   06/21/17 0500 190 lb 1.6 oz (86.2 kg)   06/20/17 1733 180 lb (81.6 kg)   06/20/17 1212 180 lb (81.6 kg)       Objective:  General Appearance:  Comfortable, in no acute distress, not in pain and well-appearing.    Vital signs: (most recent): Blood pressure 133/65, pulse 85, temperature 99.5 °F (37.5 °C), temperature source Oral, resp. rate 18, height 72.01\" (182.9 cm), weight 183 lb 8 oz (83.2 kg), SpO2 95 %.  Vital signs are normal.  No fever.    Output: " Producing urine.    HEENT: Normal HEENT exam.    Lungs:  Normal respiratory rate and normal effort.  He is not in respiratory distress.  Breath sounds clear to auscultation.  No wheezes or decreased breath sounds.    Heart: Normal rate.  Regular rhythm.    Abdomen: Abdomen is soft and non-distended.  Bowel sounds are normal.   There is no epigastric area or no suprapubic area tenderness.  There is no rebound tenderness.     Extremities: Normal range of motion.  There is no deformity or dependent edema.    Pulses: Distal pulses are intact.    Neurological: Patient is alert and oriented to person, place and time.    Skin:  Warm and dry.  No rash or cyanosis.             Results Review:    Past Medical History: reviewed and updated  Past Medical History:   Diagnosis Date   • Plasmacytoma          Allergies:  No Known Allergies    Intake/Output:     Intake/Output Summary (Last 24 hours) at 07/05/17 1527  Last data filed at 07/05/17 1224   Gross per 24 hour   Intake              640 ml   Output             2225 ml   Net            -1585 ml         DATA:  Interval chart, labs and notes reviewed.  The following labs personally reviewed by me  I discussed with his wife at bedside, interval history obtained from her regarding confusion of last 24 hours which she states is improved  Old records personally reviewed showing no prior hyponatremia  We discussed the potential risks of oral Samsca and they agree to proceed.  Risks include possible overcorrection which would be attempted by stopping fluid restriction and starting lower dose samsca and checking frequent labs per protocol    Labs:   Recent Results (from the past 24 hour(s))   Renal Function Panel    Collection Time: 07/05/17  4:27 AM   Result Value Ref Range    Glucose 92 65 - 99 mg/dL    BUN 33 (H) 8 - 23 mg/dL    Creatinine 0.79 0.76 - 1.27 mg/dL    Sodium 126 (L) 136 - 145 mmol/L    Potassium 4.6 3.5 - 5.2 mmol/L    Chloride 90 (L) 98 - 107 mmol/L    CO2 22.5 22.0  - 29.0 mmol/L    Calcium 8.1 (L) 8.6 - 10.5 mg/dL    Albumin 2.40 (L) 3.50 - 5.20 g/dL    Phosphorus 4.0 2.5 - 4.5 mg/dL    Anion Gap 13.5 mmol/L    BUN/Creatinine Ratio 41.8 (H) 7.0 - 25.0    eGFR Non African Amer 94 >60 mL/min/1.73   CBC Auto Differential    Collection Time: 07/05/17  4:27 AM   Result Value Ref Range    WBC 17.36 (H) 4.50 - 10.70 10*3/mm3    RBC 2.46 (L) 4.60 - 6.00 10*6/mm3    Hemoglobin 8.8 (L) 13.7 - 17.6 g/dL    Hematocrit 25.3 (L) 40.4 - 52.2 %    .8 (H) 79.8 - 96.2 fL    MCH 35.8 (H) 27.0 - 32.7 pg    MCHC 34.8 32.6 - 36.4 g/dL    RDW 13.0 11.5 - 14.5 %    RDW-SD 48.5 37.0 - 54.0 fl    MPV 8.3 6.0 - 12.0 fL    Platelets 580 (H) 140 - 500 10*3/mm3    Neutrophil % 81.8 (H) 42.7 - 76.0 %    Lymphocyte % 5.8 (L) 19.6 - 45.3 %    Monocyte % 11.3 5.0 - 12.0 %    Eosinophil % 0.5 0.3 - 6.2 %    Basophil % 0.1 0.0 - 1.5 %    Immature Grans % 0.5 0.0 - 0.5 %    Neutrophils, Absolute 14.20 (H) 1.90 - 8.10 10*3/mm3    Lymphocytes, Absolute 1.00 0.90 - 4.80 10*3/mm3    Monocytes, Absolute 1.97 (H) 0.20 - 1.20 10*3/mm3    Eosinophils, Absolute 0.09 0.00 - 0.70 10*3/mm3    Basophils, Absolute 0.01 0.00 - 0.20 10*3/mm3    Immature Grans, Absolute 0.09 (H) 0.00 - 0.03 10*3/mm3       Radiology:  Imaging Results (last 24 hours)     Procedure Component Value Units Date/Time    CT Guided Needle Aspiration [[id number]] Updated:  07/05/17 1349             Medications have been reviewed:  Current Facility-Administered Medications   Medication Dose Route Frequency Provider Last Rate Last Dose   • acetaminophen (TYLENOL) tablet 650 mg  650 mg Oral Q6H PRN Malathi Rubin MD   650 mg at 06/22/17 2253   • bisacodyl (DULCOLAX) EC tablet 5 mg  5 mg Oral Daily PRN Nolberto Hernandez IV, MD       • bisacodyl (DULCOLAX) suppository 10 mg  10 mg Rectal Daily PRN Malathi Rubin MD   10 mg at 06/28/17 1810   • bisacodyl (DULCOLAX) suppository 10 mg  10 mg Rectal Daily PRN Nolberto Hernandez IV, MD       •  calcium carbonate (TUMS) chewable tablet 500 mg (200 mg elemental)  1 tablet Oral BID PRN Malathi Rubin MD       • cefepime (MAXIPIME) 2 g/100 mL 0.9% NS (mbp)  2 g Intravenous Q8H Chandra Ponce MD   2 g at 07/05/17 1441   • cholecalciferol (VITAMIN D3) tablet 5,000 Units  5,000 Units Oral Daily Cedrick Posada MD   5,000 Units at 07/05/17 0827   • diazePAM (VALIUM) tablet 5 mg  5 mg Oral Q6H PRN Nolberto Hernandez IV, MD       • diltiaZEM CD (CARDIZEM CD) 24 hr capsule 240 mg  240 mg Oral Q24H Tiffany Dudley MD   240 mg at 07/05/17 0827   • diphenhydrAMINE (BENADRYL) capsule 25 mg  25 mg Oral Q6H PRN Gilbert Marx MD   25 mg at 07/05/17 0003   • diphenhydrAMINE-zinc acetate 2-0.1 % cream   Topical TID PRN Gilbert Marx MD       • docusate sodium (COLACE) capsule 100 mg  100 mg Oral BID PRN Nolberto Hernandez IV, MD       • HYDROcodone-acetaminophen (NORCO) 5-325 MG per tablet 1 tablet  1 tablet Oral Q4H PRN Nolberto Hernandez IV, MD   1 tablet at 07/05/17 1443   • lidocaine (XYLOCAINE) 1 % injection 1 mL  1 mL Intradermal Once Shane Perez MD       • magnesium hydroxide (MILK OF MAGNESIA) suspension 2400 mg/10mL 10 mL  10 mL Oral Daily PRN Malathi Rubin MD   10 mL at 06/28/17 0805   • magnesium hydroxide (MILK OF MAGNESIA) suspension 2400 mg/10mL 10 mL  10 mL Oral Daily PRN Nolberto Hernandez IV, MD       • midazolam (VERSED) injection 1 mg  1 mg Intravenous Q5 Min PRN Shane Perez MD       • morphine injection 1 mg  1 mg Intravenous Q2H PRN Nolberto Hernandez IV, MD       • morphine injection 2 mg  2 mg Intravenous Q4H PRN Nolberto Hernandez IV, MD        And   • naloxone (NARCAN) injection 0.4 mg  0.4 mg Intravenous Q5 Min PRN Nolberto Hernandez IV, MD       • naloxone (NARCAN) injection 0.4 mg  0.4 mg Intravenous Q5 Min PRN Malathi Rubin MD       • ondansetron (ZOFRAN) injection 4 mg  4 mg Intravenous Q6H PRN Malathi Rubin MD   4 mg at  06/24/17 1032   • ondansetron (ZOFRAN) tablet 4 mg  4 mg Oral Q6H PRN Nolberto Hernandez IV, MD        Or   • ondansetron ODT (ZOFRAN-ODT) disintegrating tablet 4 mg  4 mg Oral Q6H PRN Nolberto Hernandez IV, MD        Or   • ondansetron (ZOFRAN) injection 4 mg  4 mg Intravenous Q6H PRN Nolberto Hernandez IV, MD       • pantoprazole (PROTONIX) EC tablet 40 mg  40 mg Oral BID AC Malathi Rubin MD   40 mg at 07/05/17 0826   • Pharmacy to dose vancomycin   Does not apply Continuous PRN Chandra Ponce MD       • ramipril (ALTACE) capsule 10 mg  10 mg Oral Daily Gilbert Marx MD   10 mg at 07/05/17 0826   • sennosides-docusate sodium (SENOKOT-S) 8.6-50 MG tablet 1 tablet  1 tablet Oral Nightly PRN Nolberto Hernandez IV, MD       • sodium chloride 0.9 % flush 1-10 mL  1-10 mL Intravenous PRN Malathi Rubin MD       • sodium chloride 0.9 % flush 1-10 mL  1-10 mL Intravenous PRN Nolberto Hernandez IV, MD       • vancomycin (VANCOCIN) in iso-osmotic dextrose IVPB 1 g (premix) 200 mL  1,000 mg Intravenous Q12H Chandra Ponce MD   1,000 mg at 07/05/17 1441   • vitamin B-12 (CYANOCOBALAMIN) tablet 2,000 mcg  2,000 mcg Oral Daily Gilbert Marx MD   2,000 mcg at 07/05/17 0827       Assessment/Plan     Principal Problem:    Closed wedge compression fracture of eleventh thoracic vertebra  Active Problems:    Diarrhea    CKD (chronic kidney disease) stage 2, GFR 60-89 ml/min    B12 deficiency    Hypocalcemia    A-fib    Meningioma    SIADH (syndrome of inappropriate ADH production)    Vitamin D deficiency    Epidural abscess      Assessment:  (Hyponatremia, urine studies consistent with SIADH.  Likely related to the underlying infection, hopefully will improve now that abscess has been drained  Metabolic acidosis,  improved, off bicarbonate  Lumbar abscess status/post drainage  Hypocalcemia, likely nutritional, has been replaced.  Continue vitamin D replacement  vitamin D deficiency  Closed  wedge compression fracture of eleventh thoracic vertebra  Generalized weakness  Diarrhea, better per his wife  Fall  Urinary tract infection with Pseudomonas, on IV antibiotics  Dehydration, resolved by now after hydration  CKD (chronic kidney disease) stage 2, GFR 60-89 ml/min  B12 deficiency        Plan:  Sodium remains stable  Optimistic that sodium levels will improve after abscess drainage/I&D  Not much change in sodium with the demeclocycline so will discontinue  Discontinue IV fluids).             Continue to monitor renal function, electroytes and volume closely   Please call me with any questions or concerns      Cedrick Posada MD   [facility]   [phone]    07/05/17  3:26 PM      Dictation performed using Dragon dictation software

## 2017-07-05 NOTE — NURSING NOTE
PT seen for linear Stage 2 pressure injuries to right anterior ankle and left lateral ankle;  Skin intact with blisters noted.  KAITLYNN hose have been left off since skin injury noted.  Will use Venelex to areas.

## 2017-07-05 NOTE — PROGRESS NOTES
"    DAILY PROGRESS NOTE  [facility]    Patient Identification:  Name: Pavel Melgoza  Age: 83 y.o.  Sex: male  :  1934  MRN: [id number]         Primary Care Physician: Amanda Tanner MD    Subjective:  Interval History: no new issues - no cp/sob/ams/n/v    Objective:d/w spouse/daughter     Scheduled Meds:  castor oil-balsam peru  Topical Q12H   cefepime 2 g Intravenous Q8H   cholecalciferol 5,000 Units Oral Daily   diltiaZEM  mg Oral Q24H   lidocaine 1 mL Intradermal Once   pantoprazole 40 mg Oral BID AC   ramipril 10 mg Oral Daily   vancomycin 1,000 mg Intravenous Q12H   vitamin B-12 2,000 mcg Oral Daily     Continuous Infusions:  Pharmacy to dose vancomycin        Vital signs in last 24 hours:  Temp:  [97.8 °F (36.6 °C)-99.5 °F (37.5 °C)] 99.5 °F (37.5 °C)  Heart Rate:  [76-94] 85  Resp:  [16-24] 18  BP: (116-145)/(58-79) 133/65    Intake/Output:    Intake/Output Summary (Last 24 hours) at 17 1651  Last data filed at 17 1224   Gross per 24 hour   Intake              640 ml   Output             2225 ml   Net            -1585 ml       Exam:  /65 (BP Location: Right arm, Patient Position: Lying)  Pulse 85  Temp 99.5 °F (37.5 °C) (Oral)   Resp 18  Ht 72.01\" (182.9 cm)  Wt 183 lb 8 oz (83.2 kg)  SpO2 95%  BMI 24.21 kg/m2    General Appearance:    Alert, cooperative, no distress, AAOx3                          Head:    Normocephalic, without obvious abnormality, atraumatic                         Throat:   Lips, tongue, gums normal; oral mucosa pink and moist                           Neck:   Supple, no JVD                         Lungs:    Clear to auscultation bilaterally, respirations unlabored                 Chest Wall:    No tenderness or deformity                          Heart:    Regular rate and rhythm, S1 and S2 normal                  Abdomen:     Soft, non-tender, bowel sounds active                 Extremities:   No cyanosis or edema       "                     Data Review:  Labs in chart were reviewed.    Assessment:  Active Hospital Problems (** Indicates Principal Problem)    Diagnosis Date Noted   • **Closed wedge compression fracture of eleventh thoracic vertebra [S22.080A] 06/20/2017   • Epidural abscess [G06.2] 07/04/2017   • Vitamin D deficiency [E55.9] 06/26/2017   • SIADH (syndrome of inappropriate ADH production) [E22.2] 06/25/2017   • Meningioma [D32.9] 06/24/2017   • Hypocalcemia [E83.51] 06/23/2017   • A-fib [I48.91] 06/23/2017   • B12 deficiency [E53.8] 06/21/2017   • Diarrhea [R19.7] 06/20/2017   • CKD (chronic kidney disease) stage 2, GFR 60-89 ml/min [N18.2] 06/20/2017      Resolved Hospital Problems    Diagnosis Date Noted Date Resolved   • Pseudomonas infection [B96.5] 06/28/2017 07/02/2017   • UTI (urinary tract infection) [N39.0] 06/26/2017 07/02/2017   • Left inguinal hernia [K40.90] 06/24/2017 07/02/2017   • Metabolic acidosis [E87.2] 06/23/2017 07/02/2017       Plan:  · S/P RAHUL 6/22/17.  · S/P Laparoscopic left inguinal hernia repair, transabdominal preperitoneal approach 6/23/17  · B12 replacement  · SIADH per nephrology. Na slowly improving - hopefully w/ continue to improve s/p abscess I/D - fluid mgnt per Renal   · Afib: not anticoag candidate d/t falls. Rate control per card.  · UTI with pseudomonas treated though on D2 cefepime per ID w/ 7/3 UCx reported as negative. Urinary retention w/ fernandez per Urology       Leukocytosis/ Epidural abscess/Psoas abscess- s/p lumbar laminectomy - ID dosing Vanc pending cultures - agree w/ PICC     Appreciate input and assistance from ALL    Jayesh Naranjo MD  7/5/2017  4:51 PM

## 2017-07-05 NOTE — NURSING NOTE
I s/w ANITRA Dominguez regarding PT orders for this patient. She stated okay to work with PT IF PATIENT DOES NOT HAVE ANY DRAINAGE FROM INCISION SITE.     There is small to moderate sanguinous drainage. S/w PT; to check back with rn in AM

## 2017-07-05 NOTE — SIGNIFICANT NOTE
07/05/17 1255   Rehab Treatment   Discipline speech language pathologist   Rehab Evaluation   Evaluation Not Performed patient unavailable for evaluation  (SLP attempted to see patient for cognitive treatment, patient off the floor in CT. Will re attempt as time permits.)   Recommendations   SLP - Next Appointment (PRN)

## 2017-07-05 NOTE — NURSING NOTE
Patient returned from ct guided abscess drainage. No drain was left in place. Patient had fluid aspirated. Patient has gauze with bandaid to left side of back that is clean, dry, and intact.

## 2017-07-05 NOTE — SIGNIFICANT NOTE
07/05/17 0921   Rehab Treatment   Discipline occupational therapist   Treatment Not Performed other (see comments)  (Per nsg pt with orders HOB flat bed rest. Check back tomorrow.)   Recommendation   OT - Next Appointment 07/06/17

## 2017-07-05 NOTE — SIGNIFICANT NOTE
07/05/17 1544   Rehab Treatment   Discipline physical therapist   Rehab Evaluation   Evaluation Not Performed other (see comments)  (RN states that PT should hold for today and she will get a new PT order to reeavaluate tomorrow.)   Recommendation   PT - Next Appointment 07/06/17

## 2017-07-05 NOTE — NURSING NOTE
Possible PU found 7/4/17. Primary RN did not add appropriate LDA documentation, care plan, and consult to wound/ ostomy nurse.   I have added appropriate consult and care plan. Will notify RN on 5P these interventions have been completed.     Nguyen Kim RN  5N/S

## 2017-07-06 PROBLEM — E83.51 HYPOCALCEMIA: Status: RESOLVED | Noted: 2017-01-01 | Resolved: 2017-01-01

## 2017-07-06 PROBLEM — R19.7 DIARRHEA: Status: RESOLVED | Noted: 2017-01-01 | Resolved: 2017-01-01

## 2017-07-06 NOTE — PLAN OF CARE
Problem: Patient Care Overview (Adult)  Goal: Plan of Care Review  Outcome: Ongoing (interventions implemented as appropriate)    07/06/17 1645   Coping/Psychosocial Response Interventions   Plan Of Care Reviewed With patient   Patient Care Overview   Progress improving   Outcome Evaluation   Outcome Summary/Follow up Plan Patient able to mobilize with PT, LE weakness, no pain during the day. PICC placed this afternoon . Still confused and restless at times.          Problem: Fall Risk (Adult)  Goal: Absence of Falls  Outcome: Ongoing (interventions implemented as appropriate)    07/06/17 1645   Fall Risk (Adult)   Absence of Falls making progress toward outcome         Problem: Pain, Chronic (Adult)  Goal: Acceptable Pain Control/Comfort Level  Outcome: Ongoing (interventions implemented as appropriate)    07/06/17 1645   Pain, Chronic (Adult)   Acceptable Pain Control/Comfort Level making progress toward outcome.No pain medication needed during the day

## 2017-07-06 NOTE — PROGRESS NOTES
[facility] PROGRESS NOTE    PATIENT IDENTIFICATION:   Name:  Pavel Melgoza      MRN:  [id number]     83 y.o.  male                   Principal Problem:    Closed wedge compression fracture of eleventh thoracic vertebra  Active Problems:    Diarrhea    CKD (chronic kidney disease) stage 2, GFR 60-89 ml/min    B12 deficiency    Hypocalcemia    A-fib    Meningioma    SIADH (syndrome of inappropriate ADH production)    Vitamin D deficiency    Epidural abscess        Subjective   CC: dull back pain  Interval History: no headache.  Scant serosang d/c on dressing.    Objective     Vital signs in last 24 hours:  Temp:  [98 °F (36.7 °C)-99.9 °F (37.7 °C)] 99.9 °F (37.7 °C)  Heart Rate:  [66-94] 83  Resp:  [16-18] 18  BP: (116-148)/(59-83) 137/74      Intake/Output last 3 shifts:  I/O last 3 completed shifts:  In: 640 [P.O.:640]  Out: 3425 [Urine:3425]  Intake/Output this shift:  I/O this shift:  In: 480 [P.O.:480]  Out: 400 [Urine:400]    LABS    Results from last 7 days  Lab Units 07/03/17  1301   INR  1.11*     Lab Results   Component Value Date    CALCIUM 8.0 (L) 07/06/2017    PHOS 2.6 07/06/2017     Results from last 7 days  Lab Units 07/06/17  0545 07/05/17  0427 07/04/17  0538 07/03/17  0541   SODIUM mmol/L 124* 126* 125* 125*   POTASSIUM mmol/L 4.0 4.6 4.4 4.6   CHLORIDE mmol/L 88* 90* 90* 87*   CO2 mmol/L 23.5 22.5 23.4 24.8   BUN mg/dL 20 33* 43* 54*   CREATININE mg/dL 0.79 0.79 0.99 1.08   GLUCOSE mg/dL 113* 92 90 107*   CALCIUM mg/dL 8.0* 8.1* 8.3* 8.7   WBC 10*3/mm3  --  17.36* 14.11* 18.86*   HEMOGLOBIN g/dL  --  8.8* 9.4* 10.4*   PLATELETS 10*3/mm3  --  580* 674* 738*     Physical Exam:        4 - Opens eyes on own  6 - Follows simple motor commands  5 - Alert and oriented  strength is stable      ASSESSMENT  Assessment/Plan     Change dressing  Up with pt today     LOS: 16 days         Nolberto Hernandez IV, MD

## 2017-07-06 NOTE — PROGRESS NOTES
"Pharmacokinetic Consult - Vancomycin Dosing (Follow-up Note)    Pavel Melgoza is on day #3  pharmacy to dose vancomycin for bone and joint infection .  Pharmacy dosing vancomycin per Dr Ponce 's request.   Goal trough: 15-20  mg/L     Relevant clinical data and objective history reviewed:  83 y.o. male 72.01\" (182.9 cm) 183 lb 8 oz (83.2 kg)    Past Medical History:   Diagnosis Date   • Plasmacytoma      Creatinine   Date Value Ref Range Status   07/06/2017 0.79 0.76 - 1.27 mg/dL Final   07/05/2017 0.79 0.76 - 1.27 mg/dL Final   07/04/2017 0.99 0.76 - 1.27 mg/dL Final     BUN   Date Value Ref Range Status   07/06/2017 20 8 - 23 mg/dL Final     Estimated Creatinine Clearance: 82.3 mL/min (by C-G formula based on Cr of 0.79).    Lab Results   Component Value Date    WBC 17.36 (H) 07/05/2017     Temp Readings from Last 3 Encounters:   07/06/17 99.9 °F (37.7 °C) (Oral)        Cultures:   7/4 WC Spine: GPCs on gram stain  7/3 BCx: NGx 3 days  6/25 UCx: >100k pan-sensitive Pseudomonas    IV Anti-Infectives     Ordered     Dose/Rate Route Frequency Start Stop    07/06/17 1532  vancomycin 1500 mg/500 mL 0.9% NS IVPB ([facility])     Ordering Provider:  Chandra Ponce MD    1,500 mg  over 150 Minutes Intravenous Every 12 Hours 07/06/17 1700      07/04/17 1525  ceFAZolin in dextrose (ANCEF) IVPB solution 2 g     Ordering Provider:  Nolberot Heranndez IV, MD    2 g  over 30 Minutes Intravenous Every 8 Hours 07/04/17 1715 07/05/17 0200    07/04/17 1320  vancomycin 1750 mg/500 mL 0.9% NS IVPB ([facility])     Ordering Provider:  Chandra Ponce MD    20 mg/kg × 83.2 kg  over 180 Minutes Intravenous Once 07/04/17 1400 07/04/17 1921    07/04/17 1309  Pharmacy to dose vancomycin     Ordering Provider:  Chandra Ponce MD     Does not apply Continuous PRN 07/04/17 1309      07/04/17 1214  cefepime (MAXIPIME) 2 g/100 mL 0.9% NS (mbp)     Ordering Provider:  Chandra Ponce MD    2 g Intravenous " Every 8 Hours 07/04/17 1245      07/04/17 0813  ceFAZolin in dextrose (ANCEF) IVPB solution 2 g     Ordering Provider:  Nolberto Hernandez IV, MD    2 g Intravenous Once 07/04/17 0845 07/04/17 0910    06/27/17 0912  cefepime (MAXIPIME) 1 g/100 mL 0.9% NS IVPB (mbp)     Gilbert Marx MD reviewed the order on 06/29/17 1331.   Ordering Provider:  Gilbert Marx MD    1 g Intravenous Every 8 Hours 06/27/17 1000 06/30/17 0245    06/24/17 0913  ceFAZolin in dextrose (ANCEF) IVPB solution 2 g     Ordering Provider:  Erwin Mei MD    2 g  over 30 Minutes Intravenous Once 06/24/17 0945 06/24/17 1007         Lab Results   Component Value Date    BOLIVAROUGH 10.60 07/06/2017        Assessment/Plan   1. Vanc trough =10.6mcg/ml ( subtherapeutic)  Will increase dosing to Vancomycin 1500mg ( 18mg/kg) iv q 12 hours and obtain trough with 4th dose 7/8/17   0500      2. Monitor renal function…serum creatinine in am (already ordered)   3. Encourage hydration to prevent toxic accumulation; monitor for s/sx of toxicity including increase in SCr and decrease in UOP.                                                                 4. Pharmacy will continue to follow and adjust accordingly.    Asiya Cortes [facility]

## 2017-07-06 NOTE — PROGRESS NOTES
Continued Stay Note  [facility]     Patient Name: Pavel Melgoza  MRN: [id number]  Today's Date: 7/6/2017    Admit Date: 6/20/2017          Discharge Plan       07/06/17 1129    Case Management/Social Work Plan    Additional Comments Epidural and retroperitoneal abscess, s/p lumbar laminectomy on 7/4/17.  S/p IR guided drainage of RP abscess on 7/5/17.  cultures pending; GPCs on gram stain.  Updated Jo jayne [place] Justin.                Danielle Singh RN

## 2017-07-06 NOTE — PROGRESS NOTES
"    DAILY PROGRESS NOTE  [facility]    Patient Identification:  Name: Pavel Melgoza  Age: 83 y.o.  Sex: male  :  1934  MRN: [id number]         Primary Care Physician: Amanda Tanner MD    Subjective:  Interval History: no new issues - some intermittent confusion and nausea but no emesis or abdominal pains . Still w/ back roseline worse w/ movement.     Objective:spouse at bs    Scheduled Meds:    castor oil-balsam peru  Topical Q12H   cefepime 2 g Intravenous Q8H   cholecalciferol 5,000 Units Oral Daily   diltiaZEM  mg Oral Q24H   lidocaine 1 mL Intradermal Once   pantoprazole 40 mg Oral BID AC   ramipril 10 mg Oral Daily   vancomycin 1,500 mg Intravenous Q12H   vitamin B-12 2,000 mcg Oral Daily     Continuous Infusions:    Pharmacy to dose vancomycin        Vital signs in last 24 hours:  Temp:  [98 °F (36.7 °C)-99.9 °F (37.7 °C)] 99.9 °F (37.7 °C)  Heart Rate:  [66-88] 83  Resp:  [18] 18  BP: (136-148)/(61-83) 137/74    Intake/Output:    Intake/Output Summary (Last 24 hours) at 17 1637  Last data filed at 17 0921   Gross per 24 hour   Intake              480 ml   Output             1550 ml   Net            -1070 ml       Exam:  /74 (BP Location: Right arm, Patient Position: Lying)  Pulse 83  Temp 99.9 °F (37.7 °C) (Oral)   Resp 18  Ht 72.01\" (182.9 cm)  Wt 183 lb 8 oz (83.2 kg)  SpO2 98%  BMI 24.21 kg/m2    General Appearance:    Alert, cooperative, no distress, AAOx3 but issues w/ place/year                         Throat:   Lips, tongue, gums normal; oral mucosa pink and moist                         Lungs:    Clear to auscultation bilaterally, respirations unlabored                         Heart:    Regular rate and rhythm, S1 and S2 normal                  Abdomen:     Soft, non-tender, bowel sounds active                 Extremities:   SCDs, no cyanosis or edema                             Data Review:  Labs in chart were " reviewed.    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] 06/25/2017   • Meningioma [D32.9] 06/24/2017   • A-fib [I48.91] 06/23/2017   • B12 deficiency [E53.8] 06/21/2017   • Closed wedge compression fracture of eleventh thoracic vertebra [S22.080A] 06/20/2017   • CKD (chronic kidney disease) stage 2, GFR 60-89 ml/min [N18.2] 06/20/2017      Resolved Hospital Problems    Diagnosis Date Noted Date Resolved   • Pseudomonas infection [B96.5] 06/28/2017 07/02/2017   • UTI (urinary tract infection) [N39.0] 06/26/2017 07/02/2017   • Left inguinal hernia [K40.90] 06/24/2017 07/02/2017   • Metabolic acidosis [E87.2] 06/23/2017 07/02/2017   • Hypocalcemia [E83.51] 06/23/2017 07/06/2017   • Diarrhea [R19.7] 06/20/2017 07/06/2017       Plan:  · S/P RAHUL 6/22/17.  · S/P Laparoscopic left inguinal hernia repair, transabdominal preperitoneal approach 6/23/17  · B12 replacement  · SIADH per nephrology. Na slowly improving - hopefully w/ continue to improve s/p abscess I/D - fluid mgnt per Renal and giving Samsca x1 today  · Afib: not anticoag candidate d/t falls. Rate control per card.  · UTI with pseudomonas treated as 7/3 UCx reported as negative. Urinary retention w/ fernandez per Urology       Leukocytosis/ Epidural abscess/Psoas abscess   - s/p lumbar laminectomy on 7/4    - IR drainage on 7/5    - ID dosing Vanc/Cefepime pending final cultures though all growing G+ cocci at this time most likely c/w MRSA    S/p  PICC       Appreciate input and assistance from ALL    SCDs for DVT prophylaxis    Jayesh Naranjo MD  7/6/2017  4:37 PM

## 2017-07-06 NOTE — THERAPY RE-EVALUATION
Acute Care - Physical Therapy Re-Evaluation  [facility]     Patient Name: Pavel Melgoza  : 1934  MRN: [id number]  Today's Date: 2017   Onset of Illness/Injury or Date of Surgery Date: 17     Referring Physician: Dr. Marx      Admit Date: 2017     Visit Dx:    ICD-10-CM ICD-9-CM   1. Generalized weakness R53.1 780.79   2. Dehydration E86.0 276.51   3. Compression fracture T14.8 829.0   4. Pain R52 780.96   5. Epidural abscess, L2-L5 G06.1 324.1   6. Left leg weakness M62.81 729.89   7. Epidural abscess G06.2 324.9     Patient Active Problem List   Diagnosis   • Generalized weakness   • Plasmocytoma   • Nausea & vomiting   • Diarrhea   • Fall   • Noncompliance with medication regimen   • Left leg weakness   • Closed wedge compression fracture of eleventh thoracic vertebra   • CKD (chronic kidney disease) stage 2, GFR 60-89 ml/min   • B12 deficiency   • Hypocalcemia   • A-fib   • Meningioma   • SIADH (syndrome of inappropriate ADH production)   • Hyponatremia   • Vitamin D deficiency   • Epidural abscess     Past Medical History:   Diagnosis Date   • Plasmacytoma      Past Surgical History:   Procedure Laterality Date   • FRACTURE SURGERY     • INGUINAL HERNIA REPAIR Left 2017    Procedure: LAPAROSCOPIC LEFT INGUINAL HERNIA REPAIR WITH MESH;  Surgeon: Erwin Mei MD;  Location: [facility];  Service:    • JOINT REPLACEMENT      bilateral hips   • LUMBAR LAMINECTOMY DISCECTOMY DECOMPRESSION N/A 2017    Procedure: LUMBAR LAMINECTOMY FOR RESECTION OF EPIDURAL ABCESS ;  Surgeon: Nolberto Hernandez IV, MD;  Location: [facility];  Service:           PT ASSESSMENT (last 72 hours)      PT Evaluation       17 0800 17 2936    Rehab Evaluation    Document Type (P)  re-evaluation  -KS     Symptoms Noted During/After Treatment (P)  increased pain  -KS     General Information    Precautions/Limitations (P)  spinal precautions  -KS     Living Environment     Transportation Available  car;ambulance  -PC    Pain Assessment    Pain Assessment (P)  0-10  -KS     Pain Score (P)  --   pt noted increased pain throughout tx  -KS     Pain Type (P)  Surgical pain  -KS     Pain Location (P)  Back  -KS     Pain Orientation (P)  --   surgical site  -KS     Pain Intervention(s) (P)  Ambulation/increased activity;Repositioned  -KS     Response to Interventions (P)  unchanged  -KS     Vision Assessment/Intervention    Visual Impairment (P)  WFL  -KS     Cognitive Assessment/Intervention    Current Cognitive/Communication Assessment (P)  functional  -KS     Follows Commands/Answers Questions (P)  able to follow multi-step instructions;needs repetition;needs cueing  -KS     Personal Safety (P)  mild impairment  -KS     ROM (Range of Motion)    General ROM (P)  head/neck/trunk range of motion deficits identified;lower extremity range of motion deficits identified  -KS     General ROM Detail (P)  spinal precautions, swollen and impaired L LE  -KS     MMT (Manual Muscle Testing)    General MMT Assessment (P)  lower extremity strength deficits identified  -KS     General MMT Assessment Detail (P)  L LE deficits noted, grossly 2+, R LE grossly 3+/5  -KS     Bed Mobility, Assessment/Treatment    Bed Mobility, Assistive Device (P)  bed rails  -KS     Bed Mobility, Roll Left, [place] (P)  2 person assist required;verbal cues required;moderate assist (50% patient effort)  -KS     Bed Mobility, Scoot/Bridge, [place] (P)  maximum assist (25% patient effort);verbal cues required  -KS     Bed Mob, Supine to Sit, [place] (P)  moderate assist (50% patient effort);2 person assist required;verbal cues required  -KS     Bed Mob, Sit to Supine, [place] (P)  moderate assist (50% patient effort);2 person assist required;verbal cues required  -KS     Bed Mob, Sidelying to Sit, [place] (P)  2 person assist required;moderate assist (50% patient effort);verbal cues required  -KS     Bed  Mob, Sit to Sidelying, [place] (P)  verbal cues required;moderate assist (50% patient effort);2 person assist required  -KS     Bed Mobility, Impairments (P)  pain;ROM decreased;strength decreased  -KS     Transfer Assessment/Treatment    Transfers, Sit-Stand [place] (P)  2 person assist required;verbal cues required;maximum assist (25% patient effort);nonverbal cues required (demo/gesture)  -KS     Transfers, Stand-Sit [place] (P)  verbal cues required;nonverbal cues required (demo/gesture);maximum assist (25% patient effort);2 person assist required  -KS     Transfers, Sit-Stand-Sit, Assist Device (P)  --   HHA x2  -KS     Transfer, Impairments (P)  ROM decreased;strength decreased;pain;impaired balance;postural control impaired  -KS     Transfer, Comment (P)  poor forward flexed posturing, max cueing for upright. PT assist at L LE blocking quad   -KS     Therapy Exercises    Right Lower Extremity (P)  AROM:;heel slides;ankle pumps/circles;LAQ  -KS     Left Lower Extremity (P)  PROM:;heel slides;ankle pumps/circles  -KS     Positioning and Restraints    Pre-Treatment Position (P)  in bed  -KS     Post Treatment Position (P)  bed  -KS     In Bed (P)  call light within reach;exit alarm on;encouraged to call for assist;with family/caregiver;supine  -KS       07/05/17 1544 07/05/17 1255    Rehab Evaluation    Evaluation Not Performed other (see comments)   RN states that PT should hold for today and she will get a new PT order to reeavaluate tomorrow.  -CH patient unavailable for evaluation   SLP attempted to see patient for cognitive treatment, patient off the floor in CT.  -SA      07/05/17 0830 07/04/17 2037    Muscle Tone Assessment    Muscle Tone Assessment  Bilateral Lower Extremities  -SB    LUE Muscle Tone Assessment  mildly increased tone  -SB    LLE Muscle Tone Assessment severely decreased tone   baseline per spouse   -PC severely decreased tone  -SB    RLE Muscle Tone Assessment moderately  decreased tone  -PC moderately decreased tone  -SB    Sensory Assessment/Intervention    Light Touch  LLE  -SB    LUE Light Touch  WNL  -SB    RUE Light Touch  WNL  -SB    LLE Light Touch moderate impairment   family states baseline   -PC moderate impairment   family states baseline   -SB      07/04/17 1527 07/03/17 1410    Muscle Tone Assessment    Muscle Tone Assessment  Bilateral Lower Extremities  -KH    LUE Muscle Tone Assessment  mildly increased tone  -KH    LLE Muscle Tone Assessment severely decreased tone  -PC severely increased tone  -KH    RLE Muscle Tone Assessment moderately decreased tone  -PC     Sensory Assessment/Intervention    Light Touch LLE  -PC LLE  -KH    LUE Light Touch WNL  -PC WNL  -KH    RUE Light Touch WNL  -PC WNL  -KH    LLE Light Touch moderate impairment   family states baseline   -PC moderate impairment  -KH      User Key  (r) = Recorded By, (t) = Taken By, (c) = Cosigned By    Initials Name Provider Type    MARLINE Garcia, PT Physical Therapist    SB Eliz Mcdermott, RN Registered Nurse    KATY Rowe, RN Registered Nurse     Patricia Ramos Speech and Language Pathologist     Kwabena Cain, RN Registered Nurse    RIOS Davis, PT Student PT Student          Physical Therapy Education     Title: PT OT SLP Therapies (Active)     Topic: Physical Therapy (Active)     Point: Mobility training (Active)    Learning Progress Summary    Learner Readiness Method Response Comment Documented by Status   Patient Acceptance E NR  KS 07/06/17 0858 Active    Acceptance E VU,NR  AL 07/02/17 1138 Done    Acceptance E NR  AL 07/01/17 1103 Active    Acceptance E VU,NR,NL   06/30/17 1202 Done    Acceptance E,TB VU,NR   06/28/17 2237 Done    Acceptance D,E VU,NR   06/28/17 1345 Done    Acceptance E NR,NL   06/27/17 1551 Active    Acceptance E VU,NR   06/26/17 1200 Done    Acceptance E,TB,D VU,NR   06/25/17 1155 Done    Nonacceptance E,D NR,NL    06/22/17 1433 Active    Nonacceptance E NR,[facility] 06/21/17 1159 Active   Family Acceptance E,TB VU,NR   06/28/17 2237 Done    Acceptance E VU,NR   06/26/17 1200 Done   Significant Other Acceptance E NR  KS 07/06/17 0858 Active               Point: Home exercise program (Active)    Learning Progress Summary    Learner Readiness Method Response Comment Documented by Status   Patient Acceptance E NR  KS 07/06/17 0858 Active    Acceptance E VU,NR,[facility] 06/30/17 1202 Done    Acceptance E,TB VU,NR   06/28/17 2237 Done    Acceptance D,E VU,NR   06/28/17 1345 Done    Acceptance E NR,[facility] 06/27/17 1551 Active    Acceptance E VU,NR   06/26/17 1200 Done    Nonacceptance E,D NR,[facility] 06/22/17 1433 Active    Nonacceptance E NR,[facility] 06/21/17 1159 Active   Family Acceptance E,TB VU,NR   06/28/17 2237 Done    Acceptance E VU,NR   06/26/17 1200 Done   Significant Other Acceptance E NR  KS 07/06/17 0858 Active               Point: Body mechanics (Active)    Learning Progress Summary    Learner Readiness Method Response Comment Documented by Status   Patient Acceptance E NR  KS 07/06/17 0858 Active    Acceptance E VU,NR  AL 07/02/17 1138 Done    Acceptance E NR  AL 07/01/17 1103 Active    Acceptance E VU,NR,[facility] 06/30/17 1202 Done    Acceptance E,TB VU,NR   06/28/17 2237 Done    Acceptance D,E VU,NR   06/28/17 1345 Done    Acceptance E NR,[facility] 06/27/17 1551 Active    Acceptance E VU,NR   06/26/17 1200 Done    Acceptance E,TB,D VU,NR   06/25/17 1155 Done    Nonacceptance E,D NR,[facility] 06/22/17 1433 Active    Nonacceptance E NR,[facility] 06/21/17 1159 Active   Family Acceptance E,TB VU,NR   06/28/17 2237 Done    Acceptance E VU,NR   06/26/17 1200 Done   Significant Other Acceptance E NR  KS 07/06/17 0858 Active               Point: Precautions (Active)    Learning Progress Summary    Learner Readiness Method Response Comment Documented by Status   Patient Acceptance E NR  KS 07/06/17 0858 Active     Acceptance E VU,NR  AL 07/02/17 1138 Done    Acceptance E NR  AL 07/01/17 1103 Active    Acceptance E VU,NR,NL   06/30/17 1202 Done    Acceptance E,TB VU,NR   06/28/17 2237 Done    Acceptance D,E VU,NR   06/28/17 1345 Done    Acceptance E NR,NL   06/27/17 1551 Active    Acceptance E VU,NR   06/26/17 1200 Done    Acceptance E,TB,D VU,NR   06/25/17 1155 Done    Nonacceptance E,D NR,NL   06/22/17 1433 Active    Nonacceptance E NR,NL   06/21/17 1159 Active   Family Acceptance E,TB VU,NR   06/28/17 2237 Done    Acceptance E VU,NR   06/26/17 1200 Done   Significant Other Acceptance E NR  KS 07/06/17 0858 Active                      User Key     Initials Effective Dates Name Provider Type Discipline     12/01/15 -  Maricel Garcia, PT Physical Therapist PT     12/01/15 -  Eunice Post, PT Physical Therapist PT    AL 12/01/15 -  Latanya Rodriguez, PT Physical Therapist PT     04/06/17 -  Yana Mercado, RN Registered Nurse Nurse     05/08/17 -  Humble Armando, PT Student PT Student PT     06/01/17 -  Skyla Blanco, PT Student PT Student PT    KS 05/08/17 -  Heather Davis, PT Student PT Student PT                PT Recommendation and Plan  Anticipated Equipment Needs At Discharge:  (L knee immobilizer)  Anticipated Discharge Disposition: skilled nursing facility  Planned Therapy Interventions: balance training, bed mobility training, gait training, home exercise program, patient/family education, transfer training  PT Frequency: daily  Plan of Care Review  Plan Of Care Reviewed With: (P) patient, spouse  Outcome Summary/Follow up Plan: (P) Pt presents w/ generalized weakness and impaired mobility of trunk control POD 2 lami, c/o pain at surgical site but able to tolerate log roll to EOB and stand EOB maxAx2. Noted swelling in LE, decreased mobility and strength in L LE. May benefit from skilled PT to address functional deficits, educate on spinal precautions, and increase mobility and strength.  Will cont to monitor and tx per pt status.            IP PT Goals       07/06/17 0911 06/30/17 1414 06/30/17 1404    Bed Mobility PT LTG    Bed Mobility PT LTG, Date Established (P)  07/06/17  -KS      Bed Mobility PT LTG, Time to Achieve (P)  1 wk  -KS 1 wk  -EE (r) AA (t) EE (c)     Bed Mobility PT LTG, Activity Type (P)  all bed mobility  -KS      Bed Mobility PT LTG, [place] Level (P)  contact guard assist  -KS      Bed Mobility PT LTG, Date Goal Reviewed (P)  07/13/17  -KS  06/30/17  -EE (r) AA (t) EE (c)    Bed Mobility PT LTG, Outcome (P)  goal ongoing  -KS  goal ongoing  -EE (r) AA (t) EE (c)    Bed Mobility PT LTG, Reason Goal Not Met (P)  progress slower than expected  -KS  progress slower than expected  -EE (r) AA (t) EE (c)    Transfer Training PT LTG    Transfer Training PT LTG, Date Established (P)  07/06/17  -KS      Transfer Training PT LTG, Time to Achieve (P)  1 wk  -KS 1 wk  -EE (r) AA (t) EE (c)     Transfer Training PT LTG, Activity Type (P)  all transfers  -KS      Transfer Training PT LTG, [place] Level (P)  moderate assist (50% patient effort)  -KS      Transfer Training PT LTG, Assist Device (P)  walker, rolling  -KS      Transfer Training PT  LTG, Date Goal Reviewed (P)  07/13/17  -KS  06/30/17  -EE (r) AA (t) EE (c)    Transfer Training PT LTG, Outcome (P)  goal ongoing  -KS  goal ongoing   with safe transfer technique  -EE (r) AA (t) EE (c)    Transfer Training PT LTG, Reason Goal Not Met (P)  progress slower than expected  -KS  progress slower than expected  -EE (r) AA (t) EE (c)      06/25/17 1156          Bed Mobility PT LTG    Bed Mobility PT LTG, Time to Achieve 1 wk  -CH      Bed Mobility PT LTG, Activity Type all bed mobility  -CH      Bed Mobility PT LTG, [place] Level contact guard assist  -CH      Bed Mobility PT LTG, Outcome goal revised  -CH      Transfer Training PT LTG    Transfer Training PT LTG, Time to Achieve 1 wk  -CH      Transfer Training PT LTG,  Activity Type all transfers  -CH      Transfer Training PT LTG, [place] Level minimum assist (75% patient effort);2 person assist required  -CH      Transfer Training PT LTG, Assist Device walker, rolling  -CH      Transfer Training PT LTG, Outcome goal ongoing  -        User Key  (r) = Recorded By, (t) = Taken By, (c) = Cosigned By    Initials Name Provider Type    MARLINE Garcia, PT Physical Therapist    TYESHA Post, PT Physical Therapist    LEN Blanco, PT Student PT Student    RIOS Davis, PT Student PT Student                Outcome Measures       07/06/17 0900          How much help from another person do you currently need...    Turning from your back to your side while in flat bed without using bedrails? (P)  2  -KS      Moving from lying on back to sitting on the side of a flat bed without bedrails? (P)  2  -KS      Moving to and from a bed to a chair (including a wheelchair)? (P)  2  -KS      Standing up from a chair using your arms (e.g., wheelchair, bedside chair)? (P)  2  -KS      Climbing 3-5 steps with a railing? (P)  2  -KS      To walk in hospital room? (P)  2  -KS      AM-PAC 6 Clicks Score (P)  12  -KS      Functional Assessment    Outcome Measure Options (P)  AM-PAC 6 Clicks Basic Mobility (PT)  -KS        User Key  (r) = Recorded By, (t) = Taken By, (c) = Cosigned By    Initials Name Provider Type    RIOS Dvais PT Student PT Student           Time Calculation:         PT Charges       07/06/17 0917          Time Calculation    Start Time (P)  0841  -KS      Stop Time (P)  0900  -KS      Time Calculation (min) (P)  19 min  -KS      PT Received On (P)  07/06/17  -KS      PT - Next Appointment (P)  07/07/17  -KS      PT Goal Re-Cert Due Date (P)  07/13/17  -KS        User Key  (r) = Recorded By, (t) = Taken By, (c) = Cosigned By    Initials Name Provider Type    ROIS Davis, PT Student PT Student          Therapy Charges for Today     Code  Description Service Date Service Provider Modifiers Qty    93235011652 HC PT EVAL MOD COMPLEXITY 2 7/6/2017 Heather Davis, PT Student GP 1    64654888341 HC PT THER SUPP EA 15 MIN 7/6/2017 Heather Davis PT Student GP 1          PT G-Codes  Outcome Measure Options: (P) AM-PAC 6 Clicks Basic Mobility (PT)      Heather Davis PT Student  7/6/2017

## 2017-07-06 NOTE — PROGRESS NOTES
"   LOS: 16 days   Patient Care Team:  Amanda Tanner MD as PCP - General (Family Medicine)    Chief Complaint/ Reason for encounter: Hyponatremia, SIADH        Subjective     Fall   Pertinent negatives include no fever or nausea.   Hip Pain      Back Pain   Pertinent negatives include no chest pain, fever or weakness.   Extremity Pain    Pertinent negatives include no fever.       Subjective:  Symptoms:  Stable.  No shortness of breath, chest pain or weakness.  (No new complaints today  Still complaining of some back pain  Status post CT guided abscess drainage and radiology earlier today).    Diet:  Adequate intake.  No nausea.    Activity level: Returning to normal.    Pain:  He reports no pain.          History taken from: Patient and chart    Objective     Vital Signs  Temp:  [98 °F (36.7 °C)-99.9 °F (37.7 °C)] 99.9 °F (37.7 °C)  Heart Rate:  [66-88] 83  Resp:  [18] 18  BP: (136-148)/(61-83) 137/74       Wt Readings from Last 1 Encounters:   07/04/17 0500 183 lb 8 oz (83.2 kg)   07/03/17 0500 181 lb 5 oz (82.2 kg)   07/02/17 0500 180 lb (81.6 kg)   07/01/17 0510 174 lb 2 oz (79 kg)   06/29/17 1900 191 lb (86.6 kg)   06/28/17 1900 190 lb 4.8 oz (86.3 kg)   06/28/17 1711 184 lb 15.5 oz (83.9 kg)   06/28/17 0422 185 lb (83.9 kg)   06/27/17 0500 187 lb 9.6 oz (85.1 kg)   06/26/17 0523 190 lb 11.2 oz (86.5 kg)   06/24/17 0600 192 lb (87.1 kg)   06/23/17 0413 194 lb 6.4 oz (88.2 kg)   06/22/17 1157 198 lb (89.8 kg)   06/22/17 0452 198 lb 8 oz (90 kg)   06/21/17 0500 190 lb 1.6 oz (86.2 kg)   06/20/17 1733 180 lb (81.6 kg)   06/20/17 1212 180 lb (81.6 kg)       Objective:  General Appearance:  Comfortable, in no acute distress, not in pain and well-appearing.    Vital signs: (most recent): Blood pressure 137/74, pulse 83, temperature 99.9 °F (37.7 °C), temperature source Oral, resp. rate 18, height 72.01\" (182.9 cm), weight 183 lb 8 oz (83.2 kg), SpO2 98 %.  Vital signs are normal.  No fever.    Output: " Producing urine.    HEENT: Normal HEENT exam.    Lungs:  Normal respiratory rate and normal effort.  He is not in respiratory distress.  Breath sounds clear to auscultation.  No wheezes or decreased breath sounds.    Heart: Normal rate.  Regular rhythm.    Abdomen: Abdomen is soft and non-distended.  Bowel sounds are normal.   There is no epigastric area or no suprapubic area tenderness.  There is no rebound tenderness.     Extremities: Normal range of motion.  There is no deformity or dependent edema.    Pulses: Distal pulses are intact.    Neurological: Patient is alert and oriented to person, place and time.    Skin:  Warm and dry.  No rash or cyanosis.             Results Review:    Past Medical History: reviewed and updated  Past Medical History:   Diagnosis Date   • Plasmacytoma          Allergies:  No Known Allergies    Intake/Output:     Intake/Output Summary (Last 24 hours) at 07/06/17 1504  Last data filed at 07/06/17 0921   Gross per 24 hour   Intake              480 ml   Output             2000 ml   Net            -1520 ml         DATA:  Interval chart, labs and notes reviewed.  The following labs personally reviewed by me  I discussed with his wife at bedside, interval history obtained from her regarding confusion of last 24 hours which she states is improved  Old records personally reviewed showing no prior hyponatremia  We discussed the potential risks of oral Samsca and they agree to proceed.  Risks include possible overcorrection which would be attempted by stopping fluid restriction and starting lower dose samsca and checking frequent labs per protocol    Labs:   Recent Results (from the past 24 hour(s))   Renal Function Panel    Collection Time: 07/06/17  5:45 AM   Result Value Ref Range    Glucose 113 (H) 65 - 99 mg/dL    BUN 20 8 - 23 mg/dL    Creatinine 0.79 0.76 - 1.27 mg/dL    Sodium 124 (L) 136 - 145 mmol/L    Potassium 4.0 3.5 - 5.2 mmol/L    Chloride 88 (L) 98 - 107 mmol/L    CO2 23.5 22.0  - 29.0 mmol/L    Calcium 8.0 (L) 8.6 - 10.5 mg/dL    Albumin 2.50 (L) 3.50 - 5.20 g/dL    Phosphorus 2.6 2.5 - 4.5 mg/dL    Anion Gap 12.5 mmol/L    BUN/Creatinine Ratio 25.3 (H) 7.0 - 25.0    eGFR Non African Amer 94 >60 mL/min/1.73   C-reactive Protein    Collection Time: 07/06/17  5:45 AM   Result Value Ref Range    C-Reactive Protein 14.51 (H) 0.00 - 0.50 mg/dL       Radiology:  Imaging Results (last 24 hours)     Procedure Component Value Units Date/Time    CT Guided Needle Aspiration [[id number]] Collected:  07/05/17 1553     Updated:  07/05/17 1600    Narrative:       CT-GUIDED ASPIRATION OF LEFT ILIOPSOAS FLUID COLLECTION     HISTORY: Male who is 83 years-old, with a history of spinal surgery,  status post MRI lumbar spine 07/03/2017 demonstrating probable epidural  abscess and left iliopsoas abscess measuring up to 25 x 23 mm, drainage  was requested. Due to the size of the lesion, aspiration rather than  drain placement will be performed.     The procedure, including the risk of hemorrhage, infection, diagnostic  or technical failure, was discussed prior to examination and consent was  obtained.     PROCEDURE: Multiple axial images was obtained for localization purposes.  Skin marker was placed.     The patient was prepped and draped in the usual fashion. Following  adequate local anesthesia with 1% lidocaine, dermatotomy was placed and   needle placement performed. 19-gauge needle was advanced into the  collection which was evacuated of approximately 6 cc of thick pus.  Sample was sent to laboratory for analysis.       Impression:       Satisfactory uneventful CT-guided aspiration of left  iliopsoas abscess.     This report was finalized on 7/5/2017 3:57 PM by Dr. Donal Thompson MD.                Medications have been reviewed:  Current Facility-Administered Medications   Medication Dose Route Frequency Provider Last Rate Last Dose   • acetaminophen (TYLENOL) tablet 650 mg  650 mg Oral Q6H PRN Malathi  Vidya Rubin MD   650 mg at 06/22/17 2253   • bisacodyl (DULCOLAX) EC tablet 5 mg  5 mg Oral Daily PRN Nolberto Hernandez IV, MD       • bisacodyl (DULCOLAX) suppository 10 mg  10 mg Rectal Daily PRN Malathi Rubin MD   10 mg at 06/28/17 1810   • bisacodyl (DULCOLAX) suppository 10 mg  10 mg Rectal Daily PRN Nolberto Hernandez IV, MD       • calcium carbonate (TUMS) chewable tablet 500 mg (200 mg elemental)  1 tablet Oral BID PRN Malathi Rubin MD       • castor oil-balsam peru (VENELEX) ointment   Topical Q12H Jayesh Naranjo MD       • cefepime (MAXIPIME) 2 g/100 mL 0.9% NS (mbp)  2 g Intravenous Q8H Chandra Ponce MD   2 g at 07/06/17 1353   • cholecalciferol (VITAMIN D3) tablet 5,000 Units  5,000 Units Oral Daily Cedrick Posada MD   5,000 Units at 07/06/17 0931   • diazePAM (VALIUM) tablet 5 mg  5 mg Oral Q6H PRN Nolberto Hernandez IV, MD       • diltiaZEM CD (CARDIZEM CD) 24 hr capsule 240 mg  240 mg Oral Q24H Tiffany Dudley MD   240 mg at 07/06/17 0931   • diphenhydrAMINE (BENADRYL) capsule 25 mg  25 mg Oral Q6H PRN Gilbert Marx MD   25 mg at 07/05/17 0003   • diphenhydrAMINE-zinc acetate 2-0.1 % cream   Topical TID PRN Gilbert Marx MD       • docusate sodium (COLACE) capsule 100 mg  100 mg Oral BID PRN Nolberto Hernandez IV, MD   100 mg at 07/06/17 0232   • HYDROcodone-acetaminophen (NORCO) 5-325 MG per tablet 1 tablet  1 tablet Oral Q4H PRN Nolberto Hernandez IV, MD   1 tablet at 07/06/17 0232   • lidocaine (XYLOCAINE) 1 % injection 1 mL  1 mL Intradermal Once Shane Perez MD       • magnesium hydroxide (MILK OF MAGNESIA) suspension 2400 mg/10mL 10 mL  10 mL Oral Daily PRN Malathi Rubin MD   10 mL at 06/28/17 0805   • magnesium hydroxide (MILK OF MAGNESIA) suspension 2400 mg/10mL 10 mL  10 mL Oral Daily PRN Nolberto Hernandez IV, MD       • midazolam (VERSED) injection 1 mg  1 mg Intravenous Q5 Min PRN Shane Perez MD       • morphine  injection 1 mg  1 mg Intravenous Q2H PRN Nolberto Hernandez IV, MD       • morphine injection 2 mg  2 mg Intravenous Q4H PRN Nolberto Hernandez IV, MD        And   • naloxone (NARCAN) injection 0.4 mg  0.4 mg Intravenous Q5 Min PRN Nolberto Hernandez IV, MD       • naloxone (NARCAN) injection 0.4 mg  0.4 mg Intravenous Q5 Min PRN Malathi Rubin MD       • ondansetron (ZOFRAN) injection 4 mg  4 mg Intravenous Q6H PRN Malathi Rubin MD   4 mg at 06/24/17 1032   • ondansetron (ZOFRAN) tablet 4 mg  4 mg Oral Q6H PRN Nolberto Hernandez IV, MD        Or   • ondansetron ODT (ZOFRAN-ODT) disintegrating tablet 4 mg  4 mg Oral Q6H PRN Nolberto Hernandez IV, MD        Or   • ondansetron (ZOFRAN) injection 4 mg  4 mg Intravenous Q6H PRN Nolberto Hernandez IV, MD       • pantoprazole (PROTONIX) EC tablet 40 mg  40 mg Oral BID AC Malathi Rubin MD   40 mg at 07/06/17 0524   • Pharmacy to dose vancomycin   Does not apply Continuous PRN Chandra Ponce MD       • ramipril (ALTACE) capsule 10 mg  10 mg Oral Daily Gilbert Marx MD   10 mg at 07/06/17 0931   • sennosides-docusate sodium (SENOKOT-S) 8.6-50 MG tablet 1 tablet  1 tablet Oral Nightly PRN Nolberto Hernandez IV, MD       • sodium chloride 0.9 % flush 1-10 mL  1-10 mL Intravenous PRN Malathi Rubin MD       • sodium chloride 0.9 % flush 1-10 mL  1-10 mL Intravenous PRN Nolberto Hernandez IV, MD       • vancomycin (VANCOCIN) in iso-osmotic dextrose IVPB 1 g (premix) 200 mL  1,000 mg Intravenous Q12H Chandra Ponce MD   1,000 mg at 07/06/17 0233   • vitamin B-12 (CYANOCOBALAMIN) tablet 2,000 mcg  2,000 mcg Oral Daily Gilbert Marx MD   2,000 mcg at 07/06/17 0932       Assessment/Plan     Principal Problem:    Closed wedge compression fracture of eleventh thoracic vertebra  Active Problems:    Diarrhea    CKD (chronic kidney disease) stage 2, GFR 60-89 ml/min    B12 deficiency    Hypocalcemia    A-fib    Meningioma    SIADH  (syndrome of inappropriate ADH production)    Vitamin D deficiency    Epidural abscess      Assessment:  (Hyponatremia, urine studies consistent with SIADH.  Likely related to the underlying infection, worse today, likely secondary to postop fluids  Metabolic acidosis,  improved, off bicarbonate  Lumbar abscess status/post drainage, on IV antibiotics per infectious disease  Hypocalcemia, improved, continue vitamin D replacement  vitamin D deficiency  Closed wedge compression fracture of eleventh thoracic vertebra  Generalized weakness  Diarrhea, better per his wife  Fall  Urinary tract infection with Pseudomonas, on IV antibiotics  Dehydration, resolved by now after hydration  CKD (chronic kidney disease) stage 2, GFR 60-89 ml/min  B12 deficiency        Plan:  Sodium worse today  Previous urine studies were consistent with SIADH and he had good response to Samsca  Will repeat Samsca 15 mg ×1 now  Demeclocycline discontinued  No fluid restriction or salt tabs while on Samsca).             Continue to monitor renal function, electroytes and volume closely   Please call me with any questions or concerns      Cedrick Posada MD   [facility]   [phone]    07/06/17  3:04 PM      Dictation performed using Dragon dictation software

## 2017-07-06 NOTE — PROGRESS NOTES
LOS: 16 days     Chief Complaint:  Follow-up epidural abscess    Interval History:  No acute events. IR drained RP abscess 6 cc pus.  Dull intermittent back pain worse w/ movement and relieved w/ rest. No associated fevers. Back cultures are still pending.    ROS: no n/v/d    Vital Signs  Temp:  [98 °F (36.7 °C)-99.7 °F (37.6 °C)] 98 °F (36.7 °C)  Heart Rate:  [66-94] 66  Resp:  [16-24] 18  BP: (116-148)/(58-83) 148/83    Physical Exam:  General: awake alert, nad  Head: Normocephalic, atraumatic  Eyes: PERRL, EOMI, no scleral icterus  ENT: MMM, OP clear, no thrush. Fair dentition.   Neck: Supple  Cardiovascular: NR, RR,  no LE edema  Respiratory: normal work of breathing on RA  GI: Abdomen w/ well-healing port sites, it is soft, non-tender, mildly distended, normal bowel sounds in all four quadrants  : Gong catheter present; no prostate tenderness on recent exam though it was enlarged  Musculoskeletal: no joint abnormalities, normal musculature  Skin: No rashes, lesions, or embolic phenomenon  Psychiatric: calm, good memory  Vasc: no cyanosis; PIV w/o erythema    Medications:  •  cefepime (MAXIPIME) 2 g/100 mL 0.9% NS (mbp), 2 g, Intravenous, Q8H, Chandra Ponce MD  -vancomycin 1 g q12h    LABS:  BMP, CRP, micro reviewed today  Lab Results   Component Value Date    WBC 17.36 (H) 07/05/2017    HGB 8.8 (L) 07/05/2017    HCT 25.3 (L) 07/05/2017    .8 (H) 07/05/2017     (H) 07/05/2017     Lab Results   Component Value Date    CREATININE 0.79 07/06/2017     Lab Results   Component Value Date    CRP 14.51 (H) 07/06/2017   No results found for: VANCOPEAK, VANCOTROUGH, VANCORANDOM      PSA 8  Procal 0.13  UA 0-2 WBCs     Microbiology:  7/4 OR Back Cx: GPCs on gram stain  7/3 BCx: NGTD  6/25 UCx: >100k pan-sensitive Pseudomonas     Radiology (reviewed report):  CT guided drainage of 6 cc pus from iliopsoas    Assessment/Plan   1. Epidural and retroperitoneal abscess  -s/p lumbar laminectomy on  7/4/17  -s/p IR guided drainage of RP abscess on 7/5/17  -cultures are pending from back; GPCs on gram stain  -continue empiric vancomycin and cefepime while awaiting cultures  -PICC today    2. Probable meningioma  -f/u with NSGY as an outpatient which he will be doing anyways for the back    3. Urinary retention    ID will follow.

## 2017-07-06 NOTE — PLAN OF CARE
Problem: Patient Care Overview (Adult)  Goal: Plan of Care Review  Outcome: Ongoing (interventions implemented as appropriate)    07/06/17 0504   Coping/Psychosocial Response Interventions   Plan Of Care Reviewed With patient   Patient Care Overview   Progress no change   Outcome Evaluation   Outcome Summary/Follow up Plan Pt disoriented x2. No c/o headache this shift. Oral pain medication given for back pain radiating to right hip.          Problem: Fall Risk (Adult)  Goal: Absence of Falls  Outcome: Ongoing (interventions implemented as appropriate)    07/06/17 0504   Fall Risk (Adult)   Absence of Falls making progress toward outcome

## 2017-07-06 NOTE — PLAN OF CARE
Problem: Patient Care Overview (Adult)  Goal: Plan of Care Review    07/06/17 0911   Coping/Psychosocial Response Interventions   Plan Of Care Reviewed With patient;spouse   Outcome Evaluation   Outcome Summary/Follow up Plan Pt presents w/ generalized weakness and impaired mobility of trunk control POD 2 lami, c/o pain at surgical site but able to tolerate log roll to EOB and stand EOB maxAx2. Noted swelling in LE, decreased mobility and strength in L LE. May benefit from skilled PT to address functional deficits, educate on spinal precautions, and increase mobility and strength. Will cont to monitor and tx per pt status.          Problem: Inpatient Physical Therapy  Goal: Bed Mobility Goal LTG- PT    07/06/17 0911   Bed Mobility PT LTG   Bed Mobility PT LTG, Date Established 07/06/17   Bed Mobility PT LTG, Time to Achieve 1 wk   Bed Mobility PT LTG, Activity Type all bed mobility   Bed Mobility PT LTG, Lea Level contact guard assist   Bed Mobility PT LTG, Date Goal Reviewed 07/13/17   Bed Mobility PT LTG, Outcome goal ongoing   Bed Mobility PT LTG, Reason Goal Not Met progress slower than expected       Goal: Transfer Training Goal 1 LTG- PT    07/06/17 0911   Transfer Training PT LTG   Transfer Training PT LTG, Date Established 07/06/17   Transfer Training PT LTG, Time to Achieve 1 wk   Transfer Training PT LTG, Activity Type all transfers   Transfer Training PT LTG, Lea Level moderate assist (50% patient effort)   Transfer Training PT LTG, Assist Device walker, rolling   Transfer Training PT LTG, Date Goal Reviewed 07/13/17   Transfer Training PT LTG, Outcome goal ongoing   Transfer Training PT LTG, Reason Goal Not Met progress slower than expected

## 2017-07-07 NOTE — PROGRESS NOTES
Continued Stay Note  [facility]     Patient Name: Pavel Melgoza  MRN: [id number]  Today's Date: 7/7/2017    Admit Date: 6/20/2017          Discharge Plan       07/07/17 1649    Case Management/Social Work Plan    Plan [facility]    Additional Comments Per Eunice, [facility] will have a bed this weekend if medically ready.                  Expected Discharge Date and Time     Expected Discharge Date Expected Discharge Time    Jul 9, 2017             Danielle Singh RN

## 2017-07-07 NOTE — PLAN OF CARE
Problem: Patient Care Overview (Adult)  Goal: Plan of Care Review    07/07/17 1442   Coping/Psychosocial Response Interventions   Plan Of Care Reviewed With patient   Outcome Evaluation   Outcome Summary/Follow up Plan Pt tolerates sitting EOB for UE ther ex, poor sitting balance with posterior lean. Poor attention and needs cues to follow simple commands.

## 2017-07-07 NOTE — PROGRESS NOTES
[facility] PROGRESS NOTE    PATIENT IDENTIFICATION:   Name:  Pavel Melgoza      MRN:  [id number]     83 y.o.  male                   Principal Problem:    Epidural abscess  Active Problems:    Closed wedge compression fracture of eleventh thoracic vertebra    CKD (chronic kidney disease) stage 2, GFR 60-89 ml/min    B12 deficiency    A-fib    Meningioma    SIADH (syndrome of inappropriate ADH production)    Vitamin D deficiency        Subjective   CC: soreness but no real pain  Interval History: dressing dry    Objective     Vital signs in last 24 hours:  Temp:  [98.8 °F (37.1 °C)-99.9 °F (37.7 °C)] 98.8 °F (37.1 °C)  Heart Rate:  [77-89] 83  Resp:  [18-19] 19  BP: (124-142)/(49-93) 134/76      Intake/Output last 3 shifts:  I/O last 3 completed shifts:  In: 1800 [P.O.:600; I.V.:500; IV Piggyback:700]  Out: 6100 [Urine:6100]  Intake/Output this shift:  I/O this shift:  In: 240 [P.O.:240]  Out: 800 [Urine:800]    LABS    Results from last 7 days  Lab Units 07/03/17  1301   INR  1.11*     Lab Results   Component Value Date    CALCIUM 8.2 (L) 07/07/2017    CALCIUM 8.2 (L) 07/07/2017    PHOS 3.0 07/07/2017     Results from last 7 days  Lab Units 07/07/17  0618 07/07/17  0003 07/06/17  1800  07/05/17  0427 07/04/17  0538   SODIUM mmol/L 132*  132* 130* 123*  < > 126* 125*   POTASSIUM mmol/L 3.8  3.8 3.9 4.0  < > 4.6 4.4   CHLORIDE mmol/L 97*  96* 94* 89*  < > 90* 90*   CO2 mmol/L 21.7*  23.6 23.3 22.6  < > 22.5 23.4   BUN mg/dL 15  15 17 18  < > 33* 43*   CREATININE mg/dL 0.73*  0.71* 0.73* 0.69*  < > 0.79 0.99   GLUCOSE mg/dL 107*  114* 112* 124*  < > 92 90   CALCIUM mg/dL 8.2*  8.2* 8.0* 7.8*  < > 8.1* 8.3*   WBC 10*3/mm3 15.78*  --   --   --  17.36* 14.11*   HEMOGLOBIN g/dL 8.2*  --   --   --  8.8* 9.4*   PLATELETS 10*3/mm3 434  --   --   --  580* 674*   < > = values in this interval not displayed.  Physical Exam:        4 - Opens eyes on own  6 - Follows simple motor commands  5 -  Alert and oriented  Stable strength   Dressing dry      ASSESSMENT  Assessment/Plan          LOS: 17 days         Nolberto Hernandez IV, MD

## 2017-07-07 NOTE — PLAN OF CARE
Problem: Patient Care Overview (Adult)  Goal: Plan of Care Review  Outcome: Ongoing (interventions implemented as appropriate)    07/07/17 1030   Coping/Psychosocial Response Interventions   Plan Of Care Reviewed With patient;spouse   Patient Care Overview   Progress improving         Problem: Inpatient SLP  Goal: Cognitive-linguistic-Patient will improve Cognitive-linguistic skills to improve safety and safety awareness in environment  Outcome: Ongoing (interventions implemented as appropriate)    07/07/17 1030   Cognitive Linguistic- Improve Safety and Awareness   Cognitive Linguistic Improve Safety and Awareness- SLP, Date Established 07/07/17   Cognitive Linguistic Improve Safety and Awareness- SLP, Time to Achieve by discharge   Cognitive Linguistic Improve Safety and Awareness- SLP, Outcome goal ongoing

## 2017-07-07 NOTE — PROGRESS NOTES
"    DAILY PROGRESS NOTE  [facility]    Patient Identification:  Name: Pavel Melgoza  Age: 83 y.o.  Sex: male  :  1934  MRN: [id number]         Primary Care Physician: Amanda Tanner MD    Subjective:  Interval History: no new issues - denies cp/sob - still w/ back pain and weakness    Objective:multiple fm a bs    Scheduled Meds:  castor oil-balsam peru  Topical Q12H   cholecalciferol 5,000 Units Oral Daily   diltiaZEM  mg Oral Q24H   lidocaine 1 mL Intradermal Once   pantoprazole 40 mg Oral BID AC   ramipril 10 mg Oral Daily   vancomycin 1,500 mg Intravenous Q12H   vitamin B-12 2,000 mcg Oral Daily     Continuous Infusions:  Pharmacy to dose vancomycin        Vital signs in last 24 hours:  Temp:  [98.8 °F (37.1 °C)-99.9 °F (37.7 °C)] 98.8 °F (37.1 °C)  Heart Rate:  [77-89] 83  Resp:  [18-19] 19  BP: (124-142)/(49-93) 134/76    Intake/Output:    Intake/Output Summary (Last 24 hours) at 17 1612  Last data filed at 17 1120   Gross per 24 hour   Intake             1560 ml   Output             4800 ml   Net            -3240 ml       Exam:  /76 (BP Location: Right arm, Patient Position: Lying)  Pulse 83  Temp 98.8 °F (37.1 °C) (Oral)   Resp 19  Ht 72.01\" (182.9 cm)  Wt 183 lb 8 oz (83.2 kg)  SpO2 99%  BMI 24.21 kg/m2    General Appearance:    Alert, cooperative, no distress                         Throat:   Lips, tongue, gums normal; oral mucosa pink and moist                           Neck:   Supple, no JVD                         Lungs:    Clear to auscultation bilaterally, respirations unlabored                 Chest Wall:    No tenderness or deformity                          Heart:    Regular rate and rhythm, S1 and S2 normal                  Abdomen:     Soft, non-tender, bowel sounds active                 Extremities:   SCDs, no cyanosis or edema, moving legs                            Data Review:  Labs in chart were " reviewed.    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] 06/25/2017   • Meningioma [D32.9] 06/24/2017   • A-fib [I48.91] 06/23/2017   • B12 deficiency [E53.8] 06/21/2017   • Closed wedge compression fracture of eleventh thoracic vertebra [S22.080A] 06/20/2017   • CKD (chronic kidney disease) stage 2, GFR 60-89 ml/min [N18.2] 06/20/2017      Resolved Hospital Problems    Diagnosis Date Noted Date Resolved   • Pseudomonas infection [B96.5] 06/28/2017 07/02/2017   • UTI (urinary tract infection) [N39.0] 06/26/2017 07/02/2017   • Left inguinal hernia [K40.90] 06/24/2017 07/02/2017   • Metabolic acidosis [E87.2] 06/23/2017 07/02/2017   • Hypocalcemia [E83.51] 06/23/2017 07/06/2017   • Diarrhea [R19.7] 06/20/2017 07/06/2017       Plan:  · S/P RAHUL 6/22/17.  · S/P Laparoscopic left inguinal hernia repair, transabdominal preperitoneal approach 6/23/17  · B12 replacement  · SIADH per nephrology. Na slowly improving - hopefully w/ continue to improve s/p abscess I/D - fluid mgnt per Renal and now up to 132  · Afib: not anticoag candidate d/t falls. Rate control per card.  · UTI with pseudomonas treated as 7/3 UCx reported as negative. Urinary retention w/ fernandez per Urology       Leukocytosis/ Epidural abscess/Psoas abscess  - s/p lumbar laminectomy on 7/4   - IR drainage on 7/5   - ID dosing Vanc for Enterococcus and Micrococcus w/ plans for possible PCN transition    S/p  PICC and d/w Dr Ponce today       Appreciate input and assistance from ALL     SCDs for DVT prophylaxis    Dispo - maybe back to rehab over weekend    Jayesh Naranjo MD  7/7/2017  4:12 PM\

## 2017-07-07 NOTE — THERAPY TREATMENT NOTE
Acute Care - Occupational Therapy Treatment Note  [facility]     Patient Name: Pavel Melgoza  : 1934  MRN: [id number]  Today's Date: 2017  Onset of Illness/Injury or Date of Surgery Date: 17     Referring Physician: Dr. Marx      Admit Date: 2017    Visit Dx:     ICD-10-CM ICD-9-CM   1. Generalized weakness R53.1 780.79   2. Dehydration E86.0 276.51   3. Compression fracture T14.8 829.0   4. Pain R52 780.96   5. Epidural abscess, L2-L5 G06.1 324.1   6. Left leg weakness M62.81 729.89   7. Epidural abscess G06.2 324.9     Patient Active Problem List   Diagnosis   • Generalized weakness   • Plasmocytoma   • Nausea & vomiting   • Fall   • Noncompliance with medication regimen   • Left leg weakness   • Closed wedge compression fracture of eleventh thoracic vertebra   • CKD (chronic kidney disease) stage 2, GFR 60-89 ml/min   • B12 deficiency   • A-fib   • Meningioma   • SIADH (syndrome of inappropriate ADH production)   • Hyponatremia   • Vitamin D deficiency   • Epidural abscess             Adult Rehabilitation Note       17 1440 17 1030 17 0900    Rehab Assessment/Intervention    Discipline occupational therapist  -SO speech language pathologist  -OC physical therapist  -LH    Document Type therapy note (daily note)  -SO therapy note (daily note)  -OC therapy note (daily note)  -LH    Subjective Information agree to therapy  -SO agree to therapy  -OC     Patient Effort, Rehab Treatment adequate  -SO good  -OC     Symptoms Noted During/After Treatment fatigue  -SO none  -OC     Precautions/Limitations fall precautions;spinal precautions  -SO  spinal precautions  -LH    Recorded by [SO] Jessica Fabian, OTR [OC] Linda Campbell MA,CCC-SLP [LH] Milly Garduno, PT    Pain Assessment    Pain Assessment No/denies pain  -SO No/denies pain  -OC No/denies pain  -LH    Recorded by [SO] Jessica Fabian, OTR [OC] Linda Campbell MA,CCC-SLP [LH] Milly Garduno, PT    Vision  Assessment/Intervention    Visual Impairment   WNL  -LH    Recorded by   [LH] Milly Garduno, PT    Cognitive Assessment/Intervention    Current Cognitive/Communication Assessment impaired  -SO impaired  -OC impaired  -LH    Orientation Status oriented to;person;place  -SO oriented to;person;place  -OC oriented to;person;place   was not aware had back sx  -LH    Follows Commands/Answers Questions   able to follow single-step instructions;needs cueing;needs increased time;needs repetition  -LH    Personal Safety decreased insight to deficits  -SO      Personal Safety Interventions fall prevention program maintained  -SO      Recorded by [SO] Jessica Fabian OTR [OC] Linda Campbell MA,CCC-SLP [LH] Milly Garduno, PT    Improve memory skills    Status: Improve memory skills  Progressing as expected  -OC     Memory Skills Progress  80%   80% w/ self correctionX2, 90% with repetition X2  -OC     Comments: Improve memory skills  Able to recall 1/3 items when presented with 2-3 sent paragraphs.  -OC     Recorded by  [OC] Linda Campbell MA,CCC-SLP     Bed Mobility, Assessment/Treatment    Bed Mobility, Assistive Device bed rails;head of bed elevated  -SO      Bed Mob, Supine to Sit, [place] moderate assist (50% patient effort);maximum assist (25% patient effort)  -SO  moderate assist (50% patient effort);2 person assist required;verbal cues required;nonverbal cues required (demo/gesture)  -LH    Bed Mob, Sit to Supine, [place] moderate assist (50% patient effort);verbal cues required;nonverbal cues required (demo/gesture)  -SO  moderate assist (50% patient effort);2 person assist required;verbal cues required;nonverbal cues required (demo/gesture)  -LH    Bed Mob, Sidelying to Sit, [place]   moderate assist (50% patient effort);2 person assist required  -LH    Bed Mob, Sit to Sidelying, [place]   moderate assist (50% patient effort);2 person assist required  -LH    Bed Mobility, Impairments   pain;ROM  decreased;strength decreased;impaired balance;coordination impaired  -    Bed Mobility, Comment   log rolling  -LH    Recorded by [SO] Jessica Fabian, OTR  [LH] Milly Garduno, PT    Transfer Assessment/Treatment    Transfers, Sit-Stand [place]   maximum assist (25% patient effort);moderate assist (50% patient effort);2 person assist required  -LH    Transfers, Stand-Sit [place]   moderate assist (50% patient effort);maximum assist (25% patient effort);2 person assist required;verbal cues required;nonverbal cues required (demo/gesture)  -LH    Transfer, Impairments   ROM decreased;strength decreased;impaired balance;coordination impaired  -LH    Transfer, Comment   poor fwd posturing, blocking LLE  -LH    Recorded by   [LH] Milly Garduno, PT    Gait Assessment/Treatment    Gait, [place] Level   not appropriate to assess  -LH    Recorded by   [LH] Milly Garduno PT    Balance Skills Training    Sitting-Level of Assistance Moderate assistance  -SO  Moderate assistance  -LH    Sitting-Balance Support Feet supported  -SO  Right upper extremity supported;Left upper extremity supported;Feet supported  -    Sitting-Balance Activities --   UE ther ex, posterior lean  -SO  Trunk control activities;Right UE Weight Bearing;Left UE Weight Bearing  -    Sitting # of Minutes 8  -SO  5  -LH    Recorded by [SO] Jessica Fabian, OTR  [LH] Milly Garduno, LESLEE    Therapy Exercises    Right Lower Extremity   15 reps;AROM:;sitting;ankle pumps/circles;LAQ  -LH    Left Lower Extremity   15 reps;PROM:;sitting;ankle pumps/circles;LAQ  -LH    Bilateral Upper Extremity AROM:;10 reps;sitting;elbow flexion/extension;shoulder extension/flexion   10x3  -SO      Recorded by [SO] Jessica Fabian, OTR  [LH] Milly Garduno, PT    Positioning and Restraints    Pre-Treatment Position in bed  -SO  in bed  -LH    Post Treatment Position bed  -SO  bed  -LH    In Bed supine;call light within reach;encouraged to call for  assist;exit alarm on;with family/caregiver  -SO  supine;call light within reach;encouraged to call for assist;exit alarm on  -LH    Recorded by [SO] Jessica Fabian OTR  [LH] Milly Garduno, PT      07/06/17 0800          Rehab Assessment/Intervention    Discipline physical therapist  -LH,KS,LH2      Symptoms Noted During/After Treatment increased pain  -LH,KS,LH2      Precautions/Limitations spinal precautions  -LH,KS,LH2      Specific Treatment Considerations POD 2 lami   -LH,KS,LH2      Recorded by [LH,KS,LH2] Milly Garduno, PT (r) Heather Davis, PT Student (t) Milly Garduno, PT (c)      Pain Assessment    Pain Assessment 0-10  -LH,KS,LH2      Pain Score --   pt noted increased pain throughout tx  -LH,KS,LH2      Pain Type Surgical pain  -LH,KS,LH2      Pain Location Back  -LH,KS,LH2      Pain Orientation --   surgical site  -LH,KS,LH2      Pain Intervention(s) Ambulation/increased activity;Repositioned  -LH,KS,LH2      Response to Interventions unchanged  -LH,KS,LH2      Recorded by [LH,KS,LH2] Milly Garduno, PT (r) Heather Davis, PT Student (t) Milly Garduno, PT (c)      Vision Assessment/Intervention    Visual Impairment WFL  -LH,KS,LH2      Recorded by [LH,KS,LH2] Milly Garduno, PT (r) Heather Davis PT Student (t) Milly Garduno, PT (c)      Cognitive Assessment/Intervention    Current Cognitive/Communication Assessment functional  -LH,KS,LH2      Follows Commands/Answers Questions able to follow multi-step instructions;needs repetition;needs cueing  -LH,KS,LH2      Personal Safety mild impairment  -LH,KS,LH2      Recorded by [LH,KS,LH2] Milly Garduno, PT (r) Heather Davis, PT Student (t) Milly Garduno, PT (c)      ROM (Range of Motion)    General ROM head/neck/trunk range of motion deficits identified;lower extremity range of motion deficits identified  -LH,KS,LH2      General ROM Detail spinal precautions, swollen and impaired L LE  -LH,KS,LH2      Recorded by [LH,KS,LH2] Milly Garduno, PT  (r) Heather Davis, PT Student (t) Milly Garduno, PT (c)      MMT (Manual Muscle Testing)    General MMT Assessment lower extremity strength deficits identified  -LH,KS,LH2      General MMT Assessment Detail L LE deficits noted, grossly 2+, R LE grossly 3+/5  -LH,KS,LH2      Recorded by [LH,KS,LH2] Milly Garduno, PT (r) Heather Davis, PT Student (t) Milly Garduno, PT (c)      Bed Mobility, Assessment/Treatment    Bed Mobility, Assistive Device bed rails  -LH,KS,LH2      Bed Mobility, Roll Left, [place] 2 person assist required;verbal cues required;moderate assist (50% patient effort)  -LH,KS,LH2      Bed Mobility, Scoot/Bridge, [place] maximum assist (25% patient effort);verbal cues required  -LH,KS,LH2      Bed Mob, Supine to Sit, [place] moderate assist (50% patient effort);2 person assist required;verbal cues required  -LH,KS,LH2      Bed Mob, Sit to Supine, [place] moderate assist (50% patient effort);2 person assist required;verbal cues required  -LH,KS,LH2      Bed Mob, Sidelying to Sit, [place] 2 person assist required;moderate assist (50% patient effort);verbal cues required  -LH,KS,LH2      Bed Mob, Sit to Sidelying, [place] verbal cues required;moderate assist (50% patient effort);2 person assist required  -LH,KS,LH2      Bed Mobility, Impairments pain;ROM decreased;strength decreased  -LH,KS,LH2      Recorded by [LH,KS,LH2] Milly Garduno PT (r) Heather Davis, PT Student (t) Milly Garduno, PT (c)      Transfer Assessment/Treatment    Transfers, Sit-Stand [place] 2 person assist required;verbal cues required;maximum assist (25% patient effort);nonverbal cues required (demo/gesture)  -LH,KS,LH2      Transfers, Stand-Sit [place] verbal cues required;nonverbal cues required (demo/gesture);maximum assist (25% patient effort);2 person assist required  -LH,KS,LH2      Transfers, Sit-Stand-Sit, Assist Device --   HHA x2  -LH,KS,LH2      Transfer, Impairments ROM  decreased;strength decreased;pain;impaired balance;postural control impaired  -LH,KS,LH2      Transfer, Comment poor forward flexed posturing, max cueing for upright. PT assist at L LE blocking quad   -LH,KS,LH2      Recorded by [LH,KS,LH2] Milly Garduno, PT (r) Heather Davis, PT Student (t) Milly Garduno PT (c)      Therapy Exercises    Right Lower Extremity AROM:;heel slides;ankle pumps/circles;LAQ  -LH,KS,LH2      Left Lower Extremity PROM:;heel slides;ankle pumps/circles  -LH,KS,LH2      Recorded by [LH,KS,LH2] Milly Garduno PT (r) Heather Davis, PT Student (t) Milly Garduno PT (c)      Positioning and Restraints    Pre-Treatment Position in bed  -LH,KS,LH2      Post Treatment Position bed  -LH,KS,LH2      In Bed call light within reach;exit alarm on;encouraged to call for assist;with family/caregiver;supine  -LH,KS,LH2      Recorded by [LH,KS,LH2] Milly Garduno PT (r) Heather Davis PT Student (t) Milly Garduno PT (c)        User Key  (r) = Recorded By, (t) = Taken By, (c) = Cosigned By    Initials Name Effective Dates    OC Linda Campbell MA,[facility]-SLP 04/05/17 -     KALLIE Fabian, OTR 04/13/15 -      Milly Garduno PT 02/07/17 -     RIOS Davis, PT Student 05/08/17 -               Occupational Therapy Education     Title: PT OT SLP Therapies (Active)     Topic: Occupational Therapy (Active)     Point: ADL training (Active)    Description: Instruct learner(s) on proper safety adaptation and remediation techniques during self care or transfers.   Instruct in proper use of assistive devices.    Learning Progress Summary    Learner Readiness Method Response Comment Documented by Status   Patient Acceptance E NR  KS 07/06/17 0858 Active    Acceptance E,TB VU,NR   06/28/17 0737 Done    Acceptance E,TB,D VU,NR safety for LE adls. Discussed back safety and AE with pt and spouse SG 06/28/17 142 Done    Acceptance E,TB,D VU,NR role of OT and safety for functional task  06/21/17 7649  Done   Family Acceptance E,TB VU,NR   06/28/17 2237 Done    Acceptance E,TB,D VU,NR safety for LE adls. Discussed back safety and AE with pt and spouse  06/28/17 1428 Done   Significant Other Acceptance E NR  KS 07/06/17 0858 Active    Acceptance E,TB,D VU,NR role of OT and safety for functional task  06/21/17 1451 Done               Point: Body mechanics (Active)    Description: Instruct learner(s) on proper positioning and spine alignment during self-care, functional mobility activities and/or exercises.    Learning Progress Summary    Learner Readiness Method Response Comment Documented by Status   Patient Acceptance E NR  KS 07/06/17 0858 Active    Acceptance E,TB VU,NR   06/28/17 2237 Done   Family Acceptance E,TB VU,NR   06/28/17 2237 Done   Significant Other Acceptance E [facility] 07/06/17 0858 Active                      User Key     Initials Effective Dates Name Provider Type Discipline     04/13/15 -  Ofe Mclean, OTR Occupational Therapist OT     04/06/17 -  Yana Mercado RN Registered Nurse Nurse    KS 05/08/17 -  Heather Davis, PT Student PT Student PT                  OT Recommendation and Plan  Planned Therapy Interventions: ADL retraining, bed mobility training, transfer training, adaptive equipment training  Therapy Frequency: 3-5 times/wk  Plan of Care Review  Plan Of Care Reviewed With: patient  Outcome Summary/Follow up Plan: Pt tolerates sitting EOB for UE ther ex, poor sitting balance with posterior lean. Poor attention and needs cues to follow simple commands.        Outcome Measures       07/07/17 1400 07/07/17 0900 07/06/17 0900    How much help from another person do you currently need...    Turning from your back to your side while in flat bed without using bedrails?  2  -LH 2  -LH (r) KS (t) LH (c)    Moving from lying on back to sitting on the side of a flat bed without bedrails?  2  -LH 2  -LH (r) KS (t) LH (c)    Moving to and from a bed to a chair (including a  wheelchair)?  2  -LH 2  -LH (r) KS (t) LH (c)    Standing up from a chair using your arms (e.g., wheelchair, bedside chair)?  2  -LH 2  -LH (r) KS (t) LH (c)    Climbing 3-5 steps with a railing?  1  -LH 2  -LH (r) KS (t) LH (c)    To walk in hospital room?  1  -LH 2  -LH (r) KS (t) LH (c)    AM-PAC 6 Clicks Score  10  -LH 12  -LH (r) KS (t)    How much help from another is currently needed...    Putting on and taking off regular lower body clothing? 1  -SO      Bathing (including washing, rinsing, and drying) 2  -SO      Toileting (which includes using toilet bed pan or urinal) 1  -SO      Putting on and taking off regular upper body clothing 2  -SO      Taking care of personal grooming (such as brushing teeth) 2  -SO      Eating meals 3  -SO      Score 11  -SO      Functional Assessment    Outcome Measure Options AM-PAC 6 Clicks Daily Activity (OT)  -SO AM-PAC 6 Clicks Basic Mobility (PT)  - AM-PAC 6 Clicks Basic Mobility (PT)  - (r) KS (t) LH (c)      User Key  (r) = Recorded By, (t) = Taken By, (c) = Cosigned By    Initials Name Provider Type    ADILENE Hoskins Occupational Therapist     Milly Garduno, PT Physical Therapist    KS Heather Davis, PT Student PT Student           Time Calculation:         Time Calculation- OT       07/07/17 1442          Time Calculation- OT    OT Start Time 1400  -SO      OT Stop Time 1416  -SO      OT Time Calculation (min) 16 min  -SO      OT Received On 07/07/17  -SO        User Key  (r) = Recorded By, (t) = Taken By, (c) = Cosigned By    Initials Name Provider Type    ADILENE Hoskins Occupational Therapist           Therapy Charges for Today     Code Description Service Date Service Provider Modifiers Qty    [id number]  OT THER PROC EA 15 MIN 7/7/2017 ADILENE Marcano GO 1               ADILENE Marcano  7/7/2017

## 2017-07-07 NOTE — PROGRESS NOTES
"   LOS: 17 days   Patient Care Team:  Amanda Tanner MD as PCP - General (Family Medicine)    Chief Complaint/ Reason for encounter: Hyponatremia, SIADH        Subjective     Fall   Pertinent negatives include no fever or nausea.   Hip Pain      Back Pain   Pertinent negatives include no chest pain, fever or weakness.   Extremity Pain    Pertinent negatives include no fever.       Subjective:  Symptoms:  Stable.  No shortness of breath, chest pain or weakness.  (No new complaints today  Feels better, improved appetite).    Diet:  Adequate intake.  No nausea.    Activity level: Returning to normal.    Pain:  He reports no pain.          History taken from: Patient and chart    Objective     Vital Signs  Temp:  [98.8 °F (37.1 °C)-99.9 °F (37.7 °C)] 98.8 °F (37.1 °C)  Heart Rate:  [77-89] 83  Resp:  [18-19] 19  BP: (124-142)/(49-93) 134/76       Wt Readings from Last 1 Encounters:   07/04/17 0500 183 lb 8 oz (83.2 kg)   07/03/17 0500 181 lb 5 oz (82.2 kg)   07/02/17 0500 180 lb (81.6 kg)   07/01/17 0510 174 lb 2 oz (79 kg)   06/29/17 1900 191 lb (86.6 kg)   06/28/17 1900 190 lb 4.8 oz (86.3 kg)   06/28/17 1711 184 lb 15.5 oz (83.9 kg)   06/28/17 0422 185 lb (83.9 kg)   06/27/17 0500 187 lb 9.6 oz (85.1 kg)   06/26/17 0523 190 lb 11.2 oz (86.5 kg)   06/24/17 0600 192 lb (87.1 kg)   06/23/17 0413 194 lb 6.4 oz (88.2 kg)   06/22/17 1157 198 lb (89.8 kg)   06/22/17 0452 198 lb 8 oz (90 kg)   06/21/17 0500 190 lb 1.6 oz (86.2 kg)   06/20/17 1733 180 lb (81.6 kg)   06/20/17 1212 180 lb (81.6 kg)       Objective:  General Appearance:  Comfortable, in no acute distress, not in pain and well-appearing.    Vital signs: (most recent): Blood pressure 134/76, pulse 83, temperature 98.8 °F (37.1 °C), temperature source Oral, resp. rate 19, height 72.01\" (182.9 cm), weight 183 lb 8 oz (83.2 kg), SpO2 99 %.  Vital signs are normal.  No fever.    Output: Producing urine.    HEENT: Normal HEENT exam.    Lungs:  Normal " respiratory rate and normal effort.  He is not in respiratory distress.  Breath sounds clear to auscultation.  No wheezes or decreased breath sounds.    Heart: Normal rate.  Regular rhythm.    Abdomen: Abdomen is soft and non-distended.  Bowel sounds are normal.   There is no epigastric area or no suprapubic area tenderness.  There is no rebound tenderness.     Extremities: Normal range of motion.  There is no deformity or dependent edema.    Pulses: Distal pulses are intact.    Neurological: Patient is alert and oriented to person, place and time.    Skin:  Warm and dry.  No rash or cyanosis.             Results Review:    Past Medical History: reviewed and updated  Past Medical History:   Diagnosis Date   • Plasmacytoma          Allergies:  No Known Allergies    Intake/Output:     Intake/Output Summary (Last 24 hours) at 07/07/17 1115  Last data filed at 07/07/17 0936   Gross per 24 hour   Intake             1560 ml   Output             5000 ml   Net            -3440 ml         DATA:  Interval chart, labs and notes reviewed.  The following labs personally reviewed by me  I discussed with his wife at bedside, interval history obtained from her regarding confusion of last 24 hours which she states is improved  Old records personally reviewed showing no prior hyponatremia  We discussed the potential risks of oral Samsca and they agree to proceed.  Risks include possible overcorrection which would be attempted by stopping fluid restriction and starting lower dose samsca and checking frequent labs per protocol    Labs:   Recent Results (from the past 24 hour(s))   Vancomycin, Trough    Collection Time: 07/06/17  2:27 PM   Result Value Ref Range    Vancomycin Trough 10.60 5.00 - 20.00 mcg/mL   Basic Metabolic Panel    Collection Time: 07/06/17  6:00 PM   Result Value Ref Range    Glucose 124 (H) 65 - 99 mg/dL    BUN 18 8 - 23 mg/dL    Creatinine 0.69 (L) 0.76 - 1.27 mg/dL    Sodium 123 (L) 136 - 145 mmol/L    Potassium  4.0 3.5 - 5.2 mmol/L    Chloride 89 (L) 98 - 107 mmol/L    CO2 22.6 22.0 - 29.0 mmol/L    Calcium 7.8 (L) 8.6 - 10.5 mg/dL    eGFR Non African Amer 110 >60 mL/min/1.73    BUN/Creatinine Ratio 26.1 (H) 7.0 - 25.0    Anion Gap 11.4 mmol/L   Basic Metabolic Panel    Collection Time: 07/07/17 12:03 AM   Result Value Ref Range    Glucose 112 (H) 65 - 99 mg/dL    BUN 17 8 - 23 mg/dL    Creatinine 0.73 (L) 0.76 - 1.27 mg/dL    Sodium 130 (L) 136 - 145 mmol/L    Potassium 3.9 3.5 - 5.2 mmol/L    Chloride 94 (L) 98 - 107 mmol/L    CO2 23.3 22.0 - 29.0 mmol/L    Calcium 8.0 (L) 8.6 - 10.5 mg/dL    eGFR Non African Amer 103 >60 mL/min/1.73    BUN/Creatinine Ratio 23.3 7.0 - 25.0    Anion Gap 12.7 mmol/L   Renal Function Panel    Collection Time: 07/07/17  6:18 AM   Result Value Ref Range    Glucose 114 (H) 65 - 99 mg/dL    BUN 15 8 - 23 mg/dL    Creatinine 0.71 (L) 0.76 - 1.27 mg/dL    Sodium 132 (L) 136 - 145 mmol/L    Potassium 3.8 3.5 - 5.2 mmol/L    Chloride 96 (L) 98 - 107 mmol/L    CO2 23.6 22.0 - 29.0 mmol/L    Calcium 8.2 (L) 8.6 - 10.5 mg/dL    Albumin 2.10 (L) 3.50 - 5.20 g/dL    Phosphorus 3.0 2.5 - 4.5 mg/dL    Anion Gap 12.4 mmol/L    BUN/Creatinine Ratio 21.1 7.0 - 25.0    eGFR Non African Amer 106 >60 mL/min/1.73   CBC (No Diff)    Collection Time: 07/07/17  6:18 AM   Result Value Ref Range    WBC 15.78 (H) 4.50 - 10.70 10*3/mm3    RBC 2.31 (L) 4.60 - 6.00 10*6/mm3    Hemoglobin 8.2 (L) 13.7 - 17.6 g/dL    Hematocrit 23.5 (L) 40.4 - 52.2 %    .7 (H) 79.8 - 96.2 fL    MCH 35.5 (H) 27.0 - 32.7 pg    MCHC 34.9 32.6 - 36.4 g/dL    RDW 12.8 11.5 - 14.5 %    RDW-SD 47.1 37.0 - 54.0 fl    MPV 8.2 6.0 - 12.0 fL    Platelets 434 140 - 500 10*3/mm3   Basic Metabolic Panel    Collection Time: 07/07/17  6:18 AM   Result Value Ref Range    Glucose 107 (H) 65 - 99 mg/dL    BUN 15 8 - 23 mg/dL    Creatinine 0.73 (L) 0.76 - 1.27 mg/dL    Sodium 132 (L) 136 - 145 mmol/L    Potassium 3.8 3.5 - 5.2 mmol/L    Chloride 97 (L)  98 - 107 mmol/L    CO2 21.7 (L) 22.0 - 29.0 mmol/L    Calcium 8.2 (L) 8.6 - 10.5 mg/dL    eGFR Non African Amer 103 >60 mL/min/1.73    BUN/Creatinine Ratio 20.5 7.0 - 25.0    Anion Gap 13.3 mmol/L       Radiology:  Imaging Results (last 24 hours)     ** No results found for the last 24 hours. **             Medications have been reviewed:  Current Facility-Administered Medications   Medication Dose Route Frequency Provider Last Rate Last Dose   • acetaminophen (TYLENOL) tablet 650 mg  650 mg Oral Q6H PRN Malathi Rubin MD   650 mg at 06/22/17 2253   • bisacodyl (DULCOLAX) EC tablet 5 mg  5 mg Oral Daily PRN Nolberto Hernnadez IV, MD       • bisacodyl (DULCOLAX) suppository 10 mg  10 mg Rectal Daily PRN Malathi Rubin MD   10 mg at 06/28/17 1810   • bisacodyl (DULCOLAX) suppository 10 mg  10 mg Rectal Daily PRN Nolberto Hernandez IV, MD       • calcium carbonate (TUMS) chewable tablet 500 mg (200 mg elemental)  1 tablet Oral BID PRN Malathi Rubin MD       • castor oil-balsam peru (VENELEX) ointment   Topical Q12H Jayesh Naranjo MD       • cholecalciferol (VITAMIN D3) tablet 5,000 Units  5,000 Units Oral Daily Cedrick Posada MD   5,000 Units at 07/07/17 0832   • diazePAM (VALIUM) tablet 5 mg  5 mg Oral Q6H PRN Nolberto Hernandez IV, MD       • diltiaZEM CD (CARDIZEM CD) 24 hr capsule 240 mg  240 mg Oral Q24H Tiffany Dudley MD   240 mg at 07/07/17 0831   • diphenhydrAMINE (BENADRYL) capsule 25 mg  25 mg Oral Q6H PRN Gilbert Marx MD   25 mg at 07/05/17 0003   • diphenhydrAMINE-zinc acetate 2-0.1 % cream   Topical TID PRN Gilbert Marx MD       • docusate sodium (COLACE) capsule 100 mg  100 mg Oral BID PRN Nolberto Hernandez IV, MD   100 mg at 07/06/17 0232   • HYDROcodone-acetaminophen (NORCO) 5-325 MG per tablet 1 tablet  1 tablet Oral Q4H PRN Nolberto Hernandez IV, MD   1 tablet at 07/07/17 1013   • lidocaine (XYLOCAINE) 1 % injection 1 mL  1 mL Intradermal Once Shane Aparicio  MD Chris       • magnesium hydroxide (MILK OF MAGNESIA) suspension 2400 mg/10mL 10 mL  10 mL Oral Daily PRN Malathi Rubin MD   10 mL at 06/28/17 0805   • magnesium hydroxide (MILK OF MAGNESIA) suspension 2400 mg/10mL 10 mL  10 mL Oral Daily PRN Nolberto Hernandez IV, MD       • midazolam (VERSED) injection 1 mg  1 mg Intravenous Q5 Min PRN Shane Perez MD       • morphine injection 1 mg  1 mg Intravenous Q2H PRN Nolberto Hernandez IV, MD       • morphine injection 2 mg  2 mg Intravenous Q4H PRN Nolberto Hernandez IV, MD        And   • naloxone (NARCAN) injection 0.4 mg  0.4 mg Intravenous Q5 Min PRN Nolberto Hernandez IV, MD       • naloxone (NARCAN) injection 0.4 mg  0.4 mg Intravenous Q5 Min PRN Malathi Rubin MD       • ondansetron (ZOFRAN) injection 4 mg  4 mg Intravenous Q6H PRN Malathi Rubin MD   4 mg at 06/24/17 1032   • ondansetron (ZOFRAN) tablet 4 mg  4 mg Oral Q6H PRN Nolberto Hernandez IV, MD        Or   • ondansetron ODT (ZOFRAN-ODT) disintegrating tablet 4 mg  4 mg Oral Q6H PRN Nolberto Hernandez IV, MD        Or   • ondansetron (ZOFRAN) injection 4 mg  4 mg Intravenous Q6H PRN Nolberto Hernandez IV, MD       • pantoprazole (PROTONIX) EC tablet 40 mg  40 mg Oral BID AC Malathi Rubin MD   40 mg at 07/07/17 0643   • Pharmacy to dose vancomycin   Does not apply Continuous PRN Chandra Ponce MD       • ramipril (ALTACE) capsule 10 mg  10 mg Oral Daily Gilbert Selwyn Marx MD   10 mg at 07/07/17 0831   • sennosides-docusate sodium (SENOKOT-S) 8.6-50 MG tablet 1 tablet  1 tablet Oral Nightly PRN Nolberto Hernandez IV, MD       • sodium chloride 0.9 % flush 1-10 mL  1-10 mL Intravenous PRN Malathi Rubin MD       • sodium chloride 0.9 % flush 1-10 mL  1-10 mL Intravenous PRN Nolberto Hernandez IV, MD       • vancomycin 1500 mg/500 mL 0.9% NS IVPB ([facility])  1,500 mg Intravenous Q12H Chandra Ponce MD   1,500 mg at 07/07/17 0504   • vitamin B-12  (CYANOCOBALAMIN) tablet 2,000 mcg  2,000 mcg Oral Daily Gilbert Marx MD   2,000 mcg at 07/07/17 0831       Assessment/Plan     Principal Problem:    Epidural abscess  Active Problems:    Closed wedge compression fracture of eleventh thoracic vertebra    CKD (chronic kidney disease) stage 2, GFR 60-89 ml/min    B12 deficiency    A-fib    Meningioma    SIADH (syndrome of inappropriate ADH production)    Vitamin D deficiency      Assessment:  (Hyponatremia, urine studies consistent with SIADH.  Likely related to the underlying infection, improved after Samsca   Metabolic acidosis,  stable, off bicarbonate  Lumbar abscess status/post drainage, on IV antibiotics per infectious disease  Closed wedge compression fracture of eleventh thoracic vertebra  Fall  Urinary tract infection  CKD (chronic kidney disease) stage 2, GFR 60-89 ml/min  B12 deficiency        Plan:  Sodium level improved after Samsca, acceptable rate of correction  Recheck sodium later this afternoon, try to maintain sodium at this level  Discussed fluid restriction with patient and his wife  They are very resistant to a formal fluid restriction, I recommended about 1500 cc per day maximum fluid intake).             Continue to monitor renal function, electroytes and volume closely   Please call me with any questions or concerns      Cedrick Posada MD   [facility]   [phone]    07/07/17  11:15 AM      Dictation performed using Dragon dictation software

## 2017-07-07 NOTE — THERAPY TREATMENT NOTE
Acute Care - Physical Therapy Treatment Note  [facility]     Patient Name: Pavel Melgoza  : 1934  MRN: [id number]  Today's Date: 2017  Onset of Illness/Injury or Date of Surgery Date: 17     Referring Physician: Dr. Marx    Admit Date: 2017    Visit Dx:    ICD-10-CM ICD-9-CM   1. Generalized weakness R53.1 780.79   2. Dehydration E86.0 276.51   3. Compression fracture T14.8 829.0   4. Pain R52 780.96   5. Epidural abscess, L2-L5 G06.1 324.1   6. Left leg weakness M62.81 729.89   7. Epidural abscess G06.2 324.9     Patient Active Problem List   Diagnosis   • Generalized weakness   • Plasmocytoma   • Nausea & vomiting   • Fall   • Noncompliance with medication regimen   • Left leg weakness   • Closed wedge compression fracture of eleventh thoracic vertebra   • CKD (chronic kidney disease) stage 2, GFR 60-89 ml/min   • B12 deficiency   • A-fib   • Meningioma   • SIADH (syndrome of inappropriate ADH production)   • Hyponatremia   • Vitamin D deficiency   • Epidural abscess               Adult Rehabilitation Note       17 0900 17 0800       Rehab Assessment/Intervention    Discipline physical therapist  - physical therapist  -LH,KS,LH2     Document Type therapy note (daily note)  -      Symptoms Noted During/After Treatment  increased pain  -LH,KS,LH2     Precautions/Limitations spinal precautions  - spinal precautions  -LH,KS,LH2     Specific Treatment Considerations  POD 2 lami   -LH,KS,LH2     Recorded by [] Milly Garduno, PT [LH,KS,LH2] Milly Garduno, PT (r) Heather Davis, LESLEE Student (t) Milly Garduno, PT (c)     Pain Assessment    Pain Assessment No/denies pain  - 0-10  -LH,KS,LH2     Pain Score  --   pt noted increased pain throughout tx  -LH,KS,LH2     Pain Type  Surgical pain  -LH,KS,LH2     Pain Location  Back  -LH,KS,LH2     Pain Orientation  --   surgical site  -LH,KS,LH2     Pain Intervention(s)  Ambulation/increased activity;Repositioned  -LH,KS,LH2      Response to Interventions  unchanged  -LH,KS,LH2     Recorded by [LH] Milly Garduno, PT [LH,KS,LH2] Milly Garduno PT (r) Heather Davis PT Student (t) Milly Garduno PT (c)     Vision Assessment/Intervention    Visual Impairment WNL  -LH WFL  -LH,KS,LH2     Recorded by [LH] Milly Garduno, PT [LH,KS,LH2] Milly Garduno PT (r) Heather Davis PT Student (t) Milly Garduno PT (c)     Cognitive Assessment/Intervention    Current Cognitive/Communication Assessment impaired  -LH functional  -LH,KS,LH2     Orientation Status oriented to;person;place   was not aware had back sx  -LH      Follows Commands/Answers Questions able to follow single-step instructions;needs cueing;needs increased time;needs repetition  -LH able to follow multi-step instructions;needs repetition;needs cueing  -LH,KS,LH2     Personal Safety  mild impairment  -LH,KS,LH2     Recorded by [LH] Milly Garduno, PT [LH,KS,LH2] Milly Garduno PT (r) Heather Davis PT Student (t) Milly Garduno, PT (c)     ROM (Range of Motion)    General ROM  head/neck/trunk range of motion deficits identified;lower extremity range of motion deficits identified  -LH,KS,LH2     General ROM Detail  spinal precautions, swollen and impaired L LE  -LH,KS,LH2     Recorded by  [LH,KS,LH2] Milly Garduno PT (r) Heather Davis PT Student (t) Milly Garduno PT (c)     MMT (Manual Muscle Testing)    General MMT Assessment  lower extremity strength deficits identified  -LH,KS,LH2     General MMT Assessment Detail  L LE deficits noted, grossly 2+, R LE grossly 3+/5  -LH,KS,LH2     Recorded by  [LH,KS,LH2] Milly Garduno PT (r) Heather Davis PT Student (t) Milly Garduno PT (c)     Bed Mobility, Assessment/Treatment    Bed Mobility, Assistive Device  bed rails  -LH,KS,LH2     Bed Mobility, Roll Left, [place]  2 person assist required;verbal cues required;moderate assist (50% patient effort)  -LH,KS,LH2     Bed Mobility, Scoot/Bridge, [place]  maximum assist (25%  patient effort);verbal cues required  -LH,KS,LH2     Bed Mob, Supine to Sit, [place] moderate assist (50% patient effort);2 person assist required;verbal cues required;nonverbal cues required (demo/gesture)  - moderate assist (50% patient effort);2 person assist required;verbal cues required  -LH,KS,LH2     Bed Mob, Sit to Supine, [place] moderate assist (50% patient effort);2 person assist required;verbal cues required;nonverbal cues required (demo/gesture)  - moderate assist (50% patient effort);2 person assist required;verbal cues required  -LH,KS,LH2     Bed Mob, Sidelying to Sit, [place] moderate assist (50% patient effort);2 person assist required  - 2 person assist required;moderate assist (50% patient effort);verbal cues required  -LH,KS,LH2     Bed Mob, Sit to Sidelying, [place] moderate assist (50% patient effort);2 person assist required  - verbal cues required;moderate assist (50% patient effort);2 person assist required  -LH,KS,LH2     Bed Mobility, Impairments pain;ROM decreased;strength decreased;impaired balance;coordination impaired  - pain;ROM decreased;strength decreased  -LH,KS,LH2     Bed Mobility, Comment log rolling  -LH      Recorded by [LH] Milly Garduno, PT [LH,KS,LH2] Milly Garduno, PT (r) Heather Davis, PT Student (t) Milly Garduno, PT (c)     Transfer Assessment/Treatment    Transfers, Sit-Stand [place] maximum assist (25% patient effort);moderate assist (50% patient effort);2 person assist required  - 2 person assist required;verbal cues required;maximum assist (25% patient effort);nonverbal cues required (demo/gesture)  -LH,KS,LH2     Transfers, Stand-Sit [place] moderate assist (50% patient effort);maximum assist (25% patient effort);2 person assist required;verbal cues required;nonverbal cues required (demo/gesture)  - verbal cues required;nonverbal cues required (demo/gesture);maximum assist (25% patient effort);2 person assist required   -LH,KS,LH2     Transfers, Sit-Stand-Sit, Assist Device  --   HHA x2  -LH,KS,LH2     Transfer, Impairments ROM decreased;strength decreased;impaired balance;coordination impaired  -LH ROM decreased;strength decreased;pain;impaired balance;postural control impaired  -LH,KS,LH2     Transfer, Comment poor fwd posturing, blocking LLE  -LH poor forward flexed posturing, max cueing for upright. PT assist at L LE blocking quad   -LH,KS,LH2     Recorded by [] Milly Garduno PT [LH,KS,LH2] Milly Garduno PT (r) Heather Davis, PT Student (t) Milly Garduno, PT (c)     Gait Assessment/Treatment    Gait, [place] Level not appropriate to assess  -      Recorded by [] Milly Garduno PT      Balance Skills Training    Sitting-Level of Assistance Moderate assistance  -      Sitting-Balance Support Right upper extremity supported;Left upper extremity supported;Feet supported  -      Sitting-Balance Activities Trunk control activities;Right UE Weight Bearing;Left UE Weight Bearing  -      Sitting # of Minutes 5  -LH      Recorded by [] Milly Garduno PT      Therapy Exercises    Right Lower Extremity 15 reps;AROM:;sitting;ankle pumps/circles;LAQ  -LH AROM:;heel slides;ankle pumps/circles;LAQ  -LH,KS,LH2     Left Lower Extremity 15 reps;PROM:;sitting;ankle pumps/circles;LAQ  -LH PROM:;heel slides;ankle pumps/circles  -LH,KS,LH2     Recorded by [] Milly Garduno PT [LH,KS,LH2] Milly Garduno PT (r) Heather Davis, PT Student (t) iMlly Garduno, PT (c)     Positioning and Restraints    Pre-Treatment Position in bed  -LH in bed  -LH,KS,LH2     Post Treatment Position bed  - bed  -LH,KS,LH2     In Bed supine;call light within reach;encouraged to call for assist;exit alarm on  -LH call light within reach;exit alarm on;encouraged to call for assist;with family/caregiver;supine  -LH,KS,LH2     Recorded by [] Milly Garduno PT [LH,KS,LH2] Milly Garduno PT (r) Heather Davis, PT Student (t) Milly Garduno PT (c)        User Key  (r) = Recorded By, (t) = Taken By, (c) = Cosigned By    Initials Name Effective Dates    LH Millyeunice Garduno, PT 02/07/17 -     KS Heather Davis, PT Student 05/08/17 -                 IP PT Goals       07/06/17 0911 06/30/17 1414 06/30/17 1404    Bed Mobility PT LTG    Bed Mobility PT LTG, Date Established 07/06/17  -LH (r) KS (t) LH (c)      Bed Mobility PT LTG, Time to Achieve 1 wk  -LH (r) KS (t) LH (c) 1 wk  -EE (r) AA (t) EE (c)     Bed Mobility PT LTG, Activity Type all bed mobility  -LH (r) KS (t) LH (c)      Bed Mobility PT LTG, [place] Level contact guard assist  -LH (r) KS (t) LH (c)      Bed Mobility PT LTG, Date Goal Reviewed 07/13/17  -LH (r) KS (t) LH (c)  06/30/17  -EE (r) AA (t) EE (c)    Bed Mobility PT LTG, Outcome goal ongoing  -LH (r) KS (t) LH (c)  goal ongoing  -EE (r) AA (t) EE (c)    Bed Mobility PT LTG, Reason Goal Not Met progress slower than expected  -LH (r) KS (t) LH (c)  progress slower than expected  -EE (r) AA (t) EE (c)    Transfer Training PT LTG    Transfer Training PT LTG, Date Established 07/06/17  -LH (r) KS (t) LH (c)      Transfer Training PT LTG, Time to Achieve 1 wk  -LH (r) KS (t) LH (c) 1 wk  -EE (r) AA (t) EE (c)     Transfer Training PT LTG, Activity Type all transfers  -LH (r) KS (t) LH (c)      Transfer Training PT LTG, [place] Level moderate assist (50% patient effort)  -LH (r) KS (t) LH (c)      Transfer Training PT LTG, Assist Device walker, rolling  -LH (r) KS (t) LH (c)      Transfer Training PT  LTG, Date Goal Reviewed 07/13/17  -LH (r) KS (t) LH (c)  06/30/17  -EE (r) AA (t) EE (c)    Transfer Training PT LTG, Outcome goal ongoing  -LH (r) KS (t) LH (c)  goal ongoing   with safe transfer technique  -EE (r) AA (t) EE (c)    Transfer Training PT LTG, Reason Goal Not Met progress slower than expected  -LH (r) KS (t) LH (c)  progress slower than expected  -EE (r) AA (t) EE (c)      06/25/17 1156          Bed Mobility PT LTG    Bed Mobility PT  LTG, Time to Achieve 1 wk  -CH      Bed Mobility PT LTG, Activity Type all bed mobility  -CH      Bed Mobility PT LTG, [place] Level contact guard assist  -CH      Bed Mobility PT LTG, Outcome goal revised  -CH      Transfer Training PT LTG    Transfer Training PT LTG, Time to Achieve 1 wk  -CH      Transfer Training PT LTG, Activity Type all transfers  -CH      Transfer Training PT LTG, [place] Level minimum assist (75% patient effort);2 person assist required  -CH      Transfer Training PT LTG, Assist Device walker, rolling  -CH      Transfer Training PT LTG, Outcome goal ongoing  -CH        User Key  (r) = Recorded By, (t) = Taken By, (c) = Cosigned By    Initials Name Provider Type     Maricel Garcia, PT Physical Therapist     Milly Garduno, PT Physical Therapist     Eunice Post, PT Physical Therapist     Skyla Blanco, PT Student PT Student    KS Heather Davis, PT Student PT Student          Physical Therapy Education     Title: PT OT SLP Therapies (Active)     Topic: Physical Therapy (Active)     Point: Mobility training (Active)    Learning Progress Summary    Learner Readiness Method Response Comment Documented by Status   Patient Acceptance E NR   07/07/17 0939 Active    Acceptance E NR  KS 07/06/17 0858 Active    Acceptance E VU,NR  AL 07/02/17 1138 Done    Acceptance E NR  AL 07/01/17 1103 Active    Acceptance E VU,NR,NL   06/30/17 1202 Done    Acceptance E,TB VU,NR   06/28/17 2237 Done    Acceptance D,E VU,NR   06/28/17 1345 Done    Acceptance E NR,NL   06/27/17 1551 Active    Acceptance E VU,NR   06/26/17 1200 Done    Acceptance E,TB,D VU,NR   06/25/17 1155 Done    Nonacceptance E,D NR,NL   06/22/17 1433 Active    Nonacceptance E NR,NL   06/21/17 1159 Active   Family Acceptance E,TB VU,NR   06/28/17 2237 Done    Acceptance E VU,NR   06/26/17 1200 Done   Significant Other Acceptance E NR  KS 07/06/17 0858 Active               Point: Home exercise  program (Active)    Learning Progress Summary    Learner Readiness Method Response Comment Documented by Status   Patient Acceptance E NR   07/07/17 0939 Active    Acceptance E NR  KS 07/06/17 0858 Active    Acceptance E VU,NR,[facility] 06/30/17 1202 Done    Acceptance E,TB VU,NR   06/28/17 2237 Done    Acceptance D,E VU,NR   06/28/17 1345 Done    Acceptance E NR,[facility] 06/27/17 1551 Active    Acceptance E VU,NR   06/26/17 1200 Done    Nonacceptance E,D NR,[facility] 06/22/17 1433 Active    Nonacceptance E NR,[facility] 06/21/17 1159 Active   Family Acceptance E,TB VU,NR   06/28/17 2237 Done    Acceptance E VU,[facility] 06/26/17 1200 Done   Significant Other Acceptance E NR  KS 07/06/17 0858 Active               Point: Body mechanics (Active)    Learning Progress Summary    Learner Readiness Method Response Comment Documented by Status   Patient Acceptance E NR  KS 07/06/17 0858 Active    Acceptance E VU,NR  AL 07/02/17 1138 Done    Acceptance E NR  AL 07/01/17 1103 Active    Acceptance E VU,NR,[facility] 06/30/17 1202 Done    Acceptance E,TB VU,NR   06/28/17 2237 Done    Acceptance D,E VU,NR   06/28/17 1345 Done    Acceptance E NR,[facility] 06/27/17 1551 Active    Acceptance E VU,NR   06/26/17 1200 Done    Acceptance E,TB,D VU,NR   06/25/17 1155 Done    Nonacceptance E,D NR,[facility] 06/22/17 1433 Active    Nonacceptance E NR,[facility] 06/21/17 1159 Active   Family Acceptance E,TB VU,NR   06/28/17 2237 Done    Acceptance E VU,NR   06/26/17 1200 Done   Significant Other Acceptance E NR  KS 07/06/17 0858 Active               Point: Precautions (Active)    Learning Progress Summary    Learner Readiness Method Response Comment Documented by Status   Patient Acceptance E NR  KS 07/06/17 0858 Active    Acceptance E VU,NR  AL 07/02/17 1138 Done    Acceptance E NR  AL 07/01/17 1103 Active    Acceptance E VU,NR,[facility] 06/30/17 1202 Done    Acceptance E,TB VU,NR   06/28/17 9120 Done    Acceptance D,E CARROLL CHERRY   06/28/17 6591  "Done    Acceptance E NR,NL   06/27/17 1551 Active    Acceptance E VU,NR   06/26/17 1200 Done    Acceptance E,TB,D VU,NR   06/25/17 1155 Done    Nonacceptance E,D NR,NL   06/22/17 1433 Active    Nonacceptance E NR,NL   06/21/17 1159 Active   Family Acceptance E,TB VU,NR   06/28/17 2237 Done    Acceptance E VU,NR   06/26/17 1200 Done   Significant Other Acceptance E NR  KS 07/06/17 0858 Active                      User Key     Initials Effective Dates Name Provider Type Discipline     12/01/15 -  Maricel Garcia, PT Physical Therapist PT     02/07/17 -  Milly Garduno, PT Physical Therapist PT     12/01/15 -  Eunice Post, PT Physical Therapist PT    AL 12/01/15 -  Latanya Rodriguez, PT Physical Therapist PT     04/06/17 -  Yana Mercado, RN Registered Nurse Nurse     05/08/17 -  Humble Armando, PT Student PT Student PT     06/01/17 -  Skyla Blanco, PT Student PT Student PT    KS 05/08/17 -  Heather Davis, PT Student PT Student PT                    PT Recommendation and Plan  Anticipated Equipment Needs At Discharge:  (L knee immobilizer)  Anticipated Discharge Disposition: skilled nursing facility  Planned Therapy Interventions: balance training, bed mobility training, gait training, home exercise program, patient/family education, transfer training  PT Frequency: daily  Plan of Care Review  Plan Of Care Reviewed With: patient, spouse  Outcome Summary/Follow up Plan: pt w. persistent poor trunk stability in sitting, poor insight when educated on spinal precautions, pt states \" I had back surgery??\". will cont to progress mobility as chris and appropriate, persistent LLE weakness limiting tsfs and gait.           Outcome Measures       07/07/17 0900 07/06/17 0900       How much help from another person do you currently need...    Turning from your back to your side while in flat bed without using bedrails? 2  -LH 2  -LH (r) KS (t) LH (c)     Moving from lying on back to sitting on the side of " a flat bed without bedrails? 2  - 2  -LH (r) KS (t) LH (c)     Moving to and from a bed to a chair (including a wheelchair)? 2  - 2  -LH (r) KS (t) LH (c)     Standing up from a chair using your arms (e.g., wheelchair, bedside chair)? 2  - 2  -LH (r) KS (t) LH (c)     Climbing 3-5 steps with a railing? 1  - 2  -LH (r) KS (t) LH (c)     To walk in hospital room? 1  - 2  -LH (r) KS (t) LH (c)     AM-PAC 6 Clicks Score 10  - 12  -LH (r) KS (t)     Functional Assessment    Outcome Measure Options AM-PAC 6 Clicks Basic Mobility (PT)  - AM-PAC 6 Clicks Basic Mobility (PT)  - (r) KS (t) LH (c)       User Key  (r) = Recorded By, (t) = Taken By, (c) = Cosigned By    Initials Name Provider Type     Milly Garduno, PT Physical Therapist    RIOS Davis, PT Student PT Student           Time Calculation:         PT Charges       07/07/17 0943          Time Calculation    Start Time 0920  -      Stop Time 0935  -      Time Calculation (min) 15 min  -      PT Received On 07/07/17  -      PT - Next Appointment 07/08/17  -        User Key  (r) = Recorded By, (t) = Taken By, (c) = Cosigned By    Initials Name Provider Type     Milyl Garduno, PT Physical Therapist          Therapy Charges for Today     Code Description Service Date Service Provider Modifiers Qty    69557538257 HC PT THER SUPP EA 15 MIN 7/7/2017 Milly Garduno, PT GP 1    13035898740 HC PT THER PROC EA 15 MIN 7/7/2017 Milly Garduno, PT GP 1          PT G-Codes  Outcome Measure Options: AM-PAC 6 Clicks Basic Mobility (PT)    Milly Garduno, PT  7/7/2017

## 2017-07-07 NOTE — PROGRESS NOTES
"Pharmacokinetic Consult - Vancomycin Dosing (Follow-up Note)    Pavel Melgoza is on day # 4  pharmacy to dose vancomycin for bone and joint infection   Pharmacy dosing vancomycin per Dr Ponce 's request.   Goal trough: 15-20  mg/L     Relevant clinical data and objective history reviewed:  83 y.o. male 72.01\" (182.9 cm) 183 lb 8 oz (83.2 kg)    Past Medical History:   Diagnosis Date   • Plasmacytoma      Creatinine   Date Value Ref Range Status   07/07/2017 0.71 (L) 0.76 - 1.27 mg/dL Final   07/07/2017 0.73 (L) 0.76 - 1.27 mg/dL Final   07/07/2017 0.73 (L) 0.76 - 1.27 mg/dL Final     BUN   Date Value Ref Range Status   07/07/2017 15 8 - 23 mg/dL Final   07/07/2017 15 8 - 23 mg/dL Final     Estimated Creatinine Clearance: 82.3 mL/min (by C-G formula based on Cr of 0.71).    Lab Results   Component Value Date    WBC 15.78 (H) 07/07/2017     Temp Readings from Last 3 Encounters:   07/07/17 98.8 °F (37.1 °C) (Oral)      Cultures:   7/4 OR Back CX: 4+ Enterococcus and 4+ Micrococcus ( sens pending)  7/3 BCx: NGx 4 days  6/25 UCx: >100k pan-sensitive Pseudomonas    IV Anti-Infectives     Ordered     Dose/Rate Route Frequency Start Stop    07/06/17 1532  vancomycin 1500 mg/500 mL 0.9% NS IVPB ([facility])     Ordering Provider:  Chandra Ponce MD    1,500 mg  over 150 Minutes Intravenous Every 12 Hours 07/06/17 1700      07/04/17 1525  ceFAZolin in dextrose (ANCEF) IVPB solution 2 g     Ordering Provider:  Nolberto Hernandez IV, MD    2 g  over 30 Minutes Intravenous Every 8 Hours 07/04/17 1715 07/05/17 0200    07/04/17 1320  vancomycin 1750 mg/500 mL 0.9% NS IVPB ([facility])     Ordering Provider:  Chandra Ponce MD    20 mg/kg × 83.2 kg  over 180 Minutes Intravenous Once 07/04/17 1400 07/04/17 1921    07/04/17 1309  Pharmacy to dose vancomycin     Ordering Provider:  Chandra Ponce MD     Does not apply Continuous PRN 07/04/17 1309      07/04/17 0813  ceFAZolin in dextrose (ANCEF) IVPB " solution 2 g     Ordering Provider:  Nolberto Hernandez IV, MD    2 g Intravenous Once 07/04/17 0845 07/04/17 0910    06/27/17 0912  cefepime (MAXIPIME) 1 g/100 mL 0.9% NS IVPB ([facility])     Gilbert Marx MD reviewed the order on 06/29/17 1331.   Ordering Provider:  Gilbert Marx MD    1 g Intravenous Every 8 Hours 06/27/17 1000 06/30/17 0245    06/24/17 0913  ceFAZolin in dextrose (ANCEF) IVPB solution 2 g     Ordering Provider:  Erwin Mei MD    2 g  over 30 Minutes Intravenous Once 06/24/17 0945 06/24/17 1007         Lab Results   Component Value Date    [facility] 10.60 07/06/2017      Assessment/Plan   1 Continue Vancomycin 1500mg ( 18mg/kg) iv q 12 hours and obtain trough with 4th dose 7/8/17 0500   2. Monitor renal function…Renal function panel daily  3. Encourage hydration to prevent toxic accumulation; monitor for s/sx of toxicity including increase in SCr and decrease in UOP.         4. Pharmacy will continue to follow and adjust accordingly.              Note from ID  Dr Ponce :     anticipate 6 weeks of IV antibiotics from date of OR; therefore stop date will be 8/15/17 at which time he will follow-up w/ me     Asiya Cortes [facility]

## 2017-07-07 NOTE — PLAN OF CARE
"Problem: Patient Care Overview (Adult)  Goal: Plan of Care Review    07/07/17 0939   Coping/Psychosocial Response Interventions   Plan Of Care Reviewed With patient;spouse   Outcome Evaluation   Outcome Summary/Follow up Plan pt w. persistent poor trunk stability in sitting, poor insight when educated on spinal precautions, pt states \" I had back surgery??\". will cont to progress mobility as chris and appropriate, persistent LLE weakness limiting tsfs and gait.            "

## 2017-07-07 NOTE — OP NOTE
Preoperative diagnosis: is epidural abscess lumbar  postoperative diagnosis: is the same  Estimated blood loss was 100 cc   Speciman: Multiple cultures  Surgeon David   Assistant: Kirill stemohit    Procedure performed: Lumbar laminectomy L3 4 5 for drainage of epidural abscess  Operative description: This is an unfortunate 83-year-old male with a history of chronic lower extremity weakness and one of his lower extremities who noted progressive back pain over the course of the hospitalization the patient suffered a fall previously and underwent an epidural steroid injection.  He developed an elevated white count and an MRI was obtained revealing a large epidural phlegmon centered around L4.  The patient was apprised of risks benefits and alternatives of decompression and drainage of this abscess and provided written consent.  After consent was noted me on the charts patient was escorted to the operative suite and intubated.  The patient was rotated to the prone position his back was cleaned with alcohol he was prepped and draped in usual sterile manner.  A fluoroscopic unit was draped in of the field as well.  Intraoperative fluoroscopy confirmed the appropriate levels and the skin was marked.  Patient received preoperative antibiotics.  Timeout was performed.  Skin was incised with a scalpel dissection was carried through the dermis to the lumbosacral fascia which was entered in the midline.  Egressive purulent material was immediately noted sampled and sent for culture.  The patient then had a subperiosteal dissection carried down the level of the lumbar lamina and suffered Allison retractors utilized to assist with visualization.  The upper microscope strip brought in the field the lamina were thinned to an eggshell thickness utilizing high-powered drill.  Kerrisons were utilized to elevate the lateral spinous processes. A phlegmon of pus was encountered this was aspirated and sent for culture.  The dura had an adherent  collection in and stripping this is small incidental durotomy was noted.  The arachnoid was intact.  Circumferential drilling was performed to allow for closure of this incidental durotomy.  The durotomy was closed in a watertight manner utilizing interrupted 6-0 Prolene sutures.  Once adequate decompression of the epidural abscess was noted be present the wound was copiously irrigated with several liters of fluid.  Wound was inspected for hemostasis and once adequate hemostasis noted be present closure commenced.  In toto the L34 and a portion of the L5 lumbar lamina were removed.  Wound was closed in layers utilizing absorbable Vicryl suture approximate the lumbosacral fascia and dermis and a running nylon suture was utilized to approximate the skin edges.  Needle and sponge counts correct.  At the conclusion of the case.  Is present the entire case.

## 2017-07-07 NOTE — THERAPY TREATMENT NOTE
Acute Care - Speech Language Pathology Treatment Note  [facility]     Patient Name: Pavel Melgoza  : 1934  MRN: [id number]  Today's Date: 2017         Admit Date: 2017  Cognitive-linguistic therapy completed for improved memory skills. Pt asked numerous time throughout session if he had any improvements. Pt demonstrated awareness of deficits and able to self correct X2 this date and ask for repetition X2 this date. ST to continue to follow for cognitive-linguistic tx and recommend continued ST at next level of care.   Visit Dx:      ICD-10-CM ICD-9-CM   1. Generalized weakness R53.1 780.79   2. Dehydration E86.0 276.51   3. Compression fracture T14.8 829.0   4. Pain R52 780.96   5. Epidural abscess, L2-L5 G06.1 324.1   6. Left leg weakness M62.81 729.89   7. Epidural abscess G06.2 324.9     Patient Active Problem List   Diagnosis   • Generalized weakness   • Plasmocytoma   • Nausea & vomiting   • Fall   • Noncompliance with medication regimen   • Left leg weakness   • Closed wedge compression fracture of eleventh thoracic vertebra   • CKD (chronic kidney disease) stage 2, GFR 60-89 ml/min   • B12 deficiency   • A-fib   • Meningioma   • SIADH (syndrome of inappropriate ADH production)   • Hyponatremia   • Vitamin D deficiency   • Epidural abscess              Adult Rehabilitation Note       17 1030 17 0900 17 0800    Rehab Assessment/Intervention    Discipline speech language pathologist  -OC physical therapist  -LH physical therapist  -RIOS WEISS,LH2    Document Type therapy note (daily note)  -OC therapy note (daily note)  -LH     Subjective Information agree to therapy  -OC      Patient Effort, Rehab Treatment good  -OC      Symptoms Noted During/After Treatment none  -OC  increased pain  -ISELAKS,LH2    Precautions/Limitations  spinal precautions  -LH spinal precautions  -ISELAKS,LH2    Specific Treatment Considerations   POD 2 lami   -RIOS WEISS,LH2    Recorded by [OC] Linda Campbell  MA,CCC-SLP [LH] Milly Garduno, PT [LH,KS,LH2] Milly Garduno, PT (r) Heather Davis, PT Student (t) Milly Garduno, PT (c)    Pain Assessment    Pain Assessment No/denies pain  -OC No/denies pain  -LH 0-10  -LH,KS,LH2    Pain Score   --   pt noted increased pain throughout tx  -LH,KS,LH2    Pain Type   Surgical pain  -LH,KS,LH2    Pain Location   Back  -LH,KS,LH2    Pain Orientation   --   surgical site  -LH,KS,LH2    Pain Intervention(s)   Ambulation/increased activity;Repositioned  -LH,KS,LH2    Response to Interventions   unchanged  -LH,KS,LH2    Recorded by [OC] Linda Campbell MA,CCC-SLP [LH] Milly Garduno, PT [LH,KS,LH2] Milly Garduno, PT (r) Heather Davis, PT Student (t) Milly Garduno PT (c)    Vision Assessment/Intervention    Visual Impairment  WNL  -LH WFL  -LH,KS,LH2    Recorded by  [LH] Milly Garduno, PT [LH,KS,LH2] Milly Garduno PT (r) Heather Davis, PT Student (t) Milly Garduno PT (c)    Cognitive Assessment/Intervention    Current Cognitive/Communication Assessment impaired  -OC impaired  -LH functional  -LH,KS,LH2    Orientation Status oriented to;person;place  -OC oriented to;person;place   was not aware had back sx  -LH     Follows Commands/Answers Questions  able to follow single-step instructions;needs cueing;needs increased time;needs repetition  -LH able to follow multi-step instructions;needs repetition;needs cueing  -LH,KS,LH2    Personal Safety   mild impairment  -LH,KS,LH2    Recorded by [OC] Linda Campbell MA,CCC-SLP [LH] Milly Garduno, PT [LH,KS,LH2] Milly Garduno, PT (r) Heather Davis PT Student (t) Milly Garduno, PT (c)    Improve memory skills    Status: Improve memory skills Progressing as expected  -OC      Memory Skills Progress 80%   80% w/ self correctionX2, 90% with repetition X2  -OC      Comments: Improve memory skills Able to recall 1/3 items when presented with 2-3 sent paragraphs.  -OC      Recorded by [OC] Linda Campbell MA,CCC-SLP      ROM (Range of Motion)    General  ROM   head/neck/trunk range of motion deficits identified;lower extremity range of motion deficits identified  -LH,KS,LH2    General ROM Detail   spinal precautions, swollen and impaired L LE  -LH,KS,LH2    Recorded by   [LH,KS,LH2] Milly Garduno, PT (r) Heather Davis, PT Student (t) Milly Garduno, PT (c)    MMT (Manual Muscle Testing)    General MMT Assessment   lower extremity strength deficits identified  -LH,KS,LH2    General MMT Assessment Detail   L LE deficits noted, grossly 2+, R LE grossly 3+/5  -LH,KS,LH2    Recorded by   [LH,KS,LH2] Milly Garduno PT (r) Heather Davis PT Student (t) Milly Garduno, PT (c)    Bed Mobility, Assessment/Treatment    Bed Mobility, Assistive Device   bed rails  -LH,KS,LH2    Bed Mobility, Roll Left, [place]   2 person assist required;verbal cues required;moderate assist (50% patient effort)  -,KS,LH2    Bed Mobility, Scoot/Bridge, [place]   maximum assist (25% patient effort);verbal cues required  -LH,KS,LH2    Bed Mob, Supine to Sit, [place]  moderate assist (50% patient effort);2 person assist required;verbal cues required;nonverbal cues required (demo/gesture)  - moderate assist (50% patient effort);2 person assist required;verbal cues required  -LH,KS,LH2    Bed Mob, Sit to Supine, [place]  moderate assist (50% patient effort);2 person assist required;verbal cues required;nonverbal cues required (demo/gesture)  - moderate assist (50% patient effort);2 person assist required;verbal cues required  -,KS,LH2    Bed Mob, Sidelying to Sit, [place]  moderate assist (50% patient effort);2 person assist required  - 2 person assist required;moderate assist (50% patient effort);verbal cues required  -,KS,LH2    Bed Mob, Sit to Sidelying, [place]  moderate assist (50% patient effort);2 person assist required  - verbal cues required;moderate assist (50% patient effort);2 person assist required  -,KS,LH2    Bed Mobility, Impairments   pain;ROM decreased;strength decreased;impaired balance;coordination impaired  -LH pain;ROM decreased;strength decreased  -LH,KS,LH2    Bed Mobility, Comment  log rolling  -LH     Recorded by  [LH] Milly Garduno PT [LH,KS,LH2] Milly Garduno PT (r) Heather Davis, PT Student (t) Milly Garduno, PT (c)    Transfer Assessment/Treatment    Transfers, Sit-Stand [place]  maximum assist (25% patient effort);moderate assist (50% patient effort);2 person assist required  -LH 2 person assist required;verbal cues required;maximum assist (25% patient effort);nonverbal cues required (demo/gesture)  -LH,KS,LH2    Transfers, Stand-Sit [place]  moderate assist (50% patient effort);maximum assist (25% patient effort);2 person assist required;verbal cues required;nonverbal cues required (demo/gesture)  -LH verbal cues required;nonverbal cues required (demo/gesture);maximum assist (25% patient effort);2 person assist required  -LH,KS,LH2    Transfers, Sit-Stand-Sit, Assist Device   --   HHA x2  -LH,KS,LH2    Transfer, Impairments  ROM decreased;strength decreased;impaired balance;coordination impaired  -LH ROM decreased;strength decreased;pain;impaired balance;postural control impaired  -LH,KS,LH2    Transfer, Comment  poor fwd posturing, blocking LLE  -LH poor forward flexed posturing, max cueing for upright. PT assist at L LE blocking quad   -LH,KS,LH2    Recorded by  [LH] Milly Garduno PT [LH,KS,LH2] Milly Garduno PT (r) Heather Davis, PT Student (t) Milly Garduno, PT (c)    Gait Assessment/Treatment    Gait, [place] Level  not appropriate to assess  -LH     Recorded by  [LH] Milly Garduno PT     Balance Skills Training    Sitting-Level of Assistance  Moderate assistance  -LH     Sitting-Balance Support  Right upper extremity supported;Left upper extremity supported;Feet supported  -LH     Sitting-Balance Activities  Trunk control activities;Right UE Weight Bearing;Left UE Weight Bearing  -LH     Sitting # of  Minutes  5  -LH     Recorded by  [LH] Milly Garduno PT     Therapy Exercises    Right Lower Extremity  15 reps;AROM:;sitting;ankle pumps/circles;LAQ  -LH AROM:;heel slides;ankle pumps/circles;LAQ  -LH,KS,LH2    Left Lower Extremity  15 reps;PROM:;sitting;ankle pumps/circles;LAQ  -LH PROM:;heel slides;ankle pumps/circles  -LH,KS,LH2    Recorded by  [LH] Milly Garduno, PT [LH,KS,LH2] Milly Garduno, PT (r) Heather Davis, PT Student (t) Milly Garduno, PT (c)    Positioning and Restraints    Pre-Treatment Position  in bed  -LH in bed  -LH,KS,LH2    Post Treatment Position  bed  - bed  -LH,KS,LH2    In Bed  supine;call light within reach;encouraged to call for assist;exit alarm on  - call light within reach;exit alarm on;encouraged to call for assist;with family/caregiver;supine  -LH,KS,LH2    Recorded by  [LH] Milly Garduno, PT [LH,KS,LH2] Milly Garduno, PT (r) Heather Davis, PT Student (t) Milly Garduno, PT (c)      User Key  (r) = Recorded By, (t) = Taken By, (c) = Cosigned By    Initials Name Effective Dates    REYES Campbell MA,[facility]-SLP 04/05/17 -      Milly Garduno, PT 02/07/17 -     RIOS Davis, PT Student 05/08/17 -                SLP Goals       07/07/17 1030          Cognitive Linguistic- Improve Safety and Awareness    Cognitive Linguistic Improve Safety and Awareness- SLP, Date Established 07/07/17  -OC      Cognitive Linguistic Improve Safety and Awareness- SLP, Time to Achieve by discharge  -OC      Cognitive Linguistic Improve Safety and Awareness- SLP, Outcome goal ongoing  -OC        User Key  (r) = Recorded By, (t) = Taken By, (c) = Cosigned By    Initials Name Provider Type    REYES Campbell MA,[facility]-SLP Speech and Language Pathologist          EDUCATION  The patient has been educated in the following areas:   Cognitive Impairment.    SLP Recommendation and Plan              Anticipated Discharge Disposition: skilled nursing facility                Plan of Care Review  Plan Of Care  Reviewed With: patient, spouse  Progress: improving  Outcome Summary/Follow up Plan: Bedside swallow eval completed. Oropharyngeal swallow appears WFL. ? esophageal dysfunction.          Time Calculation:         Time Calculation- SLP       07/07/17 1347          Time Calculation- SLP    SLP Start Time 0930  -OC      SLP Stop Time 1015  -OC      SLP Time Calculation (min) 45 min  -OC      SLP Received On 07/07/17  -OC        User Key  (r) = Recorded By, (t) = Taken By, (c) = Cosigned By    Initials Name Provider Type    OC Linda Campbell MA,CCC-SLP Speech and Language Pathologist          Therapy Charges for Today     Code Description Service Date Service Provider Modifiers Qty    [id number]  ST DEV OF COGN SKILLS EACH 15 MIN 7/7/2017 Linda Campbell MA,CCC-SLP GN 3               Linda Campbell MA,NATHALIA-SLP  7/7/2017

## 2017-07-07 NOTE — PROGRESS NOTES
LOS: 17 days     Chief Complaint:  Follow-up epidural abscess    Interval History:  No acute events.   Dull intermittent back pain worse w/ movement and relieved w/ rest. Still w/ weakness and can't walk. No associated fevers. Back cultures with Enterococcus and Micrococcus as per below.    ROS: no n/v/d    Vital Signs  Temp:  [98.9 °F (37.2 °C)-99.9 °F (37.7 °C)] 98.9 °F (37.2 °C)  Heart Rate:  [77-89] 77  Resp:  [18] 18  BP: (124-142)/(49-93) 142/90    Physical Exam:  General: awake alert, nad  Head: Normocephalic, atraumatic  Eyes: PERRL, EOMI, no scleral icterus  ENT: MMM, OP clear, no thrush. Fair dentition.   Neck: Supple  Cardiovascular: NR, RR,  no LE edema  Respiratory: normal work of breathing on RA  GI: Abdomen w/ well-healing port sites, it is soft, non-tender, mildly distended, normal bowel sounds in all four quadrants  : Gong catheter present; no prostate tenderness on recent exam though it was enlarged  Musculoskeletal: no joint abnormalities, normal musculature  Skin: No rashes, lesions, or embolic phenomenon  Psychiatric: calm, good memory  Vasc: no cyanosis    Medications:  -vancomycin 1500 mg q12h    LABS:  CBC, Crt, micro reviewed today  Lab Results   Component Value Date    WBC 15.78 (H) 07/07/2017    HGB 8.2 (L) 07/07/2017    HCT 23.5 (L) 07/07/2017    .7 (H) 07/07/2017     07/07/2017     Lab Results   Component Value Date    CREATININE 0.71 (L) 07/07/2017    CREATININE 0.73 (L) 07/07/2017     Lab Results   Component Value Date    CRP 14.51 (H) 07/06/2017     Lab Results   Component Value Date    VANCOTROUGH 10.60 07/06/2017     PSA 8  Procal 0.13  UA 0-2 WBCs     Microbiology:  7/4 OR Back Cx: Enterococcus (sens pending) and Micrococcus (sens pendign)  7/3 BCx: NGTD  6/25 UCx: >100k pan-sensitive Pseudomonas     Radiology (reviewed report):  No new imaging    Assessment/Plan   1. Epidural and retroperitoneal abscess secondary to Enterococcus and Micrococcus  -s/p lumbar  laminectomy on 7/4/17  -s/p IR guided drainage of RP abscess on 7/5/17  -continue vancomycin w/ goal trough 15-20  -stop cefepime  -I have asked micro lab for further sensitivity testing on the micrococcus (typically sensitive to PCN, vancomycin but want to confirm)  -anticipate 6 weeks of IV antibiotics from date of OR; therefore stop date will be 8/15/17 at which time he will follow-up w/ me    2. Probable meningioma  -f/u with NSGY as an outpatient which he will be doing anyways for the back    3. Urinary retention    ID will follow cultures over the weekend and adjust antibiotics as needed. I will see him again on 7/10 but please call [phone] if needs ID help sooner.

## 2017-07-07 NOTE — TELEPHONE ENCOUNTER
Patient's daughter called in and was hoping to get a call back from you regarding her father's test results. She had mentioned you said you would be willing to speak to her about the results. Call back is [phone]. Thanks Shani

## 2017-07-07 NOTE — PLAN OF CARE
Problem: Patient Care Overview (Adult)  Goal: Plan of Care Review  Outcome: Ongoing (interventions implemented as appropriate)    07/07/17 0600   Coping/Psychosocial Response Interventions   Plan Of Care Reviewed With patient   Patient Care Overview   Progress improving   Outcome Evaluation   Outcome Summary/Follow up Plan Pleasantly confused, vss         Problem: Fall Risk (Adult)  Goal: Absence of Falls  Outcome: Ongoing (interventions implemented as appropriate)    07/07/17 0600   Fall Risk (Adult)   Absence of Falls making progress toward outcome         Problem: Pain, Chronic (Adult)  Goal: Acceptable Pain Control/Comfort Level  Outcome: Ongoing (interventions implemented as appropriate)    07/07/17 0600   Pain, Chronic (Adult)   Acceptable Pain Control/Comfort Level making progress toward outcome

## 2017-07-08 NOTE — THERAPY TREATMENT NOTE
Acute Care - Physical Therapy Treatment Note  [facility]     Patient Name: Pavel Melgoza  : 1934  MRN: [id number]  Today's Date: 2017  Onset of Illness/Injury or Date of Surgery Date: 17     Referring Physician: Dr. Marx    Admit Date: 2017    Visit Dx:    ICD-10-CM ICD-9-CM   1. Generalized weakness R53.1 780.79   2. Dehydration E86.0 276.51   3. Compression fracture T14.8 829.0   4. Pain R52 780.96   5. Epidural abscess, L2-L5 G06.1 324.1   6. Left leg weakness M62.81 729.89   7. Epidural abscess G06.2 324.9     Patient Active Problem List   Diagnosis   • Generalized weakness   • Plasmocytoma   • Nausea & vomiting   • Fall   • Noncompliance with medication regimen   • Left leg weakness   • Closed wedge compression fracture of eleventh thoracic vertebra   • CKD (chronic kidney disease) stage 2, GFR 60-89 ml/min   • B12 deficiency   • A-fib   • Meningioma   • SIADH (syndrome of inappropriate ADH production)   • Hyponatremia   • Vitamin D deficiency   • Epidural abscess               Adult Rehabilitation Note       17 1500 17 1440 17 1030    Rehab Assessment/Intervention    Discipline physical therapist  -LC occupational therapist  -SO speech language pathologist  -OC    Document Type therapy note (daily note)  -LC therapy note (daily note)  -SO therapy note (daily note)  -OC    Subjective Information agree to therapy;complains of;weakness;fatigue  -LC agree to therapy  -SO agree to therapy  -OC    Patient Effort, Rehab Treatment good  -LC adequate  -SO good  -OC    Symptoms Noted During/After Treatment  fatigue  -SO none  -OC    Precautions/Limitations  fall precautions;spinal precautions  -SO     Recorded by [LC] Morteza Saldana, PT DPT [SO] Jessica Fabian, OTR [OC] Linda Campbell MA,CCC-SLP    Pain Assessment    Pain Assessment No/denies pain  -LC No/denies pain  -SO No/denies pain  -OC    Recorded by [LC] Morteza Saladna, PT DPT [SO] Jessica Fabian,  OTR [OC] Linda Campbell MA,CCC-SLP    Cognitive Assessment/Intervention    Current Cognitive/Communication Assessment functional  -LC impaired  -SO impaired  -OC    Orientation Status oriented to;person;place;time;situation  -LC oriented to;person;place  -SO oriented to;person;place  -OC    Follows Commands/Answers Questions 100% of the time;able to follow single-step instructions;needs cueing;needs increased time;needs repetition  -LC      Personal Safety decreased awareness, need for assist;decreased awareness, need for safety  -LC decreased insight to deficits  -SO     Personal Safety Interventions fall prevention program maintained;gait belt;nonskid shoes/slippers when out of bed;supervised activity  - fall prevention program maintained  -SO     Recorded by [LC] Morteza Saldana, PT DPT [SO] Jessica Fabian, MATYR [OC] Linda Campbell MA,CCC-SLP    Improve memory skills    Status: Improve memory skills   Progressing as expected  -OC    Memory Skills Progress   80%   80% w/ self correctionX2, 90% with repetition X2  -OC    Comments: Improve memory skills   Able to recall 1/3 items when presented with 2-3 sent paragraphs.  -OC    Recorded by   [OC] Linda Campbell MA,CCC-SLP    Bed Mobility, Assessment/Treatment    Bed Mobility, Assistive Device bed rails  - bed rails;head of bed elevated  -SO     Bed Mobility, Roll Right, [place] minimum assist (75% patient effort)  -      Bed Mobility, Scoot/Bridge, [place] contact guard assist  -LC      Bed Mob, Supine to Sit, [place] moderate assist (50% patient effort)  - moderate assist (50% patient effort);maximum assist (25% patient effort)  -SO     Bed Mob, Sit to Supine, [place]  moderate assist (50% patient effort);verbal cues required;nonverbal cues required (demo/gesture)  -SO     Recorded by [LC] Morteza Saldana, PT DPT [SO] Jessica Fabian, MATYR     Transfer Assessment/Treatment    Transfers, Sit-Stand [place] minimum assist (75%  patient effort)  -      Transfers, Stand-Sit [place] minimum assist (75% patient effort)  -      Transfers, Sit-Stand-Sit, Assist Device rolling walker  -      Transfer, Safety Issues weight-shifting ability decreased;impulsivity;knees buckling  -      Transfer, Impairments ROM decreased;strength decreased;impaired balance;coordination impaired  -      Transfer, Comment L knee buckling, insensate L limb  -      Recorded by [LC] Morteza Saldana, PT DPT      Balance Skills Training    Sitting-Level of Assistance  Moderate assistance  -SO     Sitting-Balance Support  Feet supported  -SO     Sitting-Balance Activities  --   UE ther ex, posterior lean  -SO     Sitting # of Minutes  8  -SO     Standing-Level of Assistance Contact guard  -      Static Standing Balance Support assistive device  -      Standing Balance # of Minutes 3 sit to stands for 2 minutes each  -      Recorded by [LC] Morteza Saldana, LESLEE DPT [SO] Jessica Fabian, MATYR     Therapy Exercises    Bilateral Upper Extremity  AROM:;10 reps;sitting;elbow flexion/extension;shoulder extension/flexion   10x3  -SO     Recorded by  [SO] eJssica Fabian OTR     Positioning and Restraints    Pre-Treatment Position in bed  - in bed  -SO     Post Treatment Position bed  - bed  -SO     In Bed notified nsg;call light within reach;fowlers;encouraged to call for assist;patient within staff view;side rails up x2;SCD pump applied  - supine;call light within reach;encouraged to call for assist;exit alarm on;with family/caregiver  -SO     Recorded by [LC] Morteza Saldana, LESLEE DPT [SO] Jessica Fabian OTR       07/07/17 0900 07/06/17 0800       Rehab Assessment/Intervention    Discipline physical therapist  - physical therapist  -ISELA,KS,LH2     Document Type therapy note (daily note)  -      Symptoms Noted During/After Treatment  increased pain  -ISELA,KS,LH2     Precautions/Limitations spinal precautions  - spinal precautions   -LH,KS,LH2     Specific Treatment Considerations  POD 2 lami   -LH,KS,LH2     Recorded by [LH] Milly Garduno, PT [LH,KS,LH2] Milly Garduno, PT (r) Heather Davis PT Student (t) Milly Garduno, PT (c)     Pain Assessment    Pain Assessment No/denies pain  -LH 0-10  -LH,KS,LH2     Pain Score  --   pt noted increased pain throughout tx  -LH,KS,LH2     Pain Type  Surgical pain  -LH,KS,LH2     Pain Location  Back  -LH,KS,LH2     Pain Orientation  --   surgical site  -LH,KS,LH2     Pain Intervention(s)  Ambulation/increased activity;Repositioned  -LH,KS,LH2     Response to Interventions  unchanged  -LH,KS,LH2     Recorded by [LH] Milly Garduno, PT [LH,KS,LH2] Milly Garduno PT (r) Heather Davis, PT Student (t) Milly Garduno PT (c)     Vision Assessment/Intervention    Visual Impairment WNL  -LH WFL  -LH,KS,LH2     Recorded by [LH] Milly Garduno, PT [LH,KS,LH2] Milly Garduno PT (r) Heather Davis PT Student (t) Milly Garduno PT (c)     Cognitive Assessment/Intervention    Current Cognitive/Communication Assessment impaired  -LH functional  -LH,KS,LH2     Orientation Status oriented to;person;place   was not aware had back sx  -LH      Follows Commands/Answers Questions able to follow single-step instructions;needs cueing;needs increased time;needs repetition  -LH able to follow multi-step instructions;needs repetition;needs cueing  -LH,KS,LH2     Personal Safety  mild impairment  -LH,KS,LH2     Recorded by [LH] Milly Garduno, PT [LH,KS,LH2] Milly Garduno, PT (r) eHather Davis PT Student (t) Milly Garduno PT (c)     ROM (Range of Motion)    General ROM  head/neck/trunk range of motion deficits identified;lower extremity range of motion deficits identified  -LH,KS,LH2     General ROM Detail  spinal precautions, swollen and impaired L LE  -LH,KS,LH2     Recorded by  [LH,KS,LH2] Milly Garduno, PT (r) Heather Davis, PT Student (t) Milly Garduno, PT (c)     MMT (Manual Muscle Testing)    General MMT Assessment   lower extremity strength deficits identified  -LH,KS,LH2     General MMT Assessment Detail  L LE deficits noted, grossly 2+, R LE grossly 3+/5  -LH,KS,LH2     Recorded by  [LH,KS,LH2] Milly Garduno PT (r) Heather Davis, PT Student (t) Milly Garduno, PT (c)     Bed Mobility, Assessment/Treatment    Bed Mobility, Assistive Device  bed rails  -LH,KS,LH2     Bed Mobility, Roll Left, [place]  2 person assist required;verbal cues required;moderate assist (50% patient effort)  -LH,KS,LH2     Bed Mobility, Scoot/Bridge, [place]  maximum assist (25% patient effort);verbal cues required  -LH,KS,LH2     Bed Mob, Supine to Sit, [place] moderate assist (50% patient effort);2 person assist required;verbal cues required;nonverbal cues required (demo/gesture)  -LH moderate assist (50% patient effort);2 person assist required;verbal cues required  -LH,KS,LH2     Bed Mob, Sit to Supine, [place] moderate assist (50% patient effort);2 person assist required;verbal cues required;nonverbal cues required (demo/gesture)  -LH moderate assist (50% patient effort);2 person assist required;verbal cues required  -LH,KS,LH2     Bed Mob, Sidelying to Sit, [place] moderate assist (50% patient effort);2 person assist required  -LH 2 person assist required;moderate assist (50% patient effort);verbal cues required  -LH,KS,LH2     Bed Mob, Sit to Sidelying, [place] moderate assist (50% patient effort);2 person assist required  -LH verbal cues required;moderate assist (50% patient effort);2 person assist required  -LH,KS,LH2     Bed Mobility, Impairments pain;ROM decreased;strength decreased;impaired balance;coordination impaired  -LH pain;ROM decreased;strength decreased  -LH,KS,LH2     Bed Mobility, Comment log rolling  -LH      Recorded by [LH] Milly Garduno, PT [LH,KS,LH2] Milly Garduno PT (r) Heather Davis, PT Student (t) Milly Garduno, PT (c)     Transfer Assessment/Treatment    Transfers, Sit-Stand  [place] maximum assist (25% patient effort);moderate assist (50% patient effort);2 person assist required  - 2 person assist required;verbal cues required;maximum assist (25% patient effort);nonverbal cues required (demo/gesture)  -LH,KS,LH2     Transfers, Stand-Sit [place] moderate assist (50% patient effort);maximum assist (25% patient effort);2 person assist required;verbal cues required;nonverbal cues required (demo/gesture)  -LH verbal cues required;nonverbal cues required (demo/gesture);maximum assist (25% patient effort);2 person assist required  -LH,KS,LH2     Transfers, Sit-Stand-Sit, Assist Device  --   HHA x2  -LH,KS,LH2     Transfer, Impairments ROM decreased;strength decreased;impaired balance;coordination impaired  -LH ROM decreased;strength decreased;pain;impaired balance;postural control impaired  -LH,KS,LH2     Transfer, Comment poor fwd posturing, blocking LLE  - poor forward flexed posturing, max cueing for upright. PT assist at L LE blocking quad   -,KS,LH2     Recorded by [] Milly Garduno, PT [,KS,LH2] Milly Garduno, PT (r) Heather Davis, LESLEE Student (t) Milly Garduno PT (c)     Gait Assessment/Treatment    Gait, [place] Level not appropriate to assess  -      Recorded by [] Milly Garduno PT      Balance Skills Training    Sitting-Level of Assistance Moderate assistance  -      Sitting-Balance Support Right upper extremity supported;Left upper extremity supported;Feet supported  -      Sitting-Balance Activities Trunk control activities;Right UE Weight Bearing;Left UE Weight Bearing  -      Sitting # of Minutes 5  -      Recorded by [] Milly Garduno PT      Therapy Exercises    Right Lower Extremity 15 reps;AROM:;sitting;ankle pumps/circles;LAQ  -LH AROM:;heel slides;ankle pumps/circles;LAQ  -LH,KS,LH2     Left Lower Extremity 15 reps;PROM:;sitting;ankle pumps/circles;LAQ  -LH PROM:;heel slides;ankle pumps/circles  -,KS,LH2     Recorded by [] Milly ZAMORA  Shaan, PT [LH,KS,LH2] Milly Garduno, PT (r) Heather Davis, PT Student (t) Milly Garduno, PT (c)     Positioning and Restraints    Pre-Treatment Position in bed  -LH in bed  -LH,KS,LH2     Post Treatment Position bed  -LH bed  -LH,KS,LH2     In Bed supine;call light within reach;encouraged to call for assist;exit alarm on  -LH call light within reach;exit alarm on;encouraged to call for assist;with family/caregiver;supine  -LH,KS,LH2     Recorded by [LH] Milly Garduno, PT [LH,KS,LH2] Milly Garduno, PT (r) Heather Davis, PT Student (t) Milly Garduno, PT (c)       User Key  (r) = Recorded By, (t) = Taken By, (c) = Cosigned By    Initials Name Effective Dates    OC Lidna Campbell MA,CCC-SLP 04/05/17 -     SO Jessica Fabian, OTR 04/13/15 -     LH Milly Garduno, PT 02/07/17 -     DELFINO Saldana, PT DPT 08/02/16 -     KS Heather Davis, PT Student 05/08/17 -                 IP PT Goals       07/06/17 0911 06/30/17 1414 06/30/17 1404    Bed Mobility PT LTG    Bed Mobility PT LTG, Date Established 07/06/17  -LH (r) KS (t) LH (c)      Bed Mobility PT LTG, Time to Achieve 1 wk  -LH (r) KS (t) LH (c) 1 wk  -EE (r) AA (t) EE (c)     Bed Mobility PT LTG, Activity Type all bed mobility  -LH (r) KS (t) LH (c)      Bed Mobility PT LTG, [place] Level contact guard assist  -LH (r) KS (t) LH (c)      Bed Mobility PT LTG, Date Goal Reviewed 07/13/17  -LH (r) KS (t) LH (c)  06/30/17  -EE (r) AA (t) EE (c)    Bed Mobility PT LTG, Outcome goal ongoing  -LH (r) KS (t) LH (c)  goal ongoing  -EE (r) AA (t) EE (c)    Bed Mobility PT LTG, Reason Goal Not Met progress slower than expected  -LH (r) KS (t) LH (c)  progress slower than expected  -EE (r) AA (t) EE (c)    Transfer Training PT LTG    Transfer Training PT LTG, Date Established 07/06/17  -LH (r) KS (t) LH (c)      Transfer Training PT LTG, Time to Achieve 1 wk  -LH (r) KS (t) LH (c) 1 wk  -EE (r) AA (t) EE (c)     Transfer Training PT LTG, Activity Type all  transfers  -LH (r) KS (t) LH (c)      Transfer Training PT LTG, [place] Level moderate assist (50% patient effort)  -LH (r) KS (t) LH (c)      Transfer Training PT LTG, Assist Device walker, rolling  -LH (r) KS (t) LH (c)      Transfer Training PT  LTG, Date Goal Reviewed 07/13/17  -LH (r) KS (t) LH (c)  06/30/17  -EE (r) AA (t) EE (c)    Transfer Training PT LTG, Outcome goal ongoing  -LH (r) KS (t) LH (c)  goal ongoing   with safe transfer technique  -EE (r) AA (t) EE (c)    Transfer Training PT LTG, Reason Goal Not Met progress slower than expected  -LH (r) KS (t) LH (c)  progress slower than expected  -EE (r) AA (t) EE (c)      06/25/17 1156          Bed Mobility PT LTG    Bed Mobility PT LTG, Time to Achieve 1 wk  -CH      Bed Mobility PT LTG, Activity Type all bed mobility  -CH      Bed Mobility PT LTG, [place] Level contact guard assist  -CH      Bed Mobility PT LTG, Outcome goal revised  -CH      Transfer Training PT LTG    Transfer Training PT LTG, Time to Achieve 1 wk  -CH      Transfer Training PT LTG, Activity Type all transfers  -CH      Transfer Training PT LTG, [place] Level minimum assist (75% patient effort);2 person assist required  -CH      Transfer Training PT LTG, Assist Device walker, rolling  -CH      Transfer Training PT LTG, Outcome goal ongoing  -CH        User Key  (r) = Recorded By, (t) = Taken By, (c) = Cosigned By    Initials Name Provider Type     Maricel Garcia, PT Physical Therapist     Milly Garduno, PT Physical Therapist    TYESHA Post, PT Physical Therapist    LEN Blanco, PT Student PT Student    RIOS Davis, PT Student PT Student          Physical Therapy Education     Title: PT OT SLP Therapies (Active)     Topic: Physical Therapy (Active)     Point: Mobility training (Active)    Learning Progress Summary    Learner Readiness Method Response Comment Documented by Status   Patient Acceptance E,D DU,VU,NR benefits of activity, safety  during transfers  07/08/17 1603 Done    Acceptance E NR   07/07/17 0939 Active    Acceptance E NR  KS 07/06/17 0858 Active    Acceptance E VU,NR  AL 07/02/17 1138 Done    Acceptance E NR  AL 07/01/17 1103 Active    Acceptance E VU,NR,[facility] 06/30/17 1202 Done    Acceptance E,TB VU,NR   06/28/17 2237 Done    Acceptance D,E VU,NR   06/28/17 1345 Done    Acceptance E NR,[facility] 06/27/17 1551 Active    Acceptance E VU,NR   06/26/17 1200 Done    Acceptance E,TB,D VU,NR   06/25/17 1155 Done    Nonacceptance E,D NR,[facility] 06/22/17 1433 Active    Nonacceptance E NR,[facility] 06/21/17 1159 Active   Family Acceptance E,D DU,VU,NR benefits of activity, safety during transfers  07/08/17 1603 Done    Acceptance E,TB VU,NR   06/28/17 2237 Done    Acceptance E VU,NR   06/26/17 1200 Done   Significant Other Acceptance E NR  KS 07/06/17 0858 Active               Point: Home exercise program (Active)    Learning Progress Summary    Learner Readiness Method Response Comment Documented by Status   Patient Acceptance E NR   07/07/17 0939 Active    Acceptance E NR  KS 07/06/17 0858 Active    Acceptance E VU,NR,[facility] 06/30/17 1202 Done    Acceptance E,TB VU,NR   06/28/17 2237 Done    Acceptance D,E VU,NR   06/28/17 1345 Done    Acceptance E NR,NL   06/27/17 1551 Active    Acceptance E VU,NR   06/26/17 1200 Done    Nonacceptance E,D NR,[facility] 06/22/17 1433 Active    Nonacceptance E NR,[facility] 06/21/17 1159 Active   Family Acceptance E,TB VU,NR   06/28/17 2237 Done    Acceptance E VU,NR   06/26/17 1200 Done   Significant Other Acceptance E NR  KS 07/06/17 0858 Active               Point: Body mechanics (Active)    Learning Progress Summary    Learner Readiness Method Response Comment Documented by Status   Patient Acceptance E NR  KS 07/06/17 0858 Active    Acceptance E VU,NR  AL 07/02/17 1138 Done    Acceptance E NR  AL 07/01/17 1103 Active    Acceptance E DILIP,CARROLL,NL  AA 06/30/17 1202 Done    Acceptance E,TB DILIPNR    06/28/17 2237 Done    Acceptance D,E VU,NR   06/28/17 1345 Done    Acceptance E NR,[facility] 06/27/17 1551 Active    Acceptance E VU,NR   06/26/17 1200 Done    Acceptance E,TB,D VU,NR   06/25/17 1155 Done    Nonacceptance E,D NR,[facility] 06/22/17 1433 Active    Nonacceptance E NR,[facility] 06/21/17 1159 Active   Family Acceptance E,TB VU,NR   06/28/17 2237 Done    Acceptance E VU,NR   06/26/17 1200 Done   Significant Other Acceptance E NR  KS 07/06/17 0858 Active               Point: Precautions (Active)    Learning Progress Summary    Learner Readiness Method Response Comment Documented by Status   Patient Acceptance E NR  KS 07/06/17 0858 Active    Acceptance E VU,NR  AL 07/02/17 1138 Done    Acceptance E NR  AL 07/01/17 1103 Active    Acceptance E VU,NR,[facility] 06/30/17 1202 Done    Acceptance E,TB VU,NR   06/28/17 2237 Done    Acceptance D,E VU,NR   06/28/17 1345 Done    Acceptance E NR,[facility] 06/27/17 1551 Active    Acceptance E VU,[facility] 06/26/17 1200 Done    Acceptance E,TB,D VU,[facility] 06/25/17 1155 Done    Nonacceptance E,D NR,[facility] 06/22/17 1433 Active    Nonacceptance E NR,[facility] 06/21/17 1159 Active   Family Acceptance E,TB VU,NR   06/28/17 2237 Done    Acceptance E VU,NR   06/26/17 1200 Done   Significant Other Acceptance E NR  KS 07/06/17 0858 Active                      User Key     Initials Effective Dates Name Provider Type Discipline     12/01/15 -  Maricel Garcia, PT Physical Therapist PT     02/07/17 -  Milly Garduno, PT Physical Therapist PT     12/01/15 -  Eunice Post, PT Physical Therapist PT    AL 12/01/15 -  Latanya Rodriguez, PT Physical Therapist PT     04/06/17 -  Yana Mercado, RN Registered Nurse Nurse     08/02/16 -  Morteza Saldana, PT DPT Physical Therapist PT     05/08/17 -  Humble Armando, PT Student PT Student PT     06/01/17 -  Skyla Blanco, PT Student PT Student PT    KS 05/08/17 -  Heather Davis, PT Student PT Student PT                    PT  Recommendation and Plan  Anticipated Equipment Needs At Discharge:  (L knee immobilizer)  Anticipated Discharge Disposition: skilled nursing facility  Planned Therapy Interventions: balance training, bed mobility training, gait training, home exercise program, patient/family education, transfer training  PT Frequency: daily  Plan of Care Review  Plan Of Care Reviewed With: patient, family  Progress: progress toward functional goals is gradual  Outcome Summary/Follow up Plan: Pt transferred supine to sit with mod x 1 and bedrails. Pt sat EOB with supervision. He performed sit to stand transfer 3 x with RW and min x 1 with supervision for standing balance. Pt returned sit to supine with mod x 1.          Outcome Measures       07/07/17 1400 07/07/17 0900 07/06/17 0900    How much help from another person do you currently need...    Turning from your back to your side while in flat bed without using bedrails?  2  -LH 2  -LH (r) KS (t) LH (c)    Moving from lying on back to sitting on the side of a flat bed without bedrails?  2  -LH 2  -LH (r) KS (t) LH (c)    Moving to and from a bed to a chair (including a wheelchair)?  2  -LH 2  -LH (r) KS (t) LH (c)    Standing up from a chair using your arms (e.g., wheelchair, bedside chair)?  2  -LH 2  -LH (r) KS (t) LH (c)    Climbing 3-5 steps with a railing?  1  -LH 2  -LH (r) KS (t) LH (c)    To walk in hospital room?  1  -LH 2  -LH (r) KS (t) LH (c)    AM-PAC 6 Clicks Score  10  -LH 12  -LH (r) KS (t)    How much help from another is currently needed...    Putting on and taking off regular lower body clothing? 1  -SO      Bathing (including washing, rinsing, and drying) 2  -SO      Toileting (which includes using toilet bed pan or urinal) 1  -SO      Putting on and taking off regular upper body clothing 2  -SO      Taking care of personal grooming (such as brushing teeth) 2  -SO      Eating meals 3  -SO      Score 11  -SO      Functional Assessment    Outcome Measure Options  AM-PAC 6 Clicks Daily Activity (OT)  -SO AM-PAC 6 Clicks Basic Mobility (PT)  -LH AM-PAC 6 Clicks Basic Mobility (PT)  -LH (r) KS (t) LH (c)      User Key  (r) = Recorded By, (t) = Taken By, (c) = Cosigned By    Initials Name Provider Type    SO Jessica Fabian, OTR Occupational Therapist    LH Milly Garduno, PT Physical Therapist    KS Heather Davis, PT Student PT Student           Time Calculation:         PT Charges       07/08/17 1604          Time Calculation    Start Time 1525  -LC      Stop Time 1555  -LC      Time Calculation (min) 30 min  -LC      PT Received On 07/08/17  -LC      PT - Next Appointment 07/09/17  -LC      Time Calculation- PT    Total Timed Code Minutes- PT 30 minute(s)  -LC        User Key  (r) = Recorded By, (t) = Taken By, (c) = Cosigned By    Initials Name Provider Type     Morteza Saldana, PT DPT Physical Therapist          Therapy Charges for Today     Code Description Service Date Service Provider Modifiers Qty    [id number]  PT THERAPEUTIC ACT EA 15 MIN 7/8/2017 Morteza Saldana, PT DPT GP 2          PT G-Codes  Outcome Measure Options: AM-PAC 6 Clicks Daily Activity (OT)    Morteza Saldana, PT DPT  7/8/2017

## 2017-07-08 NOTE — PLAN OF CARE
Problem: Fall Risk (Adult)  Goal: Absence of Falls  Outcome: Ongoing (interventions implemented as appropriate)    Problem: Pain, Chronic (Adult)  Goal: Acceptable Pain Control/Comfort Level  Outcome: Ongoing (interventions implemented as appropriate)    07/07/17 2028   Pain, Chronic (Adult)   Acceptable Pain Control/Comfort Level making progress toward outcome   Outcome Summary: Resting in bed this shift. Participated with PT. Drsgs remain dry and intact to back and steri strips to abdominal incisions. F/C to remain in place per urology's notes until ambulatory.

## 2017-07-08 NOTE — PLAN OF CARE
Problem: Patient Care Overview (Adult)  Goal: Plan of Care Review  Outcome: Ongoing (interventions implemented as appropriate)    07/08/17 1603 07/08/17 1637   Coping/Psychosocial Response Interventions   Plan Of Care Reviewed With patient;family --    Patient Care Overview   Progress --  improving   Outcome Evaluation   Outcome Summary/Follow up Plan --  pt vss, denies any pain, able to work with PT, AB therapy remains        Goal: Adult Individualization and Mutuality  Outcome: Ongoing (interventions implemented as appropriate)  Goal: Discharge Needs Assessment  Outcome: Ongoing (interventions implemented as appropriate)    Problem: Fall Risk (Adult)  Goal: Absence of Falls  Outcome: Ongoing (interventions implemented as appropriate)  Goal: Absence of Falls  Outcome: Ongoing (interventions implemented as appropriate)    Problem: Pain, Chronic (Adult)  Goal: Acceptable Pain Control/Comfort Level  Outcome: Ongoing (interventions implemented as appropriate)    Problem: Perioperative Period (Adult)  Goal: Signs and Symptoms of Listed Potential Problems Will be Absent or Manageable (Perioperative Period)  Outcome: Ongoing (interventions implemented as appropriate)

## 2017-07-08 NOTE — PLAN OF CARE
Problem: Patient Care Overview (Adult)  Goal: Plan of Care Review  Outcome: Ongoing (interventions implemented as appropriate)    07/08/17 1603   Coping/Psychosocial Response Interventions   Plan Of Care Reviewed With patient;family   Patient Care Overview   Progress progress toward functional goals is gradual   Outcome Evaluation   Outcome Summary/Follow up Plan Pt transferred supine to sit with mod x 1 and bedrails. Pt sat EOB with supervision. He performed sit to stand transfer 3 x with RW and min x 1 with supervision for standing balance. Pt returned sit to supine with mod x 1.

## 2017-07-08 NOTE — PROGRESS NOTES
"Postop day #4 L3 4 5 laminectomy and drainage of epidural abscess by Dr. Hernandez    Subjective: The patient notes continuing weakness of the left lower extremity but he feels as if it may be a little improved.  He denies significant pain.    Objective:  /70 (BP Location: Left arm, Patient Position: Lying)  Pulse 86  Temp 97.2 °F (36.2 °C) (Oral)   Resp 24  Ht 72.01\" (182.9 cm)  Wt 183 lb 8 oz (83.2 kg)  SpO2 95%  BMI 24.21 kg/m2      Dressing dry.    Assessment: Receiving intravenous antibiotics for abscess.  It grew enterococcus and Micrococcus    On antibiotics.      Plan: Continue antibiotics.  Rehabilitation efforts etc.  "

## 2017-07-08 NOTE — PROGRESS NOTES
"    DAILY PROGRESS NOTE  [facility]    Patient Identification:  Name: Pavel Melgoza  Age: 83 y.o.  Sex: male  :  1934  MRN: [id number]         Primary Care Physician: Amanda Tanner MD    Subjective:  Interval History: no new issues - no cp/sob/n/v/d - denies pain    Objective:    Scheduled Meds:  castor oil-balsam peru  Topical Q12H   cholecalciferol 5,000 Units Oral Daily   diltiaZEM  mg Oral Q24H   lidocaine 1 mL Intradermal Once   pantoprazole 40 mg Oral BID AC   ramipril 10 mg Oral Daily   vancomycin 1,250 mg Intravenous Q12H   vitamin B-12 2,000 mcg Oral Daily     Continuous Infusions:  Pharmacy to dose vancomycin        Vital signs in last 24 hours:  Temp:  [98.3 °F (36.8 °C)-98.8 °F (37.1 °C)] 98.8 °F (37.1 °C)  Heart Rate:  [76-83] 76  Resp:  [18-20] 20  BP: (103-134)/(76-96) 105/85    Intake/Output:    Intake/Output Summary (Last 24 hours) at 17 0914  Last data filed at 17 0850   Gross per 24 hour   Intake              480 ml   Output             2150 ml   Net            -1670 ml       Exam:  /85 (BP Location: Left arm, Patient Position: Lying)  Pulse 76  Temp 98.8 °F (37.1 °C) (Oral)   Resp 20  Ht 72.01\" (182.9 cm)  Wt 183 lb 8 oz (83.2 kg)  SpO2 94%  BMI 24.21 kg/m2    General Appearance:    Alert, cooperative, no distress, not toxic                         Throat:   Lips, tongue, gums normal; oral mucosa pink and moist                         Lungs:    Clear to auscultation bilaterally, respirations unlabored                          Heart:    Regular rate and rhythm, S1 and S2 normal                  Abdomen:     Soft, non-tender, bowel sounds active                 Extremities:   SCDS, no cyanosis or edema                            Data Review:  Labs in chart were reviewed.    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] 06/25/2017   • Meningioma [D32.9] 06/24/2017   • A-fib [I48.91] 06/23/2017   • B12 deficiency [E53.8] 06/21/2017   • Closed wedge compression fracture of eleventh thoracic vertebra [S22.080A] 06/20/2017   • CKD (chronic kidney disease) stage 2, GFR 60-89 ml/min [N18.2] 06/20/2017      Resolved Hospital Problems    Diagnosis Date Noted Date Resolved   • Pseudomonas infection [B96.5] 06/28/2017 07/02/2017   • UTI (urinary tract infection) [N39.0] 06/26/2017 07/02/2017   • Left inguinal hernia [K40.90] 06/24/2017 07/02/2017   • Metabolic acidosis [E87.2] 06/23/2017 07/02/2017   • Hypocalcemia [E83.51] 06/23/2017 07/06/2017   • Diarrhea [R19.7] 06/20/2017 07/06/2017       Plan:  · S/P RAHUL 6/22/17.  · S/P Laparoscopic left inguinal hernia repair, transabdominal preperitoneal approach 6/23/17  · B12 replacement  · SIADH per nephrology. Na slowly improving - hopefully w/ continue to improve s/p abscess I/D - fluid mgnt per Renal as went up to 132 and now 128  · Afib: not anticoag candidate d/t falls. Rate control per card.  · UTI with pseudomonas treated as 7/3 UCx reported as negative. Urinary retention w/ fernandez per Urology       Leukocytosis/ Epidural abscess/Psoas abscess  - s/p lumbar laminectomy on 7/4   - IR drainage on 7/5   - ID dosing Vanc for Enterococcus and Micrococcus w/ plans for possible PCN transition pending additional sensitivity testing and ID recs   S/p PICC        Appreciate input and assistance from ALL      SCDs for DVT prophylaxis     Dispo - hopefully to rehab over weekend or Monday    Jayesh Naranjo MD  7/8/2017  9:14 AM

## 2017-07-08 NOTE — PROGRESS NOTES
Postoperative day 14 after laparoscopic left inguinal hernia repair    Subjective:  No groin pain.  Tolerating some diet.  Some constipation but overall GI function seems adequate.    Objective:  Laparoscopic incision sites are nicely healed.  Abdomen is soft and benign.  Left groin without erythema or tenderness.    Assessment and plan:  Status post laparoscopic repair of incarcerated inguinal hernia.  Stable from my standpoint.  No signs of any mesh infection.  No diet or activity restrictions from my standpoint, we'll defer to neurosurgery.  No need to follow up with me.  I removed his Steri-Strips today.    Erwin Mei MD  General and Endoscopic Surgery  [facility]    [address], [address]  [place], [place], [address]  P: [phone]  F: [phone]

## 2017-07-08 NOTE — PROGRESS NOTES
"Pharmacokinetic Consult - Vancomycin Dosing    Pavel Melgoza is on day 5 pharmacy to dose vancomycin for bone/joint infection. Pt with epidural abscess s/p drainage on 7/7 with 6 weeks of IV therapy anticipated (stop date 8/15/17)  Pharmacy dosing vancomycin per Dr. Ponce's request.   Goal trough: 15-20 mg/L     Relevant clinical data and objective history reviewed:  83 y.o.  male  72.01\" (182.9 cm)  183 lb 8 oz (83.2 kg)  Body mass index is 24.21 kg/(m^2).     He  has a past medical history of Plasmacytoma.    Creatinine   Date Value Ref Range Status   07/08/2017 0.67 (L) 0.76 - 1.27 mg/dL Final   07/07/2017 0.72 (L) 0.76 - 1.27 mg/dL Final   07/07/2017 0.71 (L) 0.76 - 1.27 mg/dL Final   07/07/2017 0.73 (L) 0.76 - 1.27 mg/dL Final     BUN   Date Value Ref Range Status   07/08/2017 16 8 - 23 mg/dL Final     Estimated Creatinine Clearance: 82.3 mL/min (by C-G formula based on Cr of 0.67).  Allergies as of 06/20/2017   • (No Known Allergies)       Lab Results   Component Value Date    WBC 15.78 (H) 07/07/2017     Temp Readings from Last 3 Encounters:   07/08/17 98.8 °F (37.1 °C) (Oral)     Baseline culture/source/susceptibility:    Spinal wound 7/4 = micrococcus and enterococcus (ID has requested sensitivities)   Spinal tissue 7/4 = micrococcus and enterococcus (ID has requested sensitivities)    IV Anti-Infectives     Ordered     Dose/Rate Route Frequency Start Stop    07/06/17 1532  vancomycin 1500 mg/500 mL 0.9% NS IVPB ([facility])     Ordering Provider:  Chandra Ponce MD    1,500 mg  over 150 Minutes Intravenous Every 12 Hours 07/06/17 1700      07/04/17 1309  Pharmacy to dose vancomycin     Ordering Provider:  Chandra Ponce MD     Does not apply Continuous PRN 07/04/17 1309           Lab Results   Component Value Date    VANCOTROUGH 19.10 07/08/2017     Assessment/Plan  1) Although patient with goal level after 3 doses of new regimen (1500 mg Q12) concerned that pt will continue " to accumulate given age and prolonged duration of vancomycin. Will decrease dosing slightly to 1250 q12 with plan to recheck level after 5 doses of the new regimen on Tuesday 7/11 at 0430.  2) Will monitor serum creatinine at least every 48 hours per dosing recommendations. Daily renal function panel previously ordered.  3) Encourage hydration to prevent toxic accumulation; monitor for s/sxn of toxicity including increase in SCr and decrease in UOP.   Pharmacy will continue to follow daily while on vancomycin and adjust as needed.     Maame Tamyao, Pharm.D., [facility]  Clinical Pharmacist  [phone]

## 2017-07-08 NOTE — PLAN OF CARE
Problem: Patient Care Overview (Adult)  Goal: Plan of Care Review  Outcome: Ongoing (interventions implemented as appropriate)    07/08/17 0659   Coping/Psychosocial Response Interventions   Plan Of Care Reviewed With patient   Patient Care Overview   Progress no change   Outcome Evaluation   Outcome Summary/Follow up Plan some confusion. PRN meds given. Drsg's C/D/I         Problem: Pain, Chronic (Adult)  Goal: Acceptable Pain Control/Comfort Level  Outcome: Ongoing (interventions implemented as appropriate)

## 2017-07-08 NOTE — PROGRESS NOTES
"   LOS: 18 days   Patient Care Team:  Amanda Tanner MD as PCP - General (Family Medicine)    Chief Complaint/ Reason for encounter: Hyponatremia, SIADH        Subjective     Fall   Pertinent negatives include no fever or nausea.   Hip Pain      Back Pain   Pertinent negatives include no chest pain, fever or weakness.   Extremity Pain    Pertinent negatives include no fever.       Subjective:  Symptoms:  Stable.  No shortness of breath, chest pain or weakness.  (No new complaints today  Feels better, improved appetite).    Diet:  Adequate intake.  No nausea.    Activity level: Returning to normal.    Pain:  He reports no pain.          History taken from: Patient and chart    Objective     Vital Signs  Temp:  [98.3 °F (36.8 °C)-98.8 °F (37.1 °C)] 98.8 °F (37.1 °C)  Heart Rate:  [76-83] 76  Resp:  [18-20] 20  BP: (103-134)/(76-96) 105/85       Wt Readings from Last 1 Encounters:   07/04/17 0500 183 lb 8 oz (83.2 kg)   07/03/17 0500 181 lb 5 oz (82.2 kg)   07/02/17 0500 180 lb (81.6 kg)   07/01/17 0510 174 lb 2 oz (79 kg)   06/29/17 1900 191 lb (86.6 kg)   06/28/17 1900 190 lb 4.8 oz (86.3 kg)   06/28/17 1711 184 lb 15.5 oz (83.9 kg)   06/28/17 0422 185 lb (83.9 kg)   06/27/17 0500 187 lb 9.6 oz (85.1 kg)   06/26/17 0523 190 lb 11.2 oz (86.5 kg)   06/24/17 0600 192 lb (87.1 kg)   06/23/17 0413 194 lb 6.4 oz (88.2 kg)   06/22/17 1157 198 lb (89.8 kg)   06/22/17 0452 198 lb 8 oz (90 kg)   06/21/17 0500 190 lb 1.6 oz (86.2 kg)   06/20/17 1733 180 lb (81.6 kg)   06/20/17 1212 180 lb (81.6 kg)       Objective:  General Appearance:  Comfortable, in no acute distress, not in pain and well-appearing.    Vital signs: (most recent): Blood pressure 105/85, pulse 76, temperature 98.8 °F (37.1 °C), temperature source Oral, resp. rate 20, height 72.01\" (182.9 cm), weight 183 lb 8 oz (83.2 kg), SpO2 94 %.  Vital signs are normal.  No fever.    Output: Producing urine.    HEENT: Normal HEENT exam.    Lungs:  Normal " respiratory rate and normal effort.  He is not in respiratory distress.  Breath sounds clear to auscultation.  No wheezes or decreased breath sounds.    Heart: Normal rate.  Regular rhythm.    Abdomen: Abdomen is soft and non-distended.  Bowel sounds are normal.   There is no epigastric area or no suprapubic area tenderness.  There is no rebound tenderness.     Extremities: Normal range of motion.  There is no deformity or dependent edema.    Pulses: Distal pulses are intact.    Neurological: Patient is alert and oriented to person, place and time.    Skin:  Warm and dry.  No rash or cyanosis.             Results Review:    Past Medical History: reviewed and updated  Past Medical History:   Diagnosis Date   • Plasmacytoma          Allergies:  No Known Allergies    Intake/Output:     Intake/Output Summary (Last 24 hours) at 07/08/17 0906  Last data filed at 07/08/17 0850   Gross per 24 hour   Intake              480 ml   Output             2150 ml   Net            -1670 ml         DATA:  Interval chart, labs and notes reviewed.  The following labs personally reviewed by me  I discussed with his wife at bedside, interval history obtained from her regarding confusion of last 24 hours which she states is improved  Old records personally reviewed showing no prior hyponatremia  We discussed the potential risks of oral Samsca and they agree to proceed.  Risks include possible overcorrection which would be attempted by stopping fluid restriction and starting lower dose samsca and checking frequent labs per protocol    Labs:   Recent Results (from the past 24 hour(s))   Basic Metabolic Panel    Collection Time: 07/07/17  5:30 PM   Result Value Ref Range    Glucose 139 (H) 65 - 99 mg/dL    BUN 18 8 - 23 mg/dL    Creatinine 0.72 (L) 0.76 - 1.27 mg/dL    Sodium 129 (L) 136 - 145 mmol/L    Potassium 3.8 3.5 - 5.2 mmol/L    Chloride 93 (L) 98 - 107 mmol/L    CO2 22.9 22.0 - 29.0 mmol/L    Calcium 8.2 (L) 8.6 - 10.5 mg/dL    eGFR  Non  Amer 104 >60 mL/min/1.73    BUN/Creatinine Ratio 25.0 7.0 - 25.0    Anion Gap 13.1 mmol/L   Vancomycin, Trough Obtain Vancomycin trough 30 minutes PRIOR to dose. Make sure Vancomycin is not infusing.  Thank you!    Collection Time: 07/08/17  5:37 AM   Result Value Ref Range    Vancomycin Trough 19.10 5.00 - 20.00 mcg/mL   Renal Function Panel    Collection Time: 07/08/17  5:37 AM   Result Value Ref Range    Glucose 96 65 - 99 mg/dL    BUN 16 8 - 23 mg/dL    Creatinine 0.67 (L) 0.76 - 1.27 mg/dL    Sodium 128 (L) 136 - 145 mmol/L    Potassium 3.7 3.5 - 5.2 mmol/L    Chloride 93 (L) 98 - 107 mmol/L    CO2 22.4 22.0 - 29.0 mmol/L    Calcium 8.2 (L) 8.6 - 10.5 mg/dL    Albumin 2.40 (L) 3.50 - 5.20 g/dL    Phosphorus 2.8 2.5 - 4.5 mg/dL    Anion Gap 12.6 mmol/L    BUN/Creatinine Ratio 23.9 7.0 - 25.0    eGFR Non African Amer 113 >60 mL/min/1.73       Radiology:  Imaging Results (last 24 hours)     ** No results found for the last 24 hours. **             Medications have been reviewed:  Current Facility-Administered Medications   Medication Dose Route Frequency Provider Last Rate Last Dose   • acetaminophen (TYLENOL) tablet 650 mg  650 mg Oral Q6H PRN Malathi Rubin MD   650 mg at 06/22/17 2253   • bisacodyl (DULCOLAX) EC tablet 5 mg  5 mg Oral Daily PRN Nolberto Hernandez IV, MD       • bisacodyl (DULCOLAX) suppository 10 mg  10 mg Rectal Daily PRN Malathi Rubin MD   10 mg at 06/28/17 1810   • bisacodyl (DULCOLAX) suppository 10 mg  10 mg Rectal Daily PRN Nolberto Hernandez IV, MD       • calcium carbonate (TUMS) chewable tablet 500 mg (200 mg elemental)  1 tablet Oral BID PRN Malathi Rubin MD       • castor oil-balsam peru (VENELEX) ointment   Topical Q12H Jayesh Naranjo MD       • cholecalciferol (VITAMIN D3) tablet 5,000 Units  5,000 Units Oral Daily Cedrick Posada MD   5,000 Units at 07/08/17 0855   • diazePAM (VALIUM) tablet 5 mg  5 mg Oral Q6H PRN Nolberto Hernandez  MD SOUMYA   5 mg at 07/07/17 1946   • diltiaZEM CD (CARDIZEM CD) 24 hr capsule 240 mg  240 mg Oral Q24H Tiffany Dudley MD   240 mg at 07/08/17 0855   • diphenhydrAMINE (BENADRYL) capsule 25 mg  25 mg Oral Q6H PRN Gilbret Marx MD   25 mg at 07/07/17 2244   • diphenhydrAMINE-zinc acetate 2-0.1 % cream   Topical TID PRN Gilbert Marx MD       • docusate sodium (COLACE) capsule 100 mg  100 mg Oral BID PRN Nolberto Hernandez IV, MD   100 mg at 07/08/17 0855   • HYDROcodone-acetaminophen (NORCO) 5-325 MG per tablet 1 tablet  1 tablet Oral Q4H PRN Nolberto Hernandez IV, MD   1 tablet at 07/07/17 2347   • lidocaine (XYLOCAINE) 1 % injection 1 mL  1 mL Intradermal Once Shane Perez MD       • magnesium hydroxide (MILK OF MAGNESIA) suspension 2400 mg/10mL 10 mL  10 mL Oral Daily PRN Malathi Rubin MD   10 mL at 06/28/17 0805   • magnesium hydroxide (MILK OF MAGNESIA) suspension 2400 mg/10mL 10 mL  10 mL Oral Daily PRN Nolberto Hernandez IV, MD       • midazolam (VERSED) injection 1 mg  1 mg Intravenous Q5 Min PRN Shane Perez MD       • morphine injection 1 mg  1 mg Intravenous Q2H PRN Nolberto Hernandez IV, MD       • morphine injection 2 mg  2 mg Intravenous Q4H PRN Nolberto Hernandez IV, MD   2 mg at 07/08/17 0211    And   • naloxone (NARCAN) injection 0.4 mg  0.4 mg Intravenous Q5 Min PRN Nolberto Hernandez IV, MD       • naloxone (NARCAN) injection 0.4 mg  0.4 mg Intravenous Q5 Min PRN Malathi Rubin MD       • ondansetron (ZOFRAN) injection 4 mg  4 mg Intravenous Q6H PRN Malathi Rubin MD   4 mg at 06/24/17 1032   • ondansetron (ZOFRAN) tablet 4 mg  4 mg Oral Q6H PRN Nolberto Hernandez IV, MD        Or   • ondansetron ODT (ZOFRAN-ODT) disintegrating tablet 4 mg  4 mg Oral Q6H PRN Nolberto Hernandez IV, MD        Or   • ondansetron (ZOFRAN) injection 4 mg  4 mg Intravenous Q6H PRN Nolberto Hernandez IV, MD       • pantoprazole (PROTONIX) EC tablet 40 mg  40 mg Oral BID AC Malathi  Vidya Rubin MD   40 mg at 07/08/17 0855   • Pharmacy to dose vancomycin   Does not apply Continuous PRN Chandra Ponce MD       • ramipril (ALTACE) capsule 10 mg  10 mg Oral Daily Gilbert Marx MD   10 mg at 07/08/17 0855   • sennosides-docusate sodium (SENOKOT-S) 8.6-50 MG tablet 1 tablet  1 tablet Oral Nightly PRN Nolberto Hernandez IV, MD       • sodium chloride 0.9 % flush 1-10 mL  1-10 mL Intravenous PRN Malathi Rubin MD       • sodium chloride 0.9 % flush 1-10 mL  1-10 mL Intravenous PRN Nolberto Hernandez IV, MD       • vancomycin 1250 mg/250 mL 0.9% NS IVPB ([facility])  1,250 mg Intravenous Q12H Chandra Ponce MD       • vitamin B-12 (CYANOCOBALAMIN) tablet 2,000 mcg  2,000 mcg Oral Daily Gilbert Marx MD   2,000 mcg at 07/08/17 0855       Assessment/Plan     Principal Problem:    Epidural abscess  Active Problems:    Closed wedge compression fracture of eleventh thoracic vertebra    CKD (chronic kidney disease) stage 2, GFR 60-89 ml/min    B12 deficiency    A-fib    Meningioma    SIADH (syndrome of inappropriate ADH production)    Vitamin D deficiency      Assessment:  (Hyponatremia, urine studies consistent with SIADH.  Likely related to the underlying infection, improved after Samsca   Metabolic acidosis,  stable, off bicarbonate  Lumbar abscess status/post drainage, on IV antibiotics per infectious disease  Closed wedge compression fracture of eleventh thoracic vertebra  Fall  Urinary tract infection  CKD (chronic kidney disease) stage 2, GFR 60-89 ml/min  B12 deficiency        Plan:  Sodium level improved after Samsca, but a bit worse today  Discussed fluid restriction with patient and his wife: Continue  Re- dose Samsca PRN sodium less than 125  Discharge planning).             Continue to monitor renal function, electroytes and volume closely   Please call me with any questions or concerns      Cedrick Posada MD   [facility]    [phone]    07/08/17  9:06 AM      Dictation performed using Dragon dictation software

## 2017-07-09 NOTE — THERAPY TREATMENT NOTE
Acute Care - Physical Therapy Treatment Note  [facility]     Patient Name: Pavel Melgoza  : 1934  MRN: [id number]  Today's Date: 2017  Onset of Illness/Injury or Date of Surgery Date: 17     Referring Physician: Dr. Mrax    Admit Date: 2017    Visit Dx:    ICD-10-CM ICD-9-CM   1. Generalized weakness R53.1 780.79   2. Dehydration E86.0 276.51   3. Compression fracture T14.8 829.0   4. Pain R52 780.96   5. Epidural abscess, L2-L5 G06.1 324.1   6. Left leg weakness M62.81 729.89   7. Epidural abscess G06.2 324.9     Patient Active Problem List   Diagnosis   • Generalized weakness   • Plasmocytoma   • Nausea & vomiting   • Fall   • Noncompliance with medication regimen   • Left leg weakness   • Closed wedge compression fracture of eleventh thoracic vertebra   • CKD (chronic kidney disease) stage 2, GFR 60-89 ml/min   • B12 deficiency   • A-fib   • Meningioma   • SIADH (syndrome of inappropriate ADH production)   • Hyponatremia   • Vitamin D deficiency   • Epidural abscess               Adult Rehabilitation Note       17 1417 17 1500 17 1440    Rehab Assessment/Intervention    Discipline physical therapist  -LC physical therapist  -LC occupational therapist  -SO    Document Type therapy note (daily note)  -LC therapy note (daily note)  - therapy note (daily note)  -SO    Subjective Information agree to therapy;complains of;weakness;fatigue  - agree to therapy;complains of;weakness;fatigue  -LC agree to therapy  -SO    Patient Effort, Rehab Treatment good  -LC good  -LC adequate  -SO    Symptoms Noted During/After Treatment   fatigue  -SO    Precautions/Limitations   fall precautions;spinal precautions  -SO    Recorded by [LC] Morteza Saldana, PT DPT [LC] Morteza Saldana, PT DPT [SO] Jessica Fabian, OTR    Pain Assessment    Pain Assessment No/denies pain  -LC No/denies pain  -LC No/denies pain  -SO    Recorded by [LC] Morteza Saldana, PT DPT [LC] Morteza DASILVA  Les, PT DPT [SO] Jessica Fabian, MATYR    Cognitive Assessment/Intervention    Current Cognitive/Communication Assessment functional  - functional  -LC impaired  -SO    Orientation Status oriented to;disoriented to;person;place;time;situation;required verbal cueing (specifiy in comments)  -LC oriented to;person;place;time;situation  -LC oriented to;person;place  -SO    Follows Commands/Answers Questions 100% of the time;able to follow single-step instructions;needs cueing;needs increased time;needs repetition  -% of the time;able to follow single-step instructions;needs cueing;needs increased time;needs repetition  -LC     Personal Safety mild impairment;decreased awareness, need for assist;decreased awareness, need for safety  - decreased awareness, need for assist;decreased awareness, need for safety  -LC decreased insight to deficits  -SO    Personal Safety Interventions elopement precautions initiated;fall prevention program maintained;gait belt;nonskid shoes/slippers when out of bed;supervised activity  -LC fall prevention program maintained;gait belt;nonskid shoes/slippers when out of bed;supervised activity  -LC fall prevention program maintained  -SO    Recorded by [LC] Morteza Saldana, PT DPT [LC] Morteza Saldana, PT DPT [SO] Jessica Fabian, MATYR    Bed Mobility, Assessment/Treatment    Bed Mobility, Assistive Device bed rails  - bed rails  - bed rails;head of bed elevated  -SO    Bed Mobility, Roll Right, [place] minimum assist (75% patient effort)  - minimum assist (75% patient effort)  -     Bed Mobility, Scoot/Bridge, [place] contact guard assist  - contact guard assist  -LC     Bed Mob, Supine to Sit, [place] minimum assist (75% patient effort)  - moderate assist (50% patient effort)  - moderate assist (50% patient effort);maximum assist (25% patient effort)  -SO    Bed Mob, Sit to Supine, [place] minimum assist (75% patient effort)  -   moderate assist (50% patient effort);verbal cues required;nonverbal cues required (demo/gesture)  -SO    Bed Mobility, Safety Issues decreased use of legs for bridging/pushing  -LC      Bed Mobility, Impairments impaired balance;strength decreased;pain  -LC      Recorded by [LC] Morteza Saldana, PT DPT [LC] Morteza Saldana, PT DPT [SO] Jessica Fabian, OTR    Transfer Assessment/Treatment    Transfers, Sit-Stand [place] minimum assist (75% patient effort);moderate assist (50% patient effort)  -LC minimum assist (75% patient effort)  -LC     Transfers, Stand-Sit [place] minimum assist (75% patient effort);moderate assist (50% patient effort)  -LC minimum assist (75% patient effort)  -LC     Transfers, Sit-Stand-Sit, Assist Device rolling walker  -LC rolling walker  -LC     Transfer, Safety Issues impulsivity;knees buckling  - weight-shifting ability decreased;impulsivity;knees buckling  -LC     Transfer, Impairments strength decreased;impaired balance;pain  -LC ROM decreased;strength decreased;impaired balance;coordination impaired  -LC     Transfer, Comment 7 x standing with RW. 20 seconds stand  -LC L knee buckling, insensate L limb  -LC     Recorded by [LC] Morteza Saldana, PT DPT [LC] Morteza Saldana, PT DPT     Balance Skills Training    Sitting-Level of Assistance   Moderate assistance  -SO    Sitting-Balance Support   Feet supported  -SO    Sitting-Balance Activities   --   UE ther ex, posterior lean  -SO    Sitting # of Minutes   8  -SO    Standing-Level of Assistance  Contact guard  -     Static Standing Balance Support  assistive device  -LC     Standing Balance # of Minutes  3 sit to stands for 2 minutes each  -LC     Recorded by  [LC] Morteza Saldana, PT DPT [SO] Jessica Fabian, OTR    Therapy Exercises    Bilateral Upper Extremity   AROM:;10 reps;sitting;elbow flexion/extension;shoulder extension/flexion   10x3  -SO    Recorded by   [SO] Jessica Fabian, OTR    Positioning  and Restraints    Pre-Treatment Position in bed  -LC in bed  -LC in bed  -SO    Post Treatment Position bed  -LC bed  -LC bed  -SO    In Bed notified nsg;fowlers;call light within reach;encouraged to call for assist;exit alarm on;with family/caregiver;side rails up x2;SCD pump applied  -LC notified nsg;call light within reach;fowlers;encouraged to call for assist;patient within staff view;side rails up x2;SCD pump applied  -LC supine;call light within reach;encouraged to call for assist;exit alarm on;with family/caregiver  -SO    Recorded by [LC] Morteza Saldana, PT DPT [LC] Morteza Saldana, PT DPT [SO] Jessica Faiban, OTR      07/07/17 1030 07/07/17 0900       Rehab Assessment/Intervention    Discipline speech language pathologist  -OC physical therapist  -LH     Document Type therapy note (daily note)  -OC therapy note (daily note)  -LH     Subjective Information agree to therapy  -OC      Patient Effort, Rehab Treatment good  -OC      Symptoms Noted During/After Treatment none  -OC      Precautions/Limitations  spinal precautions  -LH     Recorded by [OC] Linda Campbell MA,CCC-SLP [LH] Milly Garduno, PT     Pain Assessment    Pain Assessment No/denies pain  -OC No/denies pain  -LH     Recorded by [OC] Linda Campbell MA,CCC-SLP [LH] Milly Garduno, PT     Vision Assessment/Intervention    Visual Impairment  WNL  -LH     Recorded by  [LH] Milly Garduno, PT     Cognitive Assessment/Intervention    Current Cognitive/Communication Assessment impaired  -OC impaired  -LH     Orientation Status oriented to;person;place  -OC oriented to;person;place   was not aware had back sx  -LH     Follows Commands/Answers Questions  able to follow single-step instructions;needs cueing;needs increased time;needs repetition  -LH     Recorded by [OC] Linda Campbell MA,CCC-SLP [LH] Milly Garduno, PT     Improve memory skills    Status: Improve memory skills Progressing as expected  -OC      Memory Skills Progress 80%   80% w/ self  correctionX2, 90% with repetition X2  -OC      Comments: Improve memory skills Able to recall 1/3 items when presented with 2-3 sent paragraphs.  -OC      Recorded by [OC] Linda Campbell MA,CCC-SLP      Bed Mobility, Assessment/Treatment    Bed Mob, Supine to Sit, [place]  moderate assist (50% patient effort);2 person assist required;verbal cues required;nonverbal cues required (demo/gesture)  -     Bed Mob, Sit to Supine, [place]  moderate assist (50% patient effort);2 person assist required;verbal cues required;nonverbal cues required (demo/gesture)  -LH     Bed Mob, Sidelying to Sit, [place]  moderate assist (50% patient effort);2 person assist required  -LH     Bed Mob, Sit to Sidelying, [place]  moderate assist (50% patient effort);2 person assist required  -     Bed Mobility, Impairments  pain;ROM decreased;strength decreased;impaired balance;coordination impaired  -     Bed Mobility, Comment  log rolling  -LH     Recorded by  [LH] Milly Garduno PT     Transfer Assessment/Treatment    Transfers, Sit-Stand [place]  maximum assist (25% patient effort);moderate assist (50% patient effort);2 person assist required  -     Transfers, Stand-Sit [place]  moderate assist (50% patient effort);maximum assist (25% patient effort);2 person assist required;verbal cues required;nonverbal cues required (demo/gesture)  -     Transfer, Impairments  ROM decreased;strength decreased;impaired balance;coordination impaired  -     Transfer, Comment  poor fwd posturing, blocking LLE  -LH     Recorded by  [LH] Milly Garduno PT     Gait Assessment/Treatment    Gait, [place] Level  not appropriate to assess  -LH     Recorded by  [LH] Milly Garduno PT     Balance Skills Training    Sitting-Level of Assistance  Moderate assistance  -     Sitting-Balance Support  Right upper extremity supported;Left upper extremity supported;Feet supported  -     Sitting-Balance Activities  Trunk control  activities;Right UE Weight Bearing;Left UE Weight Bearing  -LH     Sitting # of Minutes  5  -LH     Recorded by  [LH] Milly Garduno, PT     Therapy Exercises    Right Lower Extremity  15 reps;AROM:;sitting;ankle pumps/circles;LAQ  -LH     Left Lower Extremity  15 reps;PROM:;sitting;ankle pumps/circles;LAQ  -LH     Recorded by  [LH] Milly Garduno, PT     Positioning and Restraints    Pre-Treatment Position  in bed  -LH     Post Treatment Position  bed  -LH     In Bed  supine;call light within reach;encouraged to call for assist;exit alarm on  -LH     Recorded by  [LH] Milly Garduno, PT       User Key  (r) = Recorded By, (t) = Taken By, (c) = Cosigned By    Initials Name Effective Dates    OC Linda Campbell MA,CCC-SLP 04/05/17 -     SO Jessica Fabian, OTR 04/13/15 -     LH Milly Garduno, PT 02/07/17 -     LC Morteza Saldana, PT DPT 08/02/16 -                 IP PT Goals       07/06/17 0911 06/30/17 1414 06/30/17 1404    Bed Mobility PT LTG    Bed Mobility PT LTG, Date Established 07/06/17  -LH (r) KS (t) LH (c)      Bed Mobility PT LTG, Time to Achieve 1 wk  -LH (r) KS (t) LH (c) 1 wk  -EE (r) AA (t) EE (c)     Bed Mobility PT LTG, Activity Type all bed mobility  -LH (r) KS (t) LH (c)      Bed Mobility PT LTG, [place] Level contact guard assist  -LH (r) KS (t) LH (c)      Bed Mobility PT LTG, Date Goal Reviewed 07/13/17  -LH (r) KS (t) LH (c)  06/30/17  -EE (r) AA (t) EE (c)    Bed Mobility PT LTG, Outcome goal ongoing  -LH (r) KS (t) LH (c)  goal ongoing  -EE (r) AA (t) EE (c)    Bed Mobility PT LTG, Reason Goal Not Met progress slower than expected  -LH (r) KS (t) LH (c)  progress slower than expected  -EE (r) AA (t) EE (c)    Transfer Training PT LTG    Transfer Training PT LTG, Date Established 07/06/17  -LH (r) KS (t) LH (c)      Transfer Training PT LTG, Time to Achieve 1 wk  -LH (r) KS (t) LH (c) 1 wk  -EE (r) AA (t) EE (c)     Transfer Training PT LTG, Activity Type all transfers  -LH (r) KS (t) LH (c)       Transfer Training PT LTG, [place] Level moderate assist (50% patient effort)  -LH (r) KS (t) LH (c)      Transfer Training PT LTG, Assist Device walker, rolling  -LH (r) KS (t) LH (c)      Transfer Training PT  LTG, Date Goal Reviewed 07/13/17  -LH (r) KS (t) LH (c)  06/30/17  -EE (r) AA (t) EE (c)    Transfer Training PT LTG, Outcome goal ongoing  -LH (r) KS (t) LH (c)  goal ongoing   with safe transfer technique  -EE (r) AA (t) EE (c)    Transfer Training PT LTG, Reason Goal Not Met progress slower than expected  -LH (r) KS (t) LH (c)  progress slower than expected  -EE (r) AA (t) EE (c)      User Key  (r) = Recorded By, (t) = Taken By, (c) = Cosigned By    Initials Name Provider Type     Milly Garduno, PT Physical Therapist    TYESHA Post, PT Physical Therapist    LEN Blanco, PT Student PT Student    RIOS Davis, PT Student PT Student          Physical Therapy Education     Title: PT OT SLP Therapies (Active)     Topic: Physical Therapy (Active)     Point: Mobility training (Active)    Learning Progress Summary    Learner Readiness Method Response Comment Documented by Status   Patient Acceptance E,D DU,VU safety during transfers, foot position during sit to stand, safe use of RW  07/09/17 1421 Done    Acceptance E,D DU,VU,NR benefits of activity, safety during transfers  07/08/17 1603 Done    Acceptance E NR   07/07/17 0939 Active    Acceptance E NR  KS 07/06/17 0858 Active    Acceptance E VU,NR  AL 07/02/17 1138 Done    Acceptance E NR  AL 07/01/17 1103 Active    Acceptance E VU,NR,NL  AA 06/30/17 1202 Done    Acceptance E,TB VU,NR   06/28/17 2237 Done    Acceptance D,E VU,NR  EE 06/28/17 1345 Done    Acceptance E NR,NL  AA 06/27/17 1551 Active    Acceptance E VU,NR  BRIANA 06/26/17 1200 Done    Acceptance E,TB,D VU,NR   06/25/17 1155 Done    Nonacceptance E,D NR,NL  AA 06/22/17 1433 Active    Nonacceptance E NR,NL  AA 06/21/17 1159 Active   Family Acceptance E,ARJUN WILSON,DILIP,NR  benefits of activity, safety during transfers  07/08/17 1603 Done    Acceptance E,TB VU,NR   06/28/17 2237 Done    Acceptance E VU,NR   06/26/17 1200 Done   Significant Other Acceptance E NR  KS 07/06/17 0858 Active               Point: Home exercise program (Active)    Learning Progress Summary    Learner Readiness Method Response Comment Documented by Status   Patient Acceptance E NR   07/07/17 0939 Active    Acceptance E NR  KS 07/06/17 0858 Active    Acceptance E VU,NR,Count includes the [facility] 06/30/17 1202 Done    Acceptance E,TB VU,NR   06/28/17 2237 Done    Acceptance D,E VU,NR   06/28/17 1345 Done    Acceptance E NR,Count includes the [facility] 06/27/17 1551 Active    Acceptance E VU,NR   06/26/17 1200 Done    Nonacceptance E,D NR,Count includes the [facility] 06/22/17 1433 Active    Nonacceptance E NR,Count includes the [facility] 06/21/17 1159 Active   Family Acceptance E,TB VU,NR   06/28/17 2237 Done    Acceptance E VU,NR   06/26/17 1200 Done   Significant Other Acceptance E NR  KS 07/06/17 0858 Active               Point: Body mechanics (Active)    Learning Progress Summary    Learner Readiness Method Response Comment Documented by Status   Patient Acceptance E NR  KS 07/06/17 0858 Active    Acceptance E VU,NR  AL 07/02/17 1138 Done    Acceptance E NR  AL 07/01/17 1103 Active    Acceptance E VU,NR,Count includes the [facility] 06/30/17 1202 Done    Acceptance E,TB VU,NR   06/28/17 2237 Done    Acceptance D,E VU,NR   06/28/17 1345 Done    Acceptance E NR,Count includes the [facility] 06/27/17 1551 Active    Acceptance E VU,NR   06/26/17 1200 Done    Acceptance E,TB,D VU,NR   06/25/17 1155 Done    Nonacceptance E,D NR,Count includes the [facility] 06/22/17 1433 Active    Nonacceptance E NR,Count includes the [facility] 06/21/17 1159 Active   Family Acceptance E,TB VU,NR   06/28/17 2237 Done    Acceptance E VU,NR   06/26/17 1200 Done   Significant Other Acceptance E NR  KS 07/06/17 0858 Active               Point: Precautions (Active)    Learning Progress Summary    Learner Readiness Method Response Comment Documented by Status   Patient Acceptance E  NR  KS 07/06/17 0858 Active    Acceptance E VU,NR  AL 07/02/17 1138 Done    Acceptance E NR  AL 07/01/17 1103 Active    Acceptance E VU,NR,NL   06/30/17 1202 Done    Acceptance E,TB VU,NR   06/28/17 2237 Done    Acceptance D,E VU,NR   06/28/17 1345 Done    Acceptance E NR,NL   06/27/17 1551 Active    Acceptance E VU,NR   06/26/17 1200 Done    Acceptance E,TB,D VU,NR   06/25/17 1155 Done    Nonacceptance E,D NR,NL   06/22/17 1433 Active    Nonacceptance E NR,NL   06/21/17 1159 Active   Family Acceptance E,TB VU,NR   06/28/17 2237 Done    Acceptance E VU,NR   06/26/17 1200 Done   Significant Other Acceptance E NR  KS 07/06/17 0858 Active                      User Key     Initials Effective Dates Name Provider Type Discipline     12/01/15 -  Maricel Garcia, PT Physical Therapist PT     02/07/17 -  Milly Garduno, PT Physical Therapist PT     12/01/15 -  Eunice Post, PT Physical Therapist PT    AL 12/01/15 -  Latanya Rodriguez, PT Physical Therapist PT     04/06/17 -  Yana Mercado, RN Registered Nurse Nurse     08/02/16 -  Morteza Saldana, PT DPT Physical Therapist PT     05/08/17 -  Humble Armando, PT Student PT Student PT     06/01/17 -  Skyla Blanco, PT Student PT Student PT    KS 05/08/17 -  Heather Davis, PT Student PT Student PT                    PT Recommendation and Plan  Anticipated Equipment Needs At Discharge:  (L knee immobilizer)  Anticipated Discharge Disposition: skilled nursing facility  Planned Therapy Interventions: balance training, bed mobility training, gait training, home exercise program, patient/family education, transfer training  PT Frequency: daily  Plan of Care Review  Plan Of Care Reviewed With: patient, family  Progress: progress toward functional goals is gradual  Outcome Summary/Follow up Plan: Pt transferred supine to sit with min x 1 and use of bedrails. Pt performed sit to stand with min/mod x 1 and RW. He required constant verbal cueing for erect  posture and keeping wide TORY. Pt returned from sit to supine with min x 1.          Outcome Measures       07/07/17 1400 07/07/17 0900       How much help from another person do you currently need...    Turning from your back to your side while in flat bed without using bedrails?  2  -LH     Moving from lying on back to sitting on the side of a flat bed without bedrails?  2  -LH     Moving to and from a bed to a chair (including a wheelchair)?  2  -LH     Standing up from a chair using your arms (e.g., wheelchair, bedside chair)?  2  -LH     Climbing 3-5 steps with a railing?  1  -LH     To walk in hospital room?  1  -LH     AM-PAC 6 Clicks Score  10  -LH     How much help from another is currently needed...    Putting on and taking off regular lower body clothing? 1  -SO      Bathing (including washing, rinsing, and drying) 2  -SO      Toileting (which includes using toilet bed pan or urinal) 1  -SO      Putting on and taking off regular upper body clothing 2  -SO      Taking care of personal grooming (such as brushing teeth) 2  -SO      Eating meals 3  -SO      Score 11  -SO      Functional Assessment    Outcome Measure Options AM-PAC 6 Clicks Daily Activity (OT)  -SO AM-PAC 6 Clicks Basic Mobility (PT)  -       User Key  (r) = Recorded By, (t) = Taken By, (c) = Cosigned By    Initials Name Provider Type    KALLIE Fabian, OTR Occupational Therapist     Milly Garduno, PT Physical Therapist           Time Calculation:         PT Charges       07/09/17 1423          Time Calculation    Start Time 1540  -      Stop Time 1620  -      Time Calculation (min) 40 min  -      PT Received On 07/09/17  -      PT - Next Appointment 07/10/17  -      Time Calculation- PT    Total Timed Code Minutes- PT 40 minute(s)  -        User Key  (r) = Recorded By, (t) = Taken By, (c) = Cosigned By    Initials Name Provider Type    DELFINO Saldana, PT DPT Physical Therapist          Therapy Charges for Today      Code Description Service Date Service Provider Modifiers Qty    56828088709 HC PT THERAPEUTIC ACT EA 15 MIN 7/8/2017 Morteza Saldana, PT DPT GP 2    08470387929 HC PT THERAPEUTIC ACT EA 15 MIN 7/9/2017 Morteza Saldana, PT DPT GP 3          PT G-Codes  Outcome Measure Options: AM-PAC 6 Clicks Daily Activity (OT)    Morteza Saldana, PT DPT  7/9/2017

## 2017-07-09 NOTE — PLAN OF CARE
Problem: Patient Care Overview (Adult)  Goal: Plan of Care Review  Outcome: Ongoing (interventions implemented as appropriate)    07/09/17 0454   Coping/Psychosocial Response Interventions   Plan Of Care Reviewed With patient   Patient Care Overview   Progress progress towards functional goals is fair   Outcome Evaluation   Outcome Summary/Follow up Plan confusion at night. c/o's needing to sleep, c/o's of pain         Problem: Pain, Chronic (Adult)  Goal: Acceptable Pain Control/Comfort Level  Outcome: Ongoing (interventions implemented as appropriate)

## 2017-07-09 NOTE — PROGRESS NOTES
"Postoperative day #5 lumbar decompression for epidural abscess.    Subjective: He denies a significant amount of pain.  He denies numbness or tingling.  He continues with chronic left lower extremity weakness.    Objective:/66 (BP Location: Left arm, Patient Position: Lying)  Pulse 79  Temp 98.2 °F (36.8 °C) (Oral)   Resp 22  Ht 72.01\" (182.9 cm)  Wt 183 lb 8 oz (83.2 kg)  SpO2 99%  BMI 24.21 kg/m2      He moves his extremities overall quite well and unchanged from the description preoperatively.  He has better strength in his right lower extremity.  The wound is clean clear and intact.    Assessment: Standard postoperative course.    Plan: Continue intravenous antibiotics.  Rehabilitation needs being assessed.  "

## 2017-07-09 NOTE — PROGRESS NOTES
"    DAILY PROGRESS NOTE  [facility]    Patient Identification:  Name: Pavel Melgoza  Age: 83 y.o.  Sex: male  :  1934  MRN: [id number]         Primary Care Physician: Amanda Tanner MD    Subjective:  Interval History: no new issues - no cp/sob/n/v/d - denies pain but taking Norco periodically.  Not sleeping well.  Benadryl not helping takes melatonin at home.    Objective:    Scheduled Meds:    castor oil-balsam peru  Topical Q12H   cholecalciferol 5,000 Units Oral Daily   diltiaZEM  mg Oral Q24H   lidocaine 1 mL Intradermal Once   pantoprazole 40 mg Oral BID AC   ramipril 10 mg Oral Daily   vancomycin 1,250 mg Intravenous Q12H   vitamin B-12 2,000 mcg Oral Daily     Continuous Infusions:    Pharmacy to dose vancomycin        Vital signs in last 24 hours:  Temp:  [97.2 °F (36.2 °C)-99.4 °F (37.4 °C)] 97.5 °F (36.4 °C)  Heart Rate:  [76-86] 76  Resp:  [20-24] 20  BP: (122-138)/(62-83) 132/83    Intake/Output:    Intake/Output Summary (Last 24 hours) at 17 0744  Last data filed at 17 0500   Gross per 24 hour   Intake              720 ml   Output             1525 ml   Net             -805 ml       Exam:  /83 (BP Location: Right arm, Patient Position: Lying)  Pulse 76  Temp 97.5 °F (36.4 °C) (Oral)   Resp 20  Ht 72.01\" (182.9 cm)  Wt 183 lb 8 oz (83.2 kg)  SpO2 97%  BMI 24.21 kg/m2    General Appearance:    Alert, cooperative, no distress, not toxic                         Throat:   Lips, tongue, gums normal; oral mucosa pink and moist                         Lungs:    Clear to auscultation bilaterally, respirations unlabored                          Heart:    Regular rate and rhythm, S1 and S2 normal                  Abdomen:     Soft, non-tender, bowel sounds active                 Extremities:   SCDS, no cyanosis or edema                            Data Review:  Labs in chart were reviewed.       Results from last 7 days  Lab Units 17  0537 " 07/07/17  1730 07/07/17  0618 07/07/17  0003 07/06/17  1800 07/06/17  0545 07/05/17  0427 07/04/17  0538 07/03/17  0541   SODIUM mmol/L 128* 129* 132*  132* 130* 123* 124* 126* 125* 125*   POTASSIUM mmol/L 3.7 3.8 3.8  3.8 3.9 4.0 4.0 4.6 4.4 4.6   CHLORIDE mmol/L 93* 93* 97*  96* 94* 89* 88* 90* 90* 87*   CO2 mmol/L 22.4 22.9 21.7*  23.6 23.3 22.6 23.5 22.5 23.4 24.8   BUN mg/dL 16 18 15  15 17 18 20 33* 43* 54*   CREATININE mg/dL 0.67* 0.72* 0.73*  0.71* 0.73* 0.69* 0.79 0.79 0.99 1.08   GLUCOSE mg/dL 96 139* 107*  114* 112* 124* 113* 92 90 107*   ALBUMIN g/dL 2.40*  --  2.10*  --   --  2.50* 2.40* 2.50* 3.00*   Estimated Creatinine Clearance: 82.3 mL/min (by C-G formula based on Cr of 0.67).    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] 06/25/2017   • Meningioma [D32.9] 06/24/2017   • A-fib [I48.91] 06/23/2017   • B12 deficiency [E53.8] 06/21/2017   • Closed wedge compression fracture of eleventh thoracic vertebra [S22.080A] 06/20/2017   • CKD (chronic kidney disease) stage 2, GFR 60-89 ml/min [N18.2] 06/20/2017      Resolved Hospital Problems    Diagnosis Date Noted Date Resolved   • Pseudomonas infection [B96.5] 06/28/2017 07/02/2017   • UTI (urinary tract infection) [N39.0] 06/26/2017 07/02/2017   • Left inguinal hernia [K40.90] 06/24/2017 07/02/2017   • Metabolic acidosis [E87.2] 06/23/2017 07/02/2017   • Hypocalcemia [E83.51] 06/23/2017 07/06/2017   • Diarrhea [R19.7] 06/20/2017 07/06/2017       Plan:  · S/P RAHUL 6/22/17.  · S/P Laparoscopic left inguinal hernia repair, transabdominal preperitoneal approach 6/23/17  · B12 replacement  · SIADH per nephrology. Na slowly improving - hopefully w/ continue to improve s/p abscess I/D - fluid mgnt per Renal as went up to 132 and now 128  · Afib: not anticoag  candidate d/t falls. Rate control per card.  · UTI with pseudomonas treated as 7/3 UCx reported as negative. Urinary retention w/ fernandez per Urology       Leukocytosis/ Epidural abscess/Psoas abscess  - s/p lumbar laminectomy on 7/4   - IR drainage on 7/5   - ID dosing Vanc for Enterococcus and Micrococcus w/ plans for possible PCN transition pending additional sensitivity testing and ID recs   S/p PICC        Appreciate input and assistance from ALL      Hopefully to rehab  Monday if okay w/ all.  ID to make final recommendations on antibiotics.      Melatonin added to aid sleep.      Jay Jay Edmondson MD  7/9/2017  7:44 AM

## 2017-07-09 NOTE — PLAN OF CARE
Problem: Patient Care Overview (Adult)  Goal: Plan of Care Review  Outcome: Ongoing (interventions implemented as appropriate)    07/09/17 1422   Coping/Psychosocial Response Interventions   Plan Of Care Reviewed With patient;family   Patient Care Overview   Progress progress toward functional goals is gradual   Outcome Evaluation   Outcome Summary/Follow up Plan Pt transferred supine to sit with min x 1 and use of bedrails. Pt performed sit to stand with min/mod x 1 and RW. He required constant verbal cueing for erect posture and keeping wide TORY. Pt returned from sit to supine with min x 1.

## 2017-07-09 NOTE — PROGRESS NOTES
Sodium level improved initially after Samsca, now stable at 128  Continue to avoid excess water intake  Re- dose Samsca PRN sodium less than 125  Discharge planning

## 2017-07-10 NOTE — PROGRESS NOTES
"   LOS: 20 days   Patient Care Team:  Amanda Tanner MD as PCP - General (Family Medicine)    Chief Complaint/ Reason for encounter: Hyponatremia, SIADH        Subjective     Fall   Pertinent negatives include no fever or nausea.   Hip Pain      Back Pain   Pertinent negatives include no chest pain, fever or weakness.   Extremity Pain    Pertinent negatives include no fever.       Subjective:  Symptoms:  Improved.  No shortness of breath, chest pain or weakness.  (No new complaints today  Feels better, improved appetite  Discharge to rehabilitation later today).    Diet:  Adequate intake.  No nausea.    Activity level: Returning to normal.    Pain:  He reports no pain.          History taken from: Patient and chart    Objective     Vital Signs  Temp:  [97.6 °F (36.4 °C)-99.6 °F (37.6 °C)] 97.6 °F (36.4 °C)  Heart Rate:  [] 74  Resp:  [17-20] 18  BP: ()/(51-84) 119/59       Wt Readings from Last 1 Encounters:   07/04/17 0500 183 lb 8 oz (83.2 kg)   07/03/17 0500 181 lb 5 oz (82.2 kg)   07/02/17 0500 180 lb (81.6 kg)   07/01/17 0510 174 lb 2 oz (79 kg)   06/29/17 1900 191 lb (86.6 kg)   06/28/17 1900 190 lb 4.8 oz (86.3 kg)   06/28/17 1711 184 lb 15.5 oz (83.9 kg)   06/28/17 0422 185 lb (83.9 kg)   06/27/17 0500 187 lb 9.6 oz (85.1 kg)   06/26/17 0523 190 lb 11.2 oz (86.5 kg)   06/24/17 0600 192 lb (87.1 kg)   06/23/17 0413 194 lb 6.4 oz (88.2 kg)   06/22/17 1157 198 lb (89.8 kg)   06/22/17 0452 198 lb 8 oz (90 kg)   06/21/17 0500 190 lb 1.6 oz (86.2 kg)   06/20/17 1733 180 lb (81.6 kg)   06/20/17 1212 180 lb (81.6 kg)       Objective:  General Appearance:  Comfortable, in no acute distress, not in pain and well-appearing.    Vital signs: (most recent): Blood pressure 119/59, pulse 74, temperature 97.6 °F (36.4 °C), temperature source Oral, resp. rate 18, height 72.01\" (182.9 cm), weight 183 lb 8 oz (83.2 kg), SpO2 98 %.  Vital signs are normal.  No fever.    Output: Producing urine.    HEENT: " Normal HEENT exam.    Lungs:  Normal respiratory rate and normal effort.  He is not in respiratory distress.  Breath sounds clear to auscultation.  No wheezes or decreased breath sounds.    Heart: Normal rate.  Regular rhythm.    Abdomen: Abdomen is soft and non-distended.  Bowel sounds are normal.   There is no epigastric area or no suprapubic area tenderness.  There is no rebound tenderness.     Extremities: Normal range of motion.  There is no deformity or dependent edema.    Pulses: Distal pulses are intact.    Neurological: Patient is alert and oriented to person, place and time.    Skin:  Warm and dry.  No rash or cyanosis.             Results Review:    Past Medical History: reviewed and updated  Past Medical History:   Diagnosis Date   • Plasmacytoma          Allergies:  No Known Allergies    Intake/Output:     Intake/Output Summary (Last 24 hours) at 07/10/17 1248  Last data filed at 07/10/17 0957   Gross per 24 hour   Intake              490 ml   Output              850 ml   Net             -360 ml         DATA:  Interval chart, labs and notes reviewed.  The following labs personally reviewed by me  I discussed with his wife at bedside, interval history obtained from her regarding confusion of last 24 hours which she states is improved  Old records personally reviewed showing no prior hyponatremia  We discussed the potential risks of oral Samsca and they agree to proceed.  Risks include possible overcorrection which would be attempted by stopping fluid restriction and starting lower dose samsca and checking frequent labs per protocol    Labs:   Recent Results (from the past 24 hour(s))   Renal Function Panel    Collection Time: 07/10/17  5:11 AM   Result Value Ref Range    Glucose 113 (H) 65 - 99 mg/dL    BUN 17 8 - 23 mg/dL    Creatinine 0.68 (L) 0.76 - 1.27 mg/dL    Sodium 130 (L) 136 - 145 mmol/L    Potassium 3.8 3.5 - 5.2 mmol/L    Chloride 93 (L) 98 - 107 mmol/L    CO2 23.9 22.0 - 29.0 mmol/L     Calcium 7.9 (L) 8.6 - 10.5 mg/dL    Albumin 2.50 (L) 3.50 - 5.20 g/dL    Phosphorus 3.6 2.5 - 4.5 mg/dL    Anion Gap 13.1 mmol/L    BUN/Creatinine Ratio 25.0 7.0 - 25.0    eGFR Non African Amer 111 >60 mL/min/1.73   CBC (No Diff)    Collection Time: 07/10/17  5:11 AM   Result Value Ref Range    WBC 12.59 (H) 4.50 - 10.70 10*3/mm3    RBC 2.28 (L) 4.60 - 6.00 10*6/mm3    Hemoglobin 8.1 (L) 13.7 - 17.6 g/dL    Hematocrit 22.4 (L) 40.4 - 52.2 %    MCV 98.2 (H) 79.8 - 96.2 fL    MCH 35.5 (H) 27.0 - 32.7 pg    MCHC 36.2 32.6 - 36.4 g/dL    RDW 13.4 11.5 - 14.5 %    RDW-SD 48.0 37.0 - 54.0 fl    MPV 8.0 6.0 - 12.0 fL    Platelets 378 140 - 500 10*3/mm3       Radiology:  Imaging Results (last 24 hours)     ** No results found for the last 24 hours. **             Medications have been reviewed:  Current Facility-Administered Medications   Medication Dose Route Frequency Provider Last Rate Last Dose   • acetaminophen (TYLENOL) tablet 650 mg  650 mg Oral Q6H PRN Malathi Rubin MD   650 mg at 06/22/17 2553   • bisacodyl (DULCOLAX) EC tablet 5 mg  5 mg Oral Daily PRN Nolberto Hernandez IV, MD       • bisacodyl (DULCOLAX) suppository 10 mg  10 mg Rectal Daily PRN Nolberto Hernandez IV, MD   10 mg at 07/09/17 0840   • calcium carbonate (TUMS) chewable tablet 500 mg (200 mg elemental)  1 tablet Oral BID PRN Malathi Rubin MD       • castor oil-balsam peru (VENELEX) ointment   Topical Q12H Jayesh Naranjo MD       • cholecalciferol (VITAMIN D3) tablet 5,000 Units  5,000 Units Oral Daily Cedrick Posada MD   5,000 Units at 07/10/17 0809   • diazePAM (VALIUM) tablet 5 mg  5 mg Oral Q6H PRN Nolberto Hernandez IV, MD   5 mg at 07/07/17 1946   • diltiaZEM CD (CARDIZEM CD) 24 hr capsule 240 mg  240 mg Oral Q24H Tiffany Dudley MD   240 mg at 07/10/17 0809   • diphenhydrAMINE (BENADRYL) capsule 25 mg  25 mg Oral Q6H PRN Gilbert Marx MD   25 mg at 07/09/17 2102   • diphenhydrAMINE-zinc acetate 2-0.1 % cream    Topical TID PRN Gilbert Marx MD       • docusate sodium (COLACE) capsule 100 mg  100 mg Oral BID PRN Nolbetro Hernandez IV, MD   100 mg at 07/09/17 1733   • HYDROcodone-acetaminophen (NORCO) 5-325 MG per tablet 1 tablet  1 tablet Oral Q4H PRN Nolberto Hernandez IV, MD   1 tablet at 07/10/17 0503   • lidocaine (XYLOCAINE) 1 % injection 1 mL  1 mL Intradermal Once Shane Perez MD       • magnesium hydroxide (MILK OF MAGNESIA) suspension 2400 mg/10mL 10 mL  10 mL Oral Daily PRN Malathi Rubin MD   10 mL at 06/28/17 0805   • melatonin tablet 5 mg  5 mg Oral Nightly Jya Jay Edmondson MD   5 mg at 07/09/17 2102   • ondansetron (ZOFRAN) injection 4 mg  4 mg Intravenous Q6H PRN Malathi Rubin MD   4 mg at 06/24/17 1032   • pantoprazole (PROTONIX) EC tablet 40 mg  40 mg Oral BID AC Malathi Rubin MD   40 mg at 07/10/17 0809   • penicillin G potassium 4 Million Units in sodium chloride 0.9 % 100 mL IVPB  4 Million Units Intravenous Q4H Chandra Ponce MD   4 Million Units at 07/10/17 1225   • ramipril (ALTACE) capsule 10 mg  10 mg Oral Daily Gilbert Marx MD   10 mg at 07/10/17 0809   • sennosides-docusate sodium (SENOKOT-S) 8.6-50 MG tablet 1 tablet  1 tablet Oral Nightly PRN Nolberto Hernandez IV, MD       • sodium chloride 0.9 % flush 1-10 mL  1-10 mL Intravenous PRN Nolberto Hernandez IV, MD       • vitamin B-12 (CYANOCOBALAMIN) tablet 1,000 mcg  1,000 mcg Oral Daily Jay Jay Edmondson MD   1,000 mcg at 07/10/17 0809       Assessment/Plan     Principal Problem:    Epidural abscess  Active Problems:    Closed wedge compression fracture of eleventh thoracic vertebra    CKD (chronic kidney disease) stage 2, GFR 60-89 ml/min    B12 deficiency    A-fib    Meningioma    SIADH (syndrome of inappropriate ADH production)    Vitamin D deficiency      Assessment:  (Hyponatremia, urine studies consistent with SIADH.  Likely related to the underlying infection, improved after Samsca , stable today  at 1:30  Metabolic acidosis,  stable, off bicarbonate  Lumbar abscess status/post drainage, on IV antibiotics per infectious disease  Closed wedge compression fracture of eleventh thoracic vertebra  Fall  Urinary tract infection  CKD (chronic kidney disease) stage 2, GFR 60-89 ml/min  B12 deficiency        Plan:  Sodium level improved after Samsca, improved again today  Stable for discharge from renal standpoint  Recommending rechecking a BMP in one week  Continue fluid restriction  ).             Continue to monitor renal function, electroytes and volume closely   Please call me with any questions or concerns      Cedrick Posada MD   [facility]   [phone]    07/10/17  12:48 PM      Dictation performed using Dragon dictation software

## 2017-07-10 NOTE — PLAN OF CARE
Problem: Patient Care Overview (Adult)  Goal: Plan of Care Review  Outcome: Ongoing (interventions implemented as appropriate)    07/10/17 0850   Coping/Psychosocial Response Interventions   Plan Of Care Reviewed With patient;spouse   Patient Care Overview   Progress progress toward functional goals as expected   Outcome Evaluation   Outcome Summary/Follow up Plan Pt has gradually improved balance and mobility for ADL participation. Pt easily distracted which limits comprehension for cueing.          Problem: Inpatient Occupational Therapy  Goal: Bed Mobility Goal LTG- OT  Outcome: Outcome(s) achieved Date Met:  07/10/17    07/10/17 0850   Bed Mobility OT LTG   Bed Mobility OT LTG, Outcome goal met       Goal: Static Sitting Balance Goal LTG- OT  Outcome: Revised    07/10/17 0850   Static Sitting Balance OT LTG   Static Sitting Balance OT LTG, Date Established 07/10/17   Static Sitting Balance OT LTG, Time to Achieve 1 wk   Static Sitting Balance OT LTG, [place] Level minimum assist (75% patient effort)  (Pt to reach for items with one hand support during ADL.)   Static Sitting Balance OT LTG, Assist Device UE Support   Static Sitting Balance OT LTG, Date Goal Reviewed 07/10/17   Static Sitting Balance OT LTG, Outcome goal revised   Static Sitting Balance OT LTG, Reason Goal Not Met goals revised this date       Goal: Follow Directions Goal LTG- OT  Outcome: Outcome(s) achieved Date Met:  07/10/17    07/10/17 0850   Follow Directions OT LTG   Follow Directions OT LTG, Outcome goal met       Goal: ADL Goal LTG- OT  Outcome: Revised    07/10/17 0850   ADL OT LTG   ADL OT LTG, Date Established 07/10/17   ADL OT LTG, Time to Achieve 1 wk   ADL OT LTG, Activity Type ADL skills  (upper body at EOB with Min balance support only. )   ADL OT LTG, Date Goal Reviewed 07/10/17   ADL OT LTG, Outcome goal revised   ADL OT LTG, Reason Goal Not Met goals revised this date

## 2017-07-10 NOTE — PLAN OF CARE
Problem: Patient Care Overview (Adult)  Goal: Plan of Care Review  Outcome: Ongoing (interventions implemented as appropriate)    07/10/17 0914   Coping/Psychosocial Response Interventions   Plan Of Care Reviewed With patient   Patient Care Overview   Progress progress toward functional goals as expected   Outcome Evaluation   Outcome Summary/Follow up Plan Pt sitting EOB w/ OT upon entering room. Still demonstrates difficulty w/ shifting weight onto L LE due to increased muscle weakness and L knee bucking. Standing balance required mod assist x2 w/ heavy lean to R side. PT will continue to address balance deficits and improve muscle strength.

## 2017-07-10 NOTE — THERAPY TREATMENT NOTE
Acute Care - Physical Therapy Treatment Note  [facility]     Patient Name: Pavel Melgoza  : 1934  MRN: [id number]  Today's Date: 7/10/2017  Onset of Illness/Injury or Date of Surgery Date: 17     Referring Physician: Dr. Marx    Admit Date: 2017    Visit Dx:    ICD-10-CM ICD-9-CM   1. Generalized weakness R53.1 780.79   2. Dehydration E86.0 276.51   3. Compression fracture T14.8 829.0   4. Pain R52 780.96   5. Epidural abscess, L2-L5 G06.1 324.1   6. Left leg weakness M62.81 729.89   7. Epidural abscess G06.2 324.9     Patient Active Problem List   Diagnosis   • Generalized weakness   • Plasmocytoma   • Nausea & vomiting   • Fall   • Noncompliance with medication regimen   • Left leg weakness   • Closed wedge compression fracture of eleventh thoracic vertebra   • CKD (chronic kidney disease) stage 2, GFR 60-89 ml/min   • B12 deficiency   • A-fib   • Meningioma   • SIADH (syndrome of inappropriate ADH production)   • Hyponatremia   • Vitamin D deficiency   • Epidural abscess               Adult Rehabilitation Note       07/10/17 0844 07/10/17 0840 17 1417    Rehab Assessment/Intervention    Discipline occupational therapist  -LE physical therapy assistant  -SD physical therapist  -LC    Document Type therapy note (daily note)  -LE therapy note (daily note)  -SD therapy note (daily note)  -LC    Subjective Information agree to therapy  -LE agree to therapy  -SD agree to therapy;complains of;weakness;fatigue  -LC    Patient Effort, Rehab Treatment good  -LE good  -SD good  -LC    Precautions/Limitations fall precautions   fernandez catheter, easily distracted  -LE fall precautions;spinal precautions  -SD     Recorded by [LE] Milly Zuniga, OTR [SD] Patricia Forte, NIESHA [LC] Morteza Saldana, PT DPT    Vital Signs    O2 Delivery Pre Treatment room air  -LE      O2 Delivery Intra Treatment room air  -LE      O2 Delivery Post Treatment room air  -LE      Pre Patient Position Supine  -LE       "Intra Patient Position Sitting  -LE      Post Patient Position Sitting  -LE      Recorded by [LE] ADILENE Quesada      Pain Assessment    Pain Assessment No/denies pain  -LE No/denies pain  -SD No/denies pain  -LC    Recorded by [LE] ADILENE Quesada [SD] Patricia Forte PTA [LC] Morteza Saldana, PT DPT    Cognitive Assessment/Intervention    Current Cognitive/Communication Assessment impaired  -LE impaired  -SD functional  -LC    Orientation Status oriented to;person;place   not oriented to situation  -LE oriented to;person;place  -SD oriented to;disoriented to;person;place;time;situation;required verbal cueing (specifiy in comments)  -LC    Follows Commands/Answers Questions 75% of the time;able to follow single-step instructions;needs repetition;needs cueing  -LE 75% of the time;able to follow single-step instructions;needs repetition  -% of the time;able to follow single-step instructions;needs cueing;needs increased time;needs repetition  -LC    Personal Safety decreased insight to deficits   does state \"that leg doesn't work'  -LE mild impairment;decreased awareness, need for assist;decreased awareness, need for safety  -SD mild impairment;decreased awareness, need for assist;decreased awareness, need for safety  -LC    Personal Safety Interventions fall prevention program maintained;gait belt;nonskid shoes/slippers when out of bed  -LE fall prevention program maintained;gait belt;nonskid shoes/slippers when out of bed  -SD elopement precautions initiated;fall prevention program maintained;gait belt;nonskid shoes/slippers when out of bed;supervised activity  -LC    Short/Long Term Memory requires frequent cues  -LE      Additional Documentation --   easily distracted, cues to stay on task.    -LE      Recorded by [LE] ADILENE Quesada [SD] Patricia Forte, PTA [LC] Morteza Saldana, PT DPT    Bed Mobility, Assessment/Treatment    Bed Mobility, Assistive Device bed rails;head of bed elevated  -LE " draw sheet  -SD bed rails  -LC    Bed Mobility, Roll Right, [place]   minimum assist (75% patient effort)  -LC    Bed Mobility, Scoot/Bridge, [place]   contact guard assist  -LC    Bed Mob, Supine to Sit, [place] minimum assist (75% patient effort)   log roll  -LE not tested   sitting EOB w/ OT  -SD minimum assist (75% patient effort)  -LC    Bed Mob, Sit to Supine, [place]  minimum assist (75% patient effort);moderate assist (50% patient effort);2 person assist required  -SD minimum assist (75% patient effort)  -LC    Bed Mobility, Safety Issues  decreased use of arms for pushing/pulling;decreased use of legs for bridging/pushing  -SD decreased use of legs for bridging/pushing  -LC    Bed Mobility, Impairments  strength decreased;impaired balance  -SD impaired balance;strength decreased;pain  -LC    Recorded by [LE] ADILENE Quesada [SD] Patricia Forte PTA [LC] Morteza Saldana, PT DPT    Transfer Assessment/Treatment    Transfers, Sit-Stand [place]  moderate assist (50% patient effort);2 person assist required;verbal cues required  -SD minimum assist (75% patient effort);moderate assist (50% patient effort)  -LC    Transfers, Stand-Sit [place]  moderate assist (50% patient effort);2 person assist required;verbal cues required  -SD minimum assist (75% patient effort);moderate assist (50% patient effort)  -LC    Transfers, Sit-Stand-Sit, Assist Device   rolling walker  -LC    Transfer, Safety Issues  weight-shifting ability decreased;knees buckling   L knee steven  -SD impulsivity;knees buckling  -LC    Transfer, Impairments  strength decreased;impaired balance  -SD strength decreased;impaired balance;pain  -LC    Transfer, Comment deferred as going to work with PT next.   -LE sit>stand 3x 1min each  -SD 7 x standing with RW. 20 seconds stand  -LC    Recorded by [LE] ADILENE Quesada [SD] Patricia Forte PTA [LC] Morteza Saldana, PT DPT    Gait Assessment/Treatment    Gait,  [place] Level  not appropriate to assess  -SD     Recorded by  [SD] Patricia Forte PTA     Toileting Assessment/Training    Toileting Assess/Train, Comment catheter and brief.   -LE      Recorded by [LE] ADILENE Quesada      Grooming Assessment/Training    Grooming Assess/Train, Position edge of bed;sitting  -LE      Grooming Assess/Train, Indepen Level minimum assist (75% patient effort)   assist with balance at EOB, assist set up  -LE      Grooming Assess/Train, Impairments impaired balance;motor control impaired;postural control impaired  -LE      Grooming Assess/Train, Comment tries to put cap on backwards, unable squeeze small tube  -LE      Recorded by [LE] ADILENE Quesada      Motor Skills/Interventions    Additional Documentation  Balance Skills Training (Group)  -SD     Recorded by  [SD] Patricia Forte PTA     Balance Skills Training    Sitting-Level of Assistance Moderate assistance;Minimum assistance   Min static, Mod dynamic (reaching). Posterior lean  -LE      Sitting-Balance Support Left upper extremity supported;Right upper extremity supported;Feet supported  -LE      Sitting-Balance Activities Trunk control activities   grooming EOB. ed keep one hand on bed to keep balance.   -LE      Sitting # of Minutes 5  -LE      Standing-Level of Assistance  Moderate assistance;x2  -SD     Static Standing Balance Support  Right upper extremity supported;Left upper extremity supported  -SD     Standing-Balance Activities  Weight Shift R-L   attempted; pt could not shift on L LE  -SD     Standing Balance # of Minutes  3x sit>stand 1min each  -SD     Recorded by [LE] ADILENE Quesada [SD] Patricia Forte PTA     Therapy Exercises    Left Lower Extremity  PROM:;10 reps;LAQ  -SD     Bilateral Lower Extremities  AROM:;10 reps;ankle pumps/circles;quad sets   small contractioin in L quad  -SD     Recorded by  [SD] Patricia Forte PTA     Positioning and Restraints    Pre-Treatment Position in  bed  -LE in bed  -SD in bed  -LC    Post Treatment Position bed  -LE bed  -SD bed  -LC    In Bed sitting EOB;call light within reach;encouraged to call for assist;with family/caregiver;with PT  -LE supine;call light within reach;encouraged to call for assist;exit alarm on;with family/caregiver  -SD notified nsg;fowlers;call light within reach;encouraged to call for assist;exit alarm on;with family/caregiver;side rails up x2;SCD pump applied  -LC    Recorded by [LE] Milly Zuniga, OTR [SD] Patricia Forte, NIESHA [LC] Morteza Saldana, PT DPT      07/08/17 1500 07/07/17 1440 07/07/17 1030    Rehab Assessment/Intervention    Discipline physical therapist  -LC occupational therapist  -SO speech language pathologist  -OC    Document Type therapy note (daily note)  -LC therapy note (daily note)  -SO therapy note (daily note)  -OC    Subjective Information agree to therapy;complains of;weakness;fatigue  -LC agree to therapy  -SO agree to therapy  -OC    Patient Effort, Rehab Treatment good  -LC adequate  -SO good  -OC    Symptoms Noted During/After Treatment  fatigue  -SO none  -OC    Precautions/Limitations  fall precautions;spinal precautions  -SO     Recorded by [LC] Morteza Saldana, PT DPT [SO] Jessica Fabian, OTR [OC] Linda Campbell MA,CCC-SLP    Pain Assessment    Pain Assessment No/denies pain  -LC No/denies pain  -SO No/denies pain  -OC    Recorded by [LC] Morteza Saldana, PT DPT [SO] Jessica Fabian, OTR [OC] Linda Campbell MA,CCC-SLP    Cognitive Assessment/Intervention    Current Cognitive/Communication Assessment functional  -LC impaired  -SO impaired  -OC    Orientation Status oriented to;person;place;time;situation  -LC oriented to;person;place  -SO oriented to;person;place  -OC    Follows Commands/Answers Questions 100% of the time;able to follow single-step instructions;needs cueing;needs increased time;needs repetition  -LC      Personal Safety decreased awareness, need for assist;decreased  awareness, need for safety  -LC decreased insight to deficits  -SO     Personal Safety Interventions fall prevention program maintained;gait belt;nonskid shoes/slippers when out of bed;supervised activity  -LC fall prevention program maintained  -SO     Recorded by [LC] Morteza Saldana, PT DPT [SO] Jessica Fabian, OTR [OC] Linda Campbell MA,CCC-SLP    Improve memory skills    Status: Improve memory skills   Progressing as expected  -OC    Memory Skills Progress   80%   80% w/ self correctionX2, 90% with repetition X2  -OC    Comments: Improve memory skills   Able to recall 1/3 items when presented with 2-3 sent paragraphs.  -OC    Recorded by   [OC] Linda Campbell MA,CCC-SLP    Bed Mobility, Assessment/Treatment    Bed Mobility, Assistive Device bed rails  - bed rails;head of bed elevated  -SO     Bed Mobility, Roll Right, [place] minimum assist (75% patient effort)  -      Bed Mobility, Scoot/Bridge, [place] contact guard assist  -LC      Bed Mob, Supine to Sit, [place] moderate assist (50% patient effort)  - moderate assist (50% patient effort);maximum assist (25% patient effort)  -SO     Bed Mob, Sit to Supine, [place]  moderate assist (50% patient effort);verbal cues required;nonverbal cues required (demo/gesture)  -SO     Recorded by [LC] Morteza Saldana, PT DPT [SO] Jessica Fabian, OTR     Transfer Assessment/Treatment    Transfers, Sit-Stand [place] minimum assist (75% patient effort)  -      Transfers, Stand-Sit [place] minimum assist (75% patient effort)  -      Transfers, Sit-Stand-Sit, Assist Device rolling walker  -      Transfer, Safety Issues weight-shifting ability decreased;impulsivity;knees buckling  -      Transfer, Impairments ROM decreased;strength decreased;impaired balance;coordination impaired  -      Transfer, Comment L knee buckling, insensate L limb  -LC      Recorded by [LC] Morteza Saldana, PT DPT      Balance Skills Training     Sitting-Level of Assistance  Moderate assistance  -SO     Sitting-Balance Support  Feet supported  -SO     Sitting-Balance Activities  --   UE ther ex, posterior lean  -SO     Sitting # of Minutes  8  -SO     Standing-Level of Assistance Contact guard  -LC      Static Standing Balance Support assistive device  -      Standing Balance # of Minutes 3 sit to stands for 2 minutes each  -LC      Recorded by [LC] Morteza Saldana, PT DPT [SO] Jessica Fabian, OTR     Therapy Exercises    Bilateral Upper Extremity  AROM:;10 reps;sitting;elbow flexion/extension;shoulder extension/flexion   10x3  -SO     Recorded by  [SO] Jessica Fabian, OTR     Positioning and Restraints    Pre-Treatment Position in bed  - in bed  -SO     Post Treatment Position bed  - bed  -SO     In Bed notified nsg;call light within reach;fowlers;encouraged to call for assist;patient within staff view;side rails up x2;SCD pump applied  - supine;call light within reach;encouraged to call for assist;exit alarm on;with family/caregiver  -SO     Recorded by [LC] Morteza Saldana, PT DPT [SO] Jessica Fabian, OTR       User Key  (r) = Recorded By, (t) = Taken By, (c) = Cosigned By    Initials Name Effective Dates    NONI Zuniga, OTR 04/13/15 -     OC Linda Campbell MA,[facility]-SLP 04/05/17 -     SO Jessica Fabian, OTR 04/13/15 -     SD Patricia Imani Forte, PTA 01/27/16 -     LC Morteza Saldana, PT DPT 08/02/16 -                 IP PT Goals       07/06/17 0911 06/30/17 1414 06/30/17 1404    Bed Mobility PT LTG    Bed Mobility PT LTG, Date Established 07/06/17  -LH (r) KS (t) LH (c)      Bed Mobility PT LTG, Time to Achieve 1 wk  -LH (r) KS (t) LH (c) 1 wk  -EE (r) AA (t) EE (c)     Bed Mobility PT LTG, Activity Type all bed mobility  -LH (r) KS (t) LH (c)      Bed Mobility PT LTG, [place] Level contact guard assist  -LH (r) KS (t) LH (c)      Bed Mobility PT LTG, Date Goal Reviewed 07/13/17  -ISELA (r) KS (t) ISELA (c)  06/30/17  -EE  (r) AA (t) EE (c)    Bed Mobility PT LTG, Outcome goal ongoing  -LH (r) KS (t) LH (c)  goal ongoing  -EE (r) AA (t) EE (c)    Bed Mobility PT LTG, Reason Goal Not Met progress slower than expected  -LH (r) KS (t) LH (c)  progress slower than expected  -EE (r) AA (t) EE (c)    Transfer Training PT LTG    Transfer Training PT LTG, Date Established 07/06/17  -LH (r) KS (t) LH (c)      Transfer Training PT LTG, Time to Achieve 1 wk  -LH (r) KS (t) LH (c) 1 wk  -EE (r) AA (t) EE (c)     Transfer Training PT LTG, Activity Type all transfers  -LH (r) KS (t) LH (c)      Transfer Training PT LTG, [place] Level moderate assist (50% patient effort)  -LH (r) KS (t) LH (c)      Transfer Training PT LTG, Assist Device walker, rolling  -LH (r) KS (t) LH (c)      Transfer Training PT  LTG, Date Goal Reviewed 07/13/17  -LH (r) KS (t) LH (c)  06/30/17  -EE (r) AA (t) EE (c)    Transfer Training PT LTG, Outcome goal ongoing  -LH (r) KS (t) LH (c)  goal ongoing   with safe transfer technique  -EE (r) AA (t) EE (c)    Transfer Training PT LTG, Reason Goal Not Met progress slower than expected  -LH (r) KS (t) LH (c)  progress slower than expected  -EE (r) AA (t) EE (c)      User Key  (r) = Recorded By, (t) = Taken By, (c) = Cosigned By    Initials Name Provider Type     Milly Garduno, PT Physical Therapist    TYESHA Post, PT Physical Therapist    LEN Blanco, PT Student PT Student    RIOS Davis, PT Student PT Student          Physical Therapy Education     Title: PT OT SLP Therapies (Active)     Topic: Physical Therapy (Active)     Point: Mobility training (Active)    Learning Progress Summary    Learner Readiness Method Response Comment Documented by Status   Patient Acceptance E,TB NR  SD 07/10/17 0914 Active    Acceptance E,D KATIE,DILIP safety during transfers, foot position during sit to stand, safe use of RW LC 07/09/17 1421 Done    Acceptance E,D KATIE,DILIP,NR benefits of activity, safety during transfers LC  07/08/17 1603 Done    Acceptance E NR   07/07/17 0939 Active    Acceptance E NR  KS 07/06/17 0858 Active    Acceptance E VU,NR  AL 07/02/17 1138 Done    Acceptance E NR  AL 07/01/17 1103 Active    Acceptance E VU,NR,NL   06/30/17 1202 Done    Acceptance E,TB VU,NR   06/28/17 2237 Done    Acceptance D,E VU,NR   06/28/17 1345 Done    Acceptance E NR,NL   06/27/17 1551 Active    Acceptance E VU,NR   06/26/17 1200 Done    Acceptance E,TB,D VU,NR   06/25/17 1155 Done    Nonacceptance E,D NR,[facility] 06/22/17 1433 Active    Nonacceptance E NR,[facility] 06/21/17 1159 Active   Family Acceptance E,D DU,VU,NR benefits of activity, safety during transfers  07/08/17 1603 Done    Acceptance E,TB VU,NR   06/28/17 2237 Done    Acceptance E VU,NR   06/26/17 1200 Done   Significant Other Acceptance E NR  KS 07/06/17 0858 Active               Point: Home exercise program (Active)    Learning Progress Summary    Learner Readiness Method Response Comment Documented by Status   Patient Acceptance E,TB NR  SD 07/10/17 0914 Active    Acceptance E NR   07/07/17 0939 Active    Acceptance E NR  KS 07/06/17 0858 Active    Acceptance E VU,NR,[facility] 06/30/17 1202 Done    Acceptance E,TB VU,NR   06/28/17 2237 Done    Acceptance D,E VU,NR   06/28/17 1345 Done    Acceptance E NR,NL   06/27/17 1551 Active    Acceptance E VU,NR   06/26/17 1200 Done    Nonacceptance E,D NR,[facility] 06/22/17 1433 Active    Nonacceptance E NR,NL   06/21/17 1159 Active   Family Acceptance E,TB VU,NR   06/28/17 2237 Done    Acceptance E VU,NR   06/26/17 1200 Done   Significant Other Acceptance E NR  KS 07/06/17 0858 Active               Point: Body mechanics (Active)    Learning Progress Summary    Learner Readiness Method Response Comment Documented by Status   Patient Acceptance E,TB NR  SD 07/10/17 0914 Active    Acceptance E NR  KS 07/06/17 0858 Active    Acceptance E VU,NR  AL 07/02/17 1138 Done    Acceptance E NR  AL 07/01/17 1103  Active    Acceptance E VU,NR,NL   06/30/17 1202 Done    Acceptance E,TB VU,NR   06/28/17 2237 Done    Acceptance D,E VU,NR   06/28/17 1345 Done    Acceptance E NR,[facility] 06/27/17 1551 Active    Acceptance E VU,NR   06/26/17 1200 Done    Acceptance E,TB,D VU,NR   06/25/17 1155 Done    Nonacceptance E,D NR,[facility] 06/22/17 1433 Active    Nonacceptance E NR,[facility] 06/21/17 1159 Active   Family Acceptance E,TB VU,NR   06/28/17 2237 Done    Acceptance E VU,NR   06/26/17 1200 Done   Significant Other Acceptance E NR  KS 07/06/17 0858 Active               Point: Precautions (Active)    Learning Progress Summary    Learner Readiness Method Response Comment Documented by Status   Patient Acceptance E,TB NR  SD 07/10/17 0914 Active    Acceptance E NR  KS 07/06/17 0858 Active    Acceptance E VU,NR  AL 07/02/17 1138 Done    Acceptance E NR  AL 07/01/17 1103 Active    Acceptance E VU,NR,[facility] 06/30/17 1202 Done    Acceptance E,TB VU,NR   06/28/17 2237 Done    Acceptance D,E VU,NR   06/28/17 1345 Done    Acceptance E NR,[facility] 06/27/17 1551 Active    Acceptance E VU,NR   06/26/17 1200 Done    Acceptance E,TB,D VU,NR   06/25/17 1155 Done    Nonacceptance E,D NR,[facility] 06/22/17 1433 Active    Nonacceptance E NR,[facility] 06/21/17 1159 Active   Family Acceptance E,TB VU,NR   06/28/17 2237 Done    Acceptance E VU,NR   06/26/17 1200 Done   Significant Other Acceptance E NR  KS 07/06/17 0858 Active                      User Key     Initials Effective Dates Name Provider Type Discipline     12/01/15 -  Maricel Garcia, PT Physical Therapist PT     02/07/17 -  Milly Garduno, PT Physical Therapist PT     12/01/15 -  Eunice Post, PT Physical Therapist PT    AL 12/01/15 -  Latanya Rodriguez, PT Physical Therapist PT     04/06/17 -  Yana Mercado, RN Registered Nurse Nurse    SD 01/27/16 -  Patricia Forte, PTA Technician PT    LC 08/02/16 -  Morteza Saldana, PT DPT Physical Therapist PT    BRIANA 05/08/17 -  [employer], PT Student PT Student PT    AA 06/01/17 -  Skyla Blanco, PT Student PT Student PT    KS 05/08/17 -  Heather Davis, PT Student PT Student PT                    PT Recommendation and Plan  Anticipated Equipment Needs At Discharge:  (L knee immobilizer)  Anticipated Discharge Disposition: skilled nursing facility  Planned Therapy Interventions: balance training, bed mobility training, gait training, home exercise program, patient/family education, transfer training  PT Frequency: daily  Plan of Care Review  Plan Of Care Reviewed With: patient  Progress: progress toward functional goals as expected  Outcome Summary/Follow up Plan: Pt sitting EOB w/ OT upon entering room. Still demonstrates difficulty w/ shifting weight onto L LE due to increased muscle weakness and L knee bucking. Standing balance required mod assist x2 w/ heavy lean to R side. PT will continue to address balance deficits and improve muscle strength.          Outcome Measures       07/10/17 0900 07/10/17 0853 07/10/17 0800    How much help from another person do you currently need...    Turning from your back to your side while in flat bed without using bedrails? 2  -SD      Moving from lying on back to sitting on the side of a flat bed without bedrails? 2  -SD      Moving to and from a bed to a chair (including a wheelchair)? 2  -SD      Standing up from a chair using your arms (e.g., wheelchair, bedside chair)? 2  -SD      Climbing 3-5 steps with a railing? 1  -SD      To walk in hospital room? 1  -SD      AM-PAC 6 Clicks Score 10  -SD      How much help from another is currently needed...    Putting on and taking off regular lower body clothing?  2  -LE     Bathing (including washing, rinsing, and drying)  2  -LE     Toileting (which includes using toilet bed pan or urinal)  1  -LE     Putting on and taking off regular upper body clothing  2  -LE     Taking care of personal grooming (such as brushing teeth)  3  -LE     Eating meals   3  -LE     Score  13  -LE     Functional Assessment    Outcome Measure Options AM-PAC 6 Clicks Basic Mobility (PT)  -SD  AM-PAC 6 Clicks Daily Activity (OT)  -LE      07/07/17 1400          How much help from another is currently needed...    Putting on and taking off regular lower body clothing? 1  -SO      Bathing (including washing, rinsing, and drying) 2  -SO      Toileting (which includes using toilet bed pan or urinal) 1  -SO      Putting on and taking off regular upper body clothing 2  -SO      Taking care of personal grooming (such as brushing teeth) 2  -SO      Eating meals 3  -SO      Score 11  -SO      Functional Assessment    Outcome Measure Options AM-PAC 6 Clicks Daily Activity (OT)  -SO        User Key  (r) = Recorded By, (t) = Taken By, (c) = Cosigned By    Initials Name Provider Type    NONI Zuniga, OTR Occupational Therapist    SO Jessica Fabian, OTR Occupational Therapist    ANA Forte PTA Technician           Time Calculation:         PT Charges       07/10/17 0919          Time Calculation    Start Time 0840  -SD      Stop Time 0856  -SD      Time Calculation (min) 16 min  -SD      PT Received On 07/10/17  -SD      PT - Next Appointment 07/11/17  -SD        User Key  (r) = Recorded By, (t) = Taken By, (c) = Cosigned By    Initials Name Provider Type    ANA Forte PTA Technician          Therapy Charges for Today     Code Description Service Date Service Provider Modifiers Qty    91078461531 HC PT THER PROC EA 15 MIN 7/10/2017 Patricia Forte PTA GP 1    91914311958 HC PT THER SUPP EA 15 MIN 7/10/2017 Patricia Forte PTA GP 1          PT G-Codes  Outcome Measure Options: AM-PAC 6 Clicks Basic Mobility (PT)    Patricia Forte PTA  7/10/2017

## 2017-07-10 NOTE — PROGRESS NOTES
[facility] PROGRESS NOTE    PATIENT IDENTIFICATION:   Name:  Pavel Melgoza      MRN:  [id number]     83 y.o.  male                   Principal Problem:    Epidural abscess  Active Problems:    Closed wedge compression fracture of eleventh thoracic vertebra    CKD (chronic kidney disease) stage 2, GFR 60-89 ml/min    B12 deficiency    A-fib    Meningioma    SIADH (syndrome of inappropriate ADH production)    Vitamin D deficiency        Subjective   CC:none  Interval History: going to rehab today    Objective     Vital signs in last 24 hours:  Temp:  [98.2 °F (36.8 °C)-99.6 °F (37.6 °C)] 98.2 °F (36.8 °C)  Heart Rate:  [] 102  Resp:  [17-22] 17  BP: ()/(51-84) 131/84      Intake/Output last 3 shifts:  I/O last 3 completed shifts:  In: 970 [P.O.:720; IV Piggyback:250]  Out: 2150 [Urine:2150]  Intake/Output this shift:       LABS    Results from last 7 days  Lab Units 07/03/17  1301   INR  1.11*     Lab Results   Component Value Date    CALCIUM 7.9 (L) 07/10/2017    PHOS 3.6 07/10/2017     Results from last 7 days  Lab Units 07/10/17  0511 07/09/17  0723 07/08/17  0537  07/07/17  0618   SODIUM mmol/L 130* 128* 128*  < > 132*  132*   POTASSIUM mmol/L 3.8 3.6 3.7  < > 3.8  3.8   CHLORIDE mmol/L 93* 91* 93*  < > 97*  96*   CO2 mmol/L 23.9 24.7 22.4  < > 21.7*  23.6   BUN mg/dL 17 16 16  < > 15  15   CREATININE mg/dL 0.68* 0.74* 0.67*  < > 0.73*  0.71*   GLUCOSE mg/dL 113* 98 96  < > 107*  114*   CALCIUM mg/dL 7.9* 8.0* 8.2*  < > 8.2*  8.2*   WBC 10*3/mm3 12.59* 14.12*  --   --  15.78*   HEMOGLOBIN g/dL 8.1* 7.9*  --   --  8.2*   PLATELETS 10*3/mm3 378 442  --   --  434   < > = values in this interval not displayed.  Physical Exam    4 - Opens eyes on own  6 - Follows simple motor commands  5 - Alert and oriented  Chronic LLE weakness  RLE 5/5  Wound dry and non fluctuant  RUE picc    ASSESSMENT  Assessment/Plan     May remove dressing  No lifting > 10 lbs at rehab  Sutures  out in 7-10 days  Abx per Dr. Ponce    We will see him in 2 weeks   LOS: 20 days         Nolberto Hernandez IV, MD

## 2017-07-10 NOTE — PROGRESS NOTES
LOS: 20 days     Chief Complaint:  Follow-up epidural abscess    Interval History:  No acute events.   Dull intermittent back pain about the same overall. It is worse w/ movement and relieved w/ rest. Still w/ weakness. Working w/ PT/OT. No fevers. Back cultures with Enterococcus only. Ready for DC.    ROS: no n/v/d    Vital Signs  Temp:  [98.2 °F (36.8 °C)-99.6 °F (37.6 °C)] 98.2 °F (36.8 °C)  Heart Rate:  [] 102  Resp:  [17-22] 17  BP: ()/(51-84) 131/84    Physical Exam:  General: awake alert, nad  Head: Normocephalic, atraumatic  Eyes: PERRL, EOMI, no scleral icterus  ENT: MMM, OP clear, no thrush. Fair dentition.   Neck: Supple  Cardiovascular: NR, RR,  no LE edema  Respiratory: normal work of breathing on RA  GI: Abdomen w/ well-healing port sites, it is soft, non-tender, mildly distended, normal bowel sounds in all four quadrants  : Gong catheter present; no prostate tenderness on recent exam though it was enlarged  Musculoskeletal: no joint abnormalities, normal musculature; back incision present  Skin: No rashes, lesions, or embolic phenomenon  Psychiatric: calm, good memory  Vasc: no cyanosis; RUE PICC in place    Antibiotics:  -PCN g 4 million units q4h    LABS:  CBC, Crt, micro reviewed today  Lab Results   Component Value Date    WBC 12.59 (H) 07/10/2017    HGB 8.1 (L) 07/10/2017    HCT 22.4 (L) 07/10/2017    MCV 98.2 (H) 07/10/2017     07/10/2017     Lab Results   Component Value Date    CREATININE 0.68 (L) 07/10/2017     Lab Results   Component Value Date    CRP 14.51 (H) 07/06/2017     PSA 8  Procal 0.13  UA 0-2 WBCs     Microbiology:  7/4 OR Back Cx: Enterococcus (sens pending) and Micrococcus (sens pendign)  7/3 BCx: NGTD  6/25 UCx: >100k pan-sensitive Pseudomonas     Radiology (reviewed report):  No new imaging    Assessment/Plan   1. Epidural and retroperitoneal abscess secondary to ampicillin-sensitive Enterococcus  -s/p lumbar laminectomy on 7/4/17  -s/p IR guided drainage  of RP abscess on 7/5/17  -spoke w/ micro lab; there was never any Micrococcus present; it was all Enterococcus  -stop vancomycin  -while in-house and at rehab, PCN G 4 million units q4h  -if he transitions to home, PGN G 24 million units q24h via continuous infusion  -weekly CBC w/ diff, BMP, CRP faxed to me at [phone]  -STOP DATE 8/15/17 at which time he will f/u with me in ID clinic    2. Probable meningioma  -f/u with NSGY as an outpatient which he will be doing anyways for the back    3. Urinary retention    Thank you for allowing me to be involved in the care of this patient. Infectious diseases will sign off at this time with antibiotics plan in place but please call me at [phone] if any further questions.

## 2017-07-10 NOTE — THERAPY TREATMENT NOTE
Acute Care - Occupational Therapy Progress Note  [facility]     Patient Name: Paevl Melgoza  : 1934  MRN: [id number]  Today's Date: 7/10/2017  Onset of Illness/Injury or Date of Surgery Date: 17     Referring Physician: Dr. Marx      Admit Date: 2017    Visit Dx:     ICD-10-CM ICD-9-CM   1. Generalized weakness R53.1 780.79   2. Dehydration E86.0 276.51   3. Compression fracture T14.8 829.0   4. Pain R52 780.96   5. Epidural abscess, L2-L5 G06.1 324.1   6. Left leg weakness M62.81 729.89   7. Epidural abscess G06.2 324.9     Patient Active Problem List   Diagnosis   • Generalized weakness   • Plasmocytoma   • Nausea & vomiting   • Fall   • Noncompliance with medication regimen   • Left leg weakness   • Closed wedge compression fracture of eleventh thoracic vertebra   • CKD (chronic kidney disease) stage 2, GFR 60-89 ml/min   • B12 deficiency   • A-fib   • Meningioma   • SIADH (syndrome of inappropriate ADH production)   • Hyponatremia   • Vitamin D deficiency   • Epidural abscess             Adult Rehabilitation Note       07/10/17 0844 17 1417 17 1500    Rehab Assessment/Intervention    Discipline occupational therapist  -LE physical therapist  -LC physical therapist  -LC    Document Type therapy note (daily note)  -LE therapy note (daily note)  -LC therapy note (daily note)  -LC    Subjective Information agree to therapy  -LE agree to therapy;complains of;weakness;fatigue  -LC agree to therapy;complains of;weakness;fatigue  -LC    Patient Effort, Rehab Treatment good  -LE good  -LC good  -LC    Precautions/Limitations fall precautions   fernandez catheter, easily distracted  -LE      Recorded by [LE] Milly Zuniga, OTR [LC] Mroteza Saldana, PT DPT [LC] Morteza Saldana, PT DPT    Vital Signs    O2 Delivery Pre Treatment room air  -LE      O2 Delivery Intra Treatment room air  -LE      O2 Delivery Post Treatment room air  -LE      Pre Patient Position Supine  -LE      Intra Patient  "Position Sitting  -LE      Post Patient Position Sitting  -LE      Recorded by [LE] ADILENE Quesada      Pain Assessment    Pain Assessment No/denies pain  -LE No/denies pain  -LC No/denies pain  -LC    Recorded by [LE] Milly Zuniga OTR [LC] Morteza Saldana, PT DPT [LC] Morteza Saldana, PT DPT    Cognitive Assessment/Intervention    Current Cognitive/Communication Assessment impaired  -LE functional  -LC functional  -LC    Orientation Status oriented to;person;place   not oriented to situation  -LE oriented to;disoriented to;person;place;time;situation;required verbal cueing (specifiy in comments)  -LC oriented to;person;place;time;situation  -LC    Follows Commands/Answers Questions 75% of the time;able to follow single-step instructions;needs repetition;needs cueing  -% of the time;able to follow single-step instructions;needs cueing;needs increased time;needs repetition  -% of the time;able to follow single-step instructions;needs cueing;needs increased time;needs repetition  -LC    Personal Safety decreased insight to deficits   does state \"that leg doesn't work'  -LE mild impairment;decreased awareness, need for assist;decreased awareness, need for safety  -LC decreased awareness, need for assist;decreased awareness, need for safety  -LC    Personal Safety Interventions fall prevention program maintained;gait belt;nonskid shoes/slippers when out of bed  -LE elopement precautions initiated;fall prevention program maintained;gait belt;nonskid shoes/slippers when out of bed;supervised activity  -LC fall prevention program maintained;gait belt;nonskid shoes/slippers when out of bed;supervised activity  -LC    Short/Long Term Memory requires frequent cues  -LE      Additional Documentation --   easily distracted, cues to stay on task.    -LE      Recorded by [LE] ADILENE Quesada [LC] Morteza Saldana, PT DPT [LC] Morteza Saldana, PT DPT    Bed Mobility, Assessment/Treatment    Bed Mobility, Assistive Device " bed rails;head of bed elevated  -LE bed rails  -LC bed rails  -LC    Bed Mobility, Roll Right, [place]  minimum assist (75% patient effort)  -LC minimum assist (75% patient effort)  -LC    Bed Mobility, Scoot/Bridge, [place]  contact guard assist  -LC contact guard assist  -LC    Bed Mob, Supine to Sit, [place] minimum assist (75% patient effort)   log roll  -LE minimum assist (75% patient effort)  -LC moderate assist (50% patient effort)  -LC    Bed Mob, Sit to Supine, [place]  minimum assist (75% patient effort)  -LC     Bed Mobility, Safety Issues  decreased use of legs for bridging/pushing  -LC     Bed Mobility, Impairments  impaired balance;strength decreased;pain  -LC     Recorded by [LE] Milly Zuniga OTR [LC] Morteza Saldana, PT DPT [LC] Morteza Saldana, PT DPT    Transfer Assessment/Treatment    Transfers, Sit-Stand [place]  minimum assist (75% patient effort);moderate assist (50% patient effort)  -LC minimum assist (75% patient effort)  -LC    Transfers, Stand-Sit [place]  minimum assist (75% patient effort);moderate assist (50% patient effort)  -LC minimum assist (75% patient effort)  -LC    Transfers, Sit-Stand-Sit, Assist Device  rolling walker  -LC rolling walker  -LC    Transfer, Safety Issues  impulsivity;knees buckling  -LC weight-shifting ability decreased;impulsivity;knees buckling  -LC    Transfer, Impairments  strength decreased;impaired balance;pain  -LC ROM decreased;strength decreased;impaired balance;coordination impaired  -LC    Transfer, Comment deferred as going to work with PT next.   -LE 7 x standing with RW. 20 seconds stand  -LC L knee buckling, insensate L limb  -LC    Recorded by [LE] Milly Zuniga OTR [LC] Morteza Saldana, PT DPT [LC] Morteza Saldana, PT DPT    Toileting Assessment/Training    Toileting Assess/Train, Comment catheter and brief.   -LE      Recorded by [LE] ADILENE Quesada      Grooming Assessment/Training    Grooming Assess/Train, Position  edge of bed;sitting  -LE      Grooming Assess/Train, Indepen Level minimum assist (75% patient effort)   assist with balance at EOB, assist set up  -LE      Grooming Assess/Train, Impairments impaired balance;motor control impaired;postural control impaired  -LE      Grooming Assess/Train, Comment tries to put cap on backwards, unable squeeze small tube  -LE      Recorded by [LE] ADILENE Quesada      Balance Skills Training    Sitting-Level of Assistance Moderate assistance;Minimum assistance   Min static, Mod dynamic (reaching). Posterior lean  -LE      Sitting-Balance Support Left upper extremity supported;Right upper extremity supported;Feet supported  -LE      Sitting-Balance Activities Trunk control activities   grooming EOB. ed keep one hand on bed to keep balance.   -LE      Sitting # of Minutes 5  -LE      Standing-Level of Assistance   Contact guard  -    Static Standing Balance Support   assistive device  -    Standing Balance # of Minutes   3 sit to stands for 2 minutes each  -    Recorded by [LE] ADILENE Quesada  [LC] Morteza Saldana PT DPT    Positioning and Restraints    Pre-Treatment Position in bed  -LE in bed  -LC in bed  -    Post Treatment Position bed  -LE bed  -LC bed  -LC    In Bed sitting EOB;call light within reach;encouraged to call for assist;with family/caregiver;with PT  -LE notified nsg;fowlers;call light within reach;encouraged to call for assist;exit alarm on;with family/caregiver;side rails up x2;SCD pump applied  - notified nsg;call light within reach;fowlers;encouraged to call for assist;patient within staff view;side rails up x2;SCD pump applied  -    Recorded by [LE] ADILENE Quesada [LC] Morteza Saldana PT DPT [LC] Morteza Saldana PT DPT      07/07/17 1440 07/07/17 1030 07/07/17 0900    Rehab Assessment/Intervention    Discipline occupational therapist  -SO speech language pathologist  -OC physical therapist  -LH    Document Type therapy note (daily note)  -SO  therapy note (daily note)  -OC therapy note (daily note)  -LH    Subjective Information agree to therapy  -SO agree to therapy  -OC     Patient Effort, Rehab Treatment adequate  -SO good  -OC     Symptoms Noted During/After Treatment fatigue  -SO none  -OC     Precautions/Limitations fall precautions;spinal precautions  -SO  spinal precautions  -LH    Recorded by [SO] Jessica Fabian, OTR [OC] Linda Campbell MA,CCC-SLP [LH] Milly Garduno, PT    Pain Assessment    Pain Assessment No/denies pain  -SO No/denies pain  -OC No/denies pain  -LH    Recorded by [SO] Jessica Fabian, OTR [OC] Linda Campbell MA,CCC-SLP [LH] Milly Garduno, PT    Vision Assessment/Intervention    Visual Impairment   WNL  -LH    Recorded by   [LH] Milly Garduno, PT    Cognitive Assessment/Intervention    Current Cognitive/Communication Assessment impaired  -SO impaired  -OC impaired  -LH    Orientation Status oriented to;person;place  -SO oriented to;person;place  -OC oriented to;person;place   was not aware had back sx  -LH    Follows Commands/Answers Questions   able to follow single-step instructions;needs cueing;needs increased time;needs repetition  -LH    Personal Safety decreased insight to deficits  -SO      Personal Safety Interventions fall prevention program maintained  -SO      Recorded by [SO] Jessica Fabian, OTR [OC] Lnida Campbell MA,CCC-SLP [LH] Milly Garduno, PT    Improve memory skills    Status: Improve memory skills  Progressing as expected  -OC     Memory Skills Progress  80%   80% w/ self correctionX2, 90% with repetition X2  -OC     Comments: Improve memory skills  Able to recall 1/3 items when presented with 2-3 sent paragraphs.  -OC     Recorded by  [OC] Linda Campbell MA,CCC-SLP     Bed Mobility, Assessment/Treatment    Bed Mobility, Assistive Device bed rails;head of bed elevated  -SO      Bed Mob, Supine to Sit, [place] moderate assist (50% patient effort);maximum assist (25% patient effort)  -SO   moderate assist (50% patient effort);2 person assist required;verbal cues required;nonverbal cues required (demo/gesture)  -    Bed Mob, Sit to Supine, [place] moderate assist (50% patient effort);verbal cues required;nonverbal cues required (demo/gesture)  -SO  moderate assist (50% patient effort);2 person assist required;verbal cues required;nonverbal cues required (demo/gesture)  -    Bed Mob, Sidelying to Sit, [place]   moderate assist (50% patient effort);2 person assist required  -    Bed Mob, Sit to Sidelying, [place]   moderate assist (50% patient effort);2 person assist required  -    Bed Mobility, Impairments   pain;ROM decreased;strength decreased;impaired balance;coordination impaired  -    Bed Mobility, Comment   log rolling  -LH    Recorded by [SO] Jessica Fabian, OTR  [LH] Milly Garduno, PT    Transfer Assessment/Treatment    Transfers, Sit-Stand [place]   maximum assist (25% patient effort);moderate assist (50% patient effort);2 person assist required  -    Transfers, Stand-Sit [place]   moderate assist (50% patient effort);maximum assist (25% patient effort);2 person assist required;verbal cues required;nonverbal cues required (demo/gesture)  -    Transfer, Impairments   ROM decreased;strength decreased;impaired balance;coordination impaired  -    Transfer, Comment   poor fwd posturing, blocking LLE  -LH    Recorded by   [LH] Milly Garduno, PT    Gait Assessment/Treatment    Gait, [place] Level   not appropriate to assess  -LH    Recorded by   [LH] Milly Garduno, PT    Balance Skills Training    Sitting-Level of Assistance Moderate assistance  -SO  Moderate assistance  -    Sitting-Balance Support Feet supported  -SO  Right upper extremity supported;Left upper extremity supported;Feet supported  -    Sitting-Balance Activities --   UE ther ex, posterior lean  -SO  Trunk control activities;Right UE Weight Bearing;Left UE Weight Bearing  -     Sitting # of Minutes 8  -SO  5  -LH    Recorded by [SO] Jessica Fabian, OTR  [LH] Milly Garduno, PT    Therapy Exercises    Right Lower Extremity   15 reps;AROM:;sitting;ankle pumps/circles;LAQ  -LH    Left Lower Extremity   15 reps;PROM:;sitting;ankle pumps/circles;LAQ  -LH    Bilateral Upper Extremity AROM:;10 reps;sitting;elbow flexion/extension;shoulder extension/flexion   10x3  -SO      Recorded by [SO] Jessica Fabian, OTR  [LH] Milly Garduno, PT    Positioning and Restraints    Pre-Treatment Position in bed  -SO  in bed  -LH    Post Treatment Position bed  -SO  bed  -LH    In Bed supine;call light within reach;encouraged to call for assist;exit alarm on;with family/caregiver  -SO  supine;call light within reach;encouraged to call for assist;exit alarm on  -LH    Recorded by [SO] Jessica Fabian, OTR  [LH] Milly Garduno, PT      User Key  (r) = Recorded By, (t) = Taken By, (c) = Cosigned By    Initials Name Effective Dates    LE Millyeunice Zuniga, OTR 04/13/15 -     OC Linda Campbell MA,CCC-SLP 04/05/17 -     SO Jessica Fabian, OTR 04/13/15 -     LH Milly Garduno, PT 02/07/17 -     DELFINO Saldana, PT DPT 08/02/16 -                 OT Goals       07/10/17 0850          Bed Mobility OT LTG    Bed Mobility OT LTG, Outcome goal met  -LE      Static Sitting Balance OT LTG    Static Sitting Balance OT LTG, Date Established 07/10/17  -LE      Static Sitting Balance OT LTG, Time to Achieve 1 wk  -LE      Static Sitting Balance OT LTG, [place] Level minimum assist (75% patient effort)   Pt to reach for items with one hand support during ADL.  -LE      Static Sitting Balance OT LTG, Assist Device UE Support  -LE      Static Sitting Balance OT LTG, Date Goal Reviewed 07/10/17  -LE      Static Sitting Balance OT LTG, Outcome goal revised  -LE      Static Sitting Balance OT LTG, Reason Goal Not Met goals revised this date  -LE      Follow Directions OT LTG    Follow Directions OT LTG, Outcome goal  met  -LE      ADL OT LTG    ADL OT LTG, Date Established 07/10/17  -LE      ADL OT LTG, Time to Achieve 1 wk  -LE      ADL OT LTG, Activity Type ADL skills   upper body at EOB with Min balance support only.   -LE      ADL OT LTG, Date Goal Reviewed 07/10/17  -LE      ADL OT LTG, Outcome goal revised  -LE      ADL OT LTG, Reason Goal Not Met goals revised this date  -LE        User Key  (r) = Recorded By, (t) = Taken By, (c) = Cosigned By    Initials Name Provider Type    NONI Zuniga OTR Occupational Therapist          Occupational Therapy Education     Title: PT OT SLP Therapies (Active)     Topic: Occupational Therapy (Active)     Point: ADL training (Active)    Description: Instruct learner(s) on proper safety adaptation and remediation techniques during self care or transfers.   Instruct in proper use of assistive devices.    Learning Progress Summary    Learner Readiness Method Response Comment Documented by Status   Patient Acceptance E NR  KS 07/06/17 0858 Active    Acceptance E,TB VU,NR   06/28/17 2237 Done    Acceptance E,TB,D VU,NR safety for LE adls. Discussed back safety and AE with pt and spouse  06/28/17 1428 Done    Acceptance E,TB,D VU,NR role of OT and safety for functional task  06/21/17 1451 Done   Family Acceptance E,TB VU,NR   06/28/17 2237 Done    Acceptance E,TB,D VU,NR safety for LE adls. Discussed back safety and AE with pt and spouse  06/28/17 1428 Done   Significant Other Acceptance E NR  KS 07/06/17 0858 Active    Acceptance E,TB,D VU,NR role of OT and safety for functional task  06/21/17 1451 Done               Point: Body mechanics (Active)    Description: Instruct learner(s) on proper positioning and spine alignment during self-care, functional mobility activities and/or exercises.    Learning Progress Summary    Learner Readiness Method Response Comment Documented by Status   Patient Acceptance D,TB,E VU,DU Ed on hand placement when using other hand for grooming task.   Pt returns demo but needs cues to consistently incorporate. LE 07/10/17 0849 Done    Acceptance E NR  KS 07/06/17 0858 Active    Acceptance E,TB VU,NR   06/28/17 2237 Done   Family Acceptance D,TB,E VU,DU Ed on hand placement when using other hand for grooming task.  Pt returns demo but needs cues to consistently incorporate. LE 07/10/17 0849 Done    Acceptance E,TB VU,NR   06/28/17 2237 Done   Significant Other Acceptance E NR  KS 07/06/17 0858 Active                      User Key     Initials Effective Dates Name Provider Type Discipline     04/13/15 -  Ofe Mclean, OTR Occupational Therapist OT     04/13/15 -  Milly Zuniga, OTR Occupational Therapist OT     04/06/17 -  Yana Mercado, RN Registered Nurse Nurse    KS 05/08/17 -  Heather Davis, PT Student PT Student PT                  OT Recommendation and Plan  Planned Therapy Interventions: ADL retraining, bed mobility training, transfer training, adaptive equipment training  Therapy Frequency: 3-5 times/wk  Plan of Care Review  Plan Of Care Reviewed With: patient, spouse  Progress: progress toward functional goals as expected  Outcome Summary/Follow up Plan: Pt has gradually improved balance and mobility for ADL participation.  Pt easily distracted which limits comprehension for cueing.          Outcome Measures       07/10/17 0853 07/10/17 0800 07/07/17 1400    How much help from another is currently needed...    Putting on and taking off regular lower body clothing? 2  -LE  1  -SO    Bathing (including washing, rinsing, and drying) 2  -LE  2  -SO    Toileting (which includes using toilet bed pan or urinal) 1  -LE  1  -SO    Putting on and taking off regular upper body clothing 2  -LE  2  -SO    Taking care of personal grooming (such as brushing teeth) 3  -LE  2  -SO    Eating meals 3  -LE  3  -SO    Score 13  -LE  11  -SO    Functional Assessment    Outcome Measure Options  AM-PAC 6 Clicks Daily Activity (OT)  -LE AM-PAC 6 Clicks Daily Activity  (OT)  -SO      07/07/17 0900          How much help from another person do you currently need...    Turning from your back to your side while in flat bed without using bedrails? 2  -LH      Moving from lying on back to sitting on the side of a flat bed without bedrails? 2  -LH      Moving to and from a bed to a chair (including a wheelchair)? 2  -LH      Standing up from a chair using your arms (e.g., wheelchair, bedside chair)? 2  -LH      Climbing 3-5 steps with a railing? 1  -LH      To walk in hospital room? 1  -LH      AM-PAC 6 Clicks Score 10  -LH      Functional Assessment    Outcome Measure Options AM-PAC 6 Clicks Basic Mobility (PT)  -LH        User Key  (r) = Recorded By, (t) = Taken By, (c) = Cosigned By    Initials Name Provider Type    ADILENE Paniagua Occupational Therapist    SO Jessica Fabian, OTR Occupational Therapist     Milly Garduno, PT Physical Therapist           Time Calculation:         Time Calculation- OT       07/10/17 0854          Time Calculation- OT    OT Start Time 0827  -LE      OT Stop Time 0840  -LE      OT Time Calculation (min) 13 min  -LE      OT Received On 07/10/17  -LE      OT Goal Re-Cert Due Date 07/17/17  -LE        User Key  (r) = Recorded By, (t) = Taken By, (c) = Cosigned By    Initials Name Provider Type    ADILENE Paniagua Occupational Therapist           Therapy Charges for Today     Code Description Service Date Service Provider Modifiers Qty    [id number] HC OT SELF CARE/MGMT/TRAIN EA 15 MIN 7/10/2017 ADILENE Quesada GO 1               ADILENE Quesada  7/10/2017

## 2017-07-10 NOTE — PROGRESS NOTES
"    DAILY PROGRESS NOTE  [facility]    Patient Identification:  Name: Pavel Melgoza  Age: 83 y.o.  Sex: male  :  1934  MRN: [id number]         Primary Care Physician: Amanda Tanner MD    Subjective:  Interval History: still cant sleep well.      Objective:    Scheduled Meds:    castor oil-balsam peru  Topical Q12H   cholecalciferol 5,000 Units Oral Daily   diltiaZEM  mg Oral Q24H   lidocaine 1 mL Intradermal Once   melatonin 5 mg Oral Nightly   pantoprazole 40 mg Oral BID AC   ramipril 10 mg Oral Daily   vancomycin 1,250 mg Intravenous Q12H   vitamin B-12 1,000 mcg Oral Daily     Continuous Infusions:    Pharmacy to dose vancomycin        Vital signs in last 24 hours:  Temp:  [98.2 °F (36.8 °C)-99.6 °F (37.6 °C)] 98.2 °F (36.8 °C)  Heart Rate:  [68-93] 78  Resp:  [18-22] 18  BP: ()/(51-73) 99/69    Intake/Output:    Intake/Output Summary (Last 24 hours) at 07/10/17 0733  Last data filed at 07/10/17 0537   Gross per 24 hour   Intake              490 ml   Output             1350 ml   Net             -860 ml       Exam:  BP 99/69 (BP Location: Left arm, Patient Position: Lying)  Pulse 78  Temp 98.2 °F (36.8 °C) (Oral)   Resp 18  Ht 72.01\" (182.9 cm)  Wt 183 lb 8 oz (83.2 kg)  SpO2 97%  BMI 24.21 kg/m2                           Lungs:    Clear to auscultation bilaterally, respirations unlabored                          Heart:    Regular rate and rhythm, S1 and S2 normal                  Abdomen:     Soft, non-tender, bowel sounds active                 Extremities:   SCDS, no cyanosis or edema                            Data Review:  Labs in chart were reviewed.       Results from last 7 days  Lab Units 17  0723 17  0537 17  1730 17  0618 17  0003 17  1800 17  0545 17  0427 17  0538   SODIUM mmol/L 128* 128* 129* 132*  132* 130* 123* 124* 126* 125*   POTASSIUM mmol/L 3.6 3.7 3.8 3.8  3.8 3.9 4.0 4.0 4.6 4.4 "   CHLORIDE mmol/L 91* 93* 93* 97*  96* 94* 89* 88* 90* 90*   CO2 mmol/L 24.7 22.4 22.9 21.7*  23.6 23.3 22.6 23.5 22.5 23.4   BUN mg/dL 16 16 18 15  15 17 18 20 33* 43*   CREATININE mg/dL 0.74* 0.67* 0.72* 0.73*  0.71* 0.73* 0.69* 0.79 0.79 0.99   GLUCOSE mg/dL 98 96 139* 107*  114* 112* 124* 113* 92 90   ALBUMIN g/dL 2.50* 2.40*  --  2.10*  --   --  2.50* 2.40* 2.50*   Estimated Creatinine Clearance: 82.3 mL/min (by C-G formula based on Cr of 0.74).    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] 06/25/2017   • Meningioma [D32.9] 06/24/2017   • A-fib [I48.91] 06/23/2017   • B12 deficiency [E53.8] 06/21/2017   • Closed wedge compression fracture of eleventh thoracic vertebra [S22.080A] 06/20/2017   • CKD (chronic kidney disease) stage 2, GFR 60-89 ml/min [N18.2] 06/20/2017      Resolved Hospital Problems    Diagnosis Date Noted Date Resolved   • Pseudomonas infection [B96.5] 06/28/2017 07/02/2017   • UTI (urinary tract infection) [N39.0] 06/26/2017 07/02/2017   • Left inguinal hernia [K40.90] 06/24/2017 07/02/2017   • Metabolic acidosis [E87.2] 06/23/2017 07/02/2017   • Hypocalcemia [E83.51] 06/23/2017 07/06/2017   • Diarrhea [R19.7] 06/20/2017 07/06/2017       Plan:  · S/P RAHUL 6/22/17.  · S/P Laparoscopic left inguinal hernia repair, transabdominal preperitoneal approach 6/23/17  · B12 replacement  · SIADH per nephrology. Na slowly improving - hopefully w/ continue to improve s/p abscess I/D - fluid mgnt per Renal as went up to 132 and now 128  · Afib: not anticoag candidate d/t falls. Rate control per card.  · UTI with pseudomonas treated as 7/3 UCx reported as negative. Urinary retention w/ fernandez per Urology       Leukocytosis/ Epidural abscess/Psoas abscess  - s/p lumbar laminectomy on 7/4   - IR drainage on 7/5    - ID dosing Vanc for Enterococcus and Micrococcus w/ plans for possible PCN transition pending additional sensitivity testing and ID recs   S/p PICC        Appreciate input and assistance from ALL      Hopefully to rehab Monday if okay w/ all.  ID to make final recommendations on antibiotics.        Jay Jay Edmondson MD  7/10/2017  7:33 AM

## 2017-07-10 NOTE — PROGRESS NOTES
Continued Stay Note  [facility]     Patient Name: Pavel Melgoza  MRN: [id number]  Today's Date: 7/10/2017    Admit Date: 6/20/2017          Discharge Plan       07/10/17 1155    Case Management/Social Work Plan    Plan [facility]    Patient/Family In Agreement With Plan yes    Additional Comments Met w/ patient & family in room and confirmed dc plan.  Ambulance requested; disclaimer provided.      Final Note    Final Note skilled bed at [facility]                Expected Discharge Date and Time     Expected Discharge Date Expected Discharge Time    Jul 10, 2017             Danielle Singh RN

## 2017-07-10 NOTE — TELEPHONE ENCOUNTER
----- Message from ANITRA Esposito sent at 7/10/2017 10:45 AM EDT -----  Regarding: hfu  Pt seen for lumbar epidural abcess s/p I and D  by Dr. Nolberto Hernandez on Allison 3, 2017. Need follow up in 2 week(s)  without imaging. Ok to see MD. Going to [facility] believe

## 2017-07-10 NOTE — DISCHARGE INSTRUCTIONS
Can remove sutures in 7-10 days  May remove dressing.  No lifting over 10 pounds.   OK to shower.  Voiding trial once ambulatory.

## 2017-07-10 NOTE — DISCHARGE SUMMARY
NAME: Pavel Melgoza ADMIT: 2017   : 1934  PCP: Amanda Tanner MD    MRN: [id number] LOS: 20 days   AGE/SEX: 83 y.o. male  ROOM: [facility]     Date of Admission:  2017  Date of Discharge:  7/10/2017    PCP: Amanda Tanner MD      CHIEF COMPLAINT  Fall (pt lost balance yesterday and fell. 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.2] 2017   • Meningioma [D32.9] 2017   • A-fib [I48.91] 2017   • B12 deficiency [E53.8] 2017   • Closed wedge compression fracture of eleventh thoracic vertebra [S22.080A] 2017   • CKD (chronic kidney disease) stage 2, GFR 60-89 ml/min [N18.2] 2017      Resolved Hospital Problems    Diagnosis Date Noted Date Resolved   • Pseudomonas infection [B96.5] 2017   • UTI (urinary tract infection) [N39.0] 2017   • Left inguinal hernia [K40.90] 2017   • Metabolic acidosis [E87.2] 2017   • Hypocalcemia [E83.51] 2017   • Diarrhea [R19.7] 2017       SECONDARY DIAGNOSES  Past Medical History:   Diagnosis Date   • Plasmacytoma        CONSULTS   Treatment Team     Provider Relationship Specialty Contact    Cedrick Posada MD Consulting Physician Nephrology  [phone]    Jimbo Perez MD Consulting Physician Urology  [phone]    Erwin Mei MD Consulting Physician General Surgery  [phone]    Nolberto Hernandez IV, MD Surgeon, Consulting Physician Neurosurgery  [phone]    Milly Rock, ANITRA Nurse Practitioner Neurosurgery  [phone]    Chandra Ponce MD Consulting Physician Infectious Diseases  [phone]     Tiffany Dudley M.D.  Consulting Physician Cardiology       Sha Longoria MD Consulting Physician Gastroenterology       Julian Bentley MD Consulting Physician Neurosurgery  [phone]          PROCEDURES PERFORMED  LUMBAR LAMINECTOMY FOR RESECTION OF EPIDURAL ABCESS   7/7/17    Laparoscopic left inguinal hernia repair, transabdominal preperitoneal approach  6/24/17    Epidural Block [[id number]] ordered by Shane Perez MD at 06/22/17 1243    CT Guided Needle Aspiration   Final Result   Satisfactory uneventful CT-guided aspiration of left   iliopsoas abscess.       This report was finalized on 7/5/2017 3:57 PM by Dr. Donal Thompson MD.          MRI Lumbar Spine With & Without Contrast   Final Result   There are findings consistent with a large epidural abscess that has   developed in the interval since the prior exam. This causes severe canal   stenosis and is seen along the left posterior lateral aspect of the   thecal sac extending from the level of the L3-4 disc space down to the   level of the L4-5 disc space. Additionally, multiple abscesses are   identified in the left psoas muscle, the posterior paraspinal muscles,   right greater than left, and the posterior subcutaneous fat along the   right side of the lumbar spine extending from the level of the T12-L1   disc space down to the level of the L3-4 disc space.       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 These extensive   signal abnormalities within the sacrum, L5, and medial iliac bones are   stable when compared to the prior exam. Again noted is a deformity and a   fluid signal intensity cleft within the sacrum. The sacral spinal canal   is very difficult to evaluate but may be considerably compromised by    these changes of the treated plasmacytoma.       3.8 x 4.2 cm abdominal aortic aneurysm.       Markedly distended and trabeculated appearing urinary bladder with   bilateral pelvocaliectasis and ureterectasis.       These findings were discussed with Dr. Ponce on 07/03/2017 at   approximately 11:40 AM.       This report was finalized on 7/3/2017 2:31 PM by Dr. Nate Michelle MD.          MRI Brain With & Without Contrast   Final Result   No acute intracranial abnormality is noted.       There is a dural based homogeneously contrast enhancing lesion abutting   and causing mass effect upon the superior aspect of the right frontal   lobe. There is adjacent vasogenic edema. The lesion most likely   represents a meningioma although a dural based metastatic lesion is   another less likely differential consideration.           This report was finalized on 6/26/2017 1:38 PM by Dr. Nate Michelle MD.          CT Abdomen Pelvis Without Contrast   Final Result           1. Gaseous distention of transverse colon, less prominent than on the   prior x-ray, but still abnormal; possibly the cause is herniation of   sigmoid colon into left inguinal hernia.   2. Abdominal aortic aneurysm, 4.5 cm.   3. Bilateral perinephric fat stranding suggests chronic change,   correlate clinically to exclude possibility of urinary infection.   Enlarged prostate.   4. A chronic process of the sacrum and medial pelvis, indeterminate as   described. Abnormal hyperdense foci in left psoas, right paraspinal   musculature suggesting chronic change, nonspecific. Correlate   clinically.   5. Minimal left pleural effusions.       Discussed by telephone with the patient's nurses, Zee and Latanya, at time   of interpretation, 1455 2017       This report was finalized on 6/23/2017 2:59 PM by Dr. Chilo Kahn MD.          MRI Lumbar Spine With & Without Contrast   Final Result   1. Abnormal appearance of the sacrum and left iliac likely related to    patient's reported plasmacytoma with postsurgical and posttreatment   related changes. Continued follow-up will be needed to ensure stability.   2. Several mild and old appearing Schmorl's type deformities, no acute   fracture.   3. Degenerative and arthritic changes as individually discussed.       This report was finalized on 6/22/2017 1:14 AM by Nicanor Chowdhury MD.          MRI Thoracic Spine With & Without Contrast   Final Result   1. Several chronic appearing Schmorl's type deformities as discussed. No   acute fracture, pathologic fracture, or bony retropulsion.   2. No abnormal enhancement or spinal cord signal.       This report was finalized on 6/22/2017 1:14 AM by Nicanor Chowdhury MD.          XR Hip With or Without Pelvis 2 - 3 View Right   Final Result       Postsurgical and chronic changes. No acute fracture is identified. If   there is further clinical concern, cross-sectional imaging could be   obtained for further evaluation.       This report was finalized on 6/20/2017 2:18 PM by Dr. Chilo Kahn MD.          XR Spine Lumbar 4+ View   Final Result       Age-indeterminate multilevel endplate compression and anterior wedging   at T11, correlate with location of pain. Chronic changes. If indicated,   MRI could be considered for further evaluation.       This report was finalized on 6/20/2017 2:03 PM by Dr. Chilo Kahn MD.                  HOSPITAL COURSE  Mr. Melgoza is a 83 y.o. 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  Please see the admitting history and physical for further details.  He was found to have a compression fracture at T11 and was admitted to the hospital for further evaluation and treatment.  He was seen by neurosurgery who recommended he have a lumbar epidural steroid injection.  He had this performed on 6/22/2017.    He also had complaints of  abdominal distention and CT scan suggested a partial colonic obstruction related to incarcerated hernia.  He was seen by general surgery who felt he had an incarcerated left inguinal hernia that was not reducible and he underwent laparoscopic hernia repair on 6/24/2017.  This was performed by Dr. Erwin Mei.    He was followed by the nephrology service for hyponatremia.  Urine studies consistent with SIADH and he did respond to Samsca.  He is being maintained on a fluid restriction and this needs continued monitoring.    He was seen by neurology due to his history of falls.  He was found to be deficient in vitamin B12 and started on replacement therapy.    He was also found to have atrial fibrillation and cardiology recommended rate control only.  He has been stable on Cardizem.  No anticoagulation because of falls, age, dementia.  He will follow-up with Dr. Tiffany Dudley outpatient.    He developed leukocytosis prompting infectious disease consultation.  Extensive workup was undertaken which incidentally found a Pseudomonas urinary tract infection that was treated with 3 days of cefepime.  Importantly was found lumbar epidural abscesses.  He was seen in getting him in consultation by neurosurgery who performed a lumbar laminectomy at L3 to L5 with drainage of epidural abscess.  Cultures have grown enterococcus and micrococcus.  He's been maintained on IV vancomycin will need 6 weeks of treatment with stop date of 8/15/2017.      Also had a Pseudomonas urinary tract infection treated with 3 days of cefepime.     Meningioma seen on MRI last week.  Evaluated by neurosurgery and will need serial monitoring.      He was followed by urology for some urinary retention issues.  He has maintained Gong catheter and will discharge with Gong until mobility improves.  Follow-up with Dr. Fraser.        PHYSICAL EXAM  Temp:  [97.6 °F (36.4 °C)-99.6 °F (37.6 °C)] 97.6 °F (36.4 °C)  Heart Rate:  [] 74  Resp:  [17-20]  18  BP: ()/(51-84) 119/59  Body mass index is 24.21 kg/(m^2).  Physical Exam  See today's progress note.    CONDITION ON DISCHARGE  Stable.  Improved.      DISCHARGE DISPOSITION   Skilled Nursing Facility (DC - External)  [facility]      DISCHARGE MEDICATIONS   Pavel Melgoza   Home Medication Instructions THELMA:[id number]    Printed on:07/10/17 0251   Medication Information                      atorvastatin (LIPITOR) 10 MG tablet  Take 10 mg by mouth Daily.             cholecalciferol 5000 UNITS tablet  Take 5,000 Units by mouth Daily.             diltiaZEM CD (CARDIZEM CD) 240 MG 24 hr capsule  Take 1 capsule by mouth Daily.             HYDROcodone-acetaminophen (NORCO) 5-325 MG per tablet  Take 1 tablet by mouth Every 4 (Four) Hours As Needed for Moderate Pain (4-6) for up to 4 days.             pantoprazole (PROTONIX) 40 MG EC tablet  Take 1 tablet by mouth 2 (Two) Times a Day Before Meals.             penicillin G potassium 4 Million Units in sodium chloride 0.9 % 100 mL IVPB  Infuse 4 Million Units into a venous catheter Every 4 (Four) Hours for 36 days. Indications: Bone and/or Joint Infection             ramipril (ALTACE) 5 MG capsule  Take 1 capsule by mouth Daily.             vitamin B-12 (VITAMIN B-12) 1000 MCG tablet  Take 1 tablet by mouth Daily.               Diet Instructions     Diet:       Diet Texture / Consistency:  Regular   Fluid restrict to 1500 cc per day.                Activity Instructions     Activity as Tolerated                   Future Appointments  Date Time Provider Department Center   7/21/2017 12:15 PM Charlene Sneed PA-C [facility] None   8/15/2017 1:50 PM MD SWATHI Segura ID [facility] None     Follow-up Information     Follow up with [facility] .    Specialties:  Skilled Nursing Facility, Intermediate Care Facility    Contact information:    [address] [address] [phone]        Follow up with Amanda  Angelique Tanner MD Follow up in 2 week(s).    Specialty:  Family Medicine    Contact information:    [address]  [facility] [address]  [place] [id number] [phone]          Follow up with Nolberto Hernandez IV, MD Follow up in 2 week(s).    Specialty:  Neurosurgery    Why:  May remove dressing  No lifting > 10 lbs at rehab  Sutures out in 7-10 days    Contact information:    [address] [facility] [address]  [place] [id number] [phone]          Follow up with Chandra Ponce MD Follow up in 5 day(s).    Specialty:  Infectious Diseases    Contact information:    [address] [address]  [place] [address]  [phone]                Jay Jay Edmondson MD  [facility]  07/10/17  11:09 AM      Time: greater than 60 minutes.      Dragon disclaimer:  Much of this encounter note is an electronic transcription/translation of spoken language to printed text. The electronic translation of spoken language may permit erroneous, or at times, nonsensical words or phrases to be inadvertently transcribed; Although I have reviewed the note for such errors, some may still exist.

## 2017-07-10 NOTE — TELEPHONE ENCOUNTER
Patient is scheduled with Abigail Ramirez, APRN 7/21/17 at 10AM. I spoke to the patients daughter (Sulma [phone])  and she understands and accepts.

## 2017-07-10 NOTE — PROGRESS NOTES
[facility]    Physicians Statement of Medical Necessity for Ambulance Transportation    It is medically necessary for:    Patient Name: Pavel Melgoza    Insurance Information:      To be transported by ambulance:    From (if nursing facility, specify level of care: skilled, correction, etc): [facility]    To (specify level of care if nursing facility): [facility]    Date of Service: 7/10/17    For dialysis patients state date dialysis began: n/a    Diagnosis: Fall (pt lost balance yesterday and fell. ); Hip Pain (right); Back Pain; and Extremity Pain (right hip pain)  S/p lumbar laminectomy.  SIADH.     Past Medical/Surgical History:  Past Medical History:   Diagnosis Date   • Plasmacytoma       Past Surgical History:   Procedure Laterality Date   • FRACTURE SURGERY     • INGUINAL HERNIA REPAIR Left 6/24/2017    Procedure: LAPAROSCOPIC LEFT INGUINAL HERNIA REPAIR WITH MESH;  Surgeon: Erwin Mei MD;  Location: [facility];  Service:    • JOINT REPLACEMENT      bilateral hips   • LUMBAR LAMINECTOMY DISCECTOMY DECOMPRESSION N/A 7/4/2017    Procedure: LUMBAR LAMINECTOMY FOR RESECTION OF EPIDURAL ABCESS ;  Surgeon: Nolberto Hernandez IV, MD;  Location: [facility];  Service:         Current Objective Medical Evidence(including physical exam finding to support reason for limitations):    Immobilization syndrome    Other: falls precautions    Physician Signature:           (RN,NP,PA,CAN, Discharge Planner) Date/Time: 7/10/1 @ 1200     Printed Name:    ____Danielle Singh RN/CCP________________________    [facility]   Phone: [phone] Phone: [phone] Phone: [phone]   Fax: [phone] Fax: [phone] Fax: [phone]

## 2017-07-10 NOTE — PLAN OF CARE
Problem: Patient Care Overview (Adult)  Goal: Plan of Care Review  Outcome: Ongoing (interventions implemented as appropriate)    07/10/17 0452   Coping/Psychosocial Response Interventions   Plan Of Care Reviewed With patient   Patient Care Overview   Progress improving   Outcome Evaluation   Outcome Summary/Follow up Plan Patient slept fitfully tonight even following administration of benedryl and melatonin together. Patient complained of pain only once and it was controlled with oral pain medication. Plan is for patient to go to rehab soon. Continue to monitor closely. Continue with current plan of care         Problem: Fall Risk (Adult)  Goal: Absence of Falls  Outcome: Ongoing (interventions implemented as appropriate)    Problem: Pain, Chronic (Adult)  Goal: Acceptable Pain Control/Comfort Level  Outcome: Ongoing (interventions implemented as appropriate)    Problem: Perioperative Period (Adult)  Goal: Signs and Symptoms of Listed Potential Problems Will be Absent or Manageable (Perioperative Period)  Outcome: Ongoing (interventions implemented as appropriate)

## 2017-07-20 NOTE — TELEPHONE ENCOUNTER
----- Message from ANITRA Garcia sent at 7/19/2017  4:01 PM EDT -----  That's fine, if there is a nurse at rehab who can take out the sutures we are OK with that as long as incision site looks good and is healing well. We do need to see him back for clinical follow up at some point. Have the pt's daughter call us when he is out of rehab.     Thanks!      ----- Message -----     From: Yana Lomeli MA     Sent: 7/19/2017   3:10 PM       To: ANITRA Garcia    This patient is status post a lumbar laminectomy on 7/4/17 by [facility].  The patients daughter Sulma Melgoza called and stated that this patient is in [facility]. He has an appointment with you on Friday 7/21/17. She stated that she would like for someone there to take out his sutures. She says that her father gets confused when he is moved a lot. She does not want to reschedule at this time. Her call back is [phone].

## 2017-08-09 PROBLEM — T81.31XA POSTOPERATIVE WOUND DEHISCENCE: Status: ACTIVE | Noted: 2017-01-01

## 2017-08-09 PROBLEM — L89.159 SACRAL DECUBITUS ULCER: Status: ACTIVE | Noted: 2017-01-01

## 2017-08-09 PROBLEM — F03.90 DEMENTIA (HCC): Chronic | Status: ACTIVE | Noted: 2017-01-01

## 2017-08-09 NOTE — H&P
PCP: Amanda Tanner MD    Chief complaint   Chief Complaint   Patient presents with   • Abscess     pt with a draining epidural abscess; sent by Dr. David GOLDSTEIN  Patient is a 83 y.o. male presents with Readmission after recent discharge due to fever and wound drainage.  Patient was admitted at the end of June and just discharged mid July to skilled motion facility after patient fell had a T11 compression fracture then had an epidural injection at the end of June.  Then had a partial colonic obstruction secondary to incarcerated hernia then he will underwent a lap or scopic hernia repair on 6/21st/17.  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  He also had a Pseudomonas UTI and a lumbar epidural abscess which was drained and was found to have enterococcus and micrococcus.  As well as urinary retention and now has a chronic Gong.    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  He now has a sacral decub and also there is been some drainage out of the lumbar surgical wound.  As well as fevers at the skilled nursing facility.  ptis very demented and is not able to give history at all.      PAST MEDICAL HISTORY  Past Medical History:   Diagnosis Date   • Coronary artery disease    • Dementia    • GERD (gastroesophageal reflux disease)    • Hyperlipidemia    • Hypertension    • Plasmacytoma        PAST SURGICAL HISTORY  Past Surgical History:   Procedure Laterality Date   • FRACTURE SURGERY     • INGUINAL HERNIA REPAIR Left 6/24/2017    Procedure: LAPAROSCOPIC LEFT INGUINAL HERNIA REPAIR WITH MESH;  Surgeon: Erwin Mei MD;  Location: [facility];  Service:    • JOINT REPLACEMENT      bilateral hips   • LUMBAR LAMINECTOMY DISCECTOMY DECOMPRESSION N/A 7/4/2017    Procedure:  "LUMBAR LAMINECTOMY FOR RESECTION OF EPIDURAL ABCESS ;  Surgeon: Nolberto Hernandez IV, MD;  Location: [facility];  Service:        FAMILY HISTORY  History reviewed. No pertinent family history.    SOCIAL HISTORY  Social History   Substance Use Topics   • Smoking status: Former Smoker   • Smokeless tobacco: None   • Alcohol use No       MEDICATIONS:  Prescriptions Prior to Admission   Medication Sig Dispense Refill Last Dose   • atorvastatin (LIPITOR) 10 MG tablet Take 10 mg by mouth Daily.   Unknown at Unknown time   • cholecalciferol 5000 UNITS tablet Take 5,000 Units by mouth Daily.   Unknown at Unknown time   • diltiaZEM CD (CARDIZEM CD) 240 MG 24 hr capsule Take 1 capsule by mouth Daily.   Unknown at Unknown time   • pantoprazole (PROTONIX) 40 MG EC tablet Take 1 tablet by mouth 2 (Two) Times a Day Before Meals.   Unknown at Unknown time   • penicillin G potassium 4 Million Units in sodium chloride 0.9 % 100 mL IVPB Infuse 4 Million Units into a venous catheter Every 4 (Four) Hours for 36 days. Indications: Bone and/or Joint Infection  0 Unknown at Unknown time   • ramipril (ALTACE) 5 MG capsule Take 1 capsule by mouth Daily.   Unknown at Unknown time   • vitamin B-12 (VITAMIN B-12) 1000 MCG tablet Take 1 tablet by mouth Daily.   Unknown at Unknown time       Allergies:  Review of patient's allergies indicates no known allergies.    Review of Systems:  Unable to obtain due to dementia and memory problems    Vital Signs  Temp:  [99.7 °F (37.6 °C)] 99.7 °F (37.6 °C)  Heart Rate:  [85-92] 85  Resp:  [16-18] 16  BP: (132)/(73) 132/73  Flowsheet Rows         First Filed Value    Admission Height  72\" (182.9 cm) Documented at 08/09/2017 1406    Admission Weight  175 lb (79.4 kg) Documented at 08/09/2017 1406           Physical Exam:  General Appearance:    Alert, cooperative, in no acute distress   Head:    Normocephalic, without obvious abnormality, atraumatic   Eyes:         conjunctivae and sclerae normal, no " icterus, PERRLA   ENT:    Ears grossly intact, oral mucosa moist,   Neck:   No adenopathy, supple, trachea midline,    Back:     Normal to inspection, range of motion normal   Lungs:     Clear to auscultation,respirations regular, even and                   unlabored    Heart:    Regular rhythm and normal rate,  no murmur, normal S1 and S2,   Abdomen:     Normal bowel sounds, no masses,  soft non-tender, non-distended,    Extremities:   no cyanosis, no edema,             Pulses:   Pulses palpable and equal bilaterally   Skin:   No bleeding, rash, bruising    Neurologic:      Psych:   Cranial nerves 2 - 12 grossly intact, sensation intact,     Moves  extremities well/ equal bilateral strength Except left leg 2/5     Alert and Oriented x self, Normal Affect   LABS:  Admission on 08/09/2017   Component Date Value Ref Range Status   • Glucose 08/09/2017 111* 65 - 99 mg/dL Final   • BUN 08/09/2017 18  8 - 23 mg/dL Final   • Creatinine 08/09/2017 0.78  0.76 - 1.27 mg/dL Final   • Sodium 08/09/2017 124* 136 - 145 mmol/L Final   • Potassium 08/09/2017 4.3  3.5 - 5.2 mmol/L Final   • Chloride 08/09/2017 89* 98 - 107 mmol/L Final   • CO2 08/09/2017 23.3  22.0 - 29.0 mmol/L Final   • Calcium 08/09/2017 8.4* 8.6 - 10.5 mg/dL Final   • eGFR Non African Amer 08/09/2017 95  >60 mL/min/1.73 Final   • BUN/Creatinine Ratio 08/09/2017 23.1  7.0 - 25.0 Final   • Anion Gap 08/09/2017 11.7  mmol/L Final   • C-Reactive Protein 08/09/2017 11.59* 0.00 - 0.50 mg/dL Final   • Sed Rate 08/09/2017 45* 0 - 20 mm/hr Final   • WBC 08/09/2017 14.62* 4.50 - 10.70 10*3/mm3 Final   • RBC 08/09/2017 2.93* 4.60 - 6.00 10*6/mm3 Final   • Hemoglobin 08/09/2017 9.3* 13.7 - 17.6 g/dL Final   • Hematocrit 08/09/2017 27.6* 40.4 - 52.2 % Final   • MCV 08/09/2017 94.2  79.8 - 96.2 fL Final   • MCH 08/09/2017 31.7  27.0 - 32.7 pg Final   • MCHC 08/09/2017 33.7  32.6 - 36.4 g/dL Final   • RDW 08/09/2017 14.8* 11.5 - 14.5 % Final   • RDW-SD 08/09/2017 50.8  37.0 -  54.0 fl Final   • MPV 08/09/2017 8.3  6.0 - 12.0 fL Final   • Platelets 08/09/2017 270  140 - 500 10*3/mm3 Final   • Neutrophil % 08/09/2017 83.6* 42.7 - 76.0 % Final   • Lymphocyte % 08/09/2017 8.3* 19.6 - 45.3 % Final   • Monocyte % 08/09/2017 6.9  5.0 - 12.0 % Final   • Eosinophil % 08/09/2017 0.8  0.3 - 6.2 % Final   • Basophil % 08/09/2017 0.1  0.0 - 1.5 % Final   • Immature Grans % 08/09/2017 0.3  0.0 - 0.5 % Final   • Neutrophils, Absolute 08/09/2017 12.21* 1.90 - 8.10 10*3/mm3 Final   • Lymphocytes, Absolute 08/09/2017 1.21  0.90 - 4.80 10*3/mm3 Final   • Monocytes, Absolute 08/09/2017 1.01  0.20 - 1.20 10*3/mm3 Final   • Eosinophils, Absolute 08/09/2017 0.12  0.00 - 0.70 10*3/mm3 Final   • Basophils, Absolute 08/09/2017 0.02  0.00 - 0.20 10*3/mm3 Final   • Immature Grans, Absolute 08/09/2017 0.05* 0.00 - 0.03 10*3/mm3 Final       DIAGNOSTICS:  Xr Chest Post Cva Port    Result Date: 8/9/2017  Narrative: PORTAL CHEST POST CVA PORT  HISTORY: 83-year-old male post PICC placement  COMPARISON: 06/30/2017  FINDINGS: 1. Right arm PICC terminates in good position over the SVC.  This report was finalized on 8/9/2017 4:55 PM by Dr. Donal Thompson MD.           Results Review:   I reviewed the patient's new clinical results.  Discussed with ER physician  I personally viewed and interpreted the patient's EKG/Telemetry data-sinus  Old records reviewed -See history of present illness    ASSESSMENT AND PLAN    + Postoperative wound dehiscence/ Epidural abscess  -Plans to go to the OR tomorrow by neurosurgery   -continue IV antibiotics with an ID consult  -IV pain medication    +Hyponatremia/SIADH (syndrome of inappropriate ADH production)    -Placed on a 1000 cc fluid restriction- supposedly patient was on this at the skilled nursing facility  -We will consult renal, patient did require Samsca during last hospitalization    + Sacral decubitus ulcer  -Dressing change  -Wound care consult    + Urinary retention/chronic  Gong  -Family does not know if the Gong is been changed, will change now  -We will start Flomax  -Dr. Fraser followed patient on last admission however I think we are just going to continue the Gong for now and can follow-up as an outpatient     + History of plasmacytosis and chronic  Left leg weakness    CKD (chronic kidney disease) stage 2, GFR 60-89 ml/min,   A-fib        +DVT proph- scd and teds due to surg planned in am  +Medical decition maker- wife                 I discussed the patients findings and my recommendations with the patient and/or family.  Please reference all orders placed.    Malathi Rubin MD  08/09/17  5:50 PM

## 2017-08-09 NOTE — ED PROVIDER NOTES
EMERGENCY DEPARTMENT ENCOUNTER    CHIEF COMPLAINT  Chief Complaint: wound issue  History given by: patient, family  History limited by: dementia  Room Number: 660/1  PMD: Amanda Tanner MD      HPI:  Pt is a 83 y.o. male who presents complaining of increased drainage from his surgical site from prior epidural abscess for the last several days. Pt's family states that the drainage has been clear and that the pt has had decreased appetite recently. Pt denies N/V but complains of R hip pain when lying supine. Per rehabilitation facility, the pt had a fever this morning and was told to come to the ED after calling Dr. Hernandez (neurosurgery). Pt is on IV Penicillin for prior epidural abscess.     Duration:  Several days  Onset: gradual  Timing: constant  Location: L-Spine  Radiation: none  Quality: clear  Intensity/Severity: moderate  Progression: worsening  Associated Symptoms: fever, R hip pain  Aggravating Factors: none  Alleviating Factors: none  Previous Episodes: Pt's family states that the pt has a history of an epidural abscess  Treatment before arrival: Pt is on IV Penicillin for prior epidural abscess.     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   • Nausea & vomiting 06/20/2017   • Fall 06/20/2017   • Noncompliance with medication regimen 06/20/2017   • Left leg weakness 06/20/2017   • Closed wedge compression fracture of eleventh thoracic vertebra 06/20/2017   • CKD (chronic kidney disease) stage 2, GFR 60-89 ml/min 06/20/2017   • B12 deficiency 06/21/2017   • A-fib 06/23/2017   • Meningioma 06/24/2017   • SIADH (syndrome of inappropriate ADH production) 06/25/2017   • Hyponatremia 06/26/2017   • Vitamin D deficiency 06/26/2017   • Epidural abscess 07/04/2017     Resolved Ambulatory Problems     Diagnosis Date Noted   • Diarrhea 06/20/2017   • Metabolic acidosis 06/23/2017   • Hypocalcemia 06/23/2017   •  Left inguinal hernia 06/24/2017   • UTI (urinary tract infection) 06/26/2017   • Pseudomonas infection 06/28/2017     Past Medical History:   Diagnosis Date   • Coronary artery disease    • Dementia    • GERD (gastroesophageal reflux disease)    • Hyperlipidemia    • Hypertension    • Plasmacytoma        PAST SURGICAL HISTORY  Past Surgical History:   Procedure Laterality Date   • FRACTURE SURGERY     • INGUINAL HERNIA REPAIR Left 6/24/2017    Procedure: LAPAROSCOPIC LEFT INGUINAL HERNIA REPAIR WITH MESH;  Surgeon: Erwin Mei MD;  Location: [facility];  Service:    • JOINT REPLACEMENT      bilateral hips   • LUMBAR LAMINECTOMY DISCECTOMY DECOMPRESSION N/A 7/4/2017    Procedure: LUMBAR LAMINECTOMY FOR RESECTION OF EPIDURAL ABCESS ;  Surgeon: Nolberto Hernandez IV, MD;  Location: [facility];  Service:        FAMILY HISTORY  History reviewed. No pertinent family history.    SOCIAL HISTORY  Social History     Social History   • Marital status:      Spouse name: N/A   • Number of children: N/A   • Years of education: N/A     Occupational History   • Not on file.     Social History Main Topics   • Smoking status: Former Smoker   • Smokeless tobacco: Not on file   • Alcohol use No   • Drug use: No   • Sexual activity: Defer     Other Topics Concern   • Not on file     Social History Narrative   • No narrative on file       ALLERGIES  Review of patient's allergies indicates no known allergies.    REVIEW OF SYSTEMS  Review of Systems   Unable to perform ROS: Dementia   Constitutional: Positive for fever (per rehabilitation facility).   Musculoskeletal: Positive for arthralgias (R hip pain while lying supine).   Skin: Positive for wound (drainage from prior L-Spine surgical incision).       PHYSICAL EXAM  ED Triage Vitals   Temp Heart Rate Resp BP SpO2   08/09/17 1406 08/09/17 1406 08/09/17 1406 08/09/17 1502 08/09/17 1406   99.7 °F (37.6 °C) 92 18 132/73 94 %      Temp src Heart Rate Source Patient  Position BP Location FiO2 (%)   08/09/17 1406 08/09/17 1502 08/09/17 1502 08/09/17 1502 --   Tympanic Monitor Sitting Right arm      Physical Exam   Constitutional: He is well-developed, well-nourished, and in no distress.   HENT:   Head: Normocephalic and atraumatic.   Eyes: EOM are normal. Pupils are equal, round, and reactive to light.   Neck: Normal range of motion. Neck supple.   Cardiovascular: Normal rate, regular rhythm and normal heart sounds.    Pulmonary/Chest: Effort normal and breath sounds normal. No respiratory distress.   Abdominal: Soft. There is no tenderness. There is no rebound and no guarding.   Genitourinary:   Genitourinary Comments: Gong catheter in place   Musculoskeletal: Normal range of motion. He exhibits no edema.        Lumbar back: He exhibits no tenderness.   ACE wraps on BLE. Clear, yellow drainage from a <1cm area of wound dehiscence from prior L-Spine surgical incision w/o signs of infection. PICC line present in RUE.   Neurological: He is alert.   No motor function to the LLE, which is chronic.   Skin: Skin is warm and dry. No erythema.   Superficial pressure ulcer on the sacrum   Nursing note and vitals reviewed.      LAB RESULTS  Lab Results (last 24 hours)     Procedure Component Value Units Date/Time    CBC & Differential [[id number]] Collected:  08/09/17 1537    Specimen:  Blood Updated:  08/09/17 0393    Narrative:       The following orders were created for panel order CBC & Differential.  Procedure                               Abnormality         Status                     ---------                               -----------         ------                     CBC Auto Differential[[id number]]        Abnormal            Final result                 Please view results for these tests on the individual orders.    Basic Metabolic Panel [[id number]]  (Abnormal) Collected:  08/09/17 1537    Specimen:  Blood Updated:  08/09/17 1614     Glucose 111 (H) mg/dL      BUN 18 mg/dL       Creatinine 0.78 mg/dL      Sodium 124 (L) mmol/L      Potassium 4.3 mmol/L      Chloride 89 (L) mmol/L      CO2 23.3 mmol/L      Calcium 8.4 (L) mg/dL      eGFR Non African Amer 95 mL/min/1.73      BUN/Creatinine Ratio 23.1     Anion Gap 11.7 mmol/L     Narrative:       The MDRD GFR formula is only valid for adults with stable renal function between ages 18 and 70.    C-reactive Protein [[id number]]  (Abnormal) Collected:  08/09/17 1537    Specimen:  Blood Updated:  08/09/17 1614     C-Reactive Protein 11.59 (H) mg/dL     Sedimentation Rate [[id number]]  (Abnormal) Collected:  08/09/17 1537    Specimen:  Blood Updated:  08/09/17 1647     Sed Rate 45 (H) mm/hr     CBC Auto Differential [[id number]]  (Abnormal) Collected:  08/09/17 1537    Specimen:  Blood Updated:  08/09/17 1553     WBC 14.62 (H) 10*3/mm3      RBC 2.93 (L) 10*6/mm3      Hemoglobin 9.3 (L) g/dL      Hematocrit 27.6 (L) %      MCV 94.2 fL      MCH 31.7 pg      MCHC 33.7 g/dL      RDW 14.8 (H) %      RDW-SD 50.8 fl      MPV 8.3 fL      Platelets 270 10*3/mm3      Neutrophil % 83.6 (H) %      Lymphocyte % 8.3 (L) %      Monocyte % 6.9 %      Eosinophil % 0.8 %      Basophil % 0.1 %      Immature Grans % 0.3 %      Neutrophils, Absolute 12.21 (H) 10*3/mm3      Lymphocytes, Absolute 1.21 10*3/mm3      Monocytes, Absolute 1.01 10*3/mm3      Eosinophils, Absolute 0.12 10*3/mm3      Basophils, Absolute 0.02 10*3/mm3      Immature Grans, Absolute 0.05 (H) 10*3/mm3     Blood Culture [[id number]] Collected:  08/09/17 1538    Specimen:  Blood from Arm, Left Updated:  08/09/17 1546          I ordered the above labs and reviewed the results    RADIOLOGY  XR Chest Post CVA Port   Final Result   Right arm PICC terminates in good position over the SVC.   MRI Lumbar Spine With & Without Contrast    (Results Pending)   MRI Pelvis With & Without Contrast    (Results Pending)    1840- MRI Results will be called to Dr. Hernandez (neurosurgery)    I ordered the above noted  radiological studies. Interpreted by radiologist.. Reviewed by me in PACS.       PROCEDURES  Procedures      PROGRESS AND CONSULTS  ED Course     1425- Discussed the plan to order MRI L-Spine and blood work for further evaluation of the pt's symptoms. Discussed the plan to consult Dr. Hernandez (neurosurgery) as well. Pt and family agree with the plan and all questions were addressed.    1429- Ordered blood work, blood cultures, Sed rate, CRP and MRI L-Spine for further evaluation. Placed call to Dr. Hernandez (neurosurgery) for consult.    1442- Discussed the pt's case with Dr. Hernandez (neurosurgery) in the ED. He will evaluate the pt in the ED.    1451- Dr. Ponce (ID) is in the ED for consult. He requests that I add a MRI Pelvis for further evaluation, which I will do.    1457- Placed call to [facility] for admission.    1509- Ordered CXR for further evaluation.    1538- Discussed the pt's case with Dr. Rubin ([facility]) who agrees to admit the pt to a Med/Surg bed.    MEDICAL DECISION MAKING  Results were reviewed/discussed with the patient and they were also made aware of online access. Pt also made aware that some labs, such as cultures, will not be resulted during ER visit and follow up with PMD is necessary.     MDM  Number of Diagnoses or Management Options     Amount and/or Complexity of Data Reviewed  Clinical lab tests: ordered and reviewed (CRP=11.59, Sed Rate=45)  Tests in the radiology section of CPT®: ordered and reviewed (CXR shows the right arm PICC terminates in good position over the SVC.)  Decide to obtain previous medical records or to obtain history from someone other than the patient: yes  Obtain history from someone other than the patient: yes (family)  Review and summarize past medical records: yes (Pt was admitted in June 2017 for an epidural abscess. Pt had a lumbar laminectomy at L3 to L5 with drainage of epidural abscess by Dr. Hernandez (neurosurgery).)  Discuss the patient with other providers: yes (D/w   David (neurosurgery), Dr. Ponce (ID) and Dr. Rubin ([facility]))  Independent visualization of images, tracings, or specimens: yes           DIAGNOSIS  Final diagnoses:   Sacral decubitus ulcer, unspecified pressure ulcer stage   Postoperative wound dehiscence, lumbar spine, initial encounter   Fever in adult, reported       DISPOSITION  ADMISSION    Discussed treatment plan and reason for admission with pt/family and admitting physician.  Pt/family voiced understanding of the plan for admission for further testing/treatment as needed.       Latest Documented Vital Signs:  As of 6:39 PM  BP- 127/86 HR- 88 Temp- 98.2 °F (36.8 °C) (Oral) O2 sat- 95%    --  Documentation assistance provided by carmina Lassiter for Dr. Estrada.  Information recorded by the scribe was done at my direction and has been verified and validated by me.     Marleni Lassiter  08/09/17 9780       Ricky Estrada MD  08/10/17 5647

## 2017-08-09 NOTE — TELEPHONE ENCOUNTER
sarika Andujar RN/Johanny Anderson states that the patients wife and grandson are bringing him to the ED.  They left the facility at approx 12:50.

## 2017-08-09 NOTE — TELEPHONE ENCOUNTER
I spoke w/Pratima RN, at [facility] in order to get information about patient to schedule MRI.  Pratima states that the facility was going to be contacting our office as their house doctor feels as if the patient needs to be seen due to his incision has significant drainage.    I spoke with Dr. Ponce to see if he wanted to bring the patient into the clinic prior to his 08/15 appointment.  Dr. Bullington called and spoke with the patients surgeon, Dr. Hernandez.  Both Dr. Ponce and Dr. Hernandez feel as if the patient should come to the ED for evaluation and MRI.    I called Pratima back and informed her of Dr. Pollard and Dr. Gonzalez request to bring patient to ED.  Pratima states that she will speak with patients wife, but is not sure how they will arrange transportation for the patient.  Pratima states that she will call our office back once arrangements are made.

## 2017-08-09 NOTE — CONSULTS
Referring Provider: Wilbert Hernandez MD    Reason for Consultation: lumbar spine infection    History of present illness:  Pavel is a 84 YO who I am asked to evaluate and give opinion for drainage at lumbar incision site. History is obtained from the patient, daughter, and review of the old medical records which I summarize/synthesize as follows: I took care of him last admission when he was here with a lumbar spine infection and psoas abscess secondary to Enterococcus. He was discharged to complete 8 weeks of IV PCN G w/ stop date 8/15/17. Over the past week his CRP has been rising and his facility contact me today to say that there was drainage from the incision so I instructed him to come to the ER. His daughter says there has been drainage from the wound for about 3 weeks. It is most clear w/ some blood tinge to it. She says he has been cold and maybe had some chills but no documented fevers. He has developed a stage 3 sacral wound ulcer. His Gong catheter remains in place since he was discharged due to urinary retention. They do not think he has had a voiding trial yet.     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     Current Outpatient Prescriptions:   •  atorvastatin (LIPITOR) 10 MG tablet, Take 10 mg by mouth Daily., Disp: , Rfl:   •  cholecalciferol 5000 UNITS tablet, Take 5,000 Units by mouth Daily., Disp: , Rfl:   •  diltiaZEM CD (CARDIZEM CD) 240 MG 24 hr capsule, Take 1 capsule by mouth Daily., Disp: , Rfl:   •  pantoprazole (PROTONIX) 40 MG EC tablet, Take 1 tablet by mouth 2 (Two) Times a Day Before Meals., Disp: , Rfl:   •  penicillin G potassium 4 Million Units in sodium chloride 0.9 % 100 mL IVPB, Infuse 4 Million Units  into a venous catheter Every 4 (Four) Hours for 36 days. Indications: Bone and/or Joint Infection, Disp: , Rfl: 0  •  ramipril (ALTACE) 5 MG capsule, Take 1 capsule by mouth Daily., Disp: , Rfl:   •  vitamin B-12 (VITAMIN B-12) 1000 MCG tablet, Take 1 tablet by mouth Daily., Disp: , Rfl:       Review of Systems  All systems were reviewed and are negative unless otherwise stated above in the HPI    Objective   Vital Signs   Temp:  [99.7 °F (37.6 °C)] 99.7 °F (37.6 °C)  Heart Rate:  [92] 92  Resp:  [18] 18    Physical Exam:     General: awake, alert, chronically ill appearing  Head: Normocephalic, atraumatic  Eyes: PERRL, EOMI, no scleral icterus, no conjunctival pallor, no conjunctival hemorrhages.   ENT: MMM, OP clear, no thrush. Fair dentition.   Neck: Supple, no visible thyromegaly  Cardiovascular: NR, RR, no murmurs, rubs, or gallops; no LE edema  Respiratory: Lungs are clear to ascultation bilaterally, no rales or wheezing; normal work of breathing on ambient air  GI: Abdomen is soft, non-tender, non-distended, normal bowel sounds in all four quadrants; no hepatosplenomegaly, no masses palpated  : Gong catheter present w/ clear yellow urine  Musculoskeletal: L spine w/ scant dehiscence and drainage and superior portion; drainage is mostly clear and bloody. No surrounding erythema  Skin: No rashes, lesions, or embolic phenomenon  Neurological: Alert and oriented x 3, cranial nerves 2-12 grossly intact, motor strength 5/5 in UEs and RLE; chronic 3/5 strength in LLE  Psychiatric: Normal mood and affect   Lymph: no pre-auricular, post-auricular, submandibular, cervical, supraclavicular  LAD  Vasc: no cyanosis; PICC in RUE w/o erythema    Labs:    monitoring labs with   CBC, CMP, CRP, BCx ordered in ER    Microbiology:  8/9 BCx: pending    Radiology (personally reviewed images/report):  MRI L spine and pelvis ordered    Assessment/Plan   1. Epidural and retroperitoneal abscess secondary to  ampicillin-sensitive Enterococcus s/p washout 7/4/17 and s/p IR guided drainage of RP abscess on 7/5/17  2. Wound dehiscence and drainage  3. Chronic urinary retention  4. New sacral ulcer    I recommend MRI L spine to evaluate for fluid collection and MRI pelvis to further evaluate the sacrum with new ulcer. Tomorrow we will consult urology for urinary retention and chronic Gong. I suspect he will need a voiding trial. For now, continue PCN G 4 million units q4h. Follow-up CBC, CMP, CRP, and blood cultures. Repeat CBC w/ AM labs. It seems surgical plans are dependent upon MRI results.    Thank you for this consult. ID will follow. I discussed the case w/ Dr Hernandez.

## 2017-08-09 NOTE — CONSULTS
Patient name: Pavel Melgoza  Referring Provider: Dr. Ricky Estrada  Reason for Consultation: lumbar wound drainage    Patient Care Team:  Amanda Tanner MD as PCP - General (Family Medicine)    Chief complaint: wound drainage    Subjective .     History of present illness:    Patient is a 83 y.o.  male who presents with history of lumbar epidural abscess after LESI for chronic back pain and right hip pain on 6/22/17. 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 He has brain lesion recently evaluated by Dr. Bentley, suspected meningioma with planned OP follow-up.  He is s/p L3-5 laminectomy and epidural abscess drainage on 7/4/17. He did well PO and we transferred to rehab. He was unable to attend his PO appt in our office and permission was granted for staple removal by facility nursing staff. Family states the wound has been draining since. We have not been notified of this until today. He reports right hip pain and LBP. He denies fever, chills, headaches, neck pain. His family states he has a decubitis ulcer. He has not participated well with therapies at rehab. His family states he has had same fernandez catheter since his discharge from hospital.    Review of Systems  Review of Systems   Constitutional: Negative for chills and fever.   Gastrointestinal: Negative for nausea and vomiting.   Genitourinary:        Has fernandez   Musculoskeletal: Positive for arthralgias (right hip) and back pain. Negative for neck stiffness.   Neurological: Negative for headaches. Weakness: left leg-chronic.   Psychiatric/Behavioral: Negative for confusion.       History  PAST MEDICAL HISTORY  Past Medical History:   Diagnosis Date   • Coronary artery disease    • Dementia    • GERD (gastroesophageal reflux disease)    • Hyperlipidemia    • Hypertension    • Plasmacytoma        PAST SURGICAL HISTORY  Past Surgical History:   Procedure Laterality Date   • FRACTURE SURGERY     • INGUINAL HERNIA REPAIR Left  6/24/2017    Procedure: LAPAROSCOPIC LEFT INGUINAL HERNIA REPAIR WITH MESH;  Surgeon: Erwin Mei MD;  Location: [facility] OR;  Service:    • JOINT REPLACEMENT      bilateral hips   • LUMBAR LAMINECTOMY DISCECTOMY DECOMPRESSION N/A 7/4/2017    Procedure: LUMBAR LAMINECTOMY FOR RESECTION OF EPIDURAL ABCESS ;  Surgeon: Nolberto Hernandez IV, MD;  Location: [facility] OR;  Service:        FAMILY HISTORY  History reviewed. No pertinent family history.    SOCIAL HISTORY  Social History   Substance Use Topics   • Smoking status: Former Smoker   • Smokeless tobacco: None   • Alcohol use No       retired  [employer]    Allergies:  Review of patient's allergies indicates no known allergies.    MEDICATIONS:    Current Facility-Administered Medications:   •  penicillin G potassium 4 Million Units in sodium chloride 0.9 % 100 mL IVPB, 4 Million Units, Intravenous, Q4H, Chandra Ponce MD, 4 Million Units at 08/09/17 1546    Current Outpatient Prescriptions:   •  atorvastatin (LIPITOR) 10 MG tablet, Take 10 mg by mouth Daily., Disp: , Rfl:   •  cholecalciferol 5000 UNITS tablet, Take 5,000 Units by mouth Daily., Disp: , Rfl:   •  diltiaZEM CD (CARDIZEM CD) 240 MG 24 hr capsule, Take 1 capsule by mouth Daily., Disp: , Rfl:   •  pantoprazole (PROTONIX) 40 MG EC tablet, Take 1 tablet by mouth 2 (Two) Times a Day Before Meals., Disp: , Rfl:   •  penicillin G potassium 4 Million Units in sodium chloride 0.9 % 100 mL IVPB, Infuse 4 Million Units into a venous catheter Every 4 (Four) Hours for 36 days. Indications: Bone and/or Joint Infection, Disp: , Rfl: 0  •  ramipril (ALTACE) 5 MG capsule, Take 1 capsule by mouth Daily., Disp: , Rfl:   •  vitamin B-12 (VITAMIN B-12) 1000 MCG tablet, Take 1 tablet by mouth Daily., Disp: , Rfl:     Objective     Results Review:  LABS:    Results from last 7 days  Lab Units 08/09/17  1537   WBC 10*3/mm3 14.62*   HEMOGLOBIN g/dL 9.3*   HEMATOCRIT % 27.6*   PLATELETS  10*3/mm3 270       ESR, CRP, Bld cx, BMP pending      DIAGNOSTICS:  None yet    Results Review:   I reviewed the patient's new clinical results.      Vital Signs   Temp:  [99.7 °F (37.6 °C)] 99.7 °F (37.6 °C)  Heart Rate:  [92] 92  Resp:  [18] 18  BP: (132)/(73) 132/73    Physical Exam:  Physical Exam   Constitutional: He is oriented to person, place, and time. He appears well-developed and well-nourished.   HENT:   Head: Normocephalic and atraumatic.   Neck: Neck supple.   Pulmonary/Chest: Effort normal.   Musculoskeletal:        Cervical back: He exhibits normal range of motion, no tenderness and no bony tenderness.   Neurological: He is alert and oriented to person, place, and time. A sensory deficit (left leg) is present. He exhibits abnormal muscle tone (left leg). Abnormal gait: not assessed due to weakness. GCS eye subscore is 4. GCS verbal subscore is 5. GCS motor subscore is 6.   Strength right LE 5/5, Left LE 2/5 throughout- chronic   Skin: Skin is warm and dry.   Midline lumbar incision with dressing- removed; well approximated except for approximately 1 cm length at mid point of wound dehisence; No redness or swelling, small amount of serosanginous drainage- no purulence; no clear efflux with valsalva or palpation;     Diffuse sacral pressure wound with dark necrosis but no ulcerated area-tender   Psychiatric: He has a normal mood and affect. His behavior is normal. Thought content normal.   Vitals reviewed.    Neurologic Exam     Mental Status   Oriented to person, place, and time.       Assessment/Plan     Active Problems:    Sacral decubitus ulcer    Postoperative wound dehiscence      PLAN: Patient with suspected wound infection due to reports at phone call in to office; however, wound looks clean but there is an area of dehiscence where the drainage is eminating that needs revision. He has no headache or nuchal rigidity so doubtful of CSF leak (he did have dural tear at time of OR). He has no fever.  We will need to evaluate the underlying tissue to ensure no deep infection pocket. Additionally, he has a new sacral wound- necrotic. Pelvic MRI requested by Dr. Ponce to evaluate further. Labs and MRIs ordered. NPO after MN for possible intervention. Chronic fernandez catheter needs to be addressed as well. Dr. Ponce note indicates plan to consult urology in the AM.    I, ANITRA Dennis, acted solely as a scribe in this encounter for Dr. Hernandez who obtained the history, exam, and determined the assessment and treatment plan for patient.       I discussed the patients findings and my recommendations with patient, family and Dr. Ponce.    ANITRA Dallas  08/09/17  4:01 PM

## 2017-08-09 NOTE — PLAN OF CARE
Problem: Patient Care Overview (Adult)  Goal: Plan of Care Review  Outcome: Ongoing (interventions implemented as appropriate)    08/09/17 1926   Coping/Psychosocial Response Interventions   Plan Of Care Reviewed With patient   Patient Care Overview   Progress no change   Outcome Evaluation   Outcome Summary/Follow up Plan Pt admitted today. Epidural abscess leaking serosang fluid. PU on sacrum covered with mepilex and WOCN consulted. Surgery tomorrow for epidural abscess, therefore NPO after midnight. 1000 cc fluid restriction started. VSS on RA. NSR on monitor. Turn q2 hours. No acute signs of distress, will continue to monitor.       Goal: Adult Individualization and Mutuality  Outcome: Ongoing (interventions implemented as appropriate)  Goal: Discharge Needs Assessment  Outcome: Ongoing (interventions implemented as appropriate)    Problem: Pressure Ulcer (Adult)  Goal: Signs and Symptoms of Listed Potential Problems Will be Absent or Manageable (Pressure Ulcer)  Outcome: Ongoing (interventions implemented as appropriate)    Problem: Fall Risk (Adult)  Goal: Identify Related Risk Factors and Signs and Symptoms  Outcome: Outcome(s) achieved Date Met:  08/09/17  Goal: Absence of Falls  Outcome: Ongoing (interventions implemented as appropriate)

## 2017-08-10 NOTE — PROGRESS NOTES
" LOS: 1 day     Name: Pavel Melgoza  Age: 83 y.o.  Sex: male  :  1934  MRN: [id number]         Primary Care Physician: Amanda Tanner MD    Subjective   Subjective  Back pain improved status post surgical debridement this morning.  Appears more awake and alert this afternoon than he did earlier today per family.    Objective   Vital Signs  Temp:  [97.5 °F (36.4 °C)-98.9 °F (37.2 °C)] 98.9 °F (37.2 °C)  Heart Rate:  [] 64  Resp:  [16-24] 18  BP: ()/(48-86) 94/48  Body mass index is 23.74 kg/(m^2).    Objective:  General Appearance:  Comfortable and in no acute distress.    Vital signs: (most recent): Blood pressure 94/48, pulse 64, temperature 98.9 °F (37.2 °C), temperature source Oral, resp. rate 18, height 72.01\" (182.9 cm), weight 175 lb 0.7 oz (79.4 kg), SpO2 96 %.    Lungs:  Normal respiratory rate and normal effort.    Heart: Normal rate.  Regular rhythm.    Abdomen: Abdomen is soft.  Bowel sounds are normal.   There is no abdominal tenderness.     Extremities: There is no local swelling or dependent edema.    Neurological: Patient is alert and oriented to person, place and time.    Skin:  Warm and dry.              Results Review:       I reviewed the patient's new clinical results.      Results from last 7 days  Lab Units 08/10/17  0719 17  1537   WBC 10*3/mm3 10.20 14.62*   HEMOGLOBIN g/dL 7.3* 9.3*   PLATELETS 10*3/mm3 268 270       Results from last 7 days  Lab Units 08/10/17  0719 17  1537   SODIUM mmol/L 126* 124*   POTASSIUM mmol/L 4.1 4.3   CHLORIDE mmol/L 92* 89*   CO2 mmol/L 23.0 23.3   BUN mg/dL 15 18   CREATININE mg/dL 0.62* 0.78   CALCIUM mg/dL 8.1* 8.4*   GLUCOSE mg/dL 113* 111*       Results from last 7 days  Lab Units 17  2015   INR  1.16*             Scheduled Meds:     atorvastatin 10 mg Oral Daily   castor oil-balsam peru  Topical Q12H   cefepime 2 g Intravenous Q12H   cholecalciferol 5,000 Units Oral Daily   diltiaZEM  mg Oral Q24H "   pantoprazole 40 mg Oral BID AC   ramipril 5 mg Oral Daily   tamsulosin 0.4 mg Oral Nightly   [START ON 8/11/2017] vancomycin 1,500 mg Intravenous Q12H   vancomycin 1,750 mg Intravenous Once   cyanocobalamin 1,000 mcg Oral Daily     PRN Meds:   •  acetaminophen  •  bisacodyl  •  bisacodyl  •  bisacodyl  •  calcium carbonate  •  docusate sodium  •  HYDROcodone-acetaminophen  •  magnesium hydroxide  •  magnesium hydroxide  •  methocarbamol  •  Morphine **AND** naloxone  •  Morphine **AND** naloxone  •  ondansetron  •  ondansetron **OR** ondansetron ODT **OR** ondansetron  •  Pharmacy to dose vancomycin  •  sennosides-docusate sodium  •  sodium chloride  •  sodium chloride  Continuous Infusions:    Pharmacy to dose vancomycin        Assessment/Plan   Principal Problem:    Epidural abscess  Active Problems:    Left leg weakness    CKD (chronic kidney disease) stage 2, GFR 60-89 ml/min    A-fib    SIADH (syndrome of inappropriate ADH production)    Hyponatremia    Sacral decubitus ulcer    Postoperative wound dehiscence    Dementia      Assessment & Plan    - Status post debridement of epidural abscess this morning.  Continue antibiotics per infectious disease and await operative cultures.  - Local wound care and offloading measures for treatment of sacral decubitus ulcer  - Agree that the patient would benefit from urology evaluation during this admission and feel that he will be appropriate for this tomorrow  - Renal following for hyponatremia, currently on fluid restriction.  - Repeat hemoglobin in the morning.  - PT and CCP to see and assist with discharge charge planning      Wong Plummer MD  [facility]  08/10/17  2:59 PM

## 2017-08-10 NOTE — PROGRESS NOTES
Continued Stay Note  [facility]     Patient Name: Pavel Melgoza  MRN: [id number]  Today's Date: 8/10/2017    Admit Date: 8/9/2017          Discharge Plan       08/10/17 1508    Case Management/Social Work Plan    Plan [facility] at d/c    Patient/Family In Agreement With Plan yes    Additional Comments S/w patient and family at bedside and verified face sheet.  IMM explained and signed.  Patient is from [facility] and would like to go to [facility], closer to home.  Notified Jo/Seleney of patient request and she states they should have a bed at [facility].  Patient used a walker at home and they are hoping that patient will be able to walk with a walker again and go home with wife.  Patient has a living will on file with the hospital.  Patient uses [facility] on [address] for medications and has no problems paying for them.  Will continue to monitor and assist, as needed.  Valerie RN, CCP              Discharge Codes     None            Analilia Kebede RN

## 2017-08-10 NOTE — PLAN OF CARE
Problem: Pressure Ulcer (Adult)  Intervention: Promote/Optimize Nutrition  Once diet is advanced, will add Ensure Enlive BID and Adair BID to promote wound healing.     Recommend the addition of a MVI with minerals, vitamin C 500 mg x 14 days, and zinc to promote wound healing.

## 2017-08-10 NOTE — CONSULTS
[facility]                                                                                             Nephrology Initial Consult Note    Patient Identification:  Name: Pavel Melgoza MRN: [id number]  Age: 83 y.o. : 1934  Sex: male  Date:8/10/2017    Requesting Physician: As per consult order.  Reason for Consultation: Hyponatremia, SIADH  Information from:patient/ family/ chart      History of Present Illness: This is a 83 y.o. year old male  with history of hyponatremia, evidence of SIADH with the previous laboratory evaluation during his previous admission.  Admitted with epidural abscess status post debridement by neurosurgical service this morning.  Initial sodium was 124, increased to 126 with a.m. labs and he denies any confusion, lightheadedness, dizziness, chest pain or shortness of breath.  No lower extremity edema, no nausea or vomiting with good appetite.  He was removed yesterday evening with recurrent fevers, drainage from his prior wound was admitted and had the above-mentioned surgery earlier this morning.  He has not responded well to fluid restriction the past but has had good response to oral Samsca.     The following medical history and medications personally reviewed by me:    Problem List:   Patient Active Problem List    Diagnosis   • Sacral decubitus ulcer [L89.159]   • Postoperative wound dehiscence [T81.31XA]   • Dementia [F03.90]   • Epidural abscess [G06.2]   • Hyponatremia [E87.1]   • Vitamin D deficiency [E55.9]   • SIADH (syndrome of inappropriate ADH production) [E22.2]   • Meningioma [D32.9]   • A-fib [I48.91]   • B12 deficiency [E53.8]   • Generalized weakness [R53.1]   • Plasmocytoma [C90.30]   • Nausea & vomiting [R11.2]   • Fall [W19.XXXA]   • Noncompliance with medication regimen [Z91.14]   • Left leg weakness [M62.81]     Overview Note:     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     • Closed wedge compression fracture of eleventh thoracic vertebra [S22.080A]   • CKD (chronic kidney disease) stage 2, GFR 60-89 ml/min [N18.2]       Past Medical History:  Past Medical History:   Diagnosis Date   • Coronary artery disease    • Dementia    • GERD (gastroesophageal reflux disease)    • Hyperlipidemia    • Hypertension    • Plasmacytoma        Past Surgical History:  Past Surgical History:   Procedure Laterality Date   • FRACTURE SURGERY     • INGUINAL HERNIA REPAIR Left 6/24/2017    Procedure: LAPAROSCOPIC LEFT INGUINAL HERNIA REPAIR WITH MESH;  Surgeon: Erwin Mei MD;  Location: [facility];  Service:    • JOINT REPLACEMENT      bilateral hips   • LUMBAR LAMINECTOMY DISCECTOMY DECOMPRESSION N/A 7/4/2017    Procedure: LUMBAR LAMINECTOMY FOR RESECTION OF EPIDURAL ABCESS ;  Surgeon: Nolberto Hernandez IV, MD;  Location: [facility];  Service:         Home Meds:   Prescriptions Prior to Admission   Medication Sig Dispense Refill Last Dose   • atorvastatin (LIPITOR) 10 MG tablet Take 10 mg by mouth Daily.   Unknown at Unknown time   • cholecalciferol 5000 UNITS tablet Take 5,000 Units by mouth Daily.   Unknown at Unknown time   • diltiaZEM CD (CARDIZEM CD) 240 MG 24 hr capsule Take 1 capsule by mouth Daily.   Unknown at Unknown time   • pantoprazole (PROTONIX) 40 MG EC tablet Take 1 tablet by mouth 2 (Two) Times a Day Before Meals.   Unknown at Unknown time   • penicillin G potassium 4 Million Units in sodium chloride 0.9 % 100 mL IVPB Infuse 4 Million Units into a venous catheter Every 4 (Four) Hours for 36 days. Indications: Bone and/or Joint Infection  0 Unknown at Unknown time   • ramipril (ALTACE) 5 MG capsule Take 1 capsule by mouth Daily.   Unknown at Unknown time   • vitamin B-12 (VITAMIN B-12) 1000 MCG tablet Take 1 tablet by mouth Daily.   Unknown at Unknown time       Current Meds:   Current Facility-Administered Medications   Medication Dose  Route Frequency Provider Last Rate Last Dose   • acetaminophen (TYLENOL) tablet 650 mg  650 mg Oral Q6H PRN Malathi Rubin MD   650 mg at 08/10/17 0326   • atorvastatin (LIPITOR) tablet 10 mg  10 mg Oral Daily Malathi Rubin MD   10 mg at 08/10/17 1101   • bisacodyl (DULCOLAX) EC tablet 5 mg  5 mg Oral Daily PRN Nolberto Hernandez IV, MD       • bisacodyl (DULCOLAX) suppository 10 mg  10 mg Rectal Daily PRN Malathi Rubin MD       • bisacodyl (DULCOLAX) suppository 10 mg  10 mg Rectal Daily PRN Nolberto Hernandez IV, MD       • calcium carbonate (TUMS) chewable tablet 500 mg (200 mg elemental)  1 tablet Oral BID PRN Malathi Rubin MD       • castor oil-balsam peru (VENELEX) ointment   Topical Q12H Wong Plummer MD       • cefepime (MAXIPIME) 2 g/100 mL 0.9% NS (mbp)  2 g Intravenous Q12H Chandra Ponce MD   2 g at 08/10/17 1207   • cholecalciferol (VITAMIN D3) tablet 5,000 Units  5,000 Units Oral Daily Malathi Rubin MD   5,000 Units at 08/10/17 1102   • diltiaZEM CD (CARDIZEM CD) 24 hr capsule 240 mg  240 mg Oral Q24H Malathi Rubin MD   Stopped at 08/10/17 1106   • docusate sodium (COLACE) capsule 100 mg  100 mg Oral BID PRN Nolberto Hernandez IV, MD       • HYDROcodone-acetaminophen (NORCO) 5-325 MG per tablet 1 tablet  1 tablet Oral Q4H PRN Malathi Rubin MD       • magnesium hydroxide (MILK OF MAGNESIA) suspension 2400 mg/10mL 10 mL  10 mL Oral Daily PRN Malathi Rubin MD       • magnesium hydroxide (MILK OF MAGNESIA) suspension 2400 mg/10mL 10 mL  10 mL Oral Daily PRN Nolberto Hernandez IV, MD       • methocarbamol (ROBAXIN) tablet 1,000 mg  1,000 mg Oral 4x Daily PRN Nolberto Lara David IV, MD       • morphine injection 2 mg  2 mg Intravenous Q3H PRN Malathi Rubin MD        And   • naloxone (NARCAN) injection 0.4 mg  0.4 mg Intravenous Q5 Min PRN Malathi Rubin MD       • morphine injection 2 mg  2 mg  Intravenous Q4H PRN Nolberto Hernandez IV, MD        And   • naloxone (NARCAN) injection 0.4 mg  0.4 mg Intravenous Q5 Min PRN Nolberto Hernandez IV, MD       • ondansetron (ZOFRAN) injection 4 mg  4 mg Intravenous Q6H PRN Malathi Rubin MD       • ondansetron (ZOFRAN) tablet 4 mg  4 mg Oral Q6H PRN Nolberto Hernandez IV, MD        Or   • ondansetron ODT (ZOFRAN-ODT) disintegrating tablet 4 mg  4 mg Oral Q6H PRN Nolberto Hernandez IV, MD        Or   • ondansetron (ZOFRAN) injection 4 mg  4 mg Intravenous Q6H PRN Nolberto Hernandez IV, MD       • pantoprazole (PROTONIX) EC tablet 40 mg  40 mg Oral BID AC Malathi Rubin MD   40 mg at 08/10/17 1208   • Pharmacy to dose vancomycin   Does not apply Continuous PRN Chandra Ponce MD       • ramipril (ALTACE) capsule 5 mg  5 mg Oral Daily Malathi Rubin MD   5 mg at 08/10/17 1102   • sennosides-docusate sodium (SENOKOT-S) 8.6-50 MG tablet 1 tablet  1 tablet Oral Nightly PRN Nolberto Hernandez IV, MD       • sodium chloride 0.9 % flush 1-10 mL  1-10 mL Intravenous PRN Malathi Rubin MD       • sodium chloride 0.9 % flush 1-10 mL  1-10 mL Intravenous PRN Nolberto Hernandez IV, MD       • tamsulosin (FLOMAX) 24 hr capsule 0.4 mg  0.4 mg Oral Nightly Malathi Rubin MD   0.4 mg at 08/09/17 2318   • [START ON 8/11/2017] vancomycin 1500 mg/500 mL 0.9% NS IVPB ([facility])  1,500 mg Intravenous Q12H Chandra Ponce MD       • vancomycin 1750 mg/500 mL 0.9% NS IVPB ([facility])  1,750 mg Intravenous Once Chandra Ponce MD   1,750 mg at 08/10/17 1331   • vitamin B-12 (CYANOCOBALAMIN) tablet 1,000 mcg  1,000 mcg Oral Daily Malathi Rubin MD   1,000 mcg at 08/10/17 1102       Allergies:  No Known Allergies    Social History:   Social History     Social History   • Marital status:      Spouse name: N/A   • Number of children: N/A   • Years of education: N/A     Social History Main Topics   • Smoking status: Former  "Smoker   • Smokeless tobacco: None   • Alcohol use No   • Drug use: No   • Sexual activity: Defer     Other Topics Concern   • None     Social History Narrative        Family History:  History reviewed. No pertinent family history.     Review of Systems: as per HPI, in addition:    General:       no weakness / fatigue,                       No fevers / chills                       no weight loss  HEENT:       no dysphagia / odynophagia  Neck:           normal range of motion, no swelling  Respiratory: no cough / congestion                      No shortness of air                       No wheezing  CV:              No chest pain                       No palpitations  Abdomen/GI: no nausea / vomiting                      No diarrhea / constipation                      No abdominal pain  :             no dysuria / urinary frequency                       No urgency, normal output  Endocrine:   no polyuria / polydipsia,                      No heat or cold intolerance  Skin:           no rashes or skin breakdown   Vascular:   No edema                     No claudication  Psych:        no depression/ anxiety  Neuro:        no focal weakness, no seizures  Musculoskeletal: no joint pain or deformities      Physical Exam:  Vitals:   Temp (24hrs), Av.1 °F (36.7 °C), Min:97.5 °F (36.4 °C), Max:98.9 °F (37.2 °C)    BP 94/48 (BP Location: Left arm, Patient Position: Lying)  Pulse 64  Temp 98.9 °F (37.2 °C) (Oral)   Resp 18  Ht 72.01\" (182.9 cm)  Wt 175 lb 0.7 oz (79.4 kg)  SpO2 96%  BMI 23.74 kg/m2  Intake/Output:     Intake/Output Summary (Last 24 hours) at 08/10/17 1630  Last data filed at 08/10/17 1344   Gross per 24 hour   Intake             3260 ml   Output             2080 ml   Net             1180 ml        Wt Readings from Last 1 Encounters:   08/10/17 1159 175 lb 0.7 oz (79.4 kg)   17 1406 175 lb (79.4 kg)     Exam:    General Appearance:  Awake, alert, oriented x3, no acute distress  Well-appearing "   Head and Face:  Normocephalic, atraumatic, mucus membranes moist, oropharynx clear   Eyes:  No icterus, pupils equal round and reactive to light, extraocular movements intact    ENMT: Moist mucosa, tongue symmetric    Neck: Supple  no jugular venous distention  no thyromegaly   Pulmonary:  Respiratory effort: Normal  Auscultation of lungs: Clear bilaterally  No wheezes  No rhonchi  Good air movement, good expansion   Chest wall:  No tenderness or deformity   Cardiovascular:  Auscultation of the heart: Normal rhythm, no murmurs  No edema of extremities    Abdomen:  Abdomen: soft, non-tender, normal bowel sounds all four quadrants, no masses   Liver and spleen: no hepatosplenomegaly   Musculoskeletal: Digits and nails: normal  Normal range of motion  No joint swelling or gross deformities    Skin: Skin inspection: color normal, no visible rashes or lesions  Skin palpation: texture, turgor normal, no palpable lesions   Lymphatic:  no cervical lymphadenopathy    Psychiatric: Judgement and insight: normal  Orientation to person place and time: normal  Mood and affect: normal       DATA:  Radiology and Labs:  I have personally reviewed pertinent old records, labs, meds and pertinent data from the patient chart.    Labs:   Recent Results (from the past 24 hour(s))   Blood Culture    Collection Time: 08/09/17  8:15 PM   Result Value Ref Range    Blood Culture No growth at less than 24 hours    aPTT    Collection Time: 08/09/17  8:15 PM   Result Value Ref Range    PTT 28.7 22.7 - 35.4 seconds   Protime-INR    Collection Time: 08/09/17  8:15 PM   Result Value Ref Range    Protime 14.4 (H) 11.7 - 14.2 Seconds    INR 1.16 (H) 0.90 - 1.10   Urinalysis With / Microscopic If Indicated    Collection Time: 08/10/17  3:42 AM   Result Value Ref Range    Color, UA Yellow Yellow, Straw    Appearance, UA Cloudy (A) Clear    pH, UA 6.0 5.0 - 8.0    Specific Gravity, UA 1.020 1.005 - 1.030    Glucose, UA Negative Negative    Ketones, UA  Negative Negative    Bilirubin, UA Negative Negative    Blood, UA Large (3+) (A) Negative    Protein, UA 30 mg/dL (1+) (A) Negative    Leuk Esterase, UA Moderate (2+) (A) Negative    Nitrite, UA Positive (A) Negative    Urobilinogen, UA 2.0 E.U./dL (A) 0.2 - 1.0 E.U./dL   Sodium, Urine, Random    Collection Time: 08/10/17  3:42 AM   Result Value Ref Range    Sodium, Urine 133 mmol/L   Osmolality, Urine    Collection Time: 08/10/17  3:42 AM   Result Value Ref Range    Osmolality, Urine 526 mOsm/kg   Urinalysis, Microscopic Only    Collection Time: 08/10/17  3:42 AM   Result Value Ref Range    RBC, UA 21-30 (A) None Seen, 0-2 /HPF    WBC, UA 31-50 (A) None Seen, 0-2 /HPF    Bacteria, UA 3+ (A) None Seen /HPF    Squamous Epithelial Cells, UA None Seen None Seen, 0-2 /HPF    Hyaline Casts, UA 0-2 None Seen /LPF    Amorphous Crystals, UA Small/1+ None Seen /HPF    Methodology Automated Microscopy    Basic Metabolic Panel    Collection Time: 08/10/17  7:19 AM   Result Value Ref Range    Glucose 113 (H) 65 - 99 mg/dL    BUN 15 8 - 23 mg/dL    Creatinine 0.62 (L) 0.76 - 1.27 mg/dL    Sodium 126 (L) 136 - 145 mmol/L    Potassium 4.1 3.5 - 5.2 mmol/L    Chloride 92 (L) 98 - 107 mmol/L    CO2 23.0 22.0 - 29.0 mmol/L    Calcium 8.1 (L) 8.6 - 10.5 mg/dL    eGFR Non African Amer 124 >60 mL/min/1.73    BUN/Creatinine Ratio 24.2 7.0 - 25.0    Anion Gap 11.0 mmol/L   Uric Acid    Collection Time: 08/10/17  7:19 AM   Result Value Ref Range    Uric Acid 4.1 3.4 - 7.0 mg/dL   Osmolality, Serum    Collection Time: 08/10/17  7:19 AM   Result Value Ref Range    Osmolality 261 (L) 280 - 301 mOsm/kg   CBC Auto Differential    Collection Time: 08/10/17  7:19 AM   Result Value Ref Range    WBC 10.20 4.50 - 10.70 10*3/mm3    RBC 2.35 (L) 4.60 - 6.00 10*6/mm3    Hemoglobin 7.3 (L) 13.7 - 17.6 g/dL    Hematocrit 22.0 (L) 40.4 - 52.2 %    MCV 93.6 79.8 - 96.2 fL    MCH 31.1 27.0 - 32.7 pg    MCHC 33.2 32.6 - 36.4 g/dL    RDW 15.1 (H) 11.5 - 14.5  %    RDW-SD 51.7 37.0 - 54.0 fl    MPV 7.6 6.0 - 12.0 fL    Platelets 268 140 - 500 10*3/mm3    Neutrophil % 71.5 42.7 - 76.0 %    Lymphocyte % 15.0 (L) 19.6 - 45.3 %    Monocyte % 10.1 5.0 - 12.0 %    Eosinophil % 3.0 0.3 - 6.2 %    Basophil % 0.2 0.0 - 1.5 %    Immature Grans % 0.2 0.0 - 0.5 %    Neutrophils, Absolute 7.29 1.90 - 8.10 10*3/mm3    Lymphocytes, Absolute 1.53 0.90 - 4.80 10*3/mm3    Monocytes, Absolute 1.03 0.20 - 1.20 10*3/mm3    Eosinophils, Absolute 0.31 0.00 - 0.70 10*3/mm3    Basophils, Absolute 0.02 0.00 - 0.20 10*3/mm3    Immature Grans, Absolute 0.02 0.00 - 0.03 10*3/mm3   Wound Culture    Collection Time: 08/10/17  8:45 AM   Result Value Ref Range    Gram Stain Result Rare (1+) WBCs seen     Gram Stain Result No organisms seen    Wound Culture    Collection Time: 08/10/17  8:46 AM   Result Value Ref Range    Gram Stain Result Rare (1+) WBCs seen     Gram Stain Result No organisms seen    Tissue/Bone Culture    Collection Time: 08/10/17  9:09 AM   Result Value Ref Range    Gram Stain Result No organisms seen     Gram Stain Result No WBCs seen    Tissue/Bone Culture    Collection Time: 08/10/17  9:10 AM   Result Value Ref Range    Gram Stain Result Rare (1+) WBCs seen     Gram Stain Result       Rare (1+) Gram positive cocci in pairs and clusters       Radiology:  Imaging Results (last 24 hours)     Procedure Component Value Units Date/Time    XR Chest Post CVA Port [[id number]] Collected:  08/09/17 1653     Updated:  08/09/17 1658    Narrative:       PORTAL CHEST POST CVA PORT     HISTORY: 83-year-old male post PICC placement     COMPARISON: 06/30/2017     FINDINGS:  1. Right arm PICC terminates in good position over the SVC.     This report was finalized on 8/9/2017 4:55 PM by Dr. Donal Thompson MD.       MRI Pelvis With & Without Contrast [[id number]] Collected:  08/09/17 1905     Updated:  08/10/17 0758    Narrative:       MRI OF THE LUMBAR SPINE WITH AND WITHOUT CONTRAST, MRI SACRUM WITH  AND  WITHOUT CONTRAST     HISTORY: 83-year-old with drainage at a lumbar incision site. Stage III  sacral wound ulcer. On 07/04/2017 the patient underwent lumbar  laminectomy for resection of an epidural abscess. Patient has a history  of plasmacytoma.     TECHNIQUE: MRI of the lumbar spine includes sagittal T1, T2 fat sat, PD  as well as axial T1, T2 weighted sequences. Gadolinium administered  intravenously followed by axial and sagittal T1 fat saturated sequences.  MRI of the sacrum includes sagittal PD, T2 fat sat as well as oblique  coronal T2 fat sat, STIR and axial T1 weighted sequences. Gadolinium  administered intravenously followed by axial, coronal and sagittal T1  fat-saturated sequences.     COMPARISON: MRI of the lumbar spine with and without contrast  07/03/2017.     FINDINGS: Since the previous examination there has been posterior  decompressive laminectomies performed at the L3 and L4 levels. There is  epidural enhancement beginning at the level of mid L4 body to the lower  L5 body level extending 3.1 cm in length. This posterior epidural  enhancement measures 0.7 cm in AP dimension effacing the posterior  aspect of the thecal sac with moderate canal narrowing and there is  suspected associated small residual epidural abscess or phlegmon. There  is a peripherally enhancing fluid collection extending deep to the  laminectomy site beginning at the horizontal level of the upper L3 body  and extending to the horizontal level of mid L4 body 4.7 cm in height x  5.5 cm AP x 1 cm in thickness. This collection extends along the  surgical wound nearly extending to the skin surface at the midline.  There is also a collection within the right posterolateral paraspinal  musculature at the horizontal level of L4 and this collection measures  1.6 x 1.4 x 1.8 cm. There is a small far right lateral collection at the  horizontal level of the upper L4 body and the L3-4 disc space extending  above the anterior margin  of the right sacroiliac joint and this  collection measures 1.4 cm diameter.     At L1-2 there is posterior disc osteophyte complex with mild narrowing  of the central canal and mild foraminal narrowing.     At L2-3 there is mild facet overgrowth and there is a disc bulge  eccentric to the left. Central canal and neural foramen are negative.      At L3-4 there has been posterior decompressive laminectomy without  recurrent epidural collection or central canal narrowing. There is  endplate and facet overgrowth with moderate bilateral foraminal  narrowing.     At L4-5 there is bilateral facet overgrowth and flavum thickening. There  is epidural enhancement extending from the horizontal level of mid L4  body to the horizontal level of mid L5 body. This exerts mass effect on  the posterior aspect of the thecal sac and there is moderate canal  narrowing.     At L5-S1 there is moderate-to-severe facet arthritis and there is severe  canal stenosis between the posterior superior cortex of S1 and flavum  thickening posteriorly. There has developed enhancement and T2 increased  signal within the L5 vertebral body. There is also sclerosis within the  L5 vertebral body. There is diminished height of the posterior aspect of  the vertebral body and is Schmorl node formation at the superior  endplate of L5.     There is persistent signal abnormalities within the sacrum and medial  iliac bones. There is a pseudarthrosis with fluid signal within a  horizontally oriented fluid-filled cleft within the upper sacrum. This  fluid-filled cleft extends approximately 7.5 cm transverse by up to 1.3  cm in height and this is similar to the previous exam. There is  sclerosis as well as bone marrow edema and enhancement surrounding this  fluid-filled cleft. 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 Along the superior margin of the gluteal cleft there is  edema and enhancement within the subcutaneous fat where there is  a  decubitus ulcer. There is no abscess in this region or convincing  evidence for osteomyelitis of the distal sacrum or coccyx.       Impression:       1. Since the previous MRI there has been posterior decompressive  laminectomy with drainage of an epidural abscess. Epidural enhancement  is present with small residual epidural abscess or phlegmon posterior to  L4 and L5 vertebral bodies where there is mass effect on the posterior  thecal sac and moderate canal narrowing. There is a fluid collection at  the laminectomy site centered at the L3 and L4 vertebral body levels  measuring 4.7 x 5.5 x 1.0 cm which could represent an infected  collection. There is an additional 1.4 x 1.6 cm collection within the  right posterior paraspinal musculature and there is a third 1.4 cm  collection within the fat superior to the anterior aspect of the right  SI joint.  2. Progressive bone marrow edema and enhancement within the posterior  aspect of L5 vertebral body as well as within the pedicles and posterior  elements. This may represent osteomyelitis in the proper clinical  setting. There is no evidence for disc space infection at L4-5 or L5-S1.  3. At L5-S1 there is persistent severe central canal stenosis. The  central canal at S1 is chronically displaced toward the right and there  is canal stenosis between the posterior superior cortex of S1 and  ligamentous thickening at the L5-S1 level.  4. 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 This appears similar to the previous examination. It would  be difficult to exclude an infected collection within the sacrum.  There  remains extensive signal abnormality within the sacrum and both iliac  bones which is in part related to posttreatment changes in a patient  with a history of plasmacytoma.   5. Sacral decubitus ulcer is present extending just above the gluteal  cleft without convincing evidence for osteomyelitis of the distal sacrum  or  coccyx.     This report was finalized on 8/10/2017 7:55 AM by Dr. Scotty Mitchell MD.       MRI Lumbar Spine With & Without Contrast [[id number]] Collected:  08/09/17 1905     Updated:  08/10/17 0758    Narrative:       MRI OF THE LUMBAR SPINE WITH AND WITHOUT CONTRAST, MRI SACRUM WITH AND  WITHOUT CONTRAST     HISTORY: 83-year-old with drainage at a lumbar incision site. Stage III  sacral wound ulcer. On 07/04/2017 the patient underwent lumbar  laminectomy for resection of an epidural abscess. Patient has a history  of plasmacytoma.     TECHNIQUE: MRI of the lumbar spine includes sagittal T1, T2 fat sat, PD  as well as axial T1, T2 weighted sequences. Gadolinium administered  intravenously followed by axial and sagittal T1 fat saturated sequences.  MRI of the sacrum includes sagittal PD, T2 fat sat as well as oblique  coronal T2 fat sat, STIR and axial T1 weighted sequences. Gadolinium  administered intravenously followed by axial, coronal and sagittal T1  fat-saturated sequences.     COMPARISON: MRI of the lumbar spine with and without contrast  07/03/2017.     FINDINGS: Since the previous examination there has been posterior  decompressive laminectomies performed at the L3 and L4 levels. There is  epidural enhancement beginning at the level of mid L4 body to the lower  L5 body level extending 3.1 cm in length. This posterior epidural  enhancement measures 0.7 cm in AP dimension effacing the posterior  aspect of the thecal sac with moderate canal narrowing and there is  suspected associated small residual epidural abscess or phlegmon. There  is a peripherally enhancing fluid collection extending deep to the  laminectomy site beginning at the horizontal level of the upper L3 body  and extending to the horizontal level of mid L4 body 4.7 cm in height x  5.5 cm AP x 1 cm in thickness. This collection extends along the  surgical wound nearly extending to the skin surface at the midline.  There is also a collection  within the right posterolateral paraspinal  musculature at the horizontal level of L4 and this collection measures  1.6 x 1.4 x 1.8 cm. There is a small far right lateral collection at the  horizontal level of the upper L4 body and the L3-4 disc space extending  above the anterior margin of the right sacroiliac joint and this  collection measures 1.4 cm diameter.     At L1-2 there is posterior disc osteophyte complex with mild narrowing  of the central canal and mild foraminal narrowing.     At L2-3 there is mild facet overgrowth and there is a disc bulge  eccentric to the left. Central canal and neural foramen are negative.      At L3-4 there has been posterior decompressive laminectomy without  recurrent epidural collection or central canal narrowing. There is  endplate and facet overgrowth with moderate bilateral foraminal  narrowing.     At L4-5 there is bilateral facet overgrowth and flavum thickening. There  is epidural enhancement extending from the horizontal level of mid L4  body to the horizontal level of mid L5 body. This exerts mass effect on  the posterior aspect of the thecal sac and there is moderate canal  narrowing.     At L5-S1 there is moderate-to-severe facet arthritis and there is severe  canal stenosis between the posterior superior cortex of S1 and flavum  thickening posteriorly. There has developed enhancement and T2 increased  signal within the L5 vertebral body. There is also sclerosis within the  L5 vertebral body. There is diminished height of the posterior aspect of  the vertebral body and is Schmorl node formation at the superior  endplate of L5.     There is persistent signal abnormalities within the sacrum and medial  iliac bones. There is a pseudarthrosis with fluid signal within a  horizontally oriented fluid-filled cleft within the upper sacrum. This  fluid-filled cleft extends approximately 7.5 cm transverse by up to 1.3  cm in height and this is similar to the previous exam.  There is  sclerosis as well as bone marrow edema and enhancement surrounding this  fluid-filled cleft. 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 Along the superior margin of the gluteal cleft there is  edema and enhancement within the subcutaneous fat where there is a  decubitus ulcer. There is no abscess in this region or convincing  evidence for osteomyelitis of the distal sacrum or coccyx.       Impression:       1. Since the previous MRI there has been posterior decompressive  laminectomy with drainage of an epidural abscess. Epidural enhancement  is present with small residual epidural abscess or phlegmon posterior to  L4 and L5 vertebral bodies where there is mass effect on the posterior  thecal sac and moderate canal narrowing. There is a fluid collection at  the laminectomy site centered at the L3 and L4 vertebral body levels  measuring 4.7 x 5.5 x 1.0 cm which could represent an infected  collection. There is an additional 1.4 x 1.6 cm collection within the  right posterior paraspinal musculature and there is a third 1.4 cm  collection within the fat superior to the anterior aspect of the right  SI joint.  2. Progressive bone marrow edema and enhancement within the posterior  aspect of L5 vertebral body as well as within the pedicles and posterior  elements. This may represent osteomyelitis in the proper clinical  setting. There is no evidence for disc space infection at L4-5 or L5-S1.  3. At L5-S1 there is persistent severe central canal stenosis. The  central canal at S1 is chronically displaced toward the right and there  is canal stenosis between the posterior superior cortex of S1 and  ligamentous thickening at the L5-S1 level.  4. 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 This appears similar to the previous examination. It would  be difficult to exclude an infected collection within the sacrum.  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   5. Sacral decubitus ulcer is present extending just above the gluteal  cleft without convincing evidence for osteomyelitis of the distal sacrum  or coccyx.     This report was finalized on 8/10/2017 7:55 AM by Dr. Scotty Mitchell MD.                  Assessment:   Problem List:   Acute on chronic hyponatremia  SIADH, repeat labs still consistent with SIADH.  Currently asymptomatic    Epidural abscess, status post repeat debridement  Stage II chronic kidney disease    Hyponatremia    Sacral decubitus ulcer    Postoperative wound dehiscence    Dementia        Plan:   His current sodium was still a bit below his prior baseline level but is asymptomatic  Sodium increased from 124 admission 126  Will hold off on Samsca for now and repeat sodium level tomorrow  Repeat sodium was 125 or less we'll redosed Samsca times one  Continue to monitor serial sodium levels, monitor renal function closely  No need for IV hydration at this point in time as it's likely that worsen his hyponatremia      Continue to monitor electrolytes and volume closely   Thank you very much for the referral.    I appreciate the opportunity to participate in this patient's care.  Please call with any questions or concerns.         Cedrick Posada M.D  [facility]  [phone]  8/10/2017        Dictation via Dragon dictation software

## 2017-08-10 NOTE — DISCHARGE PLACEMENT REQUEST
"Pavel Subramanian (83 y.o. Male)     Date of Birth Social Security Number Address Home Phone MRN    1934  [id number] [address] [phone] [id number]    [employer] Marital Status          [employer]        Admission Date Admission Type Admitting Provider Attending Provider Department, Room/Bed    8/9/17 Emergency Malathi Rubin MD [place], Wong Hayden MD [address], 660/1    Discharge Date Discharge Disposition Discharge Destination                      Attending Provider: Wong Plummer MD     Allergies:  No Known Allergies    Isolation:  None   Infection:  None   Code Status:  FULL    Ht:  72.01\" (182.9 cm)   Wt:  175 lb 0.7 oz (79.4 kg)    Admission Cmt:  None   Principal Problem:  Epidural abscess [G06.2]                 Active Insurance as of 8/9/2017     Primary Coverage     Payor Plan Insurance Group Employer/Plan Group    MEDICARE MEDICARE A & B      Payor Plan Address Payor Plan Phone Number Effective From Effective To    [address] [phone] 6/1/[address]     [place] [address]       Subscriber Name Subscriber Birth Date Member ID       PAVEL SUBRAMANIAN 1934 [id number]           Secondary Coverage     Payor Plan Insurance Group Employer/Plan Group    [facility] SUPP [id number]     Payor Plan Address Payor Plan Phone Number Effective From Effective To    [address]  12/1/2016     [place] [address]       Subscriber Name Subscriber Birth Date Member ID       PAVEL SUBRAMANIAN 1934 [id number]                 Emergency Contacts      (Rel.) Home Phone Work Phone Mobile Phone    Sulma Amin (Daughter) [phone] -- --              "

## 2017-08-10 NOTE — PROGRESS NOTES
[facility]    Consult received and chart reviewed.   Patient seen by us in the recent past for SIADH. He was treated with Samsca and later with fluid restriction and NaCl tablets. On DC on 7/10/17: no samsca or NaCl tablets. Na was 131    Now is admitted with Na 124. Discussed with nurse and he is asymptomatic  Noted he is NPO after midnight.    Will hold on samsca due to patient being NPO (essentially fluid restriction which should be avoided to prevent overcorrection) and reevaluate in am  Will check urine and serum osmo, urine Na and serum Uric acid  Hold IVF (if SIADH, Na might worsen)      Full consult note to follow.    Prisca Morales MD

## 2017-08-10 NOTE — PLAN OF CARE
Problem: Patient Care Overview (Adult)  Goal: Plan of Care Review  Outcome: Ongoing (interventions implemented as appropriate)    08/10/17 7843   Coping/Psychosocial Response Interventions   Plan Of Care Reviewed With patient   Patient Care Overview   Progress improving   Outcome Evaluation   Outcome Summary/Follow up Plan Patient has been pleasant and cooperative during shift. Debridement of abcess was done today. Dressing dry and intact with scant amount of serosanguineous drainage. Wound treated sacral ulcer and venelex is being used. BRENTON's are draining well. Patient being turned Q2. Will continue to monitor and assist as needed.         Problem: Pressure Ulcer (Adult)  Goal: Signs and Symptoms of Listed Potential Problems Will be Absent or Manageable (Pressure Ulcer)  Outcome: Ongoing (interventions implemented as appropriate)    Problem: Fall Risk (Adult)  Goal: Absence of Falls  Outcome: Ongoing (interventions implemented as appropriate)

## 2017-08-10 NOTE — PROGRESS NOTES
Discharge Planning Assessment  [facility]     Patient Name: Pavel Melgoza  MRN: [id number]  Today's Date: 8/10/2017    Admit Date: 8/9/2017          Discharge Needs Assessment       08/10/17 1506    Living Environment    Lives With spouse    Living Arrangements house    Stair Railings at Home none    Type of Financial/Environmental Concern none    Transportation Available car;ambulance    Living Environment    Provides Primary Care For no one    Primary Care Provided By spouse/significant other    Quality Of Family Relationships supportive    Able to Return to Prior Living Arrangements yes    Discharge Needs Assessment    Concerns To Be Addressed basic needs concerns    Readmission Within The Last 30 Days no previous admission in last 30 days    Equipment Currently Used at Home walker, rolling;wheelchair    Equipment Needed After Discharge none    Discharge Contact Information if Applicable Sulma Amin, dtr [phone]    Discharge Planning Comments Possibly to [facility]            Discharge Plan       08/10/17 1508    Case Management/Social Work Plan    Plan [facility] at d/c    Patient/Family In Agreement With Plan yes    Additional Comments S/w patient and family at bedside and verified face sheet.  IMM explained and signed.  Patient is from [facility] and would like to go to [facility], closer to home.  Notified Jo/Seleney of patient request and she states they should have a bed at [facility].  Patient used a walker at home and they are hoping that patient will be able to walk with a walker again and go home with wife.  Patient has a living will on file with the hospital.  Wife is first contact for POA and then children share the POA 50/50. Patient uses [facility] on [address] for medications and has no problems paying for them.  Will continue to monitor and assist, as needed.  Valerie RN, CCP        Discharge Placement     Facility/Agency Request Status Selected? Address Phone Number Fax  Number    [facility] Pending - Request Sent     [address] , [facility] [address] [phone]                Demographic Summary       08/10/17 1504    Referral Information    Admission Type inpatient    Arrived From rehab facility    Referral Source admission list    Reason For Consult discharge planning    Record Reviewed plan of care    Contact Information    Permission Granted to Share Information With family/designee   Sulma kyle [phone]    Primary Care Physician Information    Name Dr. Amanda Tanner            Functional Status       08/10/17 1505    Functional Status Current    Ambulation 4-->completely dependent    Transferring 4-->completely dependent    Toileting 4-->completely dependent    Bathing 4-->completely dependent    Dressing 2-->assistive person    Eating 0-->independent    Communication 0-->understands/communicates without difficulty    Swallowing (if score 2 or more for any item, consult Rehab Services) 0-->swallows foods/liquids without difficulty    Functional Status Prior    Ambulation 4-->completely dependent    Transferring 4-->completely dependent    Toileting 4-->completely dependent    Bathing 4-->completely dependent    Dressing 2-->assistive person    Eating 0-->independent    Communication 0-->understands/communicates without difficulty    Swallowing 0-->swallows foods/liquids without difficulty    IADL    Medications completely dependent    Meal Preparation completely dependent    Housekeeping completely dependent    Laundry completely dependent    Shopping completely dependent    Oral Care independent    Activity Tolerance    Current Activity Limitations none    Cognitive/Perceptual/Developmental    Current Mental Status/Cognitive Functioning no deficits noted    Recent Changes in Mental Status/Cognitive Functioning no changes            Psychosocial     None            Abuse/Neglect     None            Legal     None             Substance Abuse     None            Patient Forms     None          Analilia Kebede RN

## 2017-08-10 NOTE — PROGRESS NOTES
CC: f/u epidural abscess    Subective/24H events:  OR this morning for I&D. He is groggy with anesthesia. He can't give further history for this reason.    Review of Systems  No n/v/d but ROS limited by mental status    Antibiotics:  •  cefepime (MAXIPIME) 2 g/100 mL 0.9% NS (mbp), 2 g, Intravenous, Q12H, Chandra Ponce MD  •  Pharmacy to dose vancomycin, , Does not apply, Continuous PRN, Chandra Ponce MD      Objective   Vital Signs   Temp:  [97.5 °F (36.4 °C)-99.7 °F (37.6 °C)] 97.5 °F (36.4 °C)  Heart Rate:  [] 72  Resp:  [16-24] 20  BP: ()/(52-86) 105/53    Physical Exam:   General: awake but groggy after anesthesia  Head: Normocephalic  Eyes: PERRL, no scleral icterus  ENT: MM dry, OP clear, no thrush. Fair dentition.   Neck: Supple  Cardiovascular: NR,  no LE edema  Respiratory:  normal work of breathing on ambient air  GI: Abdomen is soft, non-tender, non-distended  : Gong catheter present w/ clear yellow urine  Musculoskeletal: L spine bandaged w/ 2 drains  Skin: No rashes, lesions, or embolic phenomenon  Neurological: Alert and oriented x 3,  motor strength 5/5 in UEs and RLE; chronic 3/5 strength in LLE  Psychiatric: Normal mood and affect   Vasc: no cyanosis; PICC in RUE w/o erythema    Labs:   CBC, BMP, CRP, micro reviewed today  Lab Results   Component Value Date    WBC 10.20 08/10/2017    HGB 7.3 (L) 08/10/2017    HCT 22.0 (L) 08/10/2017    MCV 93.6 08/10/2017     08/10/2017       Lab Results   Component Value Date    GLUCOSE 113 (H) 08/10/2017    BUN 15 08/10/2017    CREATININE 0.62 (L) 08/10/2017    EGFRIFNONA 124 08/10/2017    BCR 24.2 08/10/2017    CO2 23.0 08/10/2017    CALCIUM 8.1 (L) 08/10/2017    ALBUMIN 2.50 (L) 07/10/2017    LABIL2 1.1 06/20/2017    AST 20 06/20/2017    ALT 19 06/20/2017     Lab Results   Component Value Date    CRP 11.59 (H) 08/09/2017     Microbiology:  8/9 BCx: NGTD    Radiology (personally reviewed report):  MRI L spine and  pelvis: L3-L4 flui colleciton 5 x 5 x 1 cm and a paraspinal 1 x 1 cm collection; bone marrow edema of L5; sacral ulcer present w/o evidence of osteomyelitis    Assessment/Plan   1. Epidural and retroperitoneal abscess secondary to ampicillin-sensitive Enterococcus s/p washout 7/4/17 and s/p IR guided drainage of RP abscess on 7/5/17  2. Wound dehiscence and drainage  3. Recurrent epidural abscess s/p washout 8/10/17  4. Chronic urinary retention  5. Sacral ulcer - no evidence of osteomyelitis on MRI    OR this morning for I&D. Cultures sent. I have broadened antibiotics to vancomycin with goal trough of 15-20 and cefepime 2 g IV q12h while awaiting culture results. I will consult urology tomorrow re: urinary retention. He is too sleepy today after anesthesia to expect him to pass a voiding trial. I am reassured by the MRI of the sacrum not showing osteomyelitis. We will have wound care evaluate him. I anticipate he will receive another 6-8 weeks of IV antibiotics.     Thank you for this consult. ID will follow. I discussed the case and operative findings w/ Dr Hernandez.

## 2017-08-10 NOTE — PLAN OF CARE
Problem: Patient Care Overview (Adult)  Goal: Plan of Care Review  Outcome: Ongoing (interventions implemented as appropriate)    08/10/17 0512   Coping/Psychosocial Response Interventions   Plan Of Care Reviewed With patient   Patient Care Overview   Progress no change   Outcome Evaluation   Outcome Summary/Follow up Plan Pt c/o pain once during shift and was medicated per order. Mepilex changed during shift. Epidural abscess covered with dressing. Catheter education and care complete. NPO since midnight. Scheduled for debridment of abscess today. No s/s of distress at this time. Vitals stable. Will continue to monitor.       Goal: Adult Individualization and Mutuality  Outcome: Ongoing (interventions implemented as appropriate)  Goal: Discharge Needs Assessment  Outcome: Ongoing (interventions implemented as appropriate)    Problem: Pressure Ulcer (Adult)  Goal: Signs and Symptoms of Listed Potential Problems Will be Absent or Manageable (Pressure Ulcer)  Outcome: Ongoing (interventions implemented as appropriate)    Problem: Fall Risk (Adult)  Goal: Absence of Falls  Outcome: Ongoing (interventions implemented as appropriate)

## 2017-08-10 NOTE — INTERVAL H&P NOTE
H&P reviewed. The patient was examined and there are no changes to the H&P. The patient has an elevated WBC and wound breakdown requiring attrention.  He has a bevy of other issues but these are semiurgent.  Will re-culture today.  D/w Dr. Ponce.

## 2017-08-10 NOTE — ANESTHESIA PREPROCEDURE EVALUATION
Anesthesia Evaluation     Patient summary reviewed and Nursing notes reviewed   no history of anesthetic complications:  NPO Solid Status: > 8 hours  NPO Liquid Status: > 8 hours     Airway   Mallampati: II  TM distance: >3 FB  Neck ROM: full  no difficulty expected  Dental - normal exam     Pulmonary - normal exam   Cardiovascular - normal exam    (+) hypertension, CAD, dysrhythmias Atrial Fib, hyperlipidemia      Neuro/Psych  (+) dementia,    GI/Hepatic/Renal/Endo      Musculoskeletal     Abdominal  - normal exam   Substance History      OB/GYN          Other                                        Anesthesia Plan    ASA 4     general     intravenous induction   Anesthetic plan and risks discussed with patient.

## 2017-08-10 NOTE — CONSULTS
"Adult Nutrition  Assessment/PES    Patient Name:  Pavel Melgoza  YOB: 1934  MRN: [id number]  Admit Date:  8/9/2017    Assessment Date:  8/10/2017     Comments:  Spoke with patient who reports a few pounds of weight loss.  He reports #.  Unsure of accuracy of his report d/t dementia.  Will f/u with family when they are available.    S/p debridement of epidural abscess.  Patient also had a st III pressure ulcer to his coccyx.  Increased kcal and protein needs d/t this.  Once diet is advanced, will add Ensure Enlive BID to promote wound healing.  Will also add Adair to promote wound healing once diet is advanced.    Recommend the addition of a MVI with minerals, Vit C 500 mg x 14 days, and zinc, all of which promote wound healing.    Will continue to follow.          Reason for Assessment       08/10/17 1158    Reason for Assessment    Reason For Assessment/Visit identified at risk by screening criteria;admission assessment    Identified At Risk By Screening Criteria large or nonhealing wound, burn or pressure ulcer;reduced oral intake over the last month    Diagnosis Diagnosis    Cardiac CAD;HTN    Gastrointestinal GERD/Reflux    Neurological Dementia    Skin Non healing wound;Pressure ulcer   s/p debridement of epidural abscess today                Anthropometrics       08/10/17 1159    Anthropometrics    Height 182.9 cm (72.01\")    Weight 79.4 kg (175 lb 0.7 oz)    RD Documented Weight on Admission 79.4 kg (175 lb 0.7 oz)    Anthropometrics (Special Considerations)    RD Calculated BMI (kg/m2) 23.73    Ideal Body Weight (IBW)    Ideal Body Weight (IBW), Male (kg) 82.09    % Ideal Body Weight 96.92    Usual Body Weight (UBW)    Usual Body Weight 81.6 kg (180 lb)   per patient report    % Usual Body Weight 97.25    Body Mass Index (BMI)    BMI (kg/m2) 23.78    BMI Grade 19.1 - 24.9 - normal            Labs/Tests/Procedures/Meds       08/10/17 1200    Labs/Tests/Procedures/Meds    Diagnostic " "Test/Procedure Review reviewed, pertinent    Labs/Tests Review Reviewed;Glucose;Na+;Cl-;Creat;Hgb Hct    Procedure Review Other (comment)   s/p debridement of epidural abscess today    Medication Review Reviewed, pertinent;Antacid   vit D3, vit B12    Significant Vitals reviewed, pertinent            Physical Findings       08/10/17 1201    Physical Findings/Assessment    Additional Documentation Physical Appearance (Group)    Physical Appearance    Skin surgical wound;non-healing wound(s);pressure ulcer(s)   back inc, back abscess, st III coccyx, B=12            Estimated/Assessed Needs       08/10/17 1202    Calculation Measurements    Weight Used For Calculations 79.4 kg (175 lb 0.7 oz)    Height Used for Calculations 1.829 m (6' 0.01\")    Estimated/Assessed Energy Needs    Energy Need Method Kcal/kg    kcal/kg 35    35 Kcal/Kg (kcal) 2779    Estimated Kcal Range  0223-4413    Estimated/Assessed Protein Needs    Weight Used for Protein Calculation 79.4 kg (175 lb 0.7 oz)    Protein (gm/kg) 1.5    1.5 Gm Protein (gm) 119.1    Estimated Protein Range     Estimated/Assessed Fluid Needs    Fluid Need Method RDA method    RDA Method (mL)  2382    Estimated/Assessed Electrolyte/Mineral Needs    Electrolyte/Mineral Requirements Zinc   to promote wound healing    Estimated/Assessed Vitamin Needs    Vitamin Requirements Vitamin C   to promote wound healing            Nutrition Prescription Ordered       08/10/17 1203    Nutrition Prescription PO    Current PO Diet Clear Liquid            Evaluation of Received Nutrient/Fluid Intake       08/10/17 1203    PO Evaluation    Number of Days PO Intake Evaluated Insufficient Data              Problem/Interventions:        Problem 1       08/10/17 1203    Nutrition Diagnoses Problem 1    Problem 1 Increased Nutrient Needs    Macronutrient Kcal;Protein    Micronutrient Vitamin C;Mineral    Etiology (related to) Medical Diagnosis    Skin Non healing wound;Pressure " ulcer;Surgical wound    Signs/Symptoms (evidenced by) Report/Observation                    Intervention Goal       08/10/17 1204    Intervention Goal    General Maintain nutrition;Meet nutritional needs for age/condition;Disease management/therapy    PO Advance diet;Establish PO;PO intake (%)    PO Intake % 75 %    Weight Maintain weight            Nutrition Intervention       08/10/17 1204    Nutrition Intervention    RD/Tech Action Follow Tx progress;Care plan reviewd;Recommend/ordered    Recommended/Ordered Supplement   add Ensure Enlive BID and Adair BID once diet adv              Education/Evaluation       08/10/17 1205    Education    Education Provided education regarding    Provided education regarding Other (comment)   Adair to promote wound healing    Monitor/Evaluation    Monitor Per protocol;PO intake;Skin status;Weight            Electronically signed by:  Etelvina Nguyễn RD  08/10/17 12:06 PM

## 2017-08-10 NOTE — ANESTHESIA POSTPROCEDURE EVALUATION
Patient: Pavel Melgoza    Procedure Summary     Date Anesthesia Start Anesthesia Stop Room / Location    08/10/17 0754 0926  [facility] /  [facility]       Procedure Diagnosis Surgeon Provider    OPEN DEBRIDEMENT OF EPIDURAL ABCCESS (N/A Spine Cervical) Generalized weakness; Postoperative wound dehiscence, initial encounter  (Generalized weakness [R53.1]; Postoperative wound dehiscence, initial encounter [T81.31XA]) MD Duy Dove IV, MD          Anesthesia Type: general  Last vitals  BP        Temp        Pulse       Resp        SpO2          Post Anesthesia Care and Evaluation    Patient location during evaluation: PACU  Patient participation: complete - patient participated  Level of consciousness: awake and alert  Pain management: adequate  Airway patency: patent  Anesthetic complications: No anesthetic complications    Cardiovascular status: acceptable  Respiratory status: acceptable  Hydration status: acceptable    Comments: ---------------------------               08/10/17                      0708         ---------------------------   BP:          115/58         Pulse:         80           Resp:          20           Temp:   36.7 °C (98.1 °F)   SpO2:         100%         ---------------------------

## 2017-08-10 NOTE — ANESTHESIA PROCEDURE NOTES
Airway  Urgency: elective    Airway not difficult    General Information and Staff    Patient location during procedure: OR  Anesthesiologist: ABDOUL VILLASENOR  CRNA: MYNOR SALAS    Indications and Patient Condition  Indications for airway management: airway protection    Preoxygenated: yes  MILS maintained throughout  Mask difficulty assessment: 2 - vent by mask + OA or adjuvant +/- NMBA    Final Airway Details  Final airway type: endotracheal airway      Successful airway: ETT  Cuffed: yes   Successful intubation technique: direct laryngoscopy  Endotracheal tube insertion site: oral  Blade: Rubin  Blade size: #2  ETT size: 7.5 mm  Cormack-Lehane Classification: grade I - full view of glottis  Placement verified by: chest auscultation and capnometry   Cuff volume (mL): 8  Measured from: lips  ETT to lips (cm): 23  Number of attempts at approach: 1    Additional Comments  Pt preoxygenated, SIVI, bag mask vent, ATETI, dentition as before

## 2017-08-10 NOTE — H&P (VIEW-ONLY)
Patient name: Pavel Melgoza  Referring Provider: Dr. Ricky Estrada  Reason for Consultation: lumbar wound drainage    Patient Care Team:  Amanda Tanner MD as PCP - General (Family Medicine)    Chief complaint: wound drainage    Subjective .     History of present illness:    Patient is a 83 y.o.  male who presents with history of lumbar epidural abscess after LESI for chronic back pain and right hip pain on 6/22/17. 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 He has brain lesion recently evaluated by Dr. Bentley, suspected meningioma with planned OP follow-up.  He is s/p L3-5 laminectomy and epidural abscess drainage on 7/4/17. He did well PO and we transferred to rehab. He was unable to attend his PO appt in our office and permission was granted for staple removal by facility nursing staff. Family states the wound has been draining since. We have not been notified of this until today. He reports right hip pain and LBP. He denies fever, chills, headaches, neck pain. His family states he has a decubitis ulcer. He has not participated well with therapies at rehab. His family states he has had same fernandez catheter since his discharge from hospital.    Review of Systems  Review of Systems   Constitutional: Negative for chills and fever.   Gastrointestinal: Negative for nausea and vomiting.   Genitourinary:        Has fernandez   Musculoskeletal: Positive for arthralgias (right hip) and back pain. Negative for neck stiffness.   Neurological: Negative for headaches. Weakness: left leg-chronic.   Psychiatric/Behavioral: Negative for confusion.       History  PAST MEDICAL HISTORY  Past Medical History:   Diagnosis Date   • Coronary artery disease    • Dementia    • GERD (gastroesophageal reflux disease)    • Hyperlipidemia    • Hypertension    • Plasmacytoma        PAST SURGICAL HISTORY  Past Surgical History:   Procedure Laterality Date   • FRACTURE SURGERY     • INGUINAL HERNIA REPAIR Left  6/24/2017    Procedure: LAPAROSCOPIC LEFT INGUINAL HERNIA REPAIR WITH MESH;  Surgeon: Erwin Mei MD;  Location: [facility] OR;  Service:    • JOINT REPLACEMENT      bilateral hips   • LUMBAR LAMINECTOMY DISCECTOMY DECOMPRESSION N/A 7/4/2017    Procedure: LUMBAR LAMINECTOMY FOR RESECTION OF EPIDURAL ABCESS ;  Surgeon: Nolberto Hernandez IV, MD;  Location: [facility] OR;  Service:        FAMILY HISTORY  History reviewed. No pertinent family history.    SOCIAL HISTORY  Social History   Substance Use Topics   • Smoking status: Former Smoker   • Smokeless tobacco: None   • Alcohol use No       retired  [employer]    Allergies:  Review of patient's allergies indicates no known allergies.    MEDICATIONS:    Current Facility-Administered Medications:   •  penicillin G potassium 4 Million Units in sodium chloride 0.9 % 100 mL IVPB, 4 Million Units, Intravenous, Q4H, Chandra Ponce MD, 4 Million Units at 08/09/17 1546    Current Outpatient Prescriptions:   •  atorvastatin (LIPITOR) 10 MG tablet, Take 10 mg by mouth Daily., Disp: , Rfl:   •  cholecalciferol 5000 UNITS tablet, Take 5,000 Units by mouth Daily., Disp: , Rfl:   •  diltiaZEM CD (CARDIZEM CD) 240 MG 24 hr capsule, Take 1 capsule by mouth Daily., Disp: , Rfl:   •  pantoprazole (PROTONIX) 40 MG EC tablet, Take 1 tablet by mouth 2 (Two) Times a Day Before Meals., Disp: , Rfl:   •  penicillin G potassium 4 Million Units in sodium chloride 0.9 % 100 mL IVPB, Infuse 4 Million Units into a venous catheter Every 4 (Four) Hours for 36 days. Indications: Bone and/or Joint Infection, Disp: , Rfl: 0  •  ramipril (ALTACE) 5 MG capsule, Take 1 capsule by mouth Daily., Disp: , Rfl:   •  vitamin B-12 (VITAMIN B-12) 1000 MCG tablet, Take 1 tablet by mouth Daily., Disp: , Rfl:     Objective     Results Review:  LABS:    Results from last 7 days  Lab Units 08/09/17  1537   WBC 10*3/mm3 14.62*   HEMOGLOBIN g/dL 9.3*   HEMATOCRIT % 27.6*   PLATELETS  10*3/mm3 270       ESR, CRP, Bld cx, BMP pending      DIAGNOSTICS:  None yet    Results Review:   I reviewed the patient's new clinical results.      Vital Signs   Temp:  [99.7 °F (37.6 °C)] 99.7 °F (37.6 °C)  Heart Rate:  [92] 92  Resp:  [18] 18  BP: (132)/(73) 132/73    Physical Exam:  Physical Exam   Constitutional: He is oriented to person, place, and time. He appears well-developed and well-nourished.   HENT:   Head: Normocephalic and atraumatic.   Neck: Neck supple.   Pulmonary/Chest: Effort normal.   Musculoskeletal:        Cervical back: He exhibits normal range of motion, no tenderness and no bony tenderness.   Neurological: He is alert and oriented to person, place, and time. A sensory deficit (left leg) is present. He exhibits abnormal muscle tone (left leg). Abnormal gait: not assessed due to weakness. GCS eye subscore is 4. GCS verbal subscore is 5. GCS motor subscore is 6.   Strength right LE 5/5, Left LE 2/5 throughout- chronic   Skin: Skin is warm and dry.   Midline lumbar incision with dressing- removed; well approximated except for approximately 1 cm length at mid point of wound dehisence; No redness or swelling, small amount of serosanginous drainage- no purulence; no clear efflux with valsalva or palpation;     Diffuse sacral pressure wound with dark necrosis but no ulcerated area-tender   Psychiatric: He has a normal mood and affect. His behavior is normal. Thought content normal.   Vitals reviewed.    Neurologic Exam     Mental Status   Oriented to person, place, and time.       Assessment/Plan     Active Problems:    Sacral decubitus ulcer    Postoperative wound dehiscence      PLAN: Patient with suspected wound infection due to reports at phone call in to office; however, wound looks clean but there is an area of dehiscence where the drainage is eminating that needs revision. He has no headache or nuchal rigidity so doubtful of CSF leak (he did have dural tear at time of OR). He has no fever.  We will need to evaluate the underlying tissue to ensure no deep infection pocket. Additionally, he has a new sacral wound- necrotic. Pelvic MRI requested by Dr. Ponce to evaluate further. Labs and MRIs ordered. NPO after MN for possible intervention. Chronic fernandez catheter needs to be addressed as well. Dr. Ponce note indicates plan to consult urology in the AM.    I, ANITRA Dennis, acted solely as a scribe in this encounter for Dr. Hernandez who obtained the history, exam, and determined the assessment and treatment plan for patient.       I discussed the patients findings and my recommendations with patient, family and Dr. Ponce.    ANITRA Dallas  08/09/17  4:01 PM

## 2017-08-10 NOTE — NURSING NOTE
Pt seen for a present on admission,  sacralcoccyxgeal skin injury;  unstageable with purplish nonblacheable area as well as a partial thickness area.  Cleansed with saline and applied a silicone border drsg.  Pt can turn self but needs reminder/encouragment.  Has intact blood blister on right great toe; pt unsure of what caused this.  On accumax mattress with pump and heels off loaded with pillow under calves.  Will use venelex to pressure injury

## 2017-08-10 NOTE — OP NOTE
DATE OF PROCEDURE: Nolberto Hernandez IV, MD     PREOPERATIVE DIAGNOSES: Generalized weakness [R53.1]  Postoperative wound dehiscence, initial encounter [T81.31XA]    Epidural abscess with wound breakdown.    POSTOPERATIVE DIAGNOSES: Post-Op Diagnosis Codes: Epidural abscess with wound breakdown     * Generalized weakness [R53.1]     * Postoperative wound dehiscence, initial encounter [T81.31XA]     SURGEON: Nolberto Hernandez IV, MD      ASSISTANT: Tiffany Escalona CFA    INDICATIONS: infected wound    PROCEDURES PERFORMED:  Procedure(s):  OPEN DEBRIDEMENT OF EPIDURAL ABCCESS     Anesthesia: General    Staff: Circulator: Patricia Roberts RN  Scrub Person: Kika ABHINAV Sullivan  Assistant: Tiffany Escalona CSA    Estimated Blood Loss: *No blood loss documented*    Specimens:   Order Name Source Comment Collection Info Order Time   WOUND CULTURE Back, Lower  Collected By: Nolberto Hernandez IV, MD 8/10/2017  8:46 AM   WOUND CULTURE Back, Lower  Collected By: Nolberto Hernandez IV, MD 8/10/2017  8:46 AM   TISSUE/BONE CULTURE Back, Lower  Collected By: Nolberto Hernandez IV, MD 8/10/2017  9:10 AM   TISSUE/BONE CULTURE Back, Lower  Collected By: Nolberto Hernandez IV, MD 8/10/2017  9:11 AM       Drains:   Drain/Device Site 08/10/17 0847 Left lumbar spine collapsible closed device 1 (Active)       Drain/Device Site 08/10/17 0850 Left lumbar spine 2 (Active)       Urethral Catheter 07/03/17 1412 100% silicone 16 10 10 (Active)   Daily Indications Urologist on the case and cannot remove without order 7/10/2017  8:10 AM   Securement secured to upper leg with adhesive device 7/10/2017  8:10 AM   Catheter care done Yes 7/10/2017  5:06 AM   Tolerance no signs/symptoms of discomfort 7/10/2017  8:10 AM   Urine Output (mL) 450 7/10/2017  5:05 AM       Urethral Catheter  (Active)   Daily Indications Acute retention 8/10/2017  7:00 AM   Securement secured to upper leg with tape 8/10/2017  7:00 AM   Catheter care done Yes  8/10/2017  3:27 AM   Tolerance no signs/symptoms of discomfort 8/10/2017  3:27 AM   Urine Output (mL) 500 8/10/2017  3:00 AM           Findings: gross pus, liquid and caseous    Complications: none immediate       DESCRIPTION OF PROCEDURE: 83-year-old male with a history of epidural abscess related to epidural steroid injection.  Patient had been doing well until drainage occurred from his prior open debridement and laminectomy for abscess.  The infectious disease team contacted me for this drainage and I arranged for him to be seen in the emergency department exitigently.  I presented and inspected the wound and did not appear grossly infected however there was a small area of dehiscence.  The patient was hemodynamically stable and neurologically at his baseline.  He had no nuchal rigidity or mental status change.  I felt it prudent to obtain imaging in order to guide his surgical intervention.  MRI revealed increased enhancement of the L5 spinous process and vertebral body several small loculated collection subcutaneously and some ring enhancement to the prior laminectomy region.  I felt it prudent to debride the wound and reculture him to ensure no polymicrobial infection.  The patient was apprised of risks benefits and alternatives provided written consent.  Patient was escorted operative suite was intubated by anesthesia staff rotated into the prone position extremities and face were well-padded.  There is no ocular pressure.  Patient's genitalia were free hanging.  Patient's back was cleaned with alcohol and he was prepped with Betadine due to the open nature of his wound.  Patient received no additional preoperative antibiotics is already on penicillin G per the infectious disease team.  Patient had timeout existing wound was opened.  Caseous necrosis was noted and multiple cultures were sent.  There were wound was further opened and the L5 spinous process was utilized as a measure of depth.  The patient  previously had an incidental durotomy so great care was taken to avoid impression of the thecal sac.  Debridement was carried down to the superior aspect of the L5 lamina and carried cranially at that level to ensure no disturbance of the dura.  The dura was identified and was intact.  Valsalva maneuver was performed no incidental leakage of spinal fluid was noted.  Bone was harvested from the L5 spinous process giving its abnormal imaging characteristic.  This was sent for culture as well.  This bone appeared vital.  Lateral dissection into the subcutaneous muscle afforded liquefied pus.  This again was sent for culture and copiously irrigated with vancomycin impregnated solution.  2 L of irrigant were utilized.  2 drains were placed and tunneled subcutaneously.  The deep drain was tagged with a Steri-Strip.  The superficial chest drain was not.  The wound was copiously irrigated again and inspected for hemostasis.  There was gentle oozing from the debrided muscle bed but no active arterial or venous ooze.  Closure commenced utilizing absorbable Vicryl stitch to approximate the fascia.  Great care was taken not to sew the drain in.  Any encountered suture material was removed.  The dermis was then closed utilizing interrupted very Vicryl sutures.  Final skin closure was performed utilizing a skin stapler due to the nature of this case.  Drains were sewn in place with silk suture.  Appropriate dressings were placed on the patient's wounds and he was transferred to the postanesthesia care unit in satisfactory and stable condition.  Needle and sponge counts are correct.  No immediate complications were encountered.     Nolberto Hernandez IV, MD  Date: 8/10/2017  Time: 9:11 AM

## 2017-08-10 NOTE — PROGRESS NOTES
"Pharmacokinetic Consult - Vancomycin Dosing (Initial Note)    Pavel Mccallerty has been consulted for pharmacy to dose vancomycin for bone and joint infection.  Pharmacy dosing vancomycin per Dr. Ponce's request.   Goal trough: 15-20 mg/L   Other antimicrobials: Cefepime 2 gm IVPB Q 12 hours    Relevant clinical data and objective history reviewed:  83 y.o. male 72\" (182.9 cm) 175 lb (79.4 kg)    Past Medical History:   Diagnosis Date   • Coronary artery disease    • Dementia    • GERD (gastroesophageal reflux disease)    • Hyperlipidemia    • Hypertension    • Plasmacytoma      Creatinine   Date Value Ref Range Status   08/10/2017 0.62 (L) 0.76 - 1.27 mg/dL Final   08/09/2017 0.78 0.76 - 1.27 mg/dL Final   07/10/2017 0.68 (L) 0.76 - 1.27 mg/dL Final     BUN   Date Value Ref Range Status   08/10/2017 15 8 - 23 mg/dL Final     Estimated Creatinine Clearance: 78.6 mL/min (by C-G formula based on Cr of 0.62).    Lab Results   Component Value Date    WBC 10.20 08/10/2017     Temp Readings from Last 3 Encounters:   08/10/17 97.5 °F (36.4 °C) (Oral)   07/10/17 97.6 °F (36.4 °C) (Oral)      Baseline culture/source/susceptibility:   Blood cultures - NGTD  Wound cultures - pending x 2    Assessment/Plan    Will start vancomycin 1750 mg IV x one dose as loading. Then begin Vancomycin 1500 mg IVPB Q 12 hours.  Will monitor serum creatinine every 24 hours for the first 3 days then at least every 48 hours per dosing recommendations. Vancomycin level to be drawn on 8/12/17 prior to 4th scheduled dose. Pharmacy will continue to follow daily while on vancomycin and adjust as needed. Thank you for consult.    Milly Wood, [facility]   Staff Clinical Pharmacist    "

## 2017-08-11 NOTE — PLAN OF CARE
Problem: Patient Care Overview (Adult)  Goal: Plan of Care Review    08/11/17 1152   Coping/Psychosocial Response Interventions   Plan Of Care Reviewed With patient   Outcome Evaluation   Outcome Summary/Follow up Plan Pt admitted with generalized weakness, bed sores and history of cancer resulting in LLE weakness. Pt requires max cueing for bed mobility and transfers secondary to confusion, agitation with activity. Pt able to perform bed mobility and transfers with CGA <> min A of 2 with precise cueing for optimal activity. Pt may benefit from skilled PT for strengthening, transfer and gait training so pt can improve independence. PT recommends d/c to SNF.          Problem: Inpatient Physical Therapy  Goal: Bed Mobility Goal LTG- PT    08/11/17 1152   Bed Mobility PT LTG   Bed Mobility PT LTG, Date Established 08/11/17   Bed Mobility PT LTG, Time to Achieve 1 wk   Bed Mobility PT LTG, Activity Type all bed mobility   Bed Mobility PT LTG, Riley Level supervision required   Bed Mobility PT Goal LTG, Assist Device bed rails       Goal: Transfer Training Goal 1 LTG- PT    08/11/17 1152   Transfer Training PT LTG   Transfer Training PT LTG, Date Established 08/11/17   Transfer Training PT LTG, Time to Achieve 1 wk   Transfer Training PT LTG, Activity Type all transfers   Transfer Training PT LTG, Riley Level contact guard assist   Transfer Training PT LTG, Assist Device walker, rolling       Goal: Gait Training Goal LTG- PT    08/11/17 1152   Gait Training PT LTG   Gait Training Goal PT LTG, Date Established 08/11/17   Gait Training Goal PT LTG, Time to Achieve 1 wk   Gait Training Goal PT LTG, Riley Level minimum assist (75% patient effort);2 person assist required   Gait Training Goal PT LTG, Assist Device walker, rolling   Gait Training Goal PT LTG, Distance to Achieve 10  (with L knee immobilizer)

## 2017-08-11 NOTE — PLAN OF CARE
Problem: Patient Care Overview (Adult)  Goal: Plan of Care Review  Outcome: Ongoing (interventions implemented as appropriate)    08/11/17 1711   Coping/Psychosocial Response Interventions   Plan Of Care Reviewed With patient   Patient Care Overview   Progress progress towards functional goals is fair   Outcome Evaluation   Outcome Summary/Follow up Plan pt alert and oriented but forgetful. frequent repostioning. coccyx and insicion dressing cdi. picc dressing cdi. one unit of blood given, pt tolerated. catching on up on abx then will initiate the last unit of blood. f/c out. pt due to void. may have to in and out cath. vs are stable. no acute distress noted. will continue to monitor.        Goal: Adult Individualization and Mutuality  Outcome: Ongoing (interventions implemented as appropriate)    Problem: Pressure Ulcer (Adult)  Goal: Signs and Symptoms of Listed Potential Problems Will be Absent or Manageable (Pressure Ulcer)  Outcome: Ongoing (interventions implemented as appropriate)    Problem: Fall Risk (Adult)  Goal: Absence of Falls  Outcome: Ongoing (interventions implemented as appropriate)

## 2017-08-11 NOTE — PROGRESS NOTES
CC: f/u epidural abscess    History from pt and wife.    Subective/24H events: No acute events. No fevers. Tolerating antibiotics. Gong remains in place. Only mild dull back pain worse w/ movement and relieved w/ rest. Drain is present.    Review of Systems  No n/v/d    Antibiotics:  •  cefepime (MAXIPIME) 2 g/100 mL 0.9% NS (mbp), 2 g, Intravenous, Q12H, Chandra Ponce MD  •  Pharmacy to dose vancomycin, , Does not apply, Continuous PRN, Chandra Ponce MD      Objective   Vital Signs   Temp:  [97.3 °F (36.3 °C)-98.9 °F (37.2 °C)] 98.3 °F (36.8 °C)  Heart Rate:  [61-73] 70  Resp:  [18-20] 18  BP: ()/(48-66) 104/51    Physical Exam:   General: awake, alert, nad  Head: Normocephalic  Eyes: PERRL, no scleral icterus  ENT: MMM, OP clear, no thrush. Fair dentition.   Neck: Supple  Cardiovascular: NR,  no LE edema  Respiratory:  normal work of breathing on ambient air  GI: Abdomen is soft, non-tender, non-distended  : Gong catheter present w/ clear yellow urine  Musculoskeletal: L spine bandaged w/ 2 drains  Skin: No rashes, lesions, or embolic phenomenon  Neurological: Alert and oriented x 3,  motor strength 5/5 in UEs and RLE; chronic 3/5 strength in LLE  Psychiatric: Normal mood and affect   Vasc: no cyanosis; PICC in RUE w/o erythema    Labs:   CBC, BMP, CRP, micro reviewed today  Lab Results   Component Value Date    WBC 12.06 (H) 08/11/2017    HGB 6.9 (C) 08/11/2017    HCT 21.2 (L) 08/11/2017    MCV 94.9 08/11/2017     08/11/2017       Lab Results   Component Value Date    GLUCOSE 151 (H) 08/11/2017    BUN 17 08/11/2017    CREATININE 0.71 (L) 08/11/2017    EGFRIFNONA 106 08/11/2017    BCR 23.9 08/11/2017    CO2 22.0 08/11/2017    CALCIUM 7.7 (L) 08/11/2017    ALBUMIN 2.50 (L) 07/10/2017    LABIL2 1.1 06/20/2017    AST 20 06/20/2017    ALT 19 06/20/2017     Lab Results   Component Value Date    CRP 8.93 (H) 08/11/2017     Microbiology:  8/9 BCx: NGTD  8/10 OR Cx: GPCs on gram  stain; culture pending    Radiology (personally reviewed report):  No new imaging today    Assessment/Plan   1. Epidural and retroperitoneal abscess secondary to ampicillin-sensitive Enterococcus s/p washout 7/4/17 and s/p IR guided drainage of RP abscess on 7/5/17  2. Wound dehiscence and drainage  3. Recurrent epidural abscess s/p washout 8/10/17 - GPCs on gram stain  4. Chronic urinary retention  5. Sacral ulcer - no evidence of osteomyelitis on MRI    OR 8/10/17 for I&D. Gram stain with GPCs. Continue vancomycin with goal trough of 15-20 and cefepime 2 g IV q12h while awaiting culture results. Urology has been consulted for urinary retention. I am reassured by the MRI of the sacrum not showing osteomyelitis. Wound care is following. I anticipate he will receive another 6-8 weeks of IV antibiotics.     Thank you for this consult. ID will follow.

## 2017-08-11 NOTE — PROGRESS NOTES
" LOS: 2 days     Name: Pavel Melgoza  Age: 83 y.o.  Sex: male  :  1934  MRN: [id number]         Primary Care Physician: Amanda Tanner MD    Subjective   Subjective   No new complaints today.  Feels well.  Back pain has improved since epidural abscess debridement.  More awake and alert today.  No somnolence.    Objective   Vital Signs  Temp:  [97.3 °F (36.3 °C)-98.9 °F (37.2 °C)] 98.3 °F (36.8 °C)  Heart Rate:  [61-73] 70  Resp:  [16-20] 18  BP: ()/(48-66) 104/51  Body mass index is 23.74 kg/(m^2).    Objective:  General Appearance:  Comfortable and in no acute distress.    Vital signs: (most recent): Blood pressure 104/51, pulse 70, temperature 98.3 °F (36.8 °C), temperature source Oral, resp. rate 18, height 72.01\" (182.9 cm), weight 175 lb 0.7 oz (79.4 kg), SpO2 95 %.    Lungs:  Normal respiratory rate and normal effort.    Heart: Normal rate.  Regular rhythm.    Abdomen: Abdomen is soft.  Bowel sounds are normal.   There is no abdominal tenderness.     Extremities: There is no local swelling or dependent edema.    Neurological: Patient is alert and oriented to person, place and time.    Skin:  Warm and dry.              Results Review:       I reviewed the patient's new clinical results.      Results from last 7 days  Lab Units 17  0608 17  0506 08/10/17  0717  1537   WBC 10*3/mm3  --  12.06* 10.20 14.62*   HEMOGLOBIN g/dL 6.9* 6.7* 7.3* 9.3*   PLATELETS 10*3/mm3  --  289 268 270       Results from last 7 days  Lab Units 17  0506 08/10/17  0719 17  1537   SODIUM mmol/L 129* 126* 124*   POTASSIUM mmol/L 4.3 4.1 4.3   CHLORIDE mmol/L 95* 92* 89*   CO2 mmol/L 22.0 23.0 23.3   BUN mg/dL 17 15 18   CREATININE mg/dL 0.71* 0.62* 0.78   CALCIUM mg/dL 7.7* 8.1* 8.4*   GLUCOSE mg/dL 151* 113* 111*       Results from last 7 days  Lab Units 17   INR  1.16*             Scheduled Meds:     atorvastatin 10 mg Oral Daily   castor oil-balsam peru  Topical " Q12H   cefepime 2 g Intravenous Q12H   cholecalciferol 5,000 Units Oral Daily   diltiaZEM  mg Oral Q24H   pantoprazole 40 mg Oral BID AC   ramipril 5 mg Oral Daily   tamsulosin 0.4 mg Oral Nightly   vancomycin 1,500 mg Intravenous Q12H   cyanocobalamin 1,000 mcg Oral Daily     PRN Meds:   •  acetaminophen  •  bisacodyl  •  bisacodyl  •  calcium carbonate  •  docusate sodium  •  HYDROcodone-acetaminophen  •  magnesium hydroxide  •  methocarbamol  •  Morphine **AND** naloxone  •  Morphine **AND** naloxone  •  ondansetron **OR** ondansetron ODT **OR** ondansetron  •  Pharmacy to dose vancomycin  •  sennosides-docusate sodium  •  sodium chloride  •  sodium chloride  Continuous Infusions:    Pharmacy to dose vancomycin        Assessment/Plan   Principal Problem:    Epidural abscess  Active Problems:    Left leg weakness    CKD (chronic kidney disease) stage 2, GFR 60-89 ml/min    A-fib    SIADH (syndrome of inappropriate ADH production)    Hyponatremia    Sacral decubitus ulcer    Postoperative wound dehiscence    Dementia      Assessment & Plan    - Status post debridement of epidural abscess this morning.  Continue antibiotics per infectious disease and await operative cultures.  - Hemoglobin trending down and is now less than 7.  We'll give 2 units of packed red blood cells today.  - Local wound care and offloading measures for treatment of sacral decubitus ulcer  - Urology consultation today to see if he would be appropriate for a voiding trial  - Renal following for hyponatremia, currently on fluid restriction.  - PT and CCP to see and assist with discharge planning    Wong Plummer MD  [facility]  08/11/17  10:10 AM

## 2017-08-11 NOTE — THERAPY EVALUATION
Acute Care - Physical Therapy Initial Evaluation  [facility]     Patient Name: Pavel Melgoza  : 1934  MRN: [id number]  Today's Date: 2017   Onset of Illness/Injury or Date of Surgery Date: (P) 17     Referring Physician: TRISTAN) Dr. Plummer      Admit Date: 2017     Visit Dx:    ICD-10-CM ICD-9-CM   1. Sacral decubitus ulcer, unspecified pressure ulcer stage L89.159 707.03     707.20   2. Postoperative wound dehiscence, lumbar spine, initial encounter T81.31XA 998.32   3. Fever in adult, reported R50.9 780.60   4. Postoperative wound dehiscence, initial encounter T81.31XA 998.32   5. Generalized weakness R53.1 780.79   6. Unsteadiness on feet R26.81 781.2     Patient Active Problem List   Diagnosis   • Generalized weakness   • Plasmocytoma   • Nausea & vomiting   • Fall   • Noncompliance with medication regimen   • Left leg weakness   • Closed wedge compression fracture of eleventh thoracic vertebra   • CKD (chronic kidney disease) stage 2, GFR 60-89 ml/min   • B12 deficiency   • A-fib   • Meningioma   • SIADH (syndrome of inappropriate ADH production)   • Hyponatremia   • Vitamin D deficiency   • Epidural abscess   • Sacral decubitus ulcer   • Postoperative wound dehiscence   • Dementia     Past Medical History:   Diagnosis Date   • Coronary artery disease    • Dementia    • GERD (gastroesophageal reflux disease)    • Hyperlipidemia    • Hypertension    • Plasmacytoma      Past Surgical History:   Procedure Laterality Date   • CERVICAL LAMINECTOMY DECOMPRESSION POSTERIOR N/A 8/10/2017    Procedure: OPEN DEBRIDEMENT OF EPIDURAL ABCCESS;  Surgeon: Nolberto Hernandez IV, MD;  Location: [facility];  Service:    • FRACTURE SURGERY     • INGUINAL HERNIA REPAIR Left 2017    Procedure: LAPAROSCOPIC LEFT INGUINAL HERNIA REPAIR WITH MESH;  Surgeon: Erwin Mei MD;  Location: [facility];  Service:    • JOINT REPLACEMENT      bilateral hips   • LUMBAR LAMINECTOMY DISCECTOMY  DECOMPRESSION N/A 7/4/2017    Procedure: LUMBAR LAMINECTOMY FOR RESECTION OF EPIDURAL ABCESS ;  Surgeon: Nolberto Hernandez IV, MD;  Location: [facility] MAIN OR;  Service:           PT ASSESSMENT (last 72 hours)      PT Evaluation       08/11/17 1115 08/10/17 1506    Rehab Evaluation    Document Type (P)  evaluation  -AA     Subjective Information (P)  agree to therapy;no complaints  -AA     Patient Effort, Rehab Treatment (P)  good  -AA     Symptoms Noted During/After Treatment (P)  increased pain  -AA     Symptoms Noted Comment (P)  Pt agitated and gives up on self throughout evaluation. Pt requires encouragement and education on AAROM with LLE.   -AA     General Information    Patient Profile Review (P)  yes  -AA     Onset of Illness/Injury or Date of Surgery Date (P)  08/09/17  -AA     Referring Physician (P)  Dr. Plummer  -AA     General Observations (P)  Pt supine in bed with no signs of distress or discomfort. Wife at bedside. Bed exit alarm on when entering room and Nsg stated OK to see pt today.   -AA     Pertinent History Of Current Problem (P)  Pt admitted with generalized weakness, bed sores and epidrual abscess.    -AA     Precautions/Limitations (P)  fall precautions  -AA     Prior Level of Function (P)  min assist:;ADL's;all household mobility   came from [facility] rehab  -AA     Equipment Currently Used at Home (P)  walker, rolling;wheelchair  -AA walker, rolling;wheelchair  -LR    Plans/Goals Discussed With (P)  patient;spouse/S.O.  -AA     Barriers to Rehab (P)  cognitive status  -AA     Living Environment    Lives With (P)  spouse  -AA spouse  -LR    Living Arrangements (P)  house  -AA house  -LR    Stair Railings at Home  none  -LR    Type of Financial/Environmental Concern  none  -LR    Transportation Available  car;ambulance  -LR    Living Environment Comment (P)  Pt admitted from [facility] where pt was receiving rehabilitation since last admission in 6/2017  -AA     Clinical Impression     Patient/Family Goals Statement (P)  Pt's wife states that pt made progress at rehab.   -AA     Criteria for Skilled Therapeutic Interventions Met (P)  yes  -AA     Pathology/Pathophysiology Noted (Describe Specifically for Each System) (P)  musculoskeletal  -AA     Impairments Found (describe specific impairments) (P)  gait, locomotion, and balance  -     Rehab Potential (P)  good, to achieve stated therapy goals  -AA     Pain Assessment    Pain Assessment (P)  No/denies pain  -AA     Cognitive Assessment/Intervention    Current Cognitive/Communication Assessment (P)  impaired  -AA     Orientation Status (P)  oriented to;person;place  -AA     Follows Commands/Answers Questions (P)  50% of the time;able to follow single-step instructions;needs cueing  -AA     Personal Safety (P)  moderate impairment;impulsive;at risk behaviors demonstrated  -AA     Personal Safety Interventions (P)  fall prevention program maintained;gait belt;nonskid shoes/slippers when out of bed  -AA     ROM (Range of Motion)    General ROM (P)  no range of motion deficits identified  -AA     MMT (Manual Muscle Testing)    General MMT Assessment Detail (P)  RLE: 4-/5; LLE 3-/5   -AA     Muscle Tone Assessment    Muscle Tone Assessment (P)  LLE  -AA     LLE Muscle Tone Assessment (P)  moderately decreased tone  -AA     Bed Mobility, Assessment/Treatment    Bed Mobility, Assistive Device (P)  bed rails  -AA     Bed Mob, Supine to Sit, [place] (P)  contact guard assist;verbal cues required  -AA     Bed Mob, Sit to Supine, [place] (P)  minimum assist (75% patient effort);verbal cues required  -AA     Bed Mobility, Safety Issues (P)  decreased use of legs for bridging/pushing;decreased use of arms for pushing/pulling  -AA     Bed Mobility, Impairments (P)  strength decreased;other (see comments)   difficulty following single step commands  -AA     Transfer Assessment/Treatment    Transfers, Sit-Stand [place] (P)  contact guard  assist;minimum assist (75% patient effort);2 person assist required;verbal cues required  -AA     Transfers, Stand-Sit [place] (P)  contact guard assist;minimum assist (75% patient effort);2 person assist required;verbal cues required  -AA     Transfers, Sit-Stand-Sit, Assist Device (P)  rolling walker  -AA     Transfer, Safety Issues (P)  weight-shifting ability decreased;knees buckling   L knee steven  -AA     Transfer, Impairments (P)  strength decreased;impaired balance;muscle tone abnormal   LLE moderately decreased tone  -AA     Transfer, Comment (P)  sit <> stand X2 max cueing required   -AA     Gait Assessment/Treatment    Gait, [place] Level (P)  not appropriate to assess  -AA     Gait, Comment (P)  Pt weak and has significant L knee buckling making ambulation unsafe at this time. Pt may benefit from L knee immobilizer with sit <> stand transfer.   -AA     Motor Skills/Interventions    Additional Documentation (P)  Balance Skills Training (Group)  -AA     Balance Skills Training    Sitting-Level of Assistance (P)  Contact guard  -AA     Sitting-Balance Support (P)  Feet supported  -AA     Sitting-Balance Activities (P)  Lateral lean;Forward lean;Reaching across midline  -AA     Sitting # of Minutes (P)  5  -AA     Standing-Level of Assistance (P)  Contact guard;Minimum assistance;x2  -AA     Static Standing Balance Support (P)  assistive device  -AA     Standing-Balance Activities (P)  Weight Shift A-P;Weight Shift R-L  -AA     Standing Balance # of Minutes (P)  3  -AA     Therapy Exercises    Right Lower Extremity (P)  AROM:;10 reps;supine;ankle pumps/circles;hip flexion;hip abduction/adduction;LAQ  -AA     Left Lower Extremity (P)  AAROM:;5 reps;supine;ankle pumps/circles;hip flexion;clam shell;LAQ  -AA     Positioning and Restraints    Pre-Treatment Position (P)  in bed  -AA     Post Treatment Position (P)  bed  -AA     In Bed (P)  supine;call light within reach;encouraged to call for  assist;exit alarm on;with family/caregiver  -       08/09/17 5651       General Information    Equipment Currently Used at Home walker, rolling  -KB     Living Environment    Lives With spouse  -KB     Living Arrangements house  -KB     Home Accessibility stairs within home  -KB       User Key  (r) = Recorded By, (t) = Taken By, (c) = Cosigned By    Initials Name Provider Type    KB Cony Walters, RN Registered Nurse    EDI Kebede RN Case Manager    LEN Blanco, PT Student PT Student          Physical Therapy Education     Title: PT OT SLP Therapies (Done)     Topic: Physical Therapy (Done)     Point: Mobility training (Done)    Learning Progress Summary    Learner Readiness Method Response Comment Documented by Status   Patient Acceptance E VU,NR   08/11/17 1151 Done               Point: Home exercise program (Done)    Learning Progress Summary    Learner Readiness Method Response Comment Documented by Status   Patient Acceptance E VU,NR   08/11/17 1151 Done               Point: Body mechanics (Done)    Learning Progress Summary    Learner Readiness Method Response Comment Documented by Status   Patient Acceptance E VU,NR   08/11/17 1151 Done               Point: Precautions (Done)    Learning Progress Summary    Learner Readiness Method Response Comment Documented by Status   Patient Acceptance E VU,NR   08/11/17 1151 Done                      User Key     Initials Effective Dates Name Provider Type Discipline     06/01/17 -  Skyla Blanco, PT Student PT Student PT                PT Recommendation and Plan  Anticipated Discharge Disposition: (P) skilled nursing facility  Planned Therapy Interventions: (P) balance training, bed mobility training, home exercise program, transfer training, gait training  PT Frequency: (P) daily  Plan of Care Review  Plan Of Care Reviewed With: (P) patient  Outcome Summary/Follow up Plan: (P) Pt admitted with generalized weakness, bed sores and  history of cancer resulting in LLE weakness. Pt requires max cueing for bed mobility and transfers secondary to confusion, agitation with activity. Pt able to perform bed mobility and transfers with CGA <> min A of 2 with precise cueing for optimal activity. Pt may benefit from skilled PT for strengthening, transfer and gait training so pt can improve independence. PT recommends d/c to SNF.           IP PT Goals       08/11/17 1152          Bed Mobility PT LTG    Bed Mobility PT LTG, Date Established (P)  08/11/17  -AA      Bed Mobility PT LTG, Time to Achieve (P)  1 wk  -AA      Bed Mobility PT LTG, Activity Type (P)  all bed mobility  -AA      Bed Mobility PT LTG, [place] Level (P)  supervision required  -AA      Bed Mobility PT Goal  LTG, Assist Device (P)  bed rails  -AA      Transfer Training PT LTG    Transfer Training PT LTG, Date Established (P)  08/11/17  -AA      Transfer Training PT LTG, Time to Achieve (P)  1 wk  -AA      Transfer Training PT LTG, Activity Type (P)  all transfers  -AA      Transfer Training PT LTG, [place] Level (P)  contact guard assist  -AA      Transfer Training PT LTG, Assist Device (P)  walker, rolling  -AA      Gait Training PT LTG    Gait Training Goal PT LTG, Date Established (P)  08/11/17  -AA      Gait Training Goal PT LTG, Time to Achieve (P)  1 wk  -AA      Gait Training Goal PT LTG, [place] Level (P)  minimum assist (75% patient effort);2 person assist required  -AA      Gait Training Goal PT LTG, Assist Device (P)  walker, rolling  -AA      Gait Training Goal PT LTG, Distance to Achieve (P)  10   with L knee immobilizer  -AA        User Key  (r) = Recorded By, (t) = Taken By, (c) = Cosigned By    Initials Name Provider Type    LEN Blanco, PT Student PT Student                Outcome Measures       08/11/17 1100          How much help from another person do you currently need...    Turning from your back to your side while in flat bed without using  bedrails? (P)  4  -AA      Moving from lying on back to sitting on the side of a flat bed without bedrails? (P)  3  -AA      Moving to and from a bed to a chair (including a wheelchair)? (P)  2  -AA      Standing up from a chair using your arms (e.g., wheelchair, bedside chair)? (P)  3  -AA      Climbing 3-5 steps with a railing? (P)  1  -AA      To walk in hospital room? (P)  2  -AA      AM-PAC 6 Clicks Score (P)  15  -AA      Functional Assessment    Outcome Measure Options (P)  AM-PAC 6 Clicks Basic Mobility (PT)  -AA        User Key  (r) = Recorded By, (t) = Taken By, (c) = Cosigned By    Initials Name Provider Type    LEN Blanco PT Student PT Student           Time Calculation:         PT Charges       08/11/17 1156          Time Calculation    Start Time (P)  1115  -AA      Stop Time (P)  1138  -AA      Time Calculation (min) (P)  23 min  -AA      PT Received On (P)  08/11/17  -AA      PT - Next Appointment (P)  08/12/17  -AA      PT Goal Re-Cert Due Date (P)  08/18/17  -AA        User Key  (r) = Recorded By, (t) = Taken By, (c) = Cosigned By    Initials Name Provider Type    LEN Blanco PT Student PT Student          Therapy Charges for Today     Code Description Service Date Service Provider Modifiers Qty    06148199994 HC PT EVAL MOD COMPLEXITY 2 8/11/2017 Skyla Blanco PT Student GP 1    95607353097 HC PT THER SUPP EA 15 MIN 8/11/2017 Skyla Blanco PT Student GP 1    23122398353 HC PT THER PROC EA 15 MIN 8/11/2017 Skyla Blanco, PT Student GP 1          PT G-Codes  Outcome Measure Options: (P) AM-PAC 6 Clicks Basic Mobility (PT)      Skyla Blanco PT Student  8/11/2017

## 2017-08-11 NOTE — PROGRESS NOTES
[facility] PROGRESS NOTE      CC:POD 1- s/p wound I and D and revision- recurrent epidural abcsess      Subjective     Interval History: Since last encounter, patient has not had new complaints. Some back pain treated with Tylenol. No new leg symptoms- chronic left leg weakness/numbness. Has fernandez-urology note indicates plan for voiding trial    ROS:  MS:  back pain  Neuro: left leg numbness, tingling, or weakness, balance difficulties  : retention    Objective     Vital signs in last 24 hours:  Temp:  [97.3 °F (36.3 °C)-98.9 °F (37.2 °C)] 98.4 °F (36.9 °C)  Heart Rate:  [61-76] 76  Resp:  [18-20] 20  BP: ()/(48-66) 106/62    Intake/Output this shift:       LABS:    Results from last 7 days  Lab Units 08/11/17  0608 08/11/17  0506 08/10/17  0719 08/09/17  1537   WBC 10*3/mm3  --  12.06* 10.20 14.62*   HEMOGLOBIN g/dL 6.9* 6.7* 7.3* 9.3*   HEMATOCRIT % 21.2* 20.3* 22.0* 27.6*   PLATELETS 10*3/mm3  --  289 268 270     Wound cultures- NGTD but gram stain- GPC 1+      IMAGING STUDIES:  No new imaging    I personally viewed and interpreted the patient's labs.    Meds reviewed/changed: Yes    Current Facility-Administered Medications:   •  acetaminophen (TYLENOL) tablet 650 mg, 650 mg, Oral, Q6H PRN, Malathi Rubin MD, 650 mg at 08/11/17 1155  •  atorvastatin (LIPITOR) tablet 10 mg, 10 mg, Oral, Daily, Malathi Rubin MD, 10 mg at 08/11/17 0951  •  bisacodyl (DULCOLAX) EC tablet 5 mg, 5 mg, Oral, Daily PRN, Nolberto Hernandez IV, MD  •  bisacodyl (DULCOLAX) suppository 10 mg, 10 mg, Rectal, Daily PRN, Nolberto Hernandez IV, MD  •  calcium carbonate (TUMS) chewable tablet 500 mg (200 mg elemental), 1 tablet, Oral, BID PRN, Malathi Rubin MD  •  castor oil-balsam peru (VENELEX) ointment, , Topical, Q12H, Wong Plummer MD  •  cefepime (MAXIPIME) 2 g/100 mL 0.9% NS (mbp), 2 g, Intravenous, Q12H, Chandra Ponce MD, 2 g at 08/10/17 7883  •   cholecalciferol (VITAMIN D3) tablet 5,000 Units, 5,000 Units, Oral, Daily, Malathi Rubin MD, 5,000 Units at 08/11/17 0951  •  diltiaZEM CD (CARDIZEM CD) 24 hr capsule 240 mg, 240 mg, Oral, Q24H, Malathi Rubin MD, 240 mg at 08/11/17 0951  •  docusate sodium (COLACE) capsule 100 mg, 100 mg, Oral, BID PRN, Nolberto Hernandez IV, MD  •  HYDROcodone-acetaminophen (NORCO) 5-325 MG per tablet 1 tablet, 1 tablet, Oral, Q4H PRN, Malathi Rubin MD  •  magnesium hydroxide (MILK OF MAGNESIA) suspension 2400 mg/10mL 10 mL, 10 mL, Oral, Daily PRN, Nolberto Hernandez IV, MD  •  methocarbamol (ROBAXIN) tablet 1,000 mg, 1,000 mg, Oral, 4x Daily PRN, Nolberto Hernandez IV, MD  •  morphine injection 2 mg, 2 mg, Intravenous, Q3H PRN **AND** naloxone (NARCAN) injection 0.4 mg, 0.4 mg, Intravenous, Q5 Min PRN, Malathi Rubin MD  •  morphine injection 2 mg, 2 mg, Intravenous, Q4H PRN **AND** naloxone (NARCAN) injection 0.4 mg, 0.4 mg, Intravenous, Q5 Min PRN, Nolberto Hernandez IV, MD  •  ondansetron (ZOFRAN) tablet 4 mg, 4 mg, Oral, Q6H PRN **OR** ondansetron ODT (ZOFRAN-ODT) disintegrating tablet 4 mg, 4 mg, Oral, Q6H PRN **OR** ondansetron (ZOFRAN) injection 4 mg, 4 mg, Intravenous, Q6H PRN, Nolberto Hernandez IV, MD  •  pantoprazole (PROTONIX) EC tablet 40 mg, 40 mg, Oral, BID AC, Malathi Rubin MD, 40 mg at 08/11/17 0656  •  Pharmacy to dose vancomycin, , Does not apply, Continuous PRN, Chandra Ponce MD  •  ramipril (ALTACE) capsule 5 mg, 5 mg, Oral, Daily, Malathi Rubin MD, 5 mg at 08/11/17 0951  •  sennosides-docusate sodium (SENOKOT-S) 8.6-50 MG tablet 1 tablet, 1 tablet, Oral, Nightly PRN, Nolberto Hernandez IV, MD  •  sodium chloride 0.9 % flush 1-10 mL, 1-10 mL, Intravenous, PRN, Malathi Rubin MD  •  sodium chloride 0.9 % flush 1-10 mL, 1-10 mL, Intravenous, PRN, Nolberto Hernandez IV, MD  •  tamsulosin (FLOMAX) 24 hr capsule 0.8 mg, 0.8 mg, Oral, Nightly, Nolberto  TANIKA Fraser Jr., MD  •  vancomycin 1500 mg/500 mL 0.9% NS IVPB ([facility]), 1,500 mg, Intravenous, Q12H, Chandra Ponce MD, 1,500 mg at 08/11/17 0134  •  vitamin B-12 (CYANOCOBALAMIN) tablet 1,000 mcg, 1,000 mcg, Oral, Daily, Malathi Rubin MD, 1,000 mcg at 08/11/17 0950      Physical Exam:    General:   Awake, alert,. Speech clear with no aphasia  Back:     Incision well approximated with staples- no R/E/D; BRENTON x 2 with sanginous output     Motor: Normal muscle strength, bulk and tone in right lower extremities; left LE weakness, chronic.   Sensation: Normal to light touch right LE  Station and Gait:             Not assessed      Assessment/Plan     ASSESSMENT:    Principal Problem:    Epidural abscess  Active Problems:    Left leg weakness    CKD (chronic kidney disease) stage 2, GFR 60-89 ml/min    A-fib    SIADH (syndrome of inappropriate ADH production)    Hyponatremia    Sacral decubitus ulcer    Postoperative wound dehiscence    Dementia      PLAN: BRENTON to remain with 70 and 110 output. OK for activity. Await final cultures-on Vanc and maxipime, ID following. Wound care nurse consulted for sacral pressure wound    I discussed the patients findings and my recommendations with patient and family       LOS: 2 days       Sheila Johnson, APRN  8/11/2017  11:56 AM

## 2017-08-11 NOTE — PROGRESS NOTES
"   LOS: 2 days   Patient Care Team:  Amanda Tanner MD as PCP - General (Family Medicine)    Chief Complaint/ Reason for encounter: Hyponatremia, SIADH        Subjective     History of Present Illness    Subjective:  Symptoms:  Improved.  He reports weakness.  No shortness of breath or chest pain.  (No complaints  Compliant with fluid restriction  No shortness of breath or nausea).    Diet:  Adequate intake.  No nausea.    Activity level: Impaired due to weakness.    Pain:  He reports no pain.          History taken from: Patient and chart    Objective     Vital Signs  Temp:  [97.3 °F (36.3 °C)-98.9 °F (37.2 °C)] 98.3 °F (36.8 °C)  Heart Rate:  [61-73] 70  Resp:  [18] 18  BP: ()/(48-66) 104/51       Wt Readings from Last 1 Encounters:   08/10/17 1159 175 lb 0.7 oz (79.4 kg)   08/09/17 1406 175 lb (79.4 kg)       Objective:  General Appearance:  Comfortable, in no acute distress, not in pain and well-appearing.    Vital signs: (most recent): Blood pressure 104/51, pulse 70, temperature 98.3 °F (36.8 °C), temperature source Oral, resp. rate 18, height 72.01\" (182.9 cm), weight 175 lb 0.7 oz (79.4 kg), SpO2 95 %.  Vital signs are normal.  No fever.    Output: Producing urine.    HEENT: Normal HEENT exam.    Lungs:  Normal respiratory rate and normal effort.  He is not in respiratory distress.  Breath sounds clear to auscultation.  No wheezes or decreased breath sounds.    Heart: Normal rate.  Regular rhythm.    Abdomen: Abdomen is soft and non-distended.  Bowel sounds are normal.   There is no epigastric area or no suprapubic area tenderness.  There is no rebound tenderness.     Extremities: Normal range of motion.  There is no deformity or dependent edema.    Pulses: Distal pulses are intact.    Neurological: Patient is alert and oriented to person, place and time.    Skin:  Warm and dry.  No rash or cyanosis.             Results Review:    Past Medical History: reviewed and updated  Past Medical " History:   Diagnosis Date   • Coronary artery disease    • Dementia    • GERD (gastroesophageal reflux disease)    • Hyperlipidemia    • Hypertension    • Plasmacytoma          Allergies:  No Known Allergies    Intake/Output:     Intake/Output Summary (Last 24 hours) at 08/11/17 1045  Last data filed at 08/11/17 0700   Gross per 24 hour   Intake             1867 ml   Output             1455 ml   Net              412 ml         DATA:  Interval chart, labs and notes reviewed.    Labs:   Recent Results (from the past 24 hour(s))   Basic Metabolic Panel    Collection Time: 08/11/17  5:06 AM   Result Value Ref Range    Glucose 151 (H) 65 - 99 mg/dL    BUN 17 8 - 23 mg/dL    Creatinine 0.71 (L) 0.76 - 1.27 mg/dL    Sodium 129 (L) 136 - 145 mmol/L    Potassium 4.3 3.5 - 5.2 mmol/L    Chloride 95 (L) 98 - 107 mmol/L    CO2 22.0 22.0 - 29.0 mmol/L    Calcium 7.7 (L) 8.6 - 10.5 mg/dL    eGFR Non African Amer 106 >60 mL/min/1.73    BUN/Creatinine Ratio 23.9 7.0 - 25.0    Anion Gap 12.0 mmol/L   CBC (No Diff)    Collection Time: 08/11/17  5:06 AM   Result Value Ref Range    WBC 12.06 (H) 4.50 - 10.70 10*3/mm3    RBC 2.14 (L) 4.60 - 6.00 10*6/mm3    Hemoglobin 6.7 (C) 13.7 - 17.6 g/dL    Hematocrit 20.3 (C) 40.4 - 52.2 %    MCV 94.9 79.8 - 96.2 fL    MCH 31.3 27.0 - 32.7 pg    MCHC 33.0 32.6 - 36.4 g/dL    RDW 14.9 (H) 11.5 - 14.5 %    RDW-SD 51.8 37.0 - 54.0 fl    MPV 8.0 6.0 - 12.0 fL    Platelets 289 140 - 500 10*3/mm3   C-reactive Protein    Collection Time: 08/11/17  5:06 AM   Result Value Ref Range    C-Reactive Protein 8.93 (H) 0.00 - 0.50 mg/dL   Hemoglobin & Hematocrit, Blood    Collection Time: 08/11/17  6:08 AM   Result Value Ref Range    Hemoglobin 6.9 (C) 13.7 - 17.6 g/dL    Hematocrit 21.2 (L) 40.4 - 52.2 %       Radiology:  Imaging Results (last 24 hours)     ** No results found for the last 24 hours. **             Medications have been reviewed:  Current Facility-Administered Medications   Medication Dose Route  Frequency Provider Last Rate Last Dose   • acetaminophen (TYLENOL) tablet 650 mg  650 mg Oral Q6H PRN Malathi Rubin MD   650 mg at 08/10/17 0326   • atorvastatin (LIPITOR) tablet 10 mg  10 mg Oral Daily Malathi Rubin MD   10 mg at 08/11/17 0951   • bisacodyl (DULCOLAX) EC tablet 5 mg  5 mg Oral Daily PRN Nolberto Hernandez IV, MD       • bisacodyl (DULCOLAX) suppository 10 mg  10 mg Rectal Daily PRN Nolberto Hernandez IV, MD       • calcium carbonate (TUMS) chewable tablet 500 mg (200 mg elemental)  1 tablet Oral BID PRN Malathi Rubin MD       • castor oil-balsam peru (VENELEX) ointment   Topical Q12H Wong Plummer MD       • cefepime (MAXIPIME) 2 g/100 mL 0.9% NS (mbp)  2 g Intravenous Q12H Chandra Ponce MD   2 g at 08/10/17 2335   • cholecalciferol (VITAMIN D3) tablet 5,000 Units  5,000 Units Oral Daily Malathi Rubin MD   5,000 Units at 08/11/17 0951   • diltiaZEM CD (CARDIZEM CD) 24 hr capsule 240 mg  240 mg Oral Q24H Malathi Rubin MD   240 mg at 08/11/17 0951   • docusate sodium (COLACE) capsule 100 mg  100 mg Oral BID PRN Nolberto Hernandez IV, MD       • HYDROcodone-acetaminophen (NORCO) 5-325 MG per tablet 1 tablet  1 tablet Oral Q4H PRN Malathi Rubin MD       • magnesium hydroxide (MILK OF MAGNESIA) suspension 2400 mg/10mL 10 mL  10 mL Oral Daily PRN Nolberto Hernandez IV, MD       • methocarbamol (ROBAXIN) tablet 1,000 mg  1,000 mg Oral 4x Daily PRN Nolberto Hernandez IV, MD       • morphine injection 2 mg  2 mg Intravenous Q3H PRN Malathi Rubin MD        And   • naloxone (NARCAN) injection 0.4 mg  0.4 mg Intravenous Q5 Min PRN Malathi Rubin MD       • morphine injection 2 mg  2 mg Intravenous Q4H PRN Nolberto Hernandez IV, MD        And   • naloxone (NARCAN) injection 0.4 mg  0.4 mg Intravenous Q5 Min PRN Nolberto Hernandez IV, MD       • ondansetron (ZOFRAN) tablet 4 mg  4 mg Oral Q6H PRN Nolberto Hernandez IV, MD         Or   • ondansetron ODT (ZOFRAN-ODT) disintegrating tablet 4 mg  4 mg Oral Q6H PRN Nolberto Hernandez IV, MD        Or   • ondansetron (ZOFRAN) injection 4 mg  4 mg Intravenous Q6H PRN Nolberto Hernandez IV, MD       • pantoprazole (PROTONIX) EC tablet 40 mg  40 mg Oral BID AC Malathi Rubin MD   40 mg at 08/11/17 0656   • Pharmacy to dose vancomycin   Does not apply Continuous PRN Chandra Ponce MD       • ramipril (ALTACE) capsule 5 mg  5 mg Oral Daily Malathi Rubin MD   5 mg at 08/11/17 0951   • sennosides-docusate sodium (SENOKOT-S) 8.6-50 MG tablet 1 tablet  1 tablet Oral Nightly PRN Nolberto Hernandez IV, MD       • sodium chloride 0.9 % flush 1-10 mL  1-10 mL Intravenous PRN Malathi Rubin MD       • sodium chloride 0.9 % flush 1-10 mL  1-10 mL Intravenous PRN Nolberto Hernandez IV, MD       • tamsulosin (FLOMAX) 24 hr capsule 0.8 mg  0.8 mg Oral Nightly Nolberto Fraser Jr., MD       • vancomycin 1500 mg/500 mL 0.9% NS IVPB ([facility])  1,500 mg Intravenous Q12H Chandra Ponce MD   1,500 mg at 08/11/17 0134   • vitamin B-12 (CYANOCOBALAMIN) tablet 1,000 mcg  1,000 mcg Oral Daily Malathi Rubin MD   1,000 mcg at 08/11/17 0952       Assessment/Plan     Principal Problem:    Epidural abscess  Active Problems:    Left leg weakness    CKD (chronic kidney disease) stage 2, GFR 60-89 ml/min    A-fib    SIADH (syndrome of inappropriate ADH production)    Hyponatremia    Sacral decubitus ulcer    Postoperative wound dehiscence    Dementia      Assessment:  (Assessment:   Acute on chronic hyponatremia, secondary to SIADH, improved  SIADH, repeat labs still consistent with SIADH.  Currently asymptomatic, levels improved with fluid restriction    Epidural abscess, status post repeat debridement  Stage II chronic kidney disease, near baseline    Sacral decubitus ulcer    Postoperative wound dehiscence    Dementia           Plan:   Sodium continues to improve with fluid  restriction  No need for Samsca at this point  Continue 1000 cc fluid ejection per day  Likely can ease up on fluid restriction a bit tomorrow if sodium levels continue to improve  Continue to monitor serial sodium levels, monitor renal function closely  No need for IV fluids at this point in time as it's likely that worsen his hyponatremia).             Continue to monitor renal function, electroytes and volume closely   Please call me with any questions or concerns      Cedrick Posada MD   [facility]   [phone]    08/11/17  10:45 AM      Dictation performed using Dragon dictation software

## 2017-08-11 NOTE — PLAN OF CARE
Problem: Patient Care Overview (Adult)  Goal: Plan of Care Review  Outcome: Ongoing (interventions implemented as appropriate)    08/11/17 0408   Coping/Psychosocial Response Interventions   Plan Of Care Reviewed With patient   Patient Care Overview   Progress improving   Outcome Evaluation   Outcome Summary/Follow up Plan Pt did not c/o pain during shift. Wound dressing changed using venelex, dry and intact. Abx administered per order. No s/s of distress at this time. Vitals stable. Will continue to monitor.        Goal: Adult Individualization and Mutuality  Outcome: Ongoing (interventions implemented as appropriate)  Goal: Discharge Needs Assessment  Outcome: Ongoing (interventions implemented as appropriate)    Problem: Pressure Ulcer (Adult)  Goal: Signs and Symptoms of Listed Potential Problems Will be Absent or Manageable (Pressure Ulcer)  Outcome: Ongoing (interventions implemented as appropriate)    Problem: Fall Risk (Adult)  Goal: Absence of Falls  Outcome: Ongoing (interventions implemented as appropriate)    Problem: Perioperative Period (Adult)  Goal: Signs and Symptoms of Listed Potential Problems Will be Absent or Manageable (Perioperative Period)  Outcome: Ongoing (interventions implemented as appropriate)

## 2017-08-12 NOTE — PROGRESS NOTES
[facility] PROGRESS NOTE    PATIENT IDENTIFICATION:   Name:  Pavel Melgoza      MRN:  [id number]     83 y.o.  male                   Principal Problem:    Epidural abscess  Active Problems:    Left leg weakness    CKD (chronic kidney disease) stage 2, GFR 60-89 ml/min    A-fib    SIADH (syndrome of inappropriate ADH production)    Hyponatremia    Sacral decubitus ulcer    Postoperative wound dehiscence    Dementia        Subjective   CC: none  Interval History: no headache, low grade fever to 99    Objective     Vital signs in last 24 hours:  Temp:  [97.6 °F (36.4 °C)-99 °F (37.2 °C)] 97.6 °F (36.4 °C)  Heart Rate:  [57-76] 70  Resp:  [20-24] 21  BP: ()/(52-68) 119/55      Intake/Output last 3 shifts:  I/O last 3 completed shifts:  In: 2243.9 [P.O.:862; I.V.:520; Blood:361.9; IV Piggyback:500]  Out: 1866 [Urine:1776; Other:90]  Intake/Output this shift:       LABS    Results from last 7 days  Lab Units 08/09/17 2015   INR  1.16*     Lab Results   Component Value Date    CALCIUM 7.9 (L) 08/12/2017    PHOS 3.6 07/10/2017     Results from last 7 days  Lab Units 08/12/17  0353 08/11/17  0608 08/11/17  0506 08/10/17  0719   SODIUM mmol/L 132*  --  129* 126*   POTASSIUM mmol/L 4.2  --  4.3 4.1   CHLORIDE mmol/L 97*  --  95* 92*   CO2 mmol/L 22.6  --  22.0 23.0   BUN mg/dL 27*  --  17 15   CREATININE mg/dL 0.74*  --  0.71* 0.62*   GLUCOSE mg/dL 111*  --  151* 113*   CALCIUM mg/dL 7.9*  --  7.7* 8.1*   WBC 10*3/mm3 12.44*  --  12.06* 10.20   HEMOGLOBIN g/dL 8.2* 6.9* 6.7* 7.3*   PLATELETS 10*3/mm3 235  --  289 268     Physical Exam:  C/d/i  str in RLE as before  Persistent chronic LLE deficits      4 - Opens eyes on own  6 - Follows simple motor commands  5 - Alert and oriented        ASSESSMENT  Assessment/Plan     Drain out today  PT  GPC's - likely 6 additional weeks of abx but defer to Dr. Ponce  [place] out at 14 days post op - I want to d/c these my self given this situation  with the NH     LOS: 3 days         Nolberto Hernandez IV, MD

## 2017-08-12 NOTE — PROGRESS NOTES
" LOS: 3 days     Name: Pavel Melgoza  Age: 83 y.o.  Sex: male  :  1934  MRN: [id number]         Primary Care Physician: Amanda Tanner MD    Subjective   Subjective  No new complaints.   A little more confused this morning per family    Objective   Vital Signs  Temp:  [97.6 °F (36.4 °C)-99 °F (37.2 °C)] 97.6 °F (36.4 °C)  Heart Rate:  [57-76] 70  Resp:  [20-24] 21  BP: ()/(52-68) 119/55  Body mass index is 23.74 kg/(m^2).    Objective:  General Appearance:  Comfortable and in no acute distress.    Vital signs: (most recent): Blood pressure 119/55, pulse 70, temperature 97.6 °F (36.4 °C), temperature source Oral, resp. rate 21, height 72.01\" (182.9 cm), weight 175 lb 0.7 oz (79.4 kg), SpO2 97 %.    Lungs:  Normal respiratory rate and normal effort.    Heart: Normal rate.  Regular rhythm.    Abdomen: Abdomen is soft.  Bowel sounds are normal.   There is no abdominal tenderness.     Extremities: There is no local swelling or dependent edema.    Neurological: Patient is alert and oriented to person, place and time.    Skin:  Warm and dry.              Results Review:       I reviewed the patient's new clinical results.      Results from last 7 days  Lab Units 17  0353 17  0608 17  0506 08/10/17  0719 08/09/17  1537   WBC 10*3/mm3 12.44*  --  12.06* 10.20 14.62*   HEMOGLOBIN g/dL 8.2* 6.9* 6.7* 7.3* 9.3*   PLATELETS 10*3/mm3 235  --  289 268 270       Results from last 7 days  Lab Units 17  0353 17  0506 08/10/17  0717  1537   SODIUM mmol/L 132* 129* 126* 124*   POTASSIUM mmol/L 4.2 4.3 4.1 4.3   CHLORIDE mmol/L 97* 95* 92* 89*   CO2 mmol/L 22.6 22.0 23.0 23.3   BUN mg/dL 27* 17 15 18   CREATININE mg/dL 0.74* 0.71* 0.62* 0.78   CALCIUM mg/dL 7.9* 7.7* 8.1* 8.4*   GLUCOSE mg/dL 111* 151* 113* 111*       Results from last 7 days  Lab Units 17   INR  1.16*             Scheduled Meds:     atorvastatin 10 mg Oral Daily   castor oil-balsam peru  " Topical Q12H   cefepime 2 g Intravenous Q12H   cholecalciferol 5,000 Units Oral Daily   diltiaZEM  mg Oral Q24H   pantoprazole 40 mg Oral BID AC   ramipril 5 mg Oral Daily   tamsulosin 0.8 mg Oral Nightly   vancomycin 1,500 mg Intravenous Q12H   cyanocobalamin 1,000 mcg Oral Daily     PRN Meds:   •  acetaminophen  •  bisacodyl  •  bisacodyl  •  calcium carbonate  •  docusate sodium  •  HYDROcodone-acetaminophen  •  magnesium hydroxide  •  methocarbamol  •  Morphine **AND** naloxone  •  Morphine **AND** naloxone  •  ondansetron **OR** ondansetron ODT **OR** ondansetron  •  Pharmacy to dose vancomycin  •  sennosides-docusate sodium  •  sodium chloride  •  sodium chloride  Continuous Infusions:    Pharmacy to dose vancomycin        Assessment/Plan   Principal Problem:    Epidural abscess  Active Problems:    Left leg weakness    CKD (chronic kidney disease) stage 2, GFR 60-89 ml/min    A-fib    SIADH (syndrome of inappropriate ADH production)    Hyponatremia    Sacral decubitus ulcer    Postoperative wound dehiscence    Dementia      Assessment & Plan    - Status post debridement of epidural abscess 8/10.  Continue antibiotics per infectious disease and await final operative cultures.  6-8 weeks of IV abx anticipated.  - S/p 2 units PRBCs yesterday with adequate response.  Monitor  - Local wound care and offloading measures for treatment of sacral decubitus ulcer  - Undergoing voiding trial this morning  - Renal following for hyponatremia, improving.  currently on fluid restriction.  - PT and CCP to see and assist with discharge planning  - Slightly more confused/delirious this AM.  Not unexpected given his underlying dementia.  Should improve upon discharge when back to more stable environment.    Wong Plummer MD  [facility]  08/12/17  10:02 AM

## 2017-08-12 NOTE — PROGRESS NOTES
"   LOS: 3 days   Patient Care Team:  Amanda Tanner MD as PCP - General (Family Medicine)    Chief Complaint/ Reason for encounter: Hyponatremia, SIADH        Subjective   Chart reviewed.  Feels well.  No CP or SOA.  No hematuria or dysuria.  No other complaints.    Objective     Vital Signs  Temp:  [97.6 °F (36.4 °C)-99 °F (37.2 °C)] 97.6 °F (36.4 °C)  Heart Rate:  [57-76] 70  Resp:  [20-24] 21  BP: ()/(52-68) 119/55       Wt Readings from Last 1 Encounters:   08/10/17 1159 175 lb 0.7 oz (79.4 kg)   08/09/17 1406 175 lb (79.4 kg)       Objective:  General Appearance:  Comfortable, in no acute distress, not in pain and well-appearing.    Vital signs: (most recent): Blood pressure 104/51, pulse 70, temperature 98.3 °F (36.8 °C), temperature source Oral, resp. rate 18, height 72.01\" (182.9 cm), weight 175 lb 0.7 oz (79.4 kg), SpO2 95 %.  Vital signs are normal.  No fever.    Output: Producing urine.    HEENT: Normal HEENT exam.    Lungs:  Normal respiratory rate and normal effort.  He is not in respiratory distress.  Breath sounds clear to auscultation.  No wheezes or decreased breath sounds.    Heart: Normal rate.  Regular rhythm.    Abdomen: Abdomen is soft and non-distended.  Bowel sounds are normal.   There is no epigastric area or no suprapubic area tenderness.  There is no rebound tenderness.     Extremities: Normal range of motion.  There is no deformity or dependent edema.    Pulses: Distal pulses are intact.    Neurological: Patient is alert and oriented to person, place and time.    Skin:  Warm and dry.  No rash or cyanosis.             Results Review:    Past Medical History: reviewed and updated  Past Medical History:   Diagnosis Date   • Coronary artery disease    • Dementia    • GERD (gastroesophageal reflux disease)    • Hyperlipidemia    • Hypertension    • Plasmacytoma          Allergies:  No Known Allergies    Intake/Output:     Intake/Output Summary (Last 24 hours) at 08/12/17 " 0907  Last data filed at 08/12/17 0814   Gross per 24 hour   Intake          1503.92 ml   Output             1091 ml   Net           412.92 ml         DATA:  Interval chart, labs and notes reviewed.    Labs:   Recent Results (from the past 24 hour(s))   Type & Screen    Collection Time: 08/11/17 10:33 AM   Result Value Ref Range    ABO Type A     RH type Negative     Antibody Screen Negative    Basic Metabolic Panel    Collection Time: 08/12/17  3:53 AM   Result Value Ref Range    Glucose 111 (H) 65 - 99 mg/dL    BUN 27 (H) 8 - 23 mg/dL    Creatinine 0.74 (L) 0.76 - 1.27 mg/dL    Sodium 132 (L) 136 - 145 mmol/L    Potassium 4.2 3.5 - 5.2 mmol/L    Chloride 97 (L) 98 - 107 mmol/L    CO2 22.6 22.0 - 29.0 mmol/L    Calcium 7.9 (L) 8.6 - 10.5 mg/dL    eGFR Non African Amer 101 >60 mL/min/1.73    BUN/Creatinine Ratio 36.5 (H) 7.0 - 25.0    Anion Gap 12.4 mmol/L   CBC Auto Differential    Collection Time: 08/12/17  3:53 AM   Result Value Ref Range    WBC 12.44 (H) 4.50 - 10.70 10*3/mm3    RBC 2.69 (L) 4.60 - 6.00 10*6/mm3    Hemoglobin 8.2 (L) 13.7 - 17.6 g/dL    Hematocrit 24.4 (L) 40.4 - 52.2 %    MCV 90.7 79.8 - 96.2 fL    MCH 30.5 27.0 - 32.7 pg    MCHC 33.6 32.6 - 36.4 g/dL    RDW 16.7 (H) 11.5 - 14.5 %    RDW-SD 55.7 (H) 37.0 - 54.0 fl    MPV 8.3 6.0 - 12.0 fL    Platelets 235 140 - 500 10*3/mm3    Neutrophil % 77.4 (H) 42.7 - 76.0 %    Lymphocyte % 12.2 (L) 19.6 - 45.3 %    Monocyte % 9.4 5.0 - 12.0 %    Eosinophil % 0.4 0.3 - 6.2 %    Basophil % 0.2 0.0 - 1.5 %    Immature Grans % 0.4 0.0 - 0.5 %    Neutrophils, Absolute 9.62 (H) 1.90 - 8.10 10*3/mm3    Lymphocytes, Absolute 1.52 0.90 - 4.80 10*3/mm3    Monocytes, Absolute 1.17 0.20 - 1.20 10*3/mm3    Eosinophils, Absolute 0.05 0.00 - 0.70 10*3/mm3    Basophils, Absolute 0.03 0.00 - 0.20 10*3/mm3    Immature Grans, Absolute 0.05 (H) 0.00 - 0.03 10*3/mm3   Prepare RBC, 2 Units    Collection Time: 08/12/17  6:00 AM   Result Value Ref Range    Product Code I8982F34      Unit Number [id number]     UNIT  ABO A     UNIT  RH NEG     Dispense Status PT     Blood Type ANEG     Blood Expiration Date 201709072359     Blood Type Barcode 0600     Product Code L4831P37     Unit Number [id number]     UNIT  ABO A     UNIT  RH NEG     Dispense Status IS     Blood Type ANEG     Blood Expiration Date [id number]     Blood Type Barcode 0600        Radiology:  Imaging Results (last 24 hours)     ** No results found for the last 24 hours. **             Medications have been reviewed:  Current Facility-Administered Medications   Medication Dose Route Frequency Provider Last Rate Last Dose   • acetaminophen (TYLENOL) tablet 650 mg  650 mg Oral Q6H PRN Malathi Rubin MD   650 mg at 08/11/17 1155   • atorvastatin (LIPITOR) tablet 10 mg  10 mg Oral Daily Malathi Rubin MD   10 mg at 08/11/17 0951   • bisacodyl (DULCOLAX) EC tablet 5 mg  5 mg Oral Daily PRN Nolberto Hernandez IV, MD       • bisacodyl (DULCOLAX) suppository 10 mg  10 mg Rectal Daily PRN Nolberto Hernandez IV, MD   10 mg at 08/11/17 1222   • calcium carbonate (TUMS) chewable tablet 500 mg (200 mg elemental)  1 tablet Oral BID PRN Malathi Rubin MD       • castor oil-balsam peru (VENELEX) ointment   Topical Q12H Wong Plummer MD       • cefepime (MAXIPIME) 2 g/100 mL 0.9% NS (mbp)  2 g Intravenous Q12H Chandra Ponce MD   2 g at 08/12/17 0443   • cholecalciferol (VITAMIN D3) tablet 5,000 Units  5,000 Units Oral Daily Malathi Rubin MD   5,000 Units at 08/11/17 0951   • diltiaZEM CD (CARDIZEM CD) 24 hr capsule 240 mg  240 mg Oral Q24H Malathi uRbin MD   240 mg at 08/11/17 0951   • docusate sodium (COLACE) capsule 100 mg  100 mg Oral BID PRN Nolberto Hernandez IV, MD       • HYDROcodone-acetaminophen (NORCO) 5-325 MG per tablet 1 tablet  1 tablet Oral Q4H PRN Malathi Rubin MD   1 tablet at 08/11/17 7692   • magnesium hydroxide (MILK OF MAGNESIA) suspension  2400 mg/10mL 10 mL  10 mL Oral Daily PRN Nolberto Hernandez IV, MD       • methocarbamol (ROBAXIN) tablet 1,000 mg  1,000 mg Oral 4x Daily PRN Nolberto Hernandez IV, MD       • morphine injection 2 mg  2 mg Intravenous Q3H PRN Malathi Rubin MD        And   • naloxone (NARCAN) injection 0.4 mg  0.4 mg Intravenous Q5 Min PRN Malathi Rubin MD       • morphine injection 2 mg  2 mg Intravenous Q4H PRN Nolberto Hernandez IV, MD        And   • naloxone (NARCAN) injection 0.4 mg  0.4 mg Intravenous Q5 Min PRN Nolberto Hernandez IV, MD       • ondansetron (ZOFRAN) tablet 4 mg  4 mg Oral Q6H PRN Nolberto Hernandez IV, MD        Or   • ondansetron ODT (ZOFRAN-ODT) disintegrating tablet 4 mg  4 mg Oral Q6H PRN Nolberto Hernandez IV, MD        Or   • ondansetron (ZOFRAN) injection 4 mg  4 mg Intravenous Q6H PRN Nolberto Hernandez IV, MD       • pantoprazole (PROTONIX) EC tablet 40 mg  40 mg Oral BID AC Malathi Rubin MD   40 mg at 08/12/17 0606   • Pharmacy to dose vancomycin   Does not apply Continuous PRN Chandra Ponce MD       • ramipril (ALTACE) capsule 5 mg  5 mg Oral Daily Malathi Rubin MD   5 mg at 08/11/17 0951   • sennosides-docusate sodium (SENOKOT-S) 8.6-50 MG tablet 1 tablet  1 tablet Oral Nightly PRN Nolberto Hernandez IV, MD       • sodium chloride 0.9 % flush 1-10 mL  1-10 mL Intravenous PRN Malathi Rubin MD       • sodium chloride 0.9 % flush 1-10 mL  1-10 mL Intravenous PRN Nolberto Hernandez IV, MD       • tamsulosin (FLOMAX) 24 hr capsule 0.8 mg  0.8 mg Oral Nightly Nolberto Fraser Jr., MD   0.8 mg at 08/11/17 2124   • vancomycin 1500 mg/500 mL 0.9% NS IVPB ([facility])  1,500 mg Intravenous Q12H Chandra Ponce MD   1,500 mg at 08/12/17 0543   • vitamin B-12 (CYANOCOBALAMIN) tablet 1,000 mcg  1,000 mcg Oral Daily Malathi Rubin MD   1,000 mcg at 08/11/17 0920       Assessment/Plan     Principal Problem:    Epidural abscess  Active Problems:    Left  leg weakness    CKD (chronic kidney disease) stage 2, GFR 60-89 ml/min    A-fib    SIADH (syndrome of inappropriate ADH production)    Hyponatremia    Sacral decubitus ulcer    Postoperative wound dehiscence    Dementia      Assessment:  Acute on chronic hyponatremia, secondary to SIADH, improved  SIADH, repeat labs still consistent with SIADH.  Currently asymptomatic, levels improved with fluid restriction    Epidural abscess, status post repeat debridement  Stage II chronic kidney disease, near baseline    Sacral decubitus ulcer    Postoperative wound dehiscence    Dementia           Plan:   Sodium continues to improve with fluid restriction  Continue 1000 cc fluid restriction per day        Maximus Goetz MD   [facility]   [phone]    08/12/17  9:07 AM

## 2017-08-12 NOTE — THERAPY TREATMENT NOTE
Acute Care - Physical Therapy Treatment Note  [facility]     Patient Name: Pavel Melgoza  : 1934  MRN: [id number]  Today's Date: 2017  Onset of Illness/Injury or Date of Surgery Date: 17     Referring Physician: Dr. Plummer    Admit Date: 2017    Visit Dx:    ICD-10-CM ICD-9-CM   1. Sacral decubitus ulcer, unspecified pressure ulcer stage L89.159 707.03     707.20   2. Postoperative wound dehiscence, lumbar spine, initial encounter T81.31XA 998.32   3. Fever in adult, reported R50.9 780.60   4. Postoperative wound dehiscence, initial encounter T81.31XA 998.32   5. Generalized weakness R53.1 780.79   6. Unsteadiness on feet R26.81 781.2     Patient Active Problem List   Diagnosis   • Generalized weakness   • Plasmocytoma   • Nausea & vomiting   • Fall   • Noncompliance with medication regimen   • Left leg weakness   • Closed wedge compression fracture of eleventh thoracic vertebra   • CKD (chronic kidney disease) stage 2, GFR 60-89 ml/min   • B12 deficiency   • A-fib   • Meningioma   • SIADH (syndrome of inappropriate ADH production)   • Hyponatremia   • Vitamin D deficiency   • Epidural abscess   • Sacral decubitus ulcer   • Postoperative wound dehiscence   • Dementia               Adult Rehabilitation Note       17 1400          Rehab Assessment/Intervention    Discipline physical therapist  -GJ      Document Type evaluation  -GJ      Subjective Information agree to therapy;complains of;pain  -GJ      Patient Effort, Rehab Treatment adequate  -GJ      Symptoms Noted During/After Treatment increased pain  -GJ      Precautions/Limitations fall precautions  -GJ      Specific Treatment Considerations MMT LLE 0-1/5 (10+ year hx), unsafe for standing exercies at this time, consider straight leg brace LLE for upright activities.  -GJ      Recorded by [GJ] Chilo Woodard, PT      Pain Assessment    Pain Assessment 0-10  -GJ      Pain Score 6  -GJ      Pain Type Surgical pain  -GJ       Pain Location Back  -GJ      Pain Intervention(s) Medication (See MAR);Repositioned  -GJ      Response to Interventions tolerated once re-positioned  -GJ      Recorded by [GJ] Chilo Woodard, PT      Cognitive Assessment/Intervention    Current Cognitive/Communication Assessment functional  -GJ      Orientation Status oriented x 4  -GJ      Follows Commands/Answers Questions 100% of the time  -GJ      Recorded by [GJ] Chilo Woodard, PT      ROM (Range of Motion)    General ROM Detail pt. demonstrates no AROM LLE (10+ year hx) per pt./wife  -GJ      Recorded by [GJ] Chilo Woodard, PT      MMT (Manual Muscle Testing)    General MMT Assessment Detail LLE 0-1/5  -GJ      Recorded by [GJ] Chilo Woodard, PT      Bed Mobility, Assessment/Treatment    Bed Mob, Supine to Sit, [place] minimum assist (75% patient effort);verbal cues required;set up required;nonverbal cues required (demo/gesture)  -GJ      Bed Mob, Sit to Supine, [place] minimum assist (75% patient effort);2 person assist required;verbal cues required;nonverbal cues required (demo/gesture)  -GJ      Recorded by [GJ] Chilo Woodard, PT      Transfer Assessment/Treatment    Transfers, Sit-Stand [place] not tested   not performed, unsafe today, pt with increased pain, LLE wea  -GJ      Transfer, Safety Issues --   LLE weakness, pre morbid  -GJ      Recorded by [GJ] Chilo Woodard, PT      Gait Assessment/Treatment    Gait, [place] Level --   unsafe  -GJ      Recorded by [GJ] Chilo Woodard, PT      Balance Skills Training    Sitting-Level of Assistance Contact guard  -GJ      Sitting-Balance Support Feet supported  -GJ      Sitting # of Minutes 4   pt had to return to supine secondary to pain  -GJ      Recorded by [GJ] Chilo Woodard, PT      Therapy Exercises    Right Lower Extremity AROM:;10 reps;sitting;LAQ;supine;ankle pumps/circles;glut sets;quad sets  -GJ      Recorded by [GJ] Chilo Woodard, PT      Positioning and Restraints     Pre-Treatment Position in bed  -GJ      Post Treatment Position bed  -GJ      In Bed side lying left;call light within reach;encouraged to call for assist;with family/caregiver;pillow between legs  -GJ      Recorded by [DAVE] Chilo Woodard, PT        User Key  (r) = Recorded By, (t) = Taken By, (c) = Cosigned By    Initials Name Effective Dates    DAVE Woodard, PT 03/26/15 -                 IP PT Goals       08/11/17 1152          Bed Mobility PT LTG    Bed Mobility PT LTG, Date Established 08/11/17  -PC (r) AA (t) PC (c)      Bed Mobility PT LTG, Time to Achieve 1 wk  -PC (r) AA (t) PC (c)      Bed Mobility PT LTG, Activity Type all bed mobility  -PC (r) AA (t) PC (c)      Bed Mobility PT LTG, [place] Level supervision required  -PC (r) AA (t) PC (c)      Bed Mobility PT Goal  LTG, Assist Device bed rails  -PC (r) AA (t) PC (c)      Transfer Training PT LTG    Transfer Training PT LTG, Date Established 08/11/17  -PC (r) AA (t) PC (c)      Transfer Training PT LTG, Time to Achieve 1 wk  -PC (r) AA (t) PC (c)      Transfer Training PT LTG, Activity Type all transfers  -PC (r) AA (t) PC (c)      Transfer Training PT LTG, [place]ose[place] Level contact guard assist  -PC (r) AA (t) PC (c)      Transfer Training PT LTG, Assist Device walker, rolling  -PC (r) AA (t) PC (c)      Gait Training PT LTG    Gait Training Goal PT LTG, Date Established 08/11/17  -PC (r) AA (t) PC (c)      Gait Training Goal PT LTG, Time to Achieve 1 wk  -PC (r) AA (t) PC (c)      Gait Training Goal PT LTG, [place] Level minimum assist (75% patient effort);2 person assist required  -PC (r) AA (t) PC (c)      Gait Training Goal PT LTG, Assist Device walker, rolling  -PC (r) AA (t) PC (c)      Gait Training Goal PT LTG, Distance to Achieve 10   with L knee immobilizer  -PC (r) AA (t) PC (c)        User Key  (r) = Recorded By, (t) = Taken By, (c) = Cosigned By    Initials Name Provider Type    KATY Kraus, PT Physical Therapist    AA  Skyla Blanco, PT Student PT Student          Physical Therapy Education     Title: PT OT SLP Therapies (Active)     Topic: Physical Therapy (Active)     Point: Mobility training (Active)    Learning Progress Summary    Learner Readiness Method Response Comment Documented by Status   Patient Acceptance E NR   08/12/17 1458 Active    Acceptance E VU,NR  AA 08/11/17 1151 Done   Significant Other Acceptance E NR   08/12/17 1458 Active               Point: Home exercise program (Active)    Learning Progress Summary    Learner Readiness Method Response Comment Documented by Status   Patient Acceptance E NR   08/12/17 1458 Active    Acceptance E VU,NR  AA 08/11/17 1151 Done   Significant Other Acceptance E NR   08/12/17 1458 Active               Point: Body mechanics (Active)    Learning Progress Summary    Learner Readiness Method Response Comment Documented by Status   Patient Acceptance E NR   08/12/17 1458 Active    Acceptance E VU,NR  AA 08/11/17 1151 Done   Significant Other Acceptance E NR   08/12/17 1458 Active               Point: Precautions (Active)    Learning Progress Summary    Learner Readiness Method Response Comment Documented by Status   Patient Acceptance E NR   08/12/17 1458 Active    Acceptance E VU,NR  AA 08/11/17 1151 Done   Significant Other Acceptance E NR   08/12/17 1458 Active                      User Key     Initials Effective Dates Name Provider Type Discipline     03/26/15 -  Chilo Woodard, PT Physical Therapist PT     06/01/17 -  Skyla Blanco, PT Student PT Student PT                    PT Recommendation and Plan  Anticipated Discharge Disposition: skilled nursing facility  Planned Therapy Interventions: balance training, bed mobility training, home exercise program, transfer training, gait training  PT Frequency: daily  Plan of Care Review  Outcome Summary/Follow up Plan: Mr. Melgoza demonstrates increased pain with sitting and is only able to tolerate ~ 4  min. sitting EOB.  He demonstrates premorbied LLE (01-/5).At this time, pt. unsafe for ambulatory activities.  He continues to be a good candidate for skilled physical therapy          Outcome Measures       08/12/17 1500 08/11/17 1100       How much help from another person do you currently need...    Turning from your back to your side while in flat bed without using bedrails? 3  -GJ 4  -PC (r) AA (t) PC (c)     Moving from lying on back to sitting on the side of a flat bed without bedrails? 2  -GJ 3  -PC (r) AA (t) PC (c)     Moving to and from a bed to a chair (including a wheelchair)? 1  -GJ 2  -PC (r) AA (t) PC (c)     Standing up from a chair using your arms (e.g., wheelchair, bedside chair)? 2  -GJ 3  -PC (r) AA (t) PC (c)     Climbing 3-5 steps with a railing? 1  -GJ 1  -PC (r) AA (t) PC (c)     To walk in hospital room? 1  -GJ 2  -PC (r) AA (t) PC (c)     AM-PAC 6 Clicks Score 10  -GJ 15  -PC (r) AA (t)     Functional Assessment    Outcome Measure Options AM-PAC 6 Clicks Basic Mobility (PT)  -GJ AM-PAC 6 Clicks Basic Mobility (PT)  -PC (r) AA (t) PC (c)       User Key  (r) = Recorded By, (t) = Taken By, (c) = Cosigned By    Initials Name Provider Type    DAVE Woodard, PT Physical Therapist    KATY Kraus, PT Physical Therapist    LEN Blanco, PT Student PT Student           Time Calculation:         PT Charges       08/12/17 1501          Time Calculation    Start Time 1450  -GJ      Stop Time 1500  -GJ      Time Calculation (min) 10 min  -GJ        User Key  (r) = Recorded By, (t) = Taken By, (c) = Cosigned By    Initials Name Provider Type    DAVE Woodard, PT Physical Therapist          Therapy Charges for Today     Code Description Service Date Service Provider Modifiers Qty    58899265565 HC PT THER PROC EA 15 MIN 8/12/2017 Chilo Woodard, PT GP 1    [id number]  PT THER SUPP EA 15 MIN 8/12/2017 Chilo Woodard, PT GP 1          PT G-Codes  Outcome Measure Options: AM-PAC 6  Clicks Basic Mobility (PT)    Chilo Woodard, PT  8/12/2017

## 2017-08-12 NOTE — PROGRESS NOTES
CC: f/u epidural abscess    History from pt and wife.    Subective/24H events: No acute events. No fevers. Tolerating antibiotics. Some confusion. Gong removed for voiding trial. Only mild dull back pain worse w/ movement and relieved w/ rest. Drains to be removed this AM.    Review of Systems  No n/v/d    Antibiotics:  •  cefepime (MAXIPIME) 2 g/100 mL 0.9% NS (mbp), 2 g, Intravenous, Q12H, Chandra Ponce MD  •  Pharmacy to dose vancomycin, , Does not apply, Continuous PRN, Chandra Ponce MD      Objective   Vital Signs   Temp:  [97.6 °F (36.4 °C)-99 °F (37.2 °C)] 97.6 °F (36.4 °C)  Heart Rate:  [57-76] 70  Resp:  [20-24] 21  BP: ()/(52-68) 119/55    Physical Exam:   General: awake, alert, nad  Head: Normocephalic  Eyes: PERRL, no scleral icterus  ENT: MMM, OP clear, no thrush. Fair dentition.   Neck: Supple  Cardiovascular: NR,  no LE edema  Respiratory:  normal work of breathing on ambient air  GI: Abdomen is soft, non-tender, non-distended  : Gong catheter present w/ clear yellow urine  Musculoskeletal: L spine bandaged w/ 2 drains still in  Skin: No rashes, lesions, or embolic phenomenon  Neurological: Alert and oriented x 3,  motor strength 5/5 in UEs and RLE; chronic 3/5 strength in LLE  Psychiatric: Normal mood and affect   Vasc: no cyanosis; PICC in RUE w/o erythema    Labs:   CBC, BMP, micro reviewed today  Lab Results   Component Value Date    WBC 12.44 (H) 08/12/2017    HGB 8.2 (L) 08/12/2017    HCT 24.4 (L) 08/12/2017    MCV 90.7 08/12/2017     08/12/2017       Lab Results   Component Value Date    GLUCOSE 111 (H) 08/12/2017    BUN 27 (H) 08/12/2017    CREATININE 0.74 (L) 08/12/2017    EGFRIFNONA 101 08/12/2017    BCR 36.5 (H) 08/12/2017    CO2 22.6 08/12/2017    CALCIUM 7.9 (L) 08/12/2017    ALBUMIN 2.50 (L) 07/10/2017    LABIL2 1.1 06/20/2017    AST 20 06/20/2017    ALT 19 06/20/2017     Lab Results   Component Value Date    CRP 8.93 (H) 08/11/2017      Microbiology:  8/9 BCx: NGTD  8/10 OR Cx: GPCs on gram stain; culture pending    Radiology (personally reviewed report):  No new imaging today    Assessment/Plan   1. Epidural and retroperitoneal abscess secondary to ampicillin-sensitive Enterococcus s/p washout 7/4/17 and s/p IR guided drainage of RP abscess on 7/5/17  2. Wound dehiscence and drainage  3. Recurrent epidural abscess s/p washout 8/10/17 - GPCs on gram stain  4. Chronic urinary retention  5. Sacral ulcer - no evidence of osteomyelitis on MRI    OR 8/10/17 for I&D. Gram stain with GPCs. Culture pending. Continue vancomycin with goal trough of 15-20 and cefepime 2 g IV q12h while awaiting culture results. I am reassured by the MRI of the sacrum not showing osteomyelitis. Wound care is following. Voiding trial underway I anticipate he will receive another 6-8 weeks of IV antibiotics.     Thank you for this consult. ID will follow.

## 2017-08-12 NOTE — NURSING NOTE
"Urinary catheter not present upon assessment. Dr. Fraser's note on 08/11/2017 at 1043h indicates \"can proceed with a voiding trial with parameters\" but no orders to D/C catheter are present in the chart. SHANEL Daigle reported she discontinued the catheter per urology and perfomed a bladder scan to assess for need to straight catheterize patient. Urology following. Urology has been paged to request order, as bladder scan volume is >400mL and pt. is unable to void despite repositioning and other voiding relaxation/promotion techniques. Janeen Chu, RN    "

## 2017-08-12 NOTE — PLAN OF CARE
Problem: Patient Care Overview (Adult)  Goal: Plan of Care Review  Outcome: Ongoing (interventions implemented as appropriate)    08/12/17 2699   Coping/Psychosocial Response Interventions   Plan Of Care Reviewed With patient;spouse   Patient Care Overview   Progress no change   Outcome Evaluation   Outcome Summary/Follow up Plan pt has back/hip pain, dressing changes, JPx2 d/c gauze tegaderm CDI, PU red/ ointment with mepilex, enc freq shift changes monitoring        Goal: Adult Individualization and Mutuality  Outcome: Ongoing (interventions implemented as appropriate)  Goal: Discharge Needs Assessment  Outcome: Ongoing (interventions implemented as appropriate)    Problem: Pressure Ulcer (Adult)  Goal: Signs and Symptoms of Listed Potential Problems Will be Absent or Manageable (Pressure Ulcer)  Outcome: Ongoing (interventions implemented as appropriate)    Problem: Fall Risk (Adult)  Goal: Absence of Falls  Outcome: Ongoing (interventions implemented as appropriate)    Problem: Pain, Acute (Adult)  Goal: Identify Related Risk Factors and Signs and Symptoms  Outcome: Ongoing (interventions implemented as appropriate)  Goal: Acceptable Pain Control/Comfort Level  Outcome: Ongoing (interventions implemented as appropriate)

## 2017-08-12 NOTE — PLAN OF CARE
Problem: Patient Care Overview (Adult)  Goal: Plan of Care Review  Outcome: Ongoing (interventions implemented as appropriate)    08/12/17 0306   Coping/Psychosocial Response Interventions   Plan Of Care Reviewed With patient   Patient Care Overview   Progress no change   Outcome Evaluation   Outcome Summary/Follow up Plan pt alert and disoriented to place, time, and situation; pt. Affect consistently angry as evidenced by pt. swearing when staff enters room to provide care/assistance; limited ROM to LLE, pt. requires +++encouragement to practice AROMs; tachypneic throughout shift, nasal cannula at 1L applied for comfort with no efficacy apparent; pt. unable to void and requiring straight catheterization; pt turned q2h and PRN, requiring reminders to avoid laying on wounds. 1 unit PRBCs administered.         Problem: Pressure Ulcer (Adult)  Goal: Signs and Symptoms of Listed Potential Problems Will be Absent or Manageable (Pressure Ulcer)  Outcome: Ongoing (interventions implemented as appropriate)    08/12/17 0306   Pressure Ulcer   Problems Assessed (Pressure Ulcer) all   Problems Present (Pressure Ulcer) pain;infection         Problem: Fall Risk (Adult)  Goal: Absence of Falls  Outcome: Ongoing (interventions implemented as appropriate)    08/12/17 0306   Fall Risk (Adult)   Absence of Falls making progress toward outcome         Problem: Perioperative Period (Adult)  Goal: Signs and Symptoms of Listed Potential Problems Will be Absent or Manageable (Perioperative Period)  Outcome: Ongoing (interventions implemented as appropriate)    08/12/17 0306   Perioperative Period   Problems Assessed (Perioperative Period) pain;wound complications;hypothermia;hemorrhage;hypoxia/hypoxemia;perioperative injury;infection;physiologic stress response;postoperative ileus;urinary retention   Problems Present (Perioperative Period) urinary retention;pain

## 2017-08-13 NOTE — PROGRESS NOTES
Name: Pavel Melgoza ADMIT: 2017   : 1934  PCP: Amanda Tanner MD    MRN: [id number] LOS: 4 days   AGE/SEX: 83 y.o. male  ROOM: [facility]   Subjective   Subjective  CC/Reason for follow-up: epidural abscess  No acute events.  Pain is well-controlled. Working with PT.  Taking PO well.  No n/v/d.  No constipation.  Objective   Vital Signs  Temp:  [98.3 °F (36.8 °C)-99 °F (37.2 °C)] 98.3 °F (36.8 °C)  Heart Rate:  [63-87] 85  Resp:  [20] 20  BP: (110-132)/(55-70) 118/64  SpO2:  [94 %-97 %] 94 %  on   ;   O2 Device: room air  Body mass index is 23.74 kg/(m^2).    Physical Exam  General:  Alert, no distress  Head: NCAT  Eyes: EOMI, conjunctivae normal  Mouth/Throat: oropharynx clear and moist  Neck: supple, no JVD  Cardiac: normal rate, regular rhythm  Respiratory: normal effort, clear to ascultation  Abdomen: soft, non-tender,bowel sounds normoactive  Musculoskeletal: no deformity, no tenderness, no dependent edema  Extremities: no cyanosis, warm, peripheral pulses intact  Skin: no rashes, warm/dry  Neuro: oriented x3, no gross deficits  Psych: mood and affect appropriate  Results Review:       I reviewed the patient's new clinical results.    Results from last 7 days  Lab Units 17  0501 17  0353 17  0608 17  0506 08/10/17  0717  1537   WBC 10*3/mm3 10.86* 12.44*  --  12.06* 10.20 14.62*   HEMOGLOBIN g/dL 9.0* 8.2* 6.9* 6.7* 7.3* 9.3*   PLATELETS 10*3/mm3 278 235  --  289 268 270     Results from last 7 days  Lab Units 17  0501 17  0353 17  0506 08/10/17  0719 17  1537   SODIUM mmol/L 131* 132* 129* 126* 124*   POTASSIUM mmol/L 4.2 4.2 4.3 4.1 4.3   CHLORIDE mmol/L 97* 97* 95* 92* 89*   CO2 mmol/L 23.3 22.6 22.0 23.0 23.3   BUN mg/dL 27* 27* 17 15 18   CREATININE mg/dL 0.66* 0.74* 0.71* 0.62* 0.78   GLUCOSE mg/dL 98 111* 151* 113* 111*   Estimated Creatinine Clearance: 78.6 mL/min (by C-G formula based on Cr of 0.66).  Results from last 7  days  Lab Units 08/13/17  0501 08/12/17  0353 08/11/17  0506 08/10/17  0719 08/09/17  1537   CALCIUM mg/dL 8.1* 7.9* 7.7* 8.1* 8.4*         atorvastatin 10 mg Oral Daily   castor oil-balsam peru  Topical Q12H   cholecalciferol 5,000 Units Oral Daily   diltiaZEM  mg Oral Q24H   pantoprazole 40 mg Oral BID AC   ramipril 5 mg Oral Daily   tamsulosin 0.8 mg Oral Nightly   vancomycin 1,000 mg Intravenous Q12H   cyanocobalamin 1,000 mcg Oral Daily       Pharmacy to dose vancomycin    Diet Regular; Daily Fluid Restriction; Other (See comments)      Assessment/Plan   Assessment:     Active Hospital Problems (** Indicates Principal Problem)    Diagnosis Date Noted   • **Epidural abscess [G06.2] 07/04/2017   • Sacral decubitus ulcer [L89.159] 08/09/2017   • Postoperative wound dehiscence [T81.31XA] 08/09/2017   • Dementia [F03.90] 08/09/2017   • Hyponatremia [E87.1] 06/26/2017   • SIADH (syndrome of inappropriate ADH production) [E22.2] 06/25/2017   • A-fib [I48.91] 06/23/2017   • CKD (chronic kidney disease) stage 2, GFR 60-89 ml/min [N18.2] 06/20/2017   • Left leg weakness [M62.81] 06/20/2017      Resolved Hospital Problems    Diagnosis Date Noted Date Resolved   No resolved problems to display.       Plan:   Epidural Abscess  -s/p debridement 8/10  -cx negative but GPCs seen on gram stain  -vancomycin x6wks through 9/21/17 per ID with weekly CBC/diff, BMP, Vanc trough  -f/u with Dr. Hernandez in 14d post op for staple removal (not to be removed at NH)    Anemia  -s/p 2 units PRBCs 8/11 with appropriate response, Hgb stable    Urinary retention  -voiding trail today    Hyponatremia  -secondary to SIADH  -stable on 1000cc fluid restriction  -appreciate nephrology recs    DVT Prophylaxis  -TEDs/SCDs      Disposition  [facility] pending bed availability      Nicanor Alvarez MD  [facility]  08/13/17  3:48 PM

## 2017-08-13 NOTE — PROGRESS NOTES
CC: f/u epidural abscess    History from pt and wife.    Subective/24H events: No acute events. No fevers. Tolerating antibiotics. Passing voiding trial thus far. Drains out of back.    Review of Systems  No n/v/d    Antibiotics:  Vancomycin 1 g q12h      Objective   Vital Signs   Temp:  [98 °F (36.7 °C)-99 °F (37.2 °C)] 98.5 °F (36.9 °C)  Heart Rate:  [63-87] 87  Resp:  [20-21] 20  BP: (107-132)/(51-70) 132/70    Physical Exam:   General: awake, alert, nad  Head: Normocephalic  Eyes: PERRL, no scleral icterus  ENT: MMM, OP clear, no thrush. Fair dentition.   Neck: Supple  Cardiovascular: NR,  no LE edema  Respiratory:  normal work of breathing on ambient air  GI: Abdomen is soft, non-tender, non-distended  : Gong catheter present w/ clear yellow urine  Musculoskeletal: L spine bandaged w/ 2 drains removed  Skin: No rashes, lesions, or embolic phenomenon  Neurological: Alert and oriented x 3,  motor strength 5/5 in UEs and RLE; chronic 3/5 strength in LLE  Psychiatric: Normal mood and affect   Vasc: no cyanosis; PICC in RUE w/o erythema    Labs:   CBC, BMP, VTr, micro reviewed today  Lab Results   Component Value Date    WBC 10.86 (H) 08/13/2017    HGB 9.0 (L) 08/13/2017    HCT 26.9 (L) 08/13/2017    MCV 90.9 08/13/2017     08/13/2017       Lab Results   Component Value Date    GLUCOSE 98 08/13/2017    BUN 27 (H) 08/13/2017    CREATININE 0.66 (L) 08/13/2017    EGFRIFNONA 115 08/13/2017    BCR 40.9 (H) 08/13/2017    CO2 23.3 08/13/2017    CALCIUM 8.1 (L) 08/13/2017    ALBUMIN 2.50 (L) 07/10/2017    LABIL2 1.1 06/20/2017    AST 20 06/20/2017    ALT 19 06/20/2017     Lab Results   Component Value Date    CRP 8.93 (H) 08/11/2017     Lab Results   Component Value Date    VANCOTROUGH 23.10 (H) 08/13/2017       Microbiology:  8/9 BCx: NGTD  8/10 OR Cx: GPCs on gram stain; culture negative    Radiology (personally reviewed report):  No new imaging today    Assessment/Plan   1. Epidural and retroperitoneal abscess  secondary to ampicillin-sensitive Enterococcus s/p washout 7/4/17 and s/p IR guided drainage of RP abscess on 7/5/17  2. Wound dehiscence and drainage  3. Recurrent epidural abscess s/p washout 8/10/17 - GPCs on gram stain but Cx negative  4. Chronic urinary retention  5. Sacral ulcer - no evidence of osteomyelitis on MRI    OR 8/10/17 for I&D. Gram stain with GPCs but culture didn't grow. With fistulous tract, I favor treating with 6 weeks of vancomycin to cover GPCs. Will give vancomycin 1 g IV q12h through 9/21/17. Weekly CBC w/ diff, BMP, VTr faxed to me at [phone]. F/U with me in ID clinic 9/21/17 at 1:30 PM.     Thank you for allowing me to be involved in the care of this patient. Infectious diseases will sign off at this time with antibiotics plan in place but please call me at [phone] if any further questions.

## 2017-08-13 NOTE — PROGRESS NOTES
"   LOS: 4 days   Patient Care Team:  Amanda Tanner MD as PCP - General (Family Medicine)    Chief Complaint/ Reason for encounter: Hyponatremia, SIADH        Subjective   Chart reviewed.  Feels well.  No CP or SOA.  No hematuria or dysuria.  No other complaints.    Objective     Vital Signs  Temp:  [97.6 °F (36.4 °C)-99 °F (37.2 °C)] 99 °F (37.2 °C)  Heart Rate:  [63-76] 76  Resp:  [20-21] 20  BP: (107-119)/(51-60) 116/55       Wt Readings from Last 1 Encounters:   08/10/17 1159 175 lb 0.7 oz (79.4 kg)   08/09/17 1406 175 lb (79.4 kg)       Objective:  General Appearance:  Comfortable, in no acute distress, not in pain and well-appearing.    Vital signs: (most recent): Blood pressure 104/51, pulse 70, temperature 98.3 °F (36.8 °C), temperature source Oral, resp. rate 18, height 72.01\" (182.9 cm), weight 175 lb 0.7 oz (79.4 kg), SpO2 95 %.  Vital signs are normal.  No fever.    Output: Producing urine.    HEENT: Normal HEENT exam.    Lungs:  Normal respiratory rate and normal effort.  He is not in respiratory distress.  Breath sounds clear to auscultation.  No wheezes or decreased breath sounds.    Heart: Normal rate.  Regular rhythm.    Abdomen: Abdomen is soft and non-distended.  Bowel sounds are normal.   There is no epigastric area or no suprapubic area tenderness.  There is no rebound tenderness.     Extremities: Normal range of motion.  There is no deformity or dependent edema.    Pulses: Distal pulses are intact.    Neurological: Patient is alert and oriented to person, place and time.    Skin:  Warm and dry.  No rash or cyanosis.             Results Review:    Past Medical History: reviewed and updated  Past Medical History:   Diagnosis Date   • Coronary artery disease    • Dementia    • GERD (gastroesophageal reflux disease)    • Hyperlipidemia    • Hypertension    • Plasmacytoma          Allergies:  No Known Allergies    Intake/Output:     Intake/Output Summary (Last 24 hours) at 08/13/17 " 0655  Last data filed at 08/13/17 0500   Gross per 24 hour   Intake              347 ml   Output             2789 ml   Net            -2442 ml         DATA:  Interval chart, labs and notes reviewed.    Labs:   Recent Results (from the past 24 hour(s))   Prepare RBC, 2 Units    Collection Time: 08/12/17  6:00 PM   Result Value Ref Range    Product Code P2013U10     Unit Number [id number]     UNIT  ABO A     UNIT  RH NEG     Dispense Status PT     Blood Type ANEG     Blood Expiration Date 201709072359     Blood Type Barcode 0600     Product Code N5193A84     Unit Number [id number]     UNIT  ABO A     UNIT  RH NEG     Dispense Status PT     Blood Type ANEG     Blood Expiration Date [id number]     Blood Type Barcode 0600    Vancomycin, Trough Please draw trough prior to 0530 dose. Please do not draw trough while vancomycin is infusing. Thank you.    Collection Time: 08/13/17  5:01 AM   Result Value Ref Range    Vancomycin Trough 23.10 (H) 5.00 - 20.00 mcg/mL   Basic Metabolic Panel    Collection Time: 08/13/17  5:01 AM   Result Value Ref Range    Glucose 98 65 - 99 mg/dL    BUN 27 (H) 8 - 23 mg/dL    Creatinine 0.66 (L) 0.76 - 1.27 mg/dL    Sodium 131 (L) 136 - 145 mmol/L    Potassium 4.2 3.5 - 5.2 mmol/L    Chloride 97 (L) 98 - 107 mmol/L    CO2 23.3 22.0 - 29.0 mmol/L    Calcium 8.1 (L) 8.6 - 10.5 mg/dL    eGFR Non African Amer 115 >60 mL/min/1.73    BUN/Creatinine Ratio 40.9 (H) 7.0 - 25.0    Anion Gap 10.7 mmol/L   CBC Auto Differential    Collection Time: 08/13/17  5:01 AM   Result Value Ref Range    WBC 10.86 (H) 4.50 - 10.70 10*3/mm3    RBC 2.96 (L) 4.60 - 6.00 10*6/mm3    Hemoglobin 9.0 (L) 13.7 - 17.6 g/dL    Hematocrit 26.9 (L) 40.4 - 52.2 %    MCV 90.9 79.8 - 96.2 fL    MCH 30.4 27.0 - 32.7 pg    MCHC 33.5 32.6 - 36.4 g/dL    RDW 16.6 (H) 11.5 - 14.5 %    RDW-SD 55.8 (H) 37.0 - 54.0 fl    MPV 8.2 6.0 - 12.0 fL    Platelets 278 140 - 500 10*3/mm3    Neutrophil % 64.8 42.7 - 76.0 %    Lymphocyte %  18.0 (L) 19.6 - 45.3 %    Monocyte % 13.4 (H) 5.0 - 12.0 %    Eosinophil % 2.8 0.3 - 6.2 %    Basophil % 0.4 0.0 - 1.5 %    Immature Grans % 0.6 (H) 0.0 - 0.5 %    Neutrophils, Absolute 7.06 1.90 - 8.10 10*3/mm3    Lymphocytes, Absolute 1.95 0.90 - 4.80 10*3/mm3    Monocytes, Absolute 1.45 (H) 0.20 - 1.20 10*3/mm3    Eosinophils, Absolute 0.30 0.00 - 0.70 10*3/mm3    Basophils, Absolute 0.04 0.00 - 0.20 10*3/mm3    Immature Grans, Absolute 0.06 (H) 0.00 - 0.03 10*3/mm3       Radiology:  Imaging Results (last 24 hours)     ** No results found for the last 24 hours. **             Medications have been reviewed:  Current Facility-Administered Medications   Medication Dose Route Frequency Provider Last Rate Last Dose   • acetaminophen (TYLENOL) tablet 650 mg  650 mg Oral Q6H PRN Malathi Rubin MD   650 mg at 08/11/17 1155   • atorvastatin (LIPITOR) tablet 10 mg  10 mg Oral Daily Malathi Rubin MD   10 mg at 08/12/17 0912   • bisacodyl (DULCOLAX) EC tablet 5 mg  5 mg Oral Daily PRN Nolberto Hernandez IV, MD       • bisacodyl (DULCOLAX) suppository 10 mg  10 mg Rectal Daily PRN Nolberto Hernandez IV, MD   10 mg at 08/11/17 1222   • calcium carbonate (TUMS) chewable tablet 500 mg (200 mg elemental)  1 tablet Oral BID PRN Malathi Rubin MD       • castor oil-balsam peru (VENELEX) ointment   Topical Q12H Wong Plummer MD       • cefepime (MAXIPIME) 2 g/100 mL 0.9% NS (mbp)  2 g Intravenous Q12H Chandra Ponce MD   2 g at 08/12/17 2332   • cholecalciferol (VITAMIN D3) tablet 5,000 Units  5,000 Units Oral Daily Malathi Rubin MD   5,000 Units at 08/12/17 0912   • diltiaZEM CD (CARDIZEM CD) 24 hr capsule 240 mg  240 mg Oral Q24H Malathi Rubin MD   240 mg at 08/12/17 0912   • docusate sodium (COLACE) capsule 100 mg  100 mg Oral BID PRN Nolberto Hernandez IV, MD       • HYDROcodone-acetaminophen (NORCO) 5-325 MG per tablet 1 tablet  1 tablet Oral Q4H PRN Malathi  Vidya Rubin MD   1 tablet at 08/12/17 2333   • magnesium hydroxide (MILK OF MAGNESIA) suspension 2400 mg/10mL 10 mL  10 mL Oral Daily PRN Nolberto Hernandez IV, MD       • methocarbamol (ROBAXIN) tablet 1,000 mg  1,000 mg Oral 4x Daily PRN Nolberto Hernandez IV, MD       • morphine injection 2 mg  2 mg Intravenous Q3H PRN Malathi Rubin MD        And   • naloxone (NARCAN) injection 0.4 mg  0.4 mg Intravenous Q5 Min PRN Malathi Rubin MD       • morphine injection 2 mg  2 mg Intravenous Q4H PRN Nolberto Hernandez IV, MD        And   • naloxone (NARCAN) injection 0.4 mg  0.4 mg Intravenous Q5 Min PRN Nolberto Hernandez IV, MD       • ondansetron (ZOFRAN) tablet 4 mg  4 mg Oral Q6H PRN Nolberto Hernandez IV, MD        Or   • ondansetron ODT (ZOFRAN-ODT) disintegrating tablet 4 mg  4 mg Oral Q6H PRN Nolberto Hernandez IV, MD        Or   • ondansetron (ZOFRAN) injection 4 mg  4 mg Intravenous Q6H PRN Nolberto Hernandez IV, MD       • pantoprazole (PROTONIX) EC tablet 40 mg  40 mg Oral BID AC Malathi Rubin MD   40 mg at 08/13/17 0555   • Pharmacy to dose vancomycin   Does not apply Continuous PRN Chandra Ponce MD       • ramipril (ALTACE) capsule 5 mg  5 mg Oral Daily Malathi Rubin MD   5 mg at 08/12/17 0912   • sennosides-docusate sodium (SENOKOT-S) 8.6-50 MG tablet 1 tablet  1 tablet Oral Nightly PRN Nolberto Hernandez IV, MD       • sodium chloride 0.9 % flush 1-10 mL  1-10 mL Intravenous PRN Malathi Rubin MD       • sodium chloride 0.9 % flush 1-10 mL  1-10 mL Intravenous PRN Nolberto Hernandez IV, MD       • tamsulosin (FLOMAX) 24 hr capsule 0.8 mg  0.8 mg Oral Nightly Nolberto Fraser Jr., MD   0.8 mg at 08/12/17 2032   • vancomycin 1500 mg/500 mL 0.9% NS IVPB ([facility])  1,500 mg Intravenous Q12H Chandra Ponce MD   1,500 mg at 08/12/17 1806   • vitamin B-12 (CYANOCOBALAMIN) tablet 1,000 mcg  1,000 mcg Oral Daily Malathi Rubin MD   1,000 mcg at  08/12/17 0912       Assessment/Plan     Principal Problem:    Epidural abscess  Active Problems:    Left leg weakness    CKD (chronic kidney disease) stage 2, GFR 60-89 ml/min    A-fib    SIADH (syndrome of inappropriate ADH production)    Hyponatremia    Sacral decubitus ulcer    Postoperative wound dehiscence    Dementia      Assessment:  Acute on chronic hyponatremia, secondary to SIADH, improved  SIADH, repeat labs still consistent with SIADH.  Currently asymptomatic, levels improved with fluid restriction    Epidural abscess, status post repeat debridement  Stage II chronic kidney disease, near baseline    Sacral decubitus ulcer    Postoperative wound dehiscence    Dementia        Plan:   Sodium level stable above 130  Continue 1000 cc fluid restriction per day  Pharmacy to adjust Vanc dose      Maximus Goetz MD   [facility]   [phone]    08/13/17  6:55 AM

## 2017-08-13 NOTE — PROGRESS NOTES
[facility] PROGRESS NOTE    PATIENT IDENTIFICATION:   Name:  Pavel Melgoza      MRN:  [id number]     83 y.o.  male                   Principal Problem:    Epidural abscess  Active Problems:    Left leg weakness    CKD (chronic kidney disease) stage 2, GFR 60-89 ml/min    A-fib    SIADH (syndrome of inappropriate ADH production)    Hyponatremia    Sacral decubitus ulcer    Postoperative wound dehiscence    Dementia        Subjective   CC: none  Interval History: Feeling his feisty self.    Objective     Vital signs in last 24 hours:  Temp:  [98 °F (36.7 °C)-99 °F (37.2 °C)] 98.5 °F (36.9 °C)  Heart Rate:  [63-87] 87  Resp:  [20-21] 20  BP: (107-132)/(51-70) 132/70      Intake/Output last 3 shifts:  I/O last 3 completed shifts:  In: 1793.9 [P.O.:912; I.V.:520; Blood:361.9]  Out: 3755 [Urine:3740; Other:15]  Intake/Output this shift:  I/O this shift:  In: 260 [P.O.:260]  Out: -     LABS    Results from last 7 days  Lab Units 08/09/17 2015   INR  1.16*     Lab Results   Component Value Date    CALCIUM 8.1 (L) 08/13/2017    PHOS 3.6 07/10/2017     Results from last 7 days  Lab Units 08/13/17  0501 08/12/17  0353 08/11/17  0608 08/11/17  0506   SODIUM mmol/L 131* 132*  --  129*   POTASSIUM mmol/L 4.2 4.2  --  4.3   CHLORIDE mmol/L 97* 97*  --  95*   CO2 mmol/L 23.3 22.6  --  22.0   BUN mg/dL 27* 27*  --  17   CREATININE mg/dL 0.66* 0.74*  --  0.71*   GLUCOSE mg/dL 98 111*  --  151*   CALCIUM mg/dL 8.1* 7.9*  --  7.7*   WBC 10*3/mm3 10.86* 12.44*  --  12.06*   HEMOGLOBIN g/dL 9.0* 8.2* 6.9* 6.7*   PLATELETS 10*3/mm3 278 235  --  289     Physical Exam:  Min serosang d/c on drain site  Wound is flat, c/d/i      4 - Opens eyes on own  6 - Follows simple motor commands  5 - Alert and oriented    RLE stable  LLE with chronic deficits    ASSESSMENT  Assessment/Plan     Needs staples out at 14 days post op in my office (not at NH)  Office number is [phone]  Please call early for any wound  issues  Discussed with wife  We will call tomorrow with followup date/time     LOS: 4 days         Nolberto Hernandez IV, MD

## 2017-08-13 NOTE — THERAPY TREATMENT NOTE
Acute Care - Physical Therapy Treatment Note  [facility]     Patient Name: Pavel Melgoza  : 1934  MRN: [id number]  Today's Date: 2017  Onset of Illness/Injury or Date of Surgery Date: 17     Referring Physician: Dr. Plummer    Admit Date: 2017    Visit Dx:    ICD-10-CM ICD-9-CM   1. Sacral decubitus ulcer, unspecified pressure ulcer stage L89.159 707.03     707.20   2. Postoperative wound dehiscence, lumbar spine, initial encounter T81.31XA 998.32   3. Fever in adult, reported R50.9 780.60   4. Postoperative wound dehiscence, initial encounter T81.31XA 998.32   5. Generalized weakness R53.1 780.79   6. Unsteadiness on feet R26.81 781.2     Patient Active Problem List   Diagnosis   • Generalized weakness   • Plasmocytoma   • Nausea & vomiting   • Fall   • Noncompliance with medication regimen   • Left leg weakness   • Closed wedge compression fracture of eleventh thoracic vertebra   • CKD (chronic kidney disease) stage 2, GFR 60-89 ml/min   • B12 deficiency   • A-fib   • Meningioma   • SIADH (syndrome of inappropriate ADH production)   • Hyponatremia   • Vitamin D deficiency   • Epidural abscess   • Sacral decubitus ulcer   • Postoperative wound dehiscence   • Dementia               Adult Rehabilitation Note       17 1100 17 1400       Rehab Assessment/Intervention    Discipline physical therapist  -GJ physical therapist  -GJ     Document Type therapy note (daily note)  -GJ evaluation  -GJ     Subjective Information agree to therapy  -GJ agree to therapy;complains of;pain  -GJ     Patient Effort, Rehab Treatment good  -GJ adequate  -GJ     Symptoms Noted During/After Treatment increased pain  -GJ increased pain  -GJ     Precautions/Limitations fall precautions   LLE </= 1/5 MMT  -GJ fall precautions  -GJ     Specific Treatment Considerations  MMT LLE 0-1/5 (10+ year hx), unsafe for standing exercies at this time, consider straight leg brace LLE for upright activities.  -GJ      Recorded by [GJ] Chilo Woodard, PT [GJ] Chilo Woodard, PT     Pain Assessment    Pain Assessment 0-10  -GJ 0-10  -GJ     Pain Score 10  -GJ 6  -GJ     Pain Type  Surgical pain  -GJ     Pain Location Hip  -GJ Back  -GJ     Pain Intervention(s) Medication (See MAR);Heat applied;Repositioned;Ambulation/increased activity   discussed with RN  -GJ Medication (See MAR);Repositioned  -GJ     Response to Interventions tolerated  -GJ tolerated once re-positioned  -GJ     Recorded by [GJ] Chilo Woodard, PT [GJ] Chilo Woodard, PT     Cognitive Assessment/Intervention    Current Cognitive/Communication Assessment functional  -GJ functional  -GJ     Orientation Status oriented x 4  -GJ oriented x 4  -GJ     Follows Commands/Answers Questions 100% of the time  -% of the time  -GJ     Recorded by [GJ] Chilo Woodard, PT [GJ] Chilo Woodard, PT     ROM (Range of Motion)    General ROM Detail  pt. demonstrates no AROM LLE (10+ year hx) per pt./wife  -GJ     Recorded by  [GJ] Chilo Woodard PT     MMT (Manual Muscle Testing)    General MMT Assessment Detail  LLE 0-1/5  -GJ     Recorded by  [GJ] Chilo Woodard, PT     Bed Mobility, Assessment/Treatment    Bed Mob, Supine to Sit, [place] minimum assist (75% patient effort);moderate assist (50% patient effort)  -GJ minimum assist (75% patient effort);verbal cues required;set up required;nonverbal cues required (demo/gesture)  -GJ     Bed Mob, Sit to Supine, [place] minimum assist (75% patient effort);moderate assist (50% patient effort)  -GJ minimum assist (75% patient effort);2 person assist required;verbal cues required;nonverbal cues required (demo/gesture)  -GJ     Bed Mobility, Safety Issues decreased use of legs for bridging/pushing  -GJ      Recorded by [GJ] Chilo Woodard, PT [GJ] Chilo Woodard, PT     Transfer Assessment/Treatment    Transfers, Sit-Stand [place] set up required;verbal cues required;nonverbal cues required (demo/gesture);minimum  assist (75% patient effort);2 person assist required   blocking of LLE  -GJ not tested   not performed, unsafe today, pt with increased pain, LLE wea  -GJ     Transfers, Stand-Sit [place] verbal cues required;nonverbal cues required (demo/gesture);contact guard assist;minimum assist (75% patient effort);2 person assist required  -GJ      Transfers, Sit-Stand-Sit, Assist Device rolling walker  -GJ      Transfer, Safety Issues weight-shifting ability decreased;knees buckling   LLE weakness  -GJ --   LLE weakness, pre morbid  -GJ     Transfer, Impairments strength decreased;impaired balance;muscle tone abnormal  -GJ      Transfer, Comment sit to/from stand x 5, stood for 15 seconds at a time, cues for erect posture  -GJ      Recorded by [GJ] Chilo Woodard, PT [GJ] Chilo Woodard, PT     Gait Assessment/Treatment    Gait, [place] Level not appropriate to assess  -GJ --   unsafe  -GJ     Recorded by [GJ] Chilo Woodard, PT [GJ] Chilo Woodard, PT     Balance Skills Training    Sitting-Level of Assistance  Contact guard  -GJ     Sitting-Balance Support  Feet supported  -GJ     Sitting # of Minutes  4   pt had to return to supine secondary to pain  -GJ     Recorded by  [GJ] Chilo Woodard, PT     Therapy Exercises    Right Lower Extremity AROM:;10 reps;sitting;LAQ;supine;ankle pumps/circles;glut sets;quad sets   performed passive R hip flexion (SKTC) gently, x 10 20 secon  -GJ AROM:;10 reps;sitting;LAQ;supine;ankle pumps/circles;glut sets;quad sets  -GJ     Recorded by [GJ] Chilo Woodard, PT [GJ] Chilo Woodard, PT     Positioning and Restraints    Pre-Treatment Position in bed  -GJ in bed  -GJ     Post Treatment Position bed  -GJ bed  -GJ     In Bed notified nsg;side lying left;call light within reach;encouraged to call for assist;with family/caregiver;pillow between legs  -GJ side lying left;call light within reach;encouraged to call for assist;with family/caregiver;pillow between legs  -GJ     Recorded by  [GJ] Chilo Woodard, PT [GJ] Chilo Woodard, PT       User Key  (r) = Recorded By, (t) = Taken By, (c) = Cosigned By    Initials Name Effective Dates    GJ Chilo Woodard, PT 03/26/15 -                 IP PT Goals       08/11/17 1152          Bed Mobility PT LTG    Bed Mobility PT LTG, Date Established 08/11/17  -PC (r) AA (t) PC (c)      Bed Mobility PT LTG, Time to Achieve 1 wk  -PC (r) AA (t) PC (c)      Bed Mobility PT LTG, Activity Type all bed mobility  -PC (r) AA (t) PC (c)      Bed Mobility PT LTG, [place] Level supervision required  -PC (r) AA (t) PC (c)      Bed Mobility PT Goal  LTG, Assist Device bed rails  -PC (r) AA (t) PC (c)      Transfer Training PT LTG    Transfer Training PT LTG, Date Established 08/11/17  -PC (r) AA (t) PC (c)      Transfer Training PT LTG, Time to Achieve 1 wk  -PC (r) AA (t) PC (c)      Transfer Training PT LTG, Activity Type all transfers  -PC (r) AA (t) PC (c)      Transfer Training PT LTG, [place] Level contact guard assist  -PC (r) AA (t) PC (c)      Transfer Training PT LTG, Assist Device walker, rolling  -PC (r) AA (t) PC (c)      Gait Training PT LTG    Gait Training Goal PT LTG, Date Established 08/11/17  -PC (r) AA (t) PC (c)      Gait Training Goal PT LTG, Time to Achieve 1 wk  -PC (r) AA (t) PC (c)      Gait Training Goal PT LTG, [place] Level minimum assist (75% patient effort);2 person assist required  -PC (r) AA (t) PC (c)      Gait Training Goal PT LTG, Assist Device walker, rolling  -PC (r) AA (t) PC (c)      Gait Training Goal PT LTG, Distance to Achieve 10   with L knee immobilizer  -PC (r) AA (t) PC (c)        User Key  (r) = Recorded By, (t) = Taken By, (c) = Cosigned By    Initials Name Provider Type    PC Em Kraus, PT Physical Therapist    LEN Blanco, PT Student PT Student          Physical Therapy Education     Title: PT OT SLP Therapies (Done)     Topic: Physical Therapy (Done)     Point: Mobility training (Done)     Learning Progress Summary    Learner Readiness Method Response Comment Documented by Status   Patient Acceptance E,TB,D VU,NR   08/13/17 1108 Done    Acceptance E NR   08/12/17 1458 Active    Acceptance E VU,NR  AA 08/11/17 1151 Done   Significant Other Acceptance E,TB,D VU,NR   08/13/17 1108 Done    Acceptance E NR   08/12/17 1458 Active               Point: Home exercise program (Done)    Learning Progress Summary    Learner Readiness Method Response Comment Documented by Status   Patient Acceptance E,TB,D VU,NR   08/13/17 1108 Done    Acceptance E NR   08/12/17 1458 Active    Acceptance E VU,NR  AA 08/11/17 1151 Done   Significant Other Acceptance E,TB,D VU,NR   08/13/17 1108 Done    Acceptance E NR   08/12/17 1458 Active               Point: Body mechanics (Done)    Learning Progress Summary    Learner Readiness Method Response Comment Documented by Status   Patient Acceptance E,TB,D VU,NR   08/13/17 1108 Done    Acceptance E NR   08/12/17 1458 Active    Acceptance E VU,NR  AA 08/11/17 1151 Done   Significant Other Acceptance E,TB,D VU,NR   08/13/17 1108 Done    Acceptance E NR   08/12/17 1458 Active               Point: Precautions (Done)    Learning Progress Summary    Learner Readiness Method Response Comment Documented by Status   Patient Acceptance E,TB,D VU,NR   08/13/17 1108 Done    Acceptance E NR   08/12/17 1458 Active    Acceptance E VU,NR  AA 08/11/17 1151 Done   Significant Other Acceptance E,TB,D VU,NR   08/13/17 1108 Done    Acceptance E NR   08/12/17 1458 Active                      User Key     Initials Effective Dates Name Provider Type Discipline     03/26/15 -  Chilo Woodard, PT Physical Therapist PT    AA 06/01/17 -  Skyla Blanco, PT Student PT Student PT                    PT Recommendation and Plan  Anticipated Discharge Disposition: skilled nursing facility  Planned Therapy Interventions: balance training, bed mobility training, home exercise  program, transfer training, gait training  PT Frequency: daily  Plan of Care Review  Outcome Summary/Follow up Plan: Mr. Melgoza participates with therapy today, able to perform sit to/from stand x 5, standing for 15 seconds at a time.  He reports increase in pain of R post. hip/glut tissue.  He continues to require increased assistance with bed mobility.  Mr. Melgoza continues to be a good candidate for skilled physical therapy.           Outcome Measures       08/13/17 1100 08/12/17 1500 08/11/17 1100    How much help from another person do you currently need...    Turning from your back to your side while in flat bed without using bedrails? 3  -GJ 3  -GJ 4  -PC (r) AA (t) PC (c)    Moving from lying on back to sitting on the side of a flat bed without bedrails? 3  -GJ 2  -GJ 3  -PC (r) AA (t) PC (c)    Moving to and from a bed to a chair (including a wheelchair)? 1  -GJ 1  -GJ 2  -PC (r) AA (t) PC (c)    Standing up from a chair using your arms (e.g., wheelchair, bedside chair)? 3  -GJ 2  -GJ 3  -PC (r) AA (t) PC (c)    Climbing 3-5 steps with a railing? 1  -GJ 1  -GJ 1  -PC (r) AA (t) PC (c)    To walk in hospital room? 1  -GJ 1  -GJ 2  -PC (r) AA (t) PC (c)    AM-PAC 6 Clicks Score 12  -GJ 10  -GJ 15  -PC (r) AA (t)    Functional Assessment    Outcome Measure Options AM-PAC 6 Clicks Basic Mobility (PT)  -GJ AM-PAC 6 Clicks Basic Mobility (PT)  -GJ AM-PAC 6 Clicks Basic Mobility (PT)  -PC (r) AA (t) PC (c)      User Key  (r) = Recorded By, (t) = Taken By, (c) = Cosigned By    Initials Name Provider Type    DAVE Woodard, PT Physical Therapist    KATY Kraus, PT Physical Therapist    LEN Blanco, PT Student PT Student           Time Calculation:         PT Charges       08/13/17 1112          Time Calculation    Start Time 1023  -GJ      Stop Time 1048  -GJ      Time Calculation (min) 25 min  -GJ      PT Received On 08/13/17  -GJ      PT - Next Appointment 08/14/17  -GJ        User Key   (r) = Recorded By, (t) = Taken By, (c) = Cosigned By    Initials Name Provider Type    GJ Chilo Woodard, PT Physical Therapist          Therapy Charges for Today     Code Description Service Date Service Provider Modifiers Qty    23632201690 HC PT THER PROC EA 15 MIN 8/12/2017 Chilo Woodard, PT GP 1    11058593099 HC PT THER SUPP EA 15 MIN 8/12/2017 Chilo Woodard, PT GP 1    57514367400 HC PT THER PROC EA 15 MIN 8/13/2017 Chilo Woodard, PT GP 2    60956210798 HC PT THER SUPP EA 15 MIN 8/13/2017 Chilo Woodard, PT GP 1          PT G-Codes  Outcome Measure Options: AM-PAC 6 Clicks Basic Mobility (PT)    Chilo Wodoard PT  8/13/2017

## 2017-08-13 NOTE — PLAN OF CARE
Problem: Patient Care Overview (Adult)  Goal: Plan of Care Review  Outcome: Ongoing (interventions implemented as appropriate)    08/13/17 6551   Coping/Psychosocial Response Interventions   Plan Of Care Reviewed With patient;spouse   Patient Care Overview   Progress no change   Outcome Evaluation   Outcome Summary/Follow up Plan c/o pain back and left hip, K pad applied, pain meds, bs Q6, straight cath, PU redness slough applied venelex mepilex , freq weight shift monitoring        Goal: Adult Individualization and Mutuality  Outcome: Ongoing (interventions implemented as appropriate)  Goal: Discharge Needs Assessment  Outcome: Ongoing (interventions implemented as appropriate)    Problem: Pressure Ulcer (Adult)  Goal: Signs and Symptoms of Listed Potential Problems Will be Absent or Manageable (Pressure Ulcer)  Outcome: Ongoing (interventions implemented as appropriate)    Problem: Fall Risk (Adult)  Goal: Absence of Falls  Outcome: Ongoing (interventions implemented as appropriate)    Problem: Pain, Acute (Adult)  Goal: Identify Related Risk Factors and Signs and Symptoms  Outcome: Ongoing (interventions implemented as appropriate)  Goal: Acceptable Pain Control/Comfort Level  Outcome: Ongoing (interventions implemented as appropriate)

## 2017-08-13 NOTE — PLAN OF CARE
Problem: Patient Care Overview (Adult)  Goal: Plan of Care Review  Outcome: Ongoing (interventions implemented as appropriate)    08/13/17 0353   Coping/Psychosocial Response Interventions   Plan Of Care Reviewed With patient   Patient Care Overview   Progress no change   Outcome Evaluation   Outcome Summary/Follow up Plan c/o intermittent severe pain to lumbar region, reporting amellioration from PRN opioid and heat pad; lower posterior thoracic incision open to air; gauze tegaderms at lateral posterior thorax (right and left) maintained clean, dry, intact; stage 2 pressure ulcer to coccyx; frequent repositioning q2h encouraged and performed; sterile straight cath performed twice this shift with scant blood clots observed at catheter tip.         Problem: Pressure Ulcer (Adult)  Goal: Signs and Symptoms of Listed Potential Problems Will be Absent or Manageable (Pressure Ulcer)  Outcome: Ongoing (interventions implemented as appropriate)    08/13/17 0353   Pressure Ulcer   Problems Assessed (Pressure Ulcer) all   Problems Present (Pressure Ulcer) infection;pain         Problem: Fall Risk (Adult)  Goal: Absence of Falls  Outcome: Ongoing (interventions implemented as appropriate)    08/13/17 0353   Fall Risk (Adult)   Absence of Falls achieves outcome         Problem: Perioperative Period (Adult)  Goal: Signs and Symptoms of Listed Potential Problems Will be Absent or Manageable (Perioperative Period)  Outcome: Ongoing (interventions implemented as appropriate)    08/13/17 0353   Perioperative Period   Problems Assessed (Perioperative Period) pain;wound complications;hemorrhage;hypothermia;hypoxia/hypoxemia;perioperative injury;infection;physiologic stress response;postoperative ileus;urinary retention   Problems Present (Perioperative Period) urinary retention;pain         Problem: Pain, Acute (Adult)  Goal: Identify Related Risk Factors and Signs and Symptoms  Outcome: Ongoing (interventions implemented as  appropriate)    08/13/17 0353   Pain, Acute   Related Risk Factors (Acute Pain) persistent pain;positioning;procedure/treatment;surgery;patient perception   Signs and Symptoms (Acute Pain) BADLs/IADLs reluctance/inability to perform;facial mask of pain/grimace;moaning;verbalization of pain descriptors       Goal: Acceptable Pain Control/Comfort Level  Outcome: Ongoing (interventions implemented as appropriate)    08/13/17 0353   Pain, Acute (Adult)   Acceptable Pain Control/Comfort Level making progress toward outcome

## 2017-08-13 NOTE — PROGRESS NOTES
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 ID consulted. On vancomycin and cefepime for now. Planning 6-8 weeks of IV antibiotic treatment.       Pharmacy dosing vancomycin per Dr. Ponce's request.   Goal trough: 15-20 mg/L     Relevant clinical data and objective history reviewed:  83 y.o. male 72.01\" (182.9 cm) 175 lb 0.7 oz (79.4 kg)    Past Medical History:   Diagnosis Date   • Coronary artery disease    • Dementia    • GERD (gastroesophageal reflux disease)    • Hyperlipidemia    • Hypertension    • Plasmacytoma      Creatinine   Date Value Ref Range Status   08/13/2017 0.66 (L) 0.76 - 1.27 mg/dL Final   08/12/2017 0.74 (L) 0.76 - 1.27 mg/dL Final   08/11/2017 0.71 (L) 0.76 - 1.27 mg/dL Final     BUN   Date Value Ref Range Status   08/13/2017 27 (H) 8 - 23 mg/dL Final     Estimated Creatinine Clearance: 78.6 mL/min (by C-G formula based on Cr of 0.66).    Lab Results   Component Value Date    WBC 10.86 (H) 08/13/2017     Temp Readings from Last 3 Encounters:   08/13/17 98.5 °F (36.9 °C) (Oral)   07/10/17 97.6 °F (36.4 °C) (Oral)     Baseline culture/source/susceptibility:   8/9 Blood cultures- NGTD  8/10 Wound cultures- NGTD  8/10 Tissue/bone cultures- NGTD, GPC in pairs and clusters on Gram-stain    IV Anti-Infectives     Ordered     Dose/Rate Route Frequency Start Stop    08/13/17 0808  vancomycin (VANCOCIN) in iso-osmotic dextrose IVPB 1 g (premix) 200 mL     Ordering Provider:  Chandra Ponce MD    1,000 mg  over 120 Minutes Intravenous Every 12 Hours 08/13/17 1000      08/10/17 1203  vancomycin 1750 mg/500 mL 0.9% NS IVPB ([facility])     Ordering Provider:  Chandra Ponce MD    1,750 mg  over 180 Minutes Intravenous Once 08/10/17 1300 08/10/17 1631    08/10/17 1059  cefepime (MAXIPIME) 2 g/100 mL 0.9% NS (mbp)   "   Ordering Provider:  Chandra Ponce MD    2 g Intravenous Every 12 Hours 08/10/17 1130      08/10/17 1059  Pharmacy to dose vancomycin     Ordering Provider:  Chandra Ponce MD     Does not apply Continuous PRN 08/10/17 1059           Lab Results   Component Value Date    LEONARD 23.10 (H) 08/13/2017       Assessment/Plan  Patient was given loading dose of 1750 mg IV once (~22 mg/kg) on 8/10 followed by maintenance dose of 1500 mg IV q12 (~18.9 mg/kg/dose, 37.8 mg/kg/day). Vancomycin trough ordered prior to 0530 dose to assess current dose resulted 23.1 mcg/mL. Serum creatinine has been stable. Dose held appropriately this am.     There are a few competing factors. Patient is Stage II CKD, elderly, will need long term antibiotics (6-8 weeks), and is currently fluid restricted. However, given location and type of infection targeting troughs within 15-20 mcg/mL goal is necessary. Given the above, I will adjust the vancomycin dose to 1000 mg IV q12 (~25 mg/kg/day). Level was slightly supratherapeutic so will provide an additional ~5 hours for further elimination of vancomycin. Since patient is already at steady state, will order trough level prior to 1000 dose tomorrow. If any of the above changes (i.e. Fluid restriction is lifted), patient may benefit from increasing vancomycin dose to 1250 mg IV q12 to ensure trough concentrations are within goal.  Will continue to monitor. Pharmacy will continue to follow daily while on vancomycin and adjust as needed. Discuss plan with RN.      Please call with questions. Thank you for the consult.       Randi Lincoln, PharmD  Clinical Pharmacy Specialist, Infectious Diseases  [phone]

## 2017-08-14 NOTE — PROGRESS NOTES
"   LOS: 5 days   Patient Care Team:  Amanda Tanner MD as PCP - General (Family Medicine)    Chief Complaint/ Reason for encounter: Hyponatremia, SIADH        Subjective     Abscess   Associated symptoms include weakness. Pertinent negatives include no chest pain, fever or nausea.       Subjective:  Symptoms:  Improved.  He reports weakness.  No shortness of breath or chest pain.  (No complaints  Compliant with fluid restriction  No shortness of breath or nausea).    Diet:  Adequate intake.  No nausea.    Activity level: Impaired due to weakness.    Pain:  He reports no pain.          History taken from: Patient and chart    Objective     Vital Signs  Temp:  [98 °F (36.7 °C)-99.1 °F (37.3 °C)] 98.1 °F (36.7 °C)  Heart Rate:  [80-91] 80  Resp:  [18-20] 18  BP: (104-124)/(56-67) 104/56       Wt Readings from Last 1 Encounters:   08/10/17 1159 175 lb 0.7 oz (79.4 kg)   08/09/17 1406 175 lb (79.4 kg)       Objective:  General Appearance:  Comfortable, in no acute distress, not in pain and well-appearing.    Vital signs: (most recent): Blood pressure 104/56, pulse 80, temperature 98.1 °F (36.7 °C), temperature source Oral, resp. rate 18, height 72.01\" (182.9 cm), weight 175 lb 0.7 oz (79.4 kg), SpO2 96 %.  Vital signs are normal.  No fever.    Output: Producing urine.    HEENT: Normal HEENT exam.    Lungs:  Normal respiratory rate and normal effort.  He is not in respiratory distress.  Breath sounds clear to auscultation.  No wheezes or decreased breath sounds.    Heart: Normal rate.  Regular rhythm.    Abdomen: Abdomen is soft and non-distended.  Bowel sounds are normal.   There is no epigastric area or no suprapubic area tenderness.  There is no rebound tenderness.     Extremities: Normal range of motion.  There is no deformity or dependent edema.    Pulses: Distal pulses are intact.    Neurological: Patient is alert and oriented to person, place and time.    Skin:  Warm and dry.  No rash or cyanosis. "             Results Review:    Past Medical History: reviewed and updated  Past Medical History:   Diagnosis Date   • Coronary artery disease    • Dementia    • GERD (gastroesophageal reflux disease)    • Hyperlipidemia    • Hypertension    • Plasmacytoma          Allergies:  No Known Allergies    Intake/Output:     Intake/Output Summary (Last 24 hours) at 08/14/17 1206  Last data filed at 08/14/17 1100   Gross per 24 hour   Intake              897 ml   Output              900 ml   Net               -3 ml         DATA:  Interval chart, labs and notes reviewed.    Labs:   Recent Results (from the past 24 hour(s))   Basic Metabolic Panel    Collection Time: 08/14/17  6:17 AM   Result Value Ref Range    Glucose 111 (H) 65 - 99 mg/dL    BUN 30 (H) 8 - 23 mg/dL    Creatinine 0.61 (L) 0.76 - 1.27 mg/dL    Sodium 131 (L) 136 - 145 mmol/L    Potassium 4.0 3.5 - 5.2 mmol/L    Chloride 94 (L) 98 - 107 mmol/L    CO2 26.8 22.0 - 29.0 mmol/L    Calcium 7.9 (L) 8.6 - 10.5 mg/dL    eGFR Non African Amer 126 >60 mL/min/1.73    BUN/Creatinine Ratio 49.2 (H) 7.0 - 25.0    Anion Gap 10.2 mmol/L   Vancomycin, Trough Please draw trough prior to 1000 dose. Please do not draw trough while vancomycin is infusing.    Collection Time: 08/14/17 10:56 AM   Result Value Ref Range    Vancomycin Trough 19.20 5.00 - 20.00 mcg/mL       Radiology:  Imaging Results (last 24 hours)     ** No results found for the last 24 hours. **             Medications have been reviewed:  Current Facility-Administered Medications   Medication Dose Route Frequency Provider Last Rate Last Dose   • acetaminophen (TYLENOL) tablet 650 mg  650 mg Oral Q6H PRN Malathi Rubin MD   650 mg at 08/11/17 1155   • atorvastatin (LIPITOR) tablet 10 mg  10 mg Oral Daily Malathi Rubin MD   10 mg at 08/14/17 0831   • bisacodyl (DULCOLAX) EC tablet 5 mg  5 mg Oral Daily PRN Nolberto Hernandez IV, MD       • bisacodyl (DULCOLAX) suppository 10 mg  10 mg Rectal Daily PRN  Nolberto Hernandez IV, MD   10 mg at 08/11/17 1222   • calcium carbonate (TUMS) chewable tablet 500 mg (200 mg elemental)  1 tablet Oral BID PRN Malathi Rubin MD       • castor oil-balsam peru (VENELEX) ointment   Topical Q12H Wong Plummer MD       • cholecalciferol (VITAMIN D3) tablet 5,000 Units  5,000 Units Oral Daily Malathi Rubin MD   5,000 Units at 08/14/17 0831   • diltiaZEM CD (CARDIZEM CD) 24 hr capsule 240 mg  240 mg Oral Q24H Malathi Rubin MD   240 mg at 08/13/17 1002   • docusate sodium (COLACE) capsule 100 mg  100 mg Oral BID PRN Nolberto Hernandez IV, MD       • HYDROcodone-acetaminophen (NORCO) 5-325 MG per tablet 1 tablet  1 tablet Oral Q4H PRN Malathi Rubin MD   1 tablet at 08/14/17 0507   • magnesium hydroxide (MILK OF MAGNESIA) suspension 2400 mg/10mL 10 mL  10 mL Oral Daily PRN Nolberto Hernandez IV, MD       • methocarbamol (ROBAXIN) tablet 1,000 mg  1,000 mg Oral 4x Daily PRN Nolberto Hernandez IV, MD       • morphine injection 2 mg  2 mg Intravenous Q3H PRN Malathi Rubin MD        And   • naloxone (NARCAN) injection 0.4 mg  0.4 mg Intravenous Q5 Min PRN Malathi Rubin MD       • morphine injection 2 mg  2 mg Intravenous Q4H PRN Nolberto Hernandez IV, MD        And   • naloxone (NARCAN) injection 0.4 mg  0.4 mg Intravenous Q5 Min PRN Nolberto Hernandez IV, MD       • ondansetron (ZOFRAN) tablet 4 mg  4 mg Oral Q6H PRN Nolberto Hernandez IV, MD        Or   • ondansetron ODT (ZOFRAN-ODT) disintegrating tablet 4 mg  4 mg Oral Q6H PRN Nolberto Hernandez IV, MD        Or   • ondansetron (ZOFRAN) injection 4 mg  4 mg Intravenous Q6H PRN Nolberto Hernandez IV, MD       • pantoprazole (PROTONIX) EC tablet 40 mg  40 mg Oral BID AC Malathi Rubin MD   40 mg at 08/14/17 0653   • Pharmacy to dose vancomycin   Does not apply Continuous PRN Chandra Ponce MD       • ramipril (ALTACE) capsule 5 mg  5 mg Oral Daily Malathi Dasilva  MD Scottie   5 mg at 08/14/17 0831   • sennosides-docusate sodium (SENOKOT-S) 8.6-50 MG tablet 1 tablet  1 tablet Oral Nightly PRN Nolberto Hernandez IV, MD       • sodium chloride 0.9 % flush 1-10 mL  1-10 mL Intravenous PRN Malathi Rubin MD       • sodium chloride 0.9 % flush 1-10 mL  1-10 mL Intravenous PRN Nolberto Hernandez IV, MD       • tamsulosin (FLOMAX) 24 hr capsule 0.8 mg  0.8 mg Oral Nightly Nolberto Fraser Jr., MD   0.8 mg at 08/13/17 2123   • vancomycin (VANCOCIN) in iso-osmotic dextrose IVPB 1 g (premix) 200 mL  1,000 mg Intravenous Q12H Chandra Ponce MD   1,000 mg at 08/14/17 1203   • vitamin B-12 (CYANOCOBALAMIN) tablet 1,000 mcg  1,000 mcg Oral Daily Malathi Rubin MD   1,000 mcg at 08/14/17 0831       Assessment/Plan     Principal Problem:    Epidural abscess  Active Problems:    Left leg weakness    CKD (chronic kidney disease) stage 2, GFR 60-89 ml/min    A-fib    SIADH (syndrome of inappropriate ADH production)    Hyponatremia    Sacral decubitus ulcer    Postoperative wound dehiscence    Dementia      Assessment:  (Assessment:   Acute on chronic hyponatremia, secondary to SIADH, improved, sodium stable today at 131  SIADH, repeat labs still consistent with SIADH.  Currently asymptomatic, levels improved with fluid restriction    Epidural abscess, status post repeat debridement    Sacral decubitus ulcer    Postoperative wound dehiscence    Dementia           Plan:   Sodium continues to remain stable with fluid restriction  No need for Samsca at this point  Continue 1500 cc fluid restriction per day  Discharge planning).             Continue to monitor renal function, electroytes and volume closely   Please call me with any questions or concerns      Cedrick Posada MD   [facility]   [phone]    08/14/17  12:06 PM      Dictation performed using Dragon dictation software

## 2017-08-14 NOTE — PROGRESS NOTES
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   Pharmacy dosing vancomycin per Dr. Ponce's request.   Goal trough: 15-20 mg/L   Planning vancomycin through 9/21 per ID.  Admit date: 8/9/2017  2:09 PM    Relevant clinical data and objective history reviewed:  83 y.o. male 72.01\" (182.9 cm) 175 lb 0.7 oz (79.4 kg)    Past Medical History:   Diagnosis Date   • Coronary artery disease    • Dementia    • GERD (gastroesophageal reflux disease)    • Hyperlipidemia    • Hypertension    • Plasmacytoma      Creatinine   Date Value Ref Range Status   08/14/2017 0.61 (L) 0.76 - 1.27 mg/dL Final   08/13/2017 0.66 (L) 0.76 - 1.27 mg/dL Final   08/12/2017 0.74 (L) 0.76 - 1.27 mg/dL Final     BUN   Date Value Ref Range Status   08/14/2017 30 (H) 8 - 23 mg/dL Final     Estimated Creatinine Clearance: 78.6 mL/min (by C-G formula based on Cr of 0.61).    Lab Results   Component Value Date    WBC 10.86 (H) 08/13/2017    WBC 12.44 (H) 08/12/2017    WBC 12.06 (H) 08/11/2017     Temp Readings from Last 3 Encounters:   08/14/17 98.1 °F (36.7 °C) (Oral)   07/10/17 97.6 °F (36.4 °C) (Oral)     Baseline cultures/source/suscetibilit/labs/radiology:  8/9 Blood cultures- NGTD  8/10 Wound cultures- NGTD  8/10 Tissue/bone cultures- NGTD, GPC in pairs and clusters on Gram-stain    IV Anti-Infectives     Ordered     Dose/Rate Route Frequency Start Stop    08/13/17 0808  vancomycin (VANCOCIN) in iso-osmotic dextrose IVPB 1 g (premix) 200 mL     Ordering Provider:  Chandra Ponce MD    1,000 mg  over 120 Minutes Intravenous Every 12 Hours 08/13/17 1000      08/10/17 1203  vancomycin 1750 mg/500 mL 0.9% NS IVPB ([facility])     Ordering Provider:  Chandra Ponce MD    1,750 mg  over 180 Minutes Intravenous Once 08/10/17 1300 08/10/17 1631    " 08/10/17 1059  Pharmacy to dose vancomycin     Ordering Provider:  Chandra Ponce MD     Does not apply Continuous PRN 08/10/17 1059             Lab Results   Component Value Date    VANCOTROUGH 19.20 08/14/2017    VANCOTROUGH 23.10 (H) 08/13/2017    VANCOTROUGH 19.10 07/08/2017     No results found for: VANCORANDOM    Vancomycin dosing history:   8/10 Vancomycin 1750 mg iv once followed by 1500 mg iv q12h  8/13 Vt = 23.1 mcg/mL, dose adjusted to vancomycin 1000 mg iv q12h (~25mg/kg/day)  8/14 Vt= 19.2 mcg/mL    Assessment/Plan  1. Vancomycin trough of 19.2 mcg/mL drawn ~11.5 hours after the end of infusion of last dose and prior to the 3rd dose of current regimen. Will continue current vancomycin regimen of 1000 mg iv q12h.   2. Renal function is stable. Will monitor renal function and check vancomycin trough level in 3-5 days or earlier with any changes to renal function.   3. Encourage hydration to prevent toxic accumulation; monitor for s/sx of toxicity including increase in SCr and decrease in UOP.    Pharmacy will continue to follow daily while on vancomycin and adjust as needed.      Thank you,    Latanya Clifton, Pharm.D.  08/14/17 11:42 AM

## 2017-08-14 NOTE — PROGRESS NOTES
Continued Stay Note  [facility]     Patient Name: Pavel Melgoza  MRN: [id number]  Today's Date: 8/14/2017    Admit Date: 8/9/2017          Discharge Plan       08/14/17 1140    Case Management/Social Work Plan    Plan [facility] at d/c    Patient/Family In Agreement With Plan yes    Additional Comments S/W wife at bedside and verified plan to go to [facility] at d/c.  [facility] states there is a bed available.  IMM explained and signed by wife at bedside.  SHANEL Padilla, CCP              Discharge Codes     None            Analilia Kebede RN

## 2017-08-14 NOTE — DISCHARGE SUMMARY
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       Presenting Problem/History of Present Illness:  Generalized weakness [R53.1]  Postoperative wound dehiscence, initial encounter [T81.31XA]  Sacral decubitus ulcer, unspecified pressure ulcer stage [L89.159]  Fever in adult [R50.9]     Hospital Course:  The patient is a 83 y.o. male with a history of an epidural abscess s/p drainage in early July 2017 who presented with a sacral decubitus ulcer and drainage from his lumbar surgical wound.  Please see H&P from 8/9/17 for further details.  On admission, Dr. Hernandez with neurosurgery was consulted.  He was started on broad-spectrum antibiotics.  He was taken to the OR per Dr. Hernandez on 8/10/17 for a washout which he tolerated well.  His operative culture was negative but did show Gm+ cocci in pairs and clusters.  Dr. Ponce with infectious diseases saw the patient and recommended 6 weeks of IV vancomycin through a PICC line which was placed prior to discharge.  He is to follow up with Dr. Ponce on 9/21` after completion of his therapy.  He is to follow up with Dr. Hernandez on 8/24/17 for staple removal-staples are to be removed by Dr. Hernandez only in his clinic.  Of note, the patient did have some urinary retention.   "He had had a chronic fernandez catheter from last admission.  Dr. Fraser with urology was consulted and recommended a voiding trial, after which the patient continued to require intermittent catheterization.  A fernandez was replaced and he was discharged on an increased dose of flomax.  He is to follow up with Dr. Fraser in 2 weeks.    Exam Today:  Blood pressure 104/56, pulse 80, temperature 98.1 °F (36.7 °C), temperature source Oral, resp. rate 18, height 72.01\" (182.9 cm), weight 175 lb 0.7 oz (79.4 kg), SpO2 96 %.  General:  Alert, no distress  Head: NCAT  Eyes: EOMI, conjunctivae normal  Mouth/Throat: oropharynx clear and moist  Neck: supple, no JVD  Cardiac: normal rate, regular rhythm  Respiratory: normal effort, clear to ascultation  Abdomen: soft, non-tender,bowel sounds normoactive  Musculoskeletal: no deformity, no tenderness, no dependent edema; sacral decubitus ulcer and surgical wounds in dressings CDI.  Extremities: no cyanosis, warm, peripheral pulses intact  Skin: no rashes, warm/dry  Neuro: oriented x3, LLE chronically weak   Psych: mood and affect appropriate    Procedures Performed:  Procedure(s):  OPEN DEBRIDEMENT OF EPIDURAL ABCCESS       Consults:   Consults     Date and Time Order Name Status Description    8/11/2017 1011 Inpatient Consult to Urology Completed     8/9/2017 2059 Inpatient Consult to Nephrology Completed     8/9/2017 1813 Inpatient Consult to Neurosurgery      8/9/2017 1813 Inpatient Consult to Infectious Diseases      8/9/2017 1457 [facility] (on-call MD unless specified) Completed     8/9/2017 1429 Neurosurgery (on-call MD unless specified) Completed            Discharge Disposition:  Skilled Nursing Facility (DC - External)    Discharge Medications:   Pavel Melgoza   [facility] Medication Instructions THELMA:[id number]    Printed on:08/14/17 1329   Medication Information                      atorvastatin (LIPITOR) 10 MG tablet  Take 10 mg by mouth Daily.             cholecalciferol 5000 " UNITS tablet  Take 5,000 Units by mouth Daily.             diltiaZEM CD (CARDIZEM CD) 240 MG 24 hr capsule  Take 1 capsule by mouth Daily.             HYDROcodone-acetaminophen (NORCO) 5-325 MG per tablet  Take 1 tablet by mouth Every 4 (Four) Hours As Needed for Moderate Pain (4-6) or Severe Pain (7-10).             pantoprazole (PROTONIX) 40 MG EC tablet  Take 1 tablet by mouth 2 (Two) Times a Day Before Meals.             ramipril (ALTACE) 5 MG capsule  Take 1 capsule by mouth Daily.             sennosides-docusate sodium (SENOKOT-S) 8.6-50 MG tablet  Take 1 tablet by mouth At Night As Needed for Constipation.             tamsulosin (FLOMAX) 0.4 MG capsule 24 hr capsule  Take 2 capsules by mouth Every Night.             vancomycin (VANCOCIN) 1-5 GM/200ML-% IVPB  Infuse 200 mL into a venous catheter Every 12 (Twelve) Hours for 77 doses. Indications: Bone and/or Joint Infection             vitamin B-12 (VITAMIN B-12) 1000 MCG tablet  Take 1 tablet by mouth Daily.                 Discharge Diet:   Diet Instructions     Diet: Cardiac; Thin       Discharge Diet:  Cardiac   Fluid Consistency:  Thin                 Activity at Discharge:   Activity Instructions     Activity as Tolerated                     Follow-up Appointments:  Future Appointments  Date Time Provider Department Center   8/28/2017 10:45 AM ANITRA Esposito [facility] None   9/21/2017 1:30 PM Chandra Ponce MD [facility] None     Additional Instructions for the Follow-ups that You Need to Schedule     Additional Discharge Follow-Up (Specify Provider)    As directed    To:  Dr. Ponce (Infectious Diseases)   Follow Up Details:  9/21/17 at 1:30PM       Discharge Follow-Up With Specified Provider    As directed    To:  Dr. Hernandez (Neurosurgery)   Follow Up Details:  8/24/17       Basic Metabolic Panel    Aug 17, 2017 (Approximate)    Collect weekly starting 8/17/17.  Fax results to [facility] (Dr. Ponce) at [phone]        CBC & Differential    Aug 17, 2017 (Approximate)    Collect weekly starting 8/17/17.  Fax results to [facility] (Dr. Ponce) at [phone]   Manual Differential:  No       Vancomycin, Trough    Aug 17, 2017 (Approximate)    Collect weekly starting 8/17/17.  Fax results to [facility] (Dr. Ponce) at [phone]                 Test Results Pending at Discharge:   Order Current Status    Blood Culture Preliminary result    Blood Culture Preliminary result           Nicanor Alvarez MD  08/14/17  1:29 PM    Time Spent on Discharge Activities:  Greater than 30 minutes.

## 2017-08-14 NOTE — PLAN OF CARE
Problem: Patient Care Overview (Adult)  Goal: Plan of Care Review  Outcome: Ongoing (interventions implemented as appropriate)    08/14/17 0443   Coping/Psychosocial Response Interventions   Plan Of Care Reviewed With patient   Patient Care Overview   Progress improving   Outcome Evaluation   Outcome Summary/Follow up Plan Pt c/o pain and was medicated x2 during shift. Pt rested well during shift. Pt was turned q2 hours. Abx administered per orders. No s/s of distress at this time. Vitals stable. WIll continue to monitor.        Goal: Adult Individualization and Mutuality  Outcome: Ongoing (interventions implemented as appropriate)  Goal: Discharge Needs Assessment  Outcome: Ongoing (interventions implemented as appropriate)    Problem: Pressure Ulcer (Adult)  Goal: Signs and Symptoms of Listed Potential Problems Will be Absent or Manageable (Pressure Ulcer)  Outcome: Ongoing (interventions implemented as appropriate)    Problem: Fall Risk (Adult)  Goal: Absence of Falls  Outcome: Ongoing (interventions implemented as appropriate)    Problem: Perioperative Period (Adult)  Goal: Signs and Symptoms of Listed Potential Problems Will be Absent or Manageable (Perioperative Period)  Outcome: Ongoing (interventions implemented as appropriate)    Problem: Pain, Acute (Adult)  Goal: Identify Related Risk Factors and Signs and Symptoms  Outcome: Outcome(s) achieved Date Met:  08/14/17  Goal: Acceptable Pain Control/Comfort Level  Outcome: Ongoing (interventions implemented as appropriate)

## 2017-08-14 NOTE — PLAN OF CARE
Problem: Patient Care Overview (Adult)  Goal: Plan of Care Review  Outcome: Ongoing (interventions implemented as appropriate)    08/14/17 0903   Coping/Psychosocial Response Interventions   Plan Of Care Reviewed With patient   Outcome Evaluation   Outcome Summary/Follow up Plan Pt able to stand at EOB w/ min(A) of 2 and RWX. Pt cued to take several steps to chair but after c/o severe pn, pt became impulsive and attempted to sit before it was safe. SHANEL Dennis alerted to concerns.

## 2017-08-14 NOTE — THERAPY TREATMENT NOTE
Acute Care - Physical Therapy Treatment Note  [facility]     Patient Name: Pavel Melgoza  : 1934  MRN: [id number]  Today's Date: 2017  Onset of Illness/Injury or Date of Surgery Date: 17     Referring Physician: Dr. Plummer    Admit Date: 2017    Visit Dx:    ICD-10-CM ICD-9-CM   1. Sacral decubitus ulcer, unspecified pressure ulcer stage L89.159 707.03     707.20   2. Postoperative wound dehiscence, lumbar spine, initial encounter T81.31XA 998.32   3. Fever in adult, reported R50.9 780.60   4. Postoperative wound dehiscence, initial encounter T81.31XA 998.32   5. Generalized weakness R53.1 780.79   6. Unsteadiness on feet R26.81 781.2     Patient Active Problem List   Diagnosis   • Generalized weakness   • Plasmocytoma   • Nausea & vomiting   • Fall   • Noncompliance with medication regimen   • Left leg weakness   • Closed wedge compression fracture of eleventh thoracic vertebra   • CKD (chronic kidney disease) stage 2, GFR 60-89 ml/min   • B12 deficiency   • A-fib   • Meningioma   • SIADH (syndrome of inappropriate ADH production)   • Hyponatremia   • Vitamin D deficiency   • Epidural abscess   • Sacral decubitus ulcer   • Postoperative wound dehiscence   • Dementia               Adult Rehabilitation Note       17 0831 17 1100 17 1400    Rehab Assessment/Intervention    Discipline physical therapy assistant  -ELOINA physical therapist  -GJ physical therapist  -GJ    Document Type therapy note (daily note)  -ELOINA therapy note (daily note)  -GJ evaluation  -GJ    Subjective Information agree to therapy  -ELOINA agree to therapy  -GJ agree to therapy;complains of;pain  -GJ    Patient Effort, Rehab Treatment good  -ELOINA good  -GJ adequate  -GJ    Symptoms Noted During/After Treatment  increased pain  -GJ increased pain  -GJ    Precautions/Limitations fall precautions  -ELOINA fall precautions   LLE </= 1/5 MMT  -GJ fall precautions  -GJ    Specific Treatment Considerations   MMT LLE  0-1/5 (10+ year hx), unsafe for standing exercies at this time, consider straight leg brace LLE for upright activities.  -GJ    Recorded by [ELOINA] Ton Phillips PTA [GJ] Chilo Woodard, PT [GJ] Chilo Woodard, PT    Pain Assessment    Pain Assessment 0-10  -ELOINA 0-10  -GJ 0-10  -GJ    Pain Score 0  -ELOINA 10  -GJ 6  -GJ    Post Pain Score 10  -ELOINA      Pain Type   Surgical pain  -GJ    Pain Location Hip  -ELOINA Hip  -GJ Back  -GJ    Pain Orientation Left  -ELOINA      Pain Intervention(s) Ambulation/increased activity;Repositioned;Rest  -ELOINA Medication (See MAR);Heat applied;Repositioned;Ambulation/increased activity   discussed with RN  -GJ Medication (See MAR);Repositioned  -GJ    Response to Interventions  tolerated  -GJ tolerated once re-positioned  -GJ    Recorded by [ELOINA] Ton Phillips PTA [GJ] Chilo Woodard, PT [GJ] Chilo Woodard, PT    Cognitive Assessment/Intervention    Current Cognitive/Communication Assessment impaired  -ELOINA functional  -GJ functional  -GJ    Orientation Status oriented to;person;place;situation  -ELOINA oriented x 4  -GJ oriented x 4  -GJ    Follows Commands/Answers Questions 100% of the time  -ELOINA 100% of the time  -% of the time  -GJ    Personal Safety moderate impairment;impulsive  -ELOINA      Personal Safety Interventions fall prevention program maintained;gait belt;nonskid shoes/slippers when out of bed  -ELOINA      Recorded by [ELOINA] Ton Phillips PTA [GJ] Chilo Woodard, PT [GJ] Chilo Woodard, PT    ROM (Range of Motion)    General ROM Detail   pt. demonstrates no AROM LLE (10+ year hx) per pt./wife  -GJ    Recorded by   [GJ] Chilo Woodard, PT    MMT (Manual Muscle Testing)    General MMT Assessment Detail   LLE 0-1/5  -GJ    Recorded by   [GJ] Chilo Woodard PT    Bed Mobility, Assessment/Treatment    Bed Mobility, Assistive Device bed rails;head of bed elevated  -ELOINA      Bed Mob, Supine to Sit, [place] moderate assist (50% patient effort);verbal cues required;nonverbal cues required  (demo/gesture)  -ELOINA minimum assist (75% patient effort);moderate assist (50% patient effort)  -GJ minimum assist (75% patient effort);verbal cues required;set up required;nonverbal cues required (demo/gesture)  -GJ    Bed Mob, Sit to Supine, [place] not tested  -ELOINA minimum assist (75% patient effort);moderate assist (50% patient effort)  -GJ minimum assist (75% patient effort);2 person assist required;verbal cues required;nonverbal cues required (demo/gesture)  -GJ    Bed Mobility, Safety Issues decreased use of arms for pushing/pulling;decreased use of legs for bridging/pushing  -ELOINA decreased use of legs for bridging/pushing  -GJ     Bed Mobility, Impairments strength decreased;impaired balance;pain  -ELOINA      Recorded by [ELOINA] Ton Phillips, PTA [GJ] Chilo Woodard, PT [GJ] Chilo Woodard, PT    Transfer Assessment/Treatment    Transfers, Bed-Chair [place] moderate assist (50% patient effort);maximum assist (25% patient effort);2 person assist required;verbal cues required;nonverbal cues required (demo/gesture)  -ELOINA      Transfers, Sit-Stand [place] minimum assist (75% patient effort);2 person assist required;verbal cues required;nonverbal cues required (demo/gesture)  -ELOINA set up required;verbal cues required;nonverbal cues required (demo/gesture);minimum assist (75% patient effort);2 person assist required   blocking of LLE  -GJ not tested   not performed, unsafe today, pt with increased pain, LLE wea  -GJ    Transfers, Stand-Sit [place] minimum assist (75% patient effort);2 person assist required;verbal cues required;nonverbal cues required (demo/gesture)  -ELOINA verbal cues required;nonverbal cues required (demo/gesture);contact guard assist;minimum assist (75% patient effort);2 person assist required  -GJ     Transfers, Sit-Stand-Sit, Assist Device rolling walker  -ELOINA rolling walker  -GJ     Transfer, Safety Issues balance decreased during turns;step length decreased;weight-shifting ability  decreased;loses balance backward  -ELOINA weight-shifting ability decreased;knees buckling   LLE weakness  -GJ --   LLE weakness, pre morbid  -GJ    Transfer, Impairments strength decreased;impaired balance;coordination impaired;pain  -ELOINA strength decreased;impaired balance;muscle tone abnormal  -GJ     Transfer, Comment sit<>stand x 4  -ELOINA sit to/from stand x 5, stood for 15 seconds at a time, cues for erect posture  -GJ     Recorded by [ELOINA] Ton Phillips PTA [GJ] Chilo Woodard, PT [GJ] Chilo Woodard, PT    Gait Assessment/Treatment    Gait, [place] Level moderate assist (50% patient effort);maximum assist (25% patient effort);2 person assist required;verbal cues required;nonverbal cues required (demo/gesture)  -ELOINA not appropriate to assess  -GJ --   unsafe  -GJ    Gait, Assistive Device rolling walker  -ELOINA      Gait, Distance (Feet) --   5-6 steps to chair  -ELOINA      Gait, Gait Deviations left:;antalgic;jasbir decreased;festinating;limb motion velocity decreased;step length decreased;knee buckling;toe-to-floor clearance decreased;weight-shifting ability decreased  -ELOINA      Gait, Safety Issues step length decreased;weight-shifting ability decreased;loses balance backward;balance decreased during turns  -ELOINA      Gait, Impairments strength decreased;impaired balance;coordination impaired;pain  -ELOINA      Recorded by [ELOINA] Ton Phillips PTA [GJ] Chilo Woodard, PT [GJ] Chilo Woodard, PT    Motor Skills/Interventions    Additional Documentation Balance Skills Training (Group)  -ELOINA      Recorded by [ELOINA] Ton Phillips PTA      Balance Skills Training    Sitting-Level of Assistance Contact guard;Minimum assistance  -ELOINA  Contact guard  -GJ    Sitting-Balance Support Feet supported  -ELOINA  Feet supported  -GJ    Sitting-Balance Activities Lateral lean;Forward lean;Reaching for objects;Trunk control activities  -ELOINA      Sitting # of Minutes 5  -EOLINA  4   pt had to return to supine secondary to pain  -GJ    Standing-Level of  Assistance Minimum assistance;x2  -ELOINA      Static Standing Balance Support assistive device  -ELOINA      Standing-Balance Activities --   static  -ELOINA      Standing Balance # of Minutes 5   5min over 4 trials  -ELOINA      Recorded by [ELOINA] Ton Phillips PTA  [GJ] Chilo Woodard PT    Therapy Exercises    Right Lower Extremity AROM:;10 reps;ankle pumps/circles;LAQ;hip flexion  -ELOINA AROM:;10 reps;sitting;LAQ;supine;ankle pumps/circles;glut sets;quad sets   performed passive R hip flexion (SKTC) gently, x 10 20 secon  -GJ AROM:;10 reps;sitting;LAQ;supine;ankle pumps/circles;glut sets;quad sets  -GJ    Left Lower Extremity AROM:;10 reps;ankle pumps/circles;LAQ;hip flexion  -ELOINA      Recorded by [ELOINA] Ton Phillips PTA [GJ] Chilo Woodard, PT [GJ] Chilo Woodard, PT    Positioning and Restraints    Pre-Treatment Position in bed  -ELOINA in bed  -GJ in bed  -GJ    Post Treatment Position chair  -ELOINA bed  -GJ bed  -GJ    In Bed  notified nsg;side lying left;call light within reach;encouraged to call for assist;with family/caregiver;pillow between legs  -GJ side lying left;call light within reach;encouraged to call for assist;with family/caregiver;pillow between legs  -GJ    In Chair reclined;call light within reach;encouraged to call for assist;exit alarm on;notified nsg  -ELOINA      Recorded by [ELOINA] Ton Phillips PTA [GJ] Chilo Woodard, PT [GJ] Chilo Woodard, PT      User Key  (r) = Recorded By, (t) = Taken By, (c) = Cosigned By    Initials Name Effective Dates     Chilo Woodard, PT 03/26/15 -     ELOINA Phillips PTA 04/24/15 -                 IP PT Goals       08/11/17 1152          Bed Mobility PT LTG    Bed Mobility PT LTG, Date Established 08/11/17  -PC (r) AA (t) PC (c)      Bed Mobility PT LTG, Time to Achieve 1 wk  -PC (r) AA (t) PC (c)      Bed Mobility PT LTG, Activity Type all bed mobility  -PC (r) AA (t) PC (c)      Bed Mobility PT LTG, [place] Level supervision required  -PC (r) AA (t) PC (c)      Bed Mobility PT Goal   LTG, Assist Device bed rails  -PC (r) AA (t) PC (c)      Transfer Training PT LTG    Transfer Training PT LTG, Date Established 08/11/17  -PC (r) AA (t) PC (c)      Transfer Training PT LTG, Time to Achieve 1 wk  -PC (r) AA (t) PC (c)      Transfer Training PT LTG, Activity Type all transfers  -PC (r) AA (t) PC (c)      Transfer Training PT LTG, [place] Level contact guard assist  -PC (r) AA (t) PC (c)      Transfer Training PT LTG, Assist Device walker, rolling  -PC (r) AA (t) PC (c)      Gait Training PT LTG    Gait Training Goal PT LTG, Date Established 08/11/17  -PC (r) AA (t) PC (c)      Gait Training Goal PT LTG, Time to Achieve 1 wk  -PC (r) AA (t) PC (c)      Gait Training Goal PT LTG, [place] Level minimum assist (75% patient effort);2 person assist required  -PC (r) AA (t) PC (c)      Gait Training Goal PT LTG, Assist Device walker, rolling  -PC (r) AA (t) PC (c)      Gait Training Goal PT LTG, Distance to Achieve 10   with L knee immobilizer  -PC (r) AA (t) PC (c)        User Key  (r) = Recorded By, (t) = Taken By, (c) = Cosigned By    Initials Name Provider Type    PC Em Kraus, PT Physical Therapist    LEN Blanco, PT Student PT Student          Physical Therapy Education     Title: PT OT SLP Therapies (Done)     Topic: Physical Therapy (Done)     Point: Mobility training (Done)    Learning Progress Summary    Learner Readiness Method Response Comment Documented by Status   Patient Acceptance E VU,NR  ELOINA 08/14/17 0903 Done    Acceptance E,TB,D VU,NR  GJ 08/13/17 1108 Done    Acceptance E NR  GJ 08/12/17 1458 Active    Acceptance E VU,NR  AA 08/11/17 1151 Done   Significant Other Acceptance E,TB,D VU,NR  GJ 08/13/17 1108 Done    Acceptance E NR  GJ 08/12/17 1458 Active               Point: Home exercise program (Done)    Learning Progress Summary    Learner Readiness Method Response Comment Documented by Status   Patient Acceptance E VU,NR  ELOINA 08/14/17 0903 Done    Acceptance  E,TB,D VU,NR   08/13/17 1108 Done    Acceptance E NR   08/12/17 1458 Active    Acceptance E VU,NR  AA 08/11/17 1151 Done   Significant Other Acceptance E,TB,D VU,NR   08/13/17 1108 Done    Acceptance E NR   08/12/17 1458 Active               Point: Body mechanics (Done)    Learning Progress Summary    Learner Readiness Method Response Comment Documented by Status   Patient Acceptance E VU,NR  ELOINA 08/14/17 0903 Done    Acceptance E,TB,D VU,NR   08/13/17 1108 Done    Acceptance E NR   08/12/17 1458 Active    Acceptance E VU,NR  AA 08/11/17 1151 Done   Significant Other Acceptance E,TB,D VU,NR   08/13/17 1108 Done    Acceptance E NR   08/12/17 1458 Active               Point: Precautions (Done)    Learning Progress Summary    Learner Readiness Method Response Comment Documented by Status   Patient Acceptance E VU,NR  ELOINA 08/14/17 0903 Done    Acceptance E,TB,D VU,NR   08/13/17 1108 Done    Acceptance E NR   08/12/17 1458 Active    Acceptance E VU,NR  AA 08/11/17 1151 Done   Significant Other Acceptance E,TB,D VU,NR   08/13/17 1108 Done    Acceptance E NR   08/12/17 1458 Active                      User Key     Initials Effective Dates Name Provider Type Discipline     03/26/15 -  Chilo Woodard, PT Physical Therapist PT    ELOINA 04/24/15 -  Ton Phillips, PTA Physical Therapy Assistant PT     06/01/17 -  Skyla Blanco, PT Student PT Student PT                    PT Recommendation and Plan  Anticipated Discharge Disposition: skilled nursing facility  Planned Therapy Interventions: balance training, bed mobility training, home exercise program, transfer training, gait training  PT Frequency: daily  Plan of Care Review  Plan Of Care Reviewed With: patient  Outcome Summary/Follow up Plan: Pt able to stand at EOB w/ min(A) of 2 and RWX.  Pt cued to take several steps to chair but after c/o severe pn, pt became impulsive and attempted to sit before it was safe.  SHANEL Dennis alerted to concerns.            Outcome Measures       08/14/17 0900 08/13/17 1100 08/12/17 1500    How much help from another person do you currently need...    Turning from your back to your side while in flat bed without using bedrails? 3  -ELOINA 3  -GJ 3  -GJ    Moving from lying on back to sitting on the side of a flat bed without bedrails? 2  -ELOINA 3  -GJ 2  -GJ    Moving to and from a bed to a chair (including a wheelchair)? 2  -ELOINA 1  -GJ 1  -GJ    Standing up from a chair using your arms (e.g., wheelchair, bedside chair)? 3  -ELOINA 3  -GJ 2  -GJ    Climbing 3-5 steps with a railing? 1  -ELOINA 1  -GJ 1  -GJ    To walk in hospital room? 2  -ELOINA 1  -GJ 1  -GJ    AM-PAC 6 Clicks Score 13  -ELOINA 12  -GJ 10  -GJ    Functional Assessment    Outcome Measure Options AM-PAC 6 Clicks Basic Mobility (PT)  -ELOINA AM-PAC 6 Clicks Basic Mobility (PT)  -GJ AM-PAC 6 Clicks Basic Mobility (PT)  -GJ      08/11/17 1100          How much help from another person do you currently need...    Turning from your back to your side while in flat bed without using bedrails? 4  -PC (r) AA (t) PC (c)      Moving from lying on back to sitting on the side of a flat bed without bedrails? 3  -PC (r) AA (t) PC (c)      Moving to and from a bed to a chair (including a wheelchair)? 2  -PC (r) AA (t) PC (c)      Standing up from a chair using your arms (e.g., wheelchair, bedside chair)? 3  -PC (r) AA (t) PC (c)      Climbing 3-5 steps with a railing? 1  -PC (r) AA (t) PC (c)      To walk in hospital room? 2  -PC (r) AA (t) PC (c)      AM-PAC 6 Clicks Score 15  -PC (r) AA (t)      Functional Assessment    Outcome Measure Options AM-PAC 6 Clicks Basic Mobility (PT)  -PC (r) AA (t) PC (c)        User Key  (r) = Recorded By, (t) = Taken By, (c) = Cosigned By    Initials Name Provider Type    DAVE Woodard, PT Physical Therapist    PC Em Kraus, PT Physical Therapist    ELOINA Phillips, PTA Physical Therapy Assistant    AA Skyla Blanco, PT Student PT Student           Time  Calculation:         PT Charges       08/14/17 0902          Time Calculation    Start Time 0831  -ELOINA      Stop Time 0855  -ELOINA      Time Calculation (min) 24 min  -ELOINA      PT Received On 08/14/17  -ELOINA      PT - Next Appointment 08/15/17  -ELOINA        User Key  (r) = Recorded By, (t) = Taken By, (c) = Cosigned By    Initials Name Provider Type    ELOINA Phillips PTA Physical Therapy Assistant          Therapy Charges for Today     Code Description Service Date Service Provider Modifiers Qty    71830431729 HC PT THER PROC EA 15 MIN 8/14/2017 Ton Phillips PTA GP 2    78073629657 HC PT THER SUPP EA 15 MIN 8/14/2017 Ton Phillips PTA GP 2          PT G-Codes  Outcome Measure Options: AM-PAC 6 Clicks Basic Mobility (PT)    Ton Phillips PTA  8/14/2017

## 2017-08-14 NOTE — TELEPHONE ENCOUNTER
Patient is schedule for a hospital follow up (post op for a wound debridement is only 10 days) on 8/28/17 @ 10:45. I called [facility] [address] and spoke to Mariann and she will add this to the discharge. Letter/Forms sent

## 2017-08-14 NOTE — PROGRESS NOTES
Continued Stay Note  [facility]     Patient Name: Pavel Melgoza  MRN: [id number]  Today's Date: 8/14/2017    Admit Date: 8/9/2017          Discharge Plan       08/14/17 1533    Case Management/Social Work Plan    Plan Jordi    Patient/Family In Agreement With Plan yes    Additional Comments S/W [facility] and set up at  for 1730.  Emailed Jo/[employer] to notify of  for 1730 and notified wife, at bedside.  Valerie, RN, CCP    Final Note    Final Note orders to d/c.               Discharge Codes     None        Expected Discharge Date and Time     Expected Discharge Date Expected Discharge Time    Aug 14, 2017             Analilia Kebede RN

## 2017-08-15 NOTE — PROGRESS NOTES
Continued Stay Note  [facility]     Patient Name: Pavel Melgoza  MRN: [id number]  Today's Date: 8/15/2017    Admit Date: 8/9/2017          Discharge Plan       08/15/17 0813    Case Management/Social Work Plan    Plan [facility]              Discharge Codes       08/15/17 0813    Discharge Codes    Discharge Codes 03  Discharged/transferred to skilled nursing facility (SNF) with Medicare certification in anticipation of skilled care        Expected Discharge Date and Time     Expected Discharge Date Expected Discharge Time    Aug 14, 2017             Analilia Kebede RN

## 2017-08-28 NOTE — PROGRESS NOTES
HPI:   Pavel Melgoza is a 83 y.o. 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  He is status post L3 through 5 laminectomy with washout of abscess on July 4, 2017.  His cultures grew enterococcus. He was transferred to nursing home rehabilitation following hospitalization.  His staples were removed at the nursing home under her direction.  Unfortunately, he developed wound drainage and we were not notified.  On August 9 he presented to the emergency room with wound drainage, new sacral decubitus, and fever. He is status post open debridement of epidural abscess on August 10, 2017.  Cultures from blood, wound, tissue were negative.  He was seen by infectious disease in 6 weeks of vancomycin has been recommended.  He was discharged to a different SNF. He has not had postoperative complications.    He has confusion so most information is gained from his wife and grandson.  She states he is making some progress with physical therapy.  He is able to stand and take some steps with assistance.  He has had no wound issues.  There happier with his care at the new facility.    He presents accompanied by his spouse and grandson.     Review of Systems   Constitutional: Positive for chills (occ'l). Negative for fever.   HENT: Negative for tinnitus and trouble swallowing.    Eyes: Negative for visual disturbance.   Respiratory: Negative for cough, shortness of breath and wheezing.    Cardiovascular: Negative for chest pain and palpitations.   Gastrointestinal: Negative for abdominal pain, nausea and vomiting.   Genitourinary:        Catheterized   Musculoskeletal: Positive for back pain (occ'l) and gait problem.   Skin: Negative for rash.   Neurological: Positive for weakness. Negative for numbness.   Psychiatric/Behavioral: Negative for sleep disturbance.        /64 (BP Location: Right arm, Patient Position: Sitting)  Pulse 80  Temp 97.5 °F (36.4 °C) (Tympanic)  "  Resp 18  Ht 72.01\" (182.9 cm)  Wt 183 lb (83 kg)  BMI 24.81 kg/m2    Physical Exam   Constitutional: He appears well-developed and well-nourished.   Pulmonary/Chest: Effort normal.   Neurological: He is alert.   Skin:   Midline lumbar incision well approximated with staples.  No redness or drainage.  There is some soft tissue swelling around the incision.  There is a clean dry intact dressing over the incision of presentation.  I removed proximally 20 staples without difficulty.  The wound remained approximated.  I placed Steri-Strips across the wound and replaced his dressing.    He also has a dressing over a sacral decubitus that was not removed.  It was clean dry and intact.   Psychiatric: His speech is normal. His mood appears anxious. He is agitated. Cognition and memory are impaired.   Vitals reviewed.    Neurologic Exam     Mental Status   Attention: normal. Concentration: decreased.   Speech: speech is normal   Level of consciousness: alert    Motor Exam   Muscle bulk: decreased (left leg)  Right leg tone: normal  Left leg tone: decreased    Strength   Strength 5/5 except as noted.        Hip flexor 4/5, gastroc 4/5     Gait, Coordination, and Reflexes     Gait  Gait: (Unable to ambulate secondary to weakness and no assistive device.)      Findings/Results:  Blood, wound, tissue cultures 8/10- NG    Assessment/Plan:  Pavel was seen today for post-op.    Diagnoses and all orders for this visit:    Postop check      Discussion/Summary  Patient presents for follow-up after recurrent epidural abscess.  He is status post laminectomy with washout on July 4 and open debridement on August 10.  His wound looks good today.  His staples were removed.  There was no redness or drainage.  He is quite weak generally.  He has chronic left lower extremity weakness that is secondary to a history of radiation for plasmacytosis.  Family states that he is working with therapy and making some improvement but slow.    He " is scheduled to follow up with Dr. Ponce on September 21.  We will see him back in the office following this visit.  I asked his wife to be sure and help with watching the wound and if he has any opening of the wound or drainage we need to be notified as soon as possible.     Plan: Return to office one month.   Plan: Return in about 1 month (around 9/28/2017) for Follow-up with Dr. Hernandez.         Patient Care Team    Patient Care Team:  Amanda Tanner MD as PCP - General (Family Medicine)  Chandra Ponce MD as Consulting Physician (Infectious Diseases)  Nolberto Fraser Jr., MD as Consulting Physician (Urology)    Sheila Johnson, APRN  8/28/2017    Dragon disclaimer:   Much of this encounter note is an electronic transcription/translation of spoken language to printed text. The electronic translation of spoken language may permit erroneous, or at times, nonsensical words or phrases to be inadvertently transcribed; Although I have reviewed the note for such errors, some may still exist.

## 2017-08-29 PROBLEM — D64.9 ANEMIA: Chronic | Status: ACTIVE | Noted: 2017-01-01

## 2017-08-29 PROBLEM — D72.829 LEUKOCYTOSIS: Status: ACTIVE | Noted: 2017-01-01

## 2017-08-29 PROBLEM — N30.01 ACUTE CYSTITIS WITH HEMATURIA: Status: ACTIVE | Noted: 2017-01-01

## 2017-08-29 PROBLEM — I10 HTN (HYPERTENSION): Chronic | Status: ACTIVE | Noted: 2017-01-01

## 2017-09-01 NOTE — PROGRESS NOTES
Case Management Discharge Note    Final Note: Pt dc'd to [facility] notified.     Discharge Placement     Facility/Agency Request Status Selected? Address Phone Number Fax Number    [facility] Accepted    Yes [address] , [facility] [id number] [phone]        Other: Other (family transported via private vehicle)    Discharge Codes: 03  Discharged/transferred to skilled nursing facility (SNF) with Medicare certification in anticipation of skilled care

## 2017-09-01 NOTE — PLAN OF CARE
Problem: Patient Care Overview (Adult)  Goal: Plan of Care Review  Outcome: Ongoing (interventions implemented as appropriate)    08/31/17 2005   Coping/Psychosocial Response Interventions   Plan Of Care Reviewed With patient   Patient Care Overview   Progress improving   Outcome Evaluation   Outcome Summary/Follow up Plan pain med as needed, d/c, 1 unit of blood, f/c to bsd midline cdi on left upper arm, drs changed on coccyx, report called to nursing home, papers sent with family, family to transport pt, pt stable       Goal: Adult Individualization and Mutuality  Outcome: Ongoing (interventions implemented as appropriate)    Problem: Fall Risk (Adult)  Goal: Absence of Falls  Outcome: Ongoing (interventions implemented as appropriate)    Problem: Infection, Risk/Actual (Adult)  Goal: Infection Prevention/Resolution  Outcome: Ongoing (interventions implemented as appropriate)    Problem: Pressure Ulcer (Adult)  Goal: Signs and Symptoms of Listed Potential Problems Will be Absent or Manageable (Pressure Ulcer)  Outcome: Ongoing (interventions implemented as appropriate)

## 2017-09-06 PROBLEM — A41.9 SEPSIS (HCC): Status: ACTIVE | Noted: 2017-01-01

## 2017-09-06 PROBLEM — A04.72 CLOSTRIDIUM DIFFICILE COLITIS: Status: ACTIVE | Noted: 2017-01-01

## 2017-09-06 NOTE — ED PROVIDER NOTES
Pt is a 83 y.o. Male presenting to the ED with abnormal labs. The hx is limited, but the wife complains the pt has not been acting himself recently. Per wife, he is receiving IV abx through a PICC line. Past lab results show a WBC - 19.00 on 9/4; WBC - 11.00 on 9/1;  WBC - 11.00 on 8/29. Past labs also show the pt has been chronically anemic during this time. On exam, pt is an elderly and chronically ill. Pt's heart rate is tachycardic with HR in the 100s. There is mild ralese in the base of his lungs bilaterally. Pt has a chronic catheter with yellow urine inside catheter tubing. There is no testicular swelling or rashes. Pt has a marked deep secral decubitus ulcer with dried purulence, and his abd is nontender. The pt has no movement of his left leg (chronic), chronically limited movement of right leg. Pt has a PICC line to LUE without erythema or warmth and is getting IV vancomycin through the PICC line with his last dose this morning.      I talked with spouse at length and reviewed lab work.     I agree with the plan to admit the pt.     I supervised care provided by the midlevel provider.    We have discussed this patient's history, physical exam, and treatment plan.   I have reviewed the note and personally saw and examined the patient and agree with the plan of care.    Documentation assistance provided by carmina Medina for Dr. Moise.  Information recorded by the scribe was done at my direction and has been verified and validated by me.       Kulwinder Medina  09/06/17 1327       Jarrod Moise MD  09/06/17 1653

## 2017-09-06 NOTE — ED NOTES
Pt discharge here on 08/31 with chronic anemia and a CAUTI. Pt arrives today from [facility] by EMS due to a WBC of 20 per pts wife. Pt has catheter in place on arrival and a midline catheter in the left upper arm. Pt currently on a flouroquinolone and vancomycin. Pt appears uncomfortable, but denies pain. AOx3. Wife at bedside.      Maricruz Wood RN  09/06/17 1652

## 2017-09-06 NOTE — ED NOTES
Held for report x2 and asked to speak with charge nurse without success. Patient placed for transport.     Maricruz Wood RN  09/06/17 7069

## 2017-09-06 NOTE — ED PROVIDER NOTES
EMERGENCY DEPARTMENT ENCOUNTER    CHIEF COMPLAINT  Chief Complaint: abnormal labs  History given by: patient, family  History limited by: patient's dementia   Room Number: 06/06  PMD: Amanda Tanner MD  Neurosurgeon- Dr Hernandez    HPI:  Pt is a 83 y.o. male who presents with abnormal labs. Per family, pt was admitted in early 8/2017 at [facility] for lumbar surgical wound drainage (s/p epidural abscess drainage in 7/2017) and chronic sacral decubitus ulcer. At the time, pt underwent washout of the wound by Dr Hernandez and was discharged on IV vancomycin for 6 weeks (currently taking it and has PICC line in place). Pt was readmitted at [facility] from 8/29/17 to 8/31/17 for catheter associated UTI and anemia and was discharged to [facility]. Per family, this morning, nursing home staff checked routine labs and noted pt's WBC count to be elevated at 19 and pt's hemoglobin to be low at 8.5 and so they referred pt to ED for further evaluation. In ED, pt c/o right hip pain that is chronic and has not acutely changed. He denies chest pain, trouble breathing, abd pain, and N/V/D. Family has not noticed any fevers or acute change in pt's mental status (states that pt is at his baseline mental status). Past Medical History of dementia, GERD, HTN, CAD, atrial fibrillation, anemia, and chronic sacral decubitus ulcer.     Duration: noticed this morning  Timing: constant   Location: N/A  Radiation: N/A  Quality: N/A  Intensity/Severity: moderate  Progression: N/A  Associated Symptoms: none   Aggravating Factors: N/A  Alleviating Factors: N/A  Previous Episodes: none mentioned   Treatment before arrival: Per family, pt was referred to ED by nursing home staff for further evaluation.     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/25/2017   • Hyponatremia 06/26/2017   • Vitamin D deficiency 06/26/2017   • Epidural abscess 07/04/2017   • Sacral decubitus ulcer 08/09/2017   • Postoperative wound dehiscence 08/09/2017   • Dementia 08/09/2017   • Acute cystitis with hematuria 08/29/2017   • HTN (hypertension) 08/29/2017   • Anemia 08/29/2017   • Leukocytosis 08/29/2017     Resolved Ambulatory Problems     Diagnosis Date Noted   • Diarrhea 06/20/2017   • Metabolic acidosis 06/23/2017   • Hypocalcemia 06/23/2017   • Left inguinal hernia 06/24/2017   • UTI (urinary tract infection) 06/26/2017   • Pseudomonas infection 06/28/2017     Past Medical History:   Diagnosis Date   • Arthritis    • Atrial fibrillation    • Coronary artery disease    • Dementia    • GERD (gastroesophageal reflux disease)    • History of transfusion    • Hyperlipidemia    • Hypertension    • Plasmacytoma    • Urinary retention        PAST SURGICAL HISTORY  Past Surgical History:   Procedure Laterality Date   • ABDOMINAL SURGERY     • BACK SURGERY     • CERVICAL LAMINECTOMY DECOMPRESSION POSTERIOR N/A 8/10/2017    Procedure: OPEN DEBRIDEMENT OF EPIDURAL ABCCESS;  Surgeon: Nolberto Hernandez IV, MD;  Location: [facility];  Service:    • FRACTURE SURGERY     • HERNIA REPAIR     • INGUINAL HERNIA REPAIR Left 6/24/2017    Procedure: LAPAROSCOPIC LEFT INGUINAL HERNIA REPAIR WITH MESH;  Surgeon: Erwin Mei MD;  Location: [facility];  Service:    • JOINT REPLACEMENT      bilateral hips   • LUMBAR LAMINECTOMY DISCECTOMY DECOMPRESSION N/A 7/4/2017    Procedure: LUMBAR LAMINECTOMY FOR RESECTION OF EPIDURAL ABCESS ;  Surgeon: Nolberto Hernandez IV, MD;  Location:   [facility];  Service:        FAMILY HISTORY  Family History   Problem Relation Age of Onset   • Cancer Sister    • Cancer Brother    • Cancer Child        SOCIAL HISTORY  Social History     Social History   • Marital status:      Spouse name: N/A   • Number of children: N/A   • Years of education: N/A     Occupational History   • retired      Social History Main Topics   • Smoking status: Former Smoker     Packs/day: 1.00     Types: Cigarettes     Quit date: 8/29/1995   • Smokeless tobacco: Never Used   • Alcohol use No   • Drug use: No   • Sexual activity: Defer     Other Topics Concern   • Not on file     Social History Narrative         ALLERGIES  Review of patient's allergies indicates no known allergies.    REVIEW OF SYSTEMS  Review of Systems   Unable to perform ROS: Dementia   Constitutional: Negative for fever (no noted fever (per family)).   Respiratory: Negative for cough and shortness of breath.    Cardiovascular: Negative for chest pain.   Gastrointestinal: Negative for abdominal pain, diarrhea, nausea and vomiting.   Musculoskeletal:        Right hip pain (chronic, no acute change)   Skin: Wound: chronic sacral decubitus ulcer.       PHYSICAL EXAM  ED Triage Vitals   Temp Heart Rate Resp BP SpO2   09/06/17 1114 09/06/17 1114 09/06/17 1114 09/06/17 1114 09/06/17 1114   98.7 °F (37.1 °C) 123 20 128/70 96 % WNL       Physical Exam   Constitutional: He is oriented to person, place, and time. No distress.   Chronically ill appearing   HENT:   Head: Atraumatic.   Mouth/Throat: Mucous membranes are normal.   Eyes: No scleral icterus.   Neck: Normal range of motion.   Cardiovascular: Regular rhythm and normal heart sounds.  Tachycardia present.    Pulmonary/Chest: Effort normal and breath sounds normal. No respiratory distress. He exhibits no tenderness.   Abdominal: Soft. There is no tenderness. There is no rebound and no guarding.   Neurological: He is alert and oriented to person, place, and time.    Skin: Skin is warm and dry.   Healing lumbar surgical incision that appears clean, dry, intact, and without signs of infection; stage 4 decubitus ulcer to sacral area   Psychiatric: Mood and affect normal.   Nursing note and vitals reviewed.      LAB RESULTS  Recent Results (from the past 24 hour(s))   Light Blue Top    Collection Time: 09/06/17 11:53 AM   Result Value Ref Range    Extra Tube hold for add-on    Green Top (Gel)    Collection Time: 09/06/17 11:53 AM   Result Value Ref Range    Extra Tube Hold for add-ons.    Lavender Top    Collection Time: 09/06/17 11:53 AM   Result Value Ref Range    Extra Tube hold for add-on    Gold Top - SST    Collection Time: 09/06/17 11:53 AM   Result Value Ref Range    Extra Tube Hold for add-ons.    Comprehensive Metabolic Panel    Collection Time: 09/06/17 11:53 AM   Result Value Ref Range    Glucose 121 (H) 65 - 99 mg/dL    BUN 19 8 - 23 mg/dL    Creatinine 0.86 0.76 - 1.27 mg/dL    Sodium 129 (L) 136 - 145 mmol/L    Potassium 4.1 3.5 - 5.2 mmol/L    Chloride 94 (L) 98 - 107 mmol/L    CO2 26.1 22.0 - 29.0 mmol/L    Calcium 7.6 (L) 8.6 - 10.5 mg/dL    Total Protein 5.5 (L) 6.0 - 8.5 g/dL    Albumin 2.40 (L) 3.50 - 5.20 g/dL    ALT (SGPT) 19 1 - 41 U/L    AST (SGOT) 18 1 - 40 U/L    Alkaline Phosphatase 81 39 - 117 U/L    Total Bilirubin 0.7 0.1 - 1.2 mg/dL    eGFR Non African Amer 85 >60 mL/min/1.73    Globulin 3.1 gm/dL    A/G Ratio 0.8 g/dL    BUN/Creatinine Ratio 22.1 7.0 - 25.0    Anion Gap 8.9 mmol/L   CBC Auto Differential    Collection Time: 09/06/17 11:53 AM   Result Value Ref Range    WBC 35.45 (C) 4.50 - 10.70 10*3/mm3    RBC 3.11 (L) 4.60 - 6.00 10*6/mm3    Hemoglobin 8.9 (L) 13.7 - 17.6 g/dL    Hematocrit 26.9 (L) 40.4 - 52.2 %    MCV 86.5 79.8 - 96.2 fL    MCH 28.6 27.0 - 32.7 pg    MCHC 33.1 32.6 - 36.4 g/dL    RDW 16.6 (H) 11.5 - 14.5 %    RDW-SD 52.9 37.0 - 54.0 fl    MPV 8.2 6.0 - 12.0 fL    Platelets 474 140 - 500 10*3/mm3    Neutrophil % 87.4 (H) 42.7 -  76.0 %    Lymphocyte % 4.9 (L) 19.6 - 45.3 %    Monocyte % 6.8 5.0 - 12.0 %    Eosinophil % 0.1 (L) 0.3 - 6.2 %    Basophil % 0.1 0.0 - 1.5 %    Immature Grans % 0.7 (H) 0.0 - 0.5 %    Neutrophils, Absolute 31.03 (H) 1.90 - 8.10 10*3/mm3    Lymphocytes, Absolute 1.73 0.90 - 4.80 10*3/mm3    Monocytes, Absolute 2.41 (H) 0.20 - 1.20 10*3/mm3    Eosinophils, Absolute 0.02 0.00 - 0.70 10*3/mm3    Basophils, Absolute 0.02 0.00 - 0.20 10*3/mm3    Immature Grans, Absolute 0.24 (H) 0.00 - 0.03 10*3/mm3   Scan Slide    Collection Time: 09/06/17 11:53 AM   Result Value Ref Range    Acanthocytes Mod/2+ None Seen    Anisocytosis Mod/2+ None Seen    Hypochromia Mod/2+ None Seen    Ovalocytes Slight/1+ None Seen    WBC Morphology Normal Normal    Platelet Morphology Normal Normal   Lactic Acid, Plasma    Collection Time: 09/06/17  1:13 PM   Result Value Ref Range    Lactate 1.8 0.5 - 2.0 mmol/L   Urinalysis With / Culture If Indicated    Collection Time: 09/06/17  1:37 PM   Result Value Ref Range    Color, UA Yellow Yellow, Straw    Appearance, UA Cloudy (A) Clear    pH, UA 6.5 5.0 - 8.0    Specific Gravity, UA 1.021 1.005 - 1.030    Glucose, UA Negative Negative    Ketones, UA Negative Negative    Bilirubin, UA Negative Negative    Blood, UA Negative Negative    Protein, UA 30 mg/dL (1+) (A) Negative    Leuk Esterase, UA Negative Negative    Nitrite, UA Negative Negative    Urobilinogen, UA 1.0 E.U./dL 0.2 - 1.0 E.U./dL   Urinalysis, Microscopic Only    Collection Time: 09/06/17  1:37 PM   Result Value Ref Range    RBC, UA 0-2 None Seen, 0-2 /HPF    WBC, UA 3-5 (A) None Seen, 0-2 /HPF    Bacteria, UA None Seen None Seen /HPF    Squamous Epithelial Cells, UA 0-2 None Seen, 0-2 /HPF    Hyaline Casts, UA 3-6 None Seen /LPF    Methodology Automated Microscopy        I ordered the above labs and reviewed the results      RADIOLOGY               XR Chest 2 View (Final result) Result time: 09/06/17 14:58:53     Final result by Wong  Andrés Collins MD (09/06/17 14:58:53)     Narrative:     TWO-VIEW CHEST     HISTORY: Plasmacytoma. Elevated white count.     There is mild cardiomegaly unchanged from 08/29/2017. There appears to  be further generalized vascular congestion on the current exam and  concerning for mild congestive heart failure. There is no focal  pneumonia.     This report was finalized on 9/6/2017 2:58 PM by Dr. Andrés Collins MD.               CT Abdomen Pelvis Without Contrast (Preliminary result) Result time: 09/06/17 17:05:46     Preliminary result by MomentCam Belkofski In (09/06/17 17:05:46)     Impression:     1.  Abnormal wall thickening of the cecum consistent with  colitis/typhlitis. Moderate stool throughout the colon.  2.     fluid with body wall edema, mesenteric stranding, mild ascites.  3.  Thick-walled decompressed urinary bladder, potential cystitis.  4.  Sacral decubitus ulcer along the superior margin of the gluteal  crease.  5.  Chronic sclerosis and insufficiency fractures involving the sacrum  and left iliac body without change.   6.  Small bilateral pleural effusions.   7.  Calcified pleural plaque right lung base.  8.     4.2 cm infrarenal abdominal aortic aneurysm.     Findings discussed with ANITRA Cox on 09/06/2017 at 3:40  PM.     Radiation dose reduction techniques were utilized, including automated  exposure control and exposure modulation based on body size.           Narrative:     CT ABDOMEN PELVIS WITHOUT CONTRAST     HISTORY: Nausea. Elevated white blood cell count. Abdominal pain.      TECHNIQUE: CT of the abdomen and pelvis without IV or oral contrast.     COMPARISON: CT of the abdomen and pelvis without contrast 06/23/2017.     FINDINGS: There is abnormal wall thickening of the cecum consistent with  colitis. Moderate stool is present throughout the colon. There is  mesenteric stranding with mild ascites and fluid extends into the  pelvis. No dilated small bowel  loops are evident.     There is also perinephric stranding which may be chronic.  There is no  hydronephrosis. Bilateral renal low density lesions are not well  evaluated without contrast though are most likely cysts. The spleen,  liver and adrenal glands appear within normal limits.     There is an infrarenal abdominal aortic aneurysm measuring 4.2 x 3.8 cm.     There is     involving the sacrum and left iliac body where there are  chronic and united fractures.     Urinary bladder is thick-walled and mostly decompressed. A Gong  catheter is present. Along the superior margin of the gluteal crease  there appears to be a sacral ulcer with overlying bandaging material.   No CT evidence for underlying osteomyelitis. Calcified pleural plaques  present along the right lung base. There is generalized body wall edema.  There are compression deformities at T10, T12 and all levels of the  lumbar spine without evidence for significant change.                I ordered the above noted radiological studies and reviewed the images on the PACS system.       MEDICAL RECORD REVIEW  Pt was admitted from 8/9/17 to 8/14/17 for lumbar epidural abscess and sacral decubitus ulcer.     Hospital course note from 8/9/17 to 8/14/17 admission-   The patient is a 83 y.o. male with a history of an epidural abscess s/p drainage in early July 2017 who presented with a sacral decubitus ulcer and drainage from his lumbar surgical wound.  Please see H&P from 8/9/17 for further details.  On admission, Dr. Hernandez with neurosurgery was consulted.  He was started on broad-spectrum antibiotics.  He was taken to the OR per Dr. Hernandez on 8/10/17 for a washout which he tolerated well.  His operative culture was negative but did show Gm+ cocci in pairs and clusters.  Dr. Ponce with infectious diseases saw the patient and recommended 6 weeks of IV vancomycin through a PICC line which was placed prior to discharge.  He is to follow up with Dr. Ponce on  9/21` after completion of his therapy.  He is to follow up with Dr. Hernandez on 8/24/17 for staple removal-staples are to be removed by Dr. Hernandez only in his clinic. Of note, the patient did have some urinary retention.  He had had a chronic fernandez catheter from last admission.  Dr. Fraser with urology was consulted and recommended a voiding trial, after which the patient continued to require intermittent catheterization.  A fernandez was replaced and he was discharged on an increased dose of flomax.  He is to follow up with Dr. Fraser in 2 weeks.      Pt was admitted from 8/29/17 to 8/31/17 for acute cystitis with hematuria.     Hospital course note from 8/29/17 to 8/31/17 admission-   Patient is a 83 y.o. male presented to [facility] complaining of Low blood counts and PICC malfunction.  Please see the admitting history and physical for further details.  He was noted to have a catheter associated urinary tract infection on admission.  His Fernandez catheter was removed and replaced.  His hemoglobin initially and recheck in the emergency room was within normal limits however further repeats in the hospital did show 1 g drop from discharge.  Iron studies were consistent with anemia of chronic disease or anemia of acute infection.  He was transfused 1 unit of blood.  It's probably a multifactorial issue regarding some blood loss from pulling his PICC line and his acute infection.  He was seen and evaluated by his infectious disease doctor recommended continuing vancomycin as scheduled and placed him on a fluoroquinolone for catheter associated UTI as well as antifungal medication.  He tolerated meds here in the hospital and plan is to discharge back to skilled nursing facility today.      WBC count was 20.28 8 days ago, 12.51 7 days ago, and 11.76 6 days ago. 6 days ago, hgb was 7.1.         PROGRESS AND CONSULTS  1:05 PM- Reviewed pt's history and workup with Dr. Moise.  At bedside evaluation, they agree with  the plan of care.  1:07 PM- Ordered IV fluids for hydration. Ordered CXR, blood work, UA, blood cultures, and lactic acid for further evaluation.   1:54 PM- Per RN, pt c/o right hip pain. Ordered morphine and zofran.   2:02 PM- WBC count is 35.45. Ordered CT abd/pel for further evaluation.   2:50 PM- Sent call out to [facility] for admission.   3:12 PM- Discussed case with Dr Edmondson ([facility] hospitalist)  Reviewed history, exam, results and treatments.  Discussed concerns and plan of care. Dr Edmondson accepts pt to be admitted to med/surg and requests that we consult ID.   3:18 PM- Sent call out to on call ID specialist for consult.   3:20 PM- Rechecked pt. He is resting comfortably and is in no acute distress. Discussed with pt and family about all pertinent results including elevated WBC count of 35.45. Discussed pt admission for further care and observation. Pt and family verbalize understanding and agree with plan. Pt is ready for admission.  6:00 PM- Call not returned from ID specialist's office.       ADMISSION    Discussed treatment plan and reason for admission with pt/family and admitting physician.  Pt/family voiced understanding of the plan for admission for further testing/treatment as needed.      DIAGNOSIS  Final diagnoses:   Leukocytosis, unspecified type   Decubitus ulcer of sacral region, stage 4   History of epidural abscess           COURSE & MEDICAL DECISION MAKING  Pertinent Labs and Imaging studies that were ordered and reviewed are noted above.  Results were reviewed/discussed with the patient/family and they were also made aware of online assess.   Pt/family also made aware that some labs, such as cultures, will not be resulted during ER visit and follow up with PMD is necessary.     MEDICATIONS GIVEN IN ER  Medications   sodium chloride 0.9 % flush 10 mL (not administered)   sodium chloride 0.9 % flush 10 mL (not administered)   sodium chloride 0.9 % bolus 1,000 mL (0 mL Intravenous Stopped 9/6/17 7231)  "  morphine injection 4 mg (4 mg Intravenous Given 9/6/17 1400)   ondansetron (ZOFRAN) injection 4 mg (4 mg Intravenous Given 9/6/17 1400)       /75  Pulse (!) 123  Temp 98.7 °F (37.1 °C)  Resp 20  Ht 72\" (182.9 cm)  Wt 175 lb (79.4 kg)  SpO2 96%  BMI 23.73 kg/m2      I personally reviewed the past medical history, past surgical history, social history, family history, current medications and allergies as they appear in this chart.  The scribe's note accurately reflects the work and decisions made by me.     Documentation assistance provided by carmina Rubio for HEATHER Cox on 9/6/2017 at 3:54 PM. Information recorded by the scribe was done at my direction and has been verified and validated by me.         Tato Rubio  09/06/17 1826       ANITRA Almonte  09/07/17 1121    "

## 2017-09-06 NOTE — PROGRESS NOTES
"Pharmacokinetic Consult - Vancomycin Dosing (Initial Note)    Pavel Melgoza has a consult for pharmacy to dose vancomycin for skin infection/bone and joint infection.  Pharmacy dosing vancomycin per Dr. Maame Mercado's request.   Goal trough: 15-20 mg/L       Relevant clinical data and objective history reviewed:  83 y.o. male 72\" (182.9 cm) 175 lb (79.4 kg)    Past Medical History:   Diagnosis Date   • Arthritis    • Atrial fibrillation    • Coronary artery disease    • Dementia    • GERD (gastroesophageal reflux disease)    • History of transfusion    • Hyperlipidemia    • Hypertension    • Plasmacytoma    • Urinary retention      Creatinine   Date Value Ref Range Status   09/06/2017 0.86 0.76 - 1.27 mg/dL Final   08/31/2017 0.81 0.76 - 1.27 mg/dL Final   08/30/2017 0.73 (L) 0.76 - 1.27 mg/dL Final     BUN   Date Value Ref Range Status   09/06/2017 19 8 - 23 mg/dL Final     Estimated Creatinine Clearance: 73.1 mL/min (by C-G formula based on Cr of 0.86).    Lab Results   Component Value Date    WBC 35.45 (C) 09/06/2017     Temp Readings from Last 3 Encounters:   09/06/17 100.4 °F (38 °C) (Oral)   08/31/17 97.5 °F (36.4 °C) (Oral)   08/28/17 97.5 °F (36.4 °C) (Tympanic)          Assessment/Plan  Patient has been receiving Vanc 1 gm IV q12. Last dose this AM. Vanc random level ordered = 21. Will start vancomycin 1250 mg IV q12h. Vancomycin level to be drawn prior to AM dose 9/8/17. Pharmacy will continue to follow daily while on vancomycin and adjust as needed.     Lorna Cain, [facility]    "

## 2017-09-07 PROBLEM — D64.89 ANEMIA DUE TO MULTIPLE MECHANISMS: Status: ACTIVE | Noted: 2017-01-01

## 2017-09-07 PROBLEM — K52.9 COLITIS: Status: ACTIVE | Noted: 2017-01-01

## 2017-09-07 PROBLEM — I95.9 HYPOTENSION: Status: ACTIVE | Noted: 2017-01-01

## 2017-09-07 NOTE — CONSULTS
Inpatient Consult to Plastic Surgery  Consult performed by: LULU HASSAN  Consult ordered by: LEVI PONCE  Reason for consult: Stage 4 sacral decubitis ulcer   Assessment/Recommendations: 83 year old male, bedridden with history of a sacral decubitus ulcer.  The patient has previous history of an epidural abscess this was addressed with surgical procedure July 4, 2017.  The patient was placed on course of IV Vancomycin.  The patient presents to hospital with leukocytosis, nausea, chronic back pain.  Current CT of pelvis unremarkable for soft tissue fluid collection.  On today's exam the patient has an open stage 4 sacral ulcer, this appears well drained.   There is no angelica-ulcer erythema.    The ulcer was debrided at the bedside today through viable subcutaneous tissue.  Recommendations: 1) Off load the ulcer with a low flow air mattress with frequent side to side rotation of the patient. 2) 1/4 Dakin's Dressing Care BID to ulcer with 4x4 and ABD. 3) The patient would benefit from a colostomy to assist with his wound care as his stool is contaminanting his ulcer. 4) The patient should follow up at the wound care center for long term wound care with Dr Hassan, [facility] at [phone].           Patient Care Team:  Amanda Tanner MD as PCP - General (Family Medicine)  Levi Ponce MD as Consulting Physician (Infectious Diseases)  Nolberto Fraser Jr., MD as Consulting Physician (Urology)    Chief complaint:Sacral  Stage 4 ulcer.     Subjective     Abnormal Lab   This is a chronic problem. The current episode started 1 to 4 weeks ago. Pertinent negatives include no chest pain.       Review of Systems   Respiratory: Negative for apnea.    Cardiovascular: Negative for chest pain.   Skin: Negative for color change and pallor.        Chronic sacral ulcer with exposure of the deep sacral fascia.  No angelica-ulcer erythema.        Past Medical History:    Diagnosis Date   • Arthritis    • Atrial fibrillation    • Coronary artery disease    • Dementia    • GERD (gastroesophageal reflux disease)    • History of transfusion    • Hyperlipidemia    • Hypertension    • Plasmacytoma    • Urinary retention    ,   Past Surgical History:   Procedure Laterality Date   • ABDOMINAL SURGERY     • BACK SURGERY     • CERVICAL LAMINECTOMY DECOMPRESSION POSTERIOR N/A 8/10/2017    Procedure: OPEN DEBRIDEMENT OF EPIDURAL ABCCESS;  Surgeon: Nolberto Hernandez IV, MD;  Location: [facility];  Service:    • FRACTURE SURGERY     • HERNIA REPAIR     • INGUINAL HERNIA REPAIR Left 6/24/2017    Procedure: LAPAROSCOPIC LEFT INGUINAL HERNIA REPAIR WITH MESH;  Surgeon: Erwin Mei MD;  Location: [facility];  Service:    • JOINT REPLACEMENT      bilateral hips   • LUMBAR LAMINECTOMY DISCECTOMY DECOMPRESSION N/A 7/4/2017    Procedure: LUMBAR LAMINECTOMY FOR RESECTION OF EPIDURAL ABCESS ;  Surgeon: Nolberto Hernandez IV, MD;  Location: [facility];  Service:    ,   Family History   Problem Relation Age of Onset   • Cancer Sister    • Cancer Brother    • Cancer Child    ,   Social History   Substance Use Topics   • Smoking status: Former Smoker     Packs/day: 1.00     Types: Cigarettes     Quit date: 8/29/1995   • Smokeless tobacco: Never Used   • Alcohol use No   ,   Prescriptions Prior to Admission   Medication Sig Dispense Refill Last Dose   • atorvastatin (LIPITOR) 10 MG tablet Take 10 mg by mouth Daily.   9/6/2017 at Unknown time   • B Complex Vitamins (B COMPLEX-B12 PO) Take 1 tablet by mouth Daily.   9/6/2017 at Unknown time   • cholecalciferol 5000 UNITS tablet Take 5,000 Units by mouth Daily.   9/6/2017 at Unknown time   • collagenase (SANTYL) 250 UNIT/GM ointment Apply  topically 2 (Two) Times a Day.   9/6/2017 at Unknown time   • dakin's (HYSEPT) 0.25 % solution topical solution Apply  topically Every 12 (Twelve) Hours.   9/6/2017 at Unknown time   • diltiaZEM CD (CARDIZEM  CD) 240 MG 24 hr capsule Take 1 capsule by mouth Daily.   9/6/2017 at Unknown time   • ferrous sulfate 325 (65 FE) MG tablet Take 325 mg by mouth Daily With Breakfast.   9/6/2017 at Unknown time   • fluconazole (DIFLUCAN) 200 MG tablet Take 1 tablet by mouth Daily for 13 doses. Indications: Skin and Soft Tissue Infection 13 tablet 0 9/6/2017 at Unknown time   • HYDROcodone-acetaminophen (NORCO) 5-325 MG per tablet Take 1 tablet by mouth Every 4 (Four) Hours As Needed for Moderate Pain  or Severe Pain . 18 tablet 0 9/6/2017 at Unknown time   • levoFLOXacin (LEVAQUIN) 500 MG tablet Take 1 tablet by mouth Daily for 7 days. Indications: Urinary Tract Infection 7 tablet 0 9/6/2017 at Unknown time   • lidocaine (LIDODERM) 5 % Place 1 patch on the skin Daily. Place on right hip. Remove & Discard patch within 12 hours or as directed by MD   9/6/2017 at Unknown time   • lisinopril (PRINIVIL,ZESTRIL) 20 MG tablet Take 20 mg by mouth Daily.   9/6/2017 at Unknown time   • Multiple Vitamins-Minerals (CENTRUM SILVER ULTRA MENS) tablet Take 1 tablet by mouth Daily.   9/6/2017 at Unknown time   • pantoprazole (PROTONIX) 40 MG EC tablet Take 1 tablet by mouth 2 (Two) Times a Day Before Meals.   9/6/2017 at Unknown time   • sennosides-docusate sodium (SENOKOT-S) 8.6-50 MG tablet Take 1 tablet by mouth At Night As Needed for Constipation.   9/6/2017 at Unknown time   • tamsulosin (FLOMAX) 0.4 MG capsule 24 hr capsule Take 2 capsules by mouth Every Night. 30 capsule  9/6/2017 at Unknown time   • vancomycin (VANCOCIN) 1000 MG injection Infuse 1,000 mg into a venous catheter 2 (Two) Times a Day.   9/6/2017 at Unknown time   • vitamin C (ASCORBIC ACID) 500 MG tablet Take 500 mg by mouth 2 (Two) Times a Day.   9/6/2017 at Unknown time   • ramipril (ALTACE) 5 MG capsule Take 1 capsule by mouth Daily.   Unknown at Unknown time   , Scheduled Meds:    atorvastatin 10 mg Oral Daily   cefepime 1 g Intravenous Q8H   collagenase  Topical Q12H    dakin's  Topical Q12H   diltiaZEM  mg Oral Q24H   famotidine 20 mg Intravenous Q12H   fluconazole 200 mg Oral Q24H   lidocaine 1 patch Transdermal Q24H   tamsulosin 0.8 mg Oral Nightly   vancomycin 1,000 mg Intravenous Q12H   , Continuous Infusions:    Pharmacy to dose vancomycin     sodium chloride 100 mL/hr Last Rate: 100 mL/hr (09/07/17 0921)   , PRN Meds:  •  acetaminophen  •  HYDROcodone-acetaminophen  •  nitroglycerin  •  ondansetron  •  Pharmacy to dose vancomycin and Allergies:  Review of patient's allergies indicates no known allergies.    Objective      Vital Signs  Temp:  [99.3 °F (37.4 °C)-100.4 °F (38 °C)] 99.7 °F (37.6 °C)  Heart Rate:  [105-127] 124  Resp:  [18-24] 24  BP: ()/(54-93) 110/58    Physical Exam   Constitutional: He appears well-developed and well-nourished.   HENT:   Head: Normocephalic.   Cardiovascular: Normal rate and regular rhythm.    Pulmonary/Chest: Effort normal. No respiratory distress.   Abdominal: Soft. Bowel sounds are normal.   Genitourinary:   Genitourinary Comments: Right scrotal ulcer, no erythema.  Shallow with serosanguineous draiange.    Musculoskeletal:   Posterior back op site with scar over the lumbar region and sacral region.  Non erythematous over the scar.  No sign of drainage.    Neurological: He is alert.   Skin: Skin is warm and dry. No erythema.   Open Sacral Ulcer with exposure of the deep sacral fascial.  No purulence to the ulcer, there does not appear to be any loculate fluid collection . There is desiccation of the subcutaneous tissue.   No erythema over the ulcer.   Psychiatric: He has a normal mood and affect. His behavior is normal.       Results Review:    I reviewed the patient's new clinical results.        Assessment/Plan     Active Problems:    CKD (chronic kidney disease) stage 2, GFR 60-89 ml/min    SIADH (syndrome of inappropriate ADH production)    Epidural abscess    Sacral decubitus ulcer    HTN (hypertension)    Leukocytosis     Sepsis    Clostridium difficile colitis      Assessment:  (83 year old male, bedridden with history of a sacral decubitus ulcer.  The patient has previous history of an epidural abscess this was addressed with surgical procedure July 4, 2017.  The patient was placed on course of IV Vancomycin.  The patient presents to hospital with leukocytosis, nausea, chronic back pain.  Current CT of pelvis unremarkable for soft tissue fluid collection.  On today's exam the patient has an open stage 4 sacral ulcer, this appears well drained.   There is no angelica-ulcer erythema.    The ulcer was debrided at the bedside today through viable subcutaneous tissue. The ulcer will be debrided today at the bed side.    Please see above recommendations. ).     Plan:   (PROCEDURE NOTE: The patient was placed in the right lateral decubitus position.   The ulcer over the sacral region was debrided through the subcutaneous tissue in an excisional fashion, the deep subcutaneous tissue was excised with a scalpel, the debridement zone was 2 x 2 cm.  Post debridement measurements were 6 x 6 cm.  Pressure was controlled with pressure.  No anesthesia was used, the patient tolerated the procedure well.  ).       I discussed the patients findings and my recommendations with patient, family and nursing staff    Robert Flores MD  09/07/17  3:43 PM    Time:

## 2017-09-07 NOTE — PROGRESS NOTES
"DAILY PROGRESS NOTE  [facility]    Patient Identification:  Name: Pavel Melgoza  Age: 83 y.o.  Sex: male  :  1934  MRN: [id number]         Primary Care Physician: Amanda Tanner MD      Subjective  Pt w no specific c/o.  He just feels bad in general.  Denies abd pain, CP, SOA.  Noted back pain earlier today but not at present.   Called twice today re low BP.  Responded initially to IVF bolus. His wife at bedside states he has had issues w bought's of low BP before.     Objective:  General Appearance:  Comfortable, in no acute distress and not in pain (Appears fatigued.).    Vital signs: (most recent): Blood pressure (!) 88/46, pulse (!) 136, temperature 99.7 °F (37.6 °C), temperature source Oral, resp. rate 20, height 72\" (182.9 cm), weight 175 lb (79.4 kg), SpO2 91 %.    Lungs:  Normal effort.  He is not in respiratory distress.  No stridor.  There are wheezes (A few end expiratory wheezes.).  No rales, rhonchi or decreased breath sounds.    Heart: Tachycardia.  Positive for murmur (there appears be both a diastolic and systolic murmur on auscultation.  Difficult to ascertain much of the characteristic secondary to tachycardia.) and gallop.  (Borderline JVD.)  Abdomen: Abdomen is soft and non-distended.  Hypoactive bowel sounds.   There is no abdominal tenderness.     Extremities: There is no dependent edema.    Neurological: Patient is alert.  (Oriented to person and place.).    Skin:  Warm and dry.                Vital signs in last 24 hours:  Temp:  [99.3 °F (37.4 °C)-99.7 °F (37.6 °C)] 99.7 °F (37.6 °C)  Heart Rate:  [105-136] 136  Resp:  [18-24] 20  BP: ()/(46-75) 88/46    Intake/Output:    Intake/Output Summary (Last 24 hours) at 17 3712  Last data filed at 17 0910   Gross per 24 hour   Intake                0 ml   Output              950 ml   Net             -950 ml           Results from last 7 days  Lab Units 17  0549 17  1153   WBC " 10*3/mm3 38.03* 35.45*   HEMOGLOBIN g/dL 8.2* 8.9*   PLATELETS 10*3/mm3 336 474     Results from last 7 days  Lab Units 09/07/17  0549 09/06/17  1153   SODIUM mmol/L 130* 129*   POTASSIUM mmol/L 4.0 4.1   CHLORIDE mmol/L 96* 94*   CO2 mmol/L 19.7* 26.1   BUN mg/dL 19 19   CREATININE mg/dL 0.85 0.86   GLUCOSE mg/dL 95 121*   Estimated Creatinine Clearance: 74 mL/min (by C-G formula based on Cr of 0.85).  Results from last 7 days  Lab Units 09/07/17  0549 09/06/17  1153   CALCIUM mg/dL 7.2* 7.6*   ALBUMIN g/dL  --  2.40*     Results from last 7 days  Lab Units 09/06/17  1153   ALBUMIN g/dL 2.40*   BILIRUBIN mg/dL 0.7   ALK PHOS U/L 81   AST (SGOT) U/L 18   ALT (SGPT) U/L 19       Assessment:  Principal Problem:    Sepsis: Severe  Active Problems:    Hypotension: Please see discussion below.    CKD (chronic kidney disease) stage 2, GFR 60-89 ml/min    SIADH (syndrome of inappropriate ADH production)    Epidural abscess    Sacral decubitus ulcer    HTN (hypertension)    Leukocytosis    Colitis    Anemia: Iron deficiency, anemia chronic disease.        Plan:  Although he has responded nicely to fluid boluses the borderline JVD and other findings are quite concerning.  We may be limited in how much more fluid with give this gentleman.  I'll be checking EKGs, troponins etc.  I would like to know the status of his LV function and also look into these murmurs more closely than echocardiogram especially considering the possibility of bacteremia.  Need to recheck his chest x-ray to be sure he hasn't already developed some pulmonary edema.  Review of the chart notes that he had a very low cortisol level back in June.  This may be reflecting low protein binding that this needs be looked at more closely and we may need steroid supplementation.  For full details please see orders.    Nadeem Benjamin MD  9/7/2017  6:37 PM

## 2017-09-07 NOTE — PLAN OF CARE
Problem: Pressure Ulcer (Adult)  Intervention: Promote/Optimize Nutrition  Ensure Enlive breakfast and dinner

## 2017-09-07 NOTE — PROGRESS NOTES
"Pharmacokinetic Consult - Vancomycin Dosing (Follow-up Note)    Pavel Melgoza is a 83 y.o. male who is on  Vancomycin day #1 of this hospitalization for bone and/or joint, SSTI infection  Pharmacy dosing vancomycin per Dr. Mercado's request.   Other antimicrobials: Cefepime 1 gram IV q8h  Goal trough: 15-20 mg/L    Current Vancomycin dose: Previous Vancomycin regimen = 1 gram IV q12h at NH ([facility], [phone]), prior plan to receive IV Vancomycin for an 8 week course ending in early October.    HPI: leukocytosis noted at NH.      Epidural and retroperitoneal abscess secondary to ampicillin-sensitive Enterococcus s/p washout 17 and s/p IR guided drainage of RP abscess on 17    Relevant clinical data and objective history reviewed:  72\" (182.9 cm)  175 lb (79.4 kg)  Body mass index is 23.73 kg/(m^2).     He has a past medical history of Arthritis; Atrial fibrillation; Coronary artery disease; Dementia; GERD (gastroesophageal reflux disease); History of transfusion; Hyperlipidemia; Hypertension; Plasmacytoma; and Urinary retention.    Allergies as of 2017   • (No Known Allergies)     Vital Signs (last 24 hours)        0700  -   0659  0700  -   0951   Most Recent    Temp (°F) 98.7 -  100.4      99.3     99.3 (37.4)  Comment: 99.3    Heart Rate 109 -  (!)131    105 -  (!)121     117    Resp   20      20     20    BP (!)89/67 -  133/74      (!)88/54     (!) 88/54  Comment: Dr. Benjamin paged    SpO2 (%) (!)88 -  98    91 -  92     92        Estimated Creatinine Clearance: 74 mL/min (by C-G formula based on Cr of 0.85).    Results from last 7 days  Lab Units 17  0549 17  1153   CREATININE mg/dL 0.85 0.86       Results from last 7 days  Lab Units 17  0549 17  1153   WBC 10*3/mm3 38.03* 35.45*     Baseline culture/source/susceptibility:    BC prelim NG<24h    Imagin/6 CT abd/pelvis without contrast  Impression:         1.  Abnormal " wall thickening of the cecum consistent with colitis/typhlitis. Moderate stool throughout the colon.  2.     fluid with body wall edema, mesenteric stranding, mild ascites.  3.  Thick-walled decompressed urinary bladder, potential cystitis.  4.  Sacral decubitus ulcer along the superior margin of the gluteal crease.  5.  Chronic sclerosis and insufficiency fractures involving the sacrum and left iliac body without change.   6.  Small bilateral pleural effusions.   7.  Calcified pleural plaque right lung base.  8.     4.2 cm infrarenal abdominal aortic aneurysm.     Labs:  Lactate 1.8->0.9  PCT 0.19    Lab Results   Component Value Date    VANCOTROUGH 14.70 09/03/2017     Lab Results   Component Value Date    VANCORANDOM 21.00 09/06/2017       Assessment/Plan  1) Patient previously demonstrated Vancomycin troughs of 19.2 (8/14), 18 (per prior [facility] note), 20.2 (8/31) and 14.7 mcg/mL (9/3) on 1 gram IV q12h regimen.  Target 15-20 mcg/mL.  Will adjust from 1250mg IV q12h to 1000mg IV q12h and chck trough level before 4th overall dose tomorrow at noon.    2) Will monitor serum creatinine at least every 24h for first 72h followed by at least q48 hours per dosing recommendations.    3) Encourage hydration as allowed by MD to help prevent toxic accumulation; monitor for s/sxn of toxicity including increase in SCr and decrease in UOP.    Pharmacy will continue to follow daily while on vancomycin and adjust as needed.     Thanks, Guerrero Hair, PharmD, BCPS

## 2017-09-07 NOTE — PROGRESS NOTES
Bilateral lower extremity venous doppler and Bilateral upper extremity venous doppler completed. Prelim lower extremity negative for DVT and Right upper positive for new superficial thrombus and Left negative for DVT given to SHANEL Cedillo.

## 2017-09-07 NOTE — H&P
Name: Pavel Melgoza ADMIT: 2017   : 1934  PCP: Amanda Tanner MD    MRN: [id number] LOS: 0 days   AGE/SEX: 83 y.o. male  ROOM: 460/1     Chief Complaint   Patient presents with   • Abnormal Lab     wbc 19, hgb 8.5       Subjective   Mr. Melgoza is a 83 y.o. male who presents to [facility] complaining of leukocytosis discovered at the nursing home.  Patient is well-known to my group through recent hospitalization.  He was discharged  from here after having been treated for a catheter associated UTI.  He has a PICC line and is currently receiving IV vancomycin for an epidural abscess.  He is followed by Dr. Ponce of infectious diseases.  Patient has dementia and is a very poor story.  He denies any complaints but family said he's not looked as good the last day or 2.  Decreased oral intake.  No vomiting.  Wife reports a formed stool yesterday.  No diarrhea.  She states he does complain of some abdominal pain.  May be some mild nausea but no vomiting.    History of Present Illness    Past Medical History:   Diagnosis Date   • Arthritis    • Atrial fibrillation    • Coronary artery disease    • Dementia    • GERD (gastroesophageal reflux disease)    • History of transfusion    • Hyperlipidemia    • Hypertension    • Plasmacytoma    • Urinary retention      Past Surgical History:   Procedure Laterality Date   • ABDOMINAL SURGERY     • BACK SURGERY     • CERVICAL LAMINECTOMY DECOMPRESSION POSTERIOR N/A 8/10/2017    Procedure: OPEN DEBRIDEMENT OF EPIDURAL ABCCESS;  Surgeon: Nolberto Hernandez IV, MD;  Location: [facility] OR;  Service:    • FRACTURE SURGERY     • HERNIA REPAIR     • INGUINAL HERNIA REPAIR Left 2017    Procedure: LAPAROSCOPIC LEFT INGUINAL HERNIA REPAIR WITH MESH;  Surgeon: Erwin Mei MD;  Location: [facility] OR;  Service:    • JOINT REPLACEMENT      bilateral hips   • LUMBAR LAMINECTOMY DISCECTOMY DECOMPRESSION N/A 2017     Procedure: LUMBAR LAMINECTOMY FOR RESECTION OF EPIDURAL ABCESS ;  Surgeon: Nolberto Hernandez IV, MD;  Location: [facility] OR;  Service:      Family History   Problem Relation Age of Onset   • Cancer Sister    • Cancer Brother    • Cancer Child      Social History   Substance Use Topics   • Smoking status: Former Smoker     Packs/day: 1.00     Types: Cigarettes     Quit date: 8/29/1995   • Smokeless tobacco: Never Used   • Alcohol use No     Prescriptions Prior to Admission   Medication Sig Dispense Refill Last Dose   • atorvastatin (LIPITOR) 10 MG tablet Take 10 mg by mouth Daily.   9/6/2017 at Unknown time   • B Complex Vitamins (B COMPLEX-B12 PO) Take 1 tablet by mouth Daily.   9/6/2017 at Unknown time   • cholecalciferol 5000 UNITS tablet Take 5,000 Units by mouth Daily.   9/6/2017 at Unknown time   • collagenase (SANTYL) 250 UNIT/GM ointment Apply  topically 2 (Two) Times a Day.   9/6/2017 at Unknown time   • dakin's (HYSEPT) 0.25 % solution topical solution Apply  topically Every 12 (Twelve) Hours.   9/6/2017 at Unknown time   • diltiaZEM CD (CARDIZEM CD) 240 MG 24 hr capsule Take 1 capsule by mouth Daily.   9/6/2017 at Unknown time   • ferrous sulfate 325 (65 FE) MG tablet Take 325 mg by mouth Daily With Breakfast.   9/6/2017 at Unknown time   • fluconazole (DIFLUCAN) 200 MG tablet Take 1 tablet by mouth Daily for 13 doses. Indications: Skin and Soft Tissue Infection 13 tablet 0 9/6/2017 at Unknown time   • HYDROcodone-acetaminophen (NORCO) 5-325 MG per tablet Take 1 tablet by mouth Every 4 (Four) Hours As Needed for Moderate Pain  or Severe Pain . 18 tablet 0 9/6/2017 at Unknown time   • levoFLOXacin (LEVAQUIN) 500 MG tablet Take 1 tablet by mouth Daily for 7 days. Indications: Urinary Tract Infection 7 tablet 0 9/6/2017 at Unknown time   • lidocaine (LIDODERM) 5 % Place 1 patch on the skin Daily. Place on right hip. Remove & Discard patch within 12 hours or as directed by MD   9/6/2017 at Unknown time   •  lisinopril (PRINIVIL,ZESTRIL) 20 MG tablet Take 20 mg by mouth Daily.   9/6/2017 at Unknown time   • Multiple Vitamins-Minerals (CENTRUM SILVER ULTRA MENS) tablet Take 1 tablet by mouth Daily.   9/6/2017 at Unknown time   • pantoprazole (PROTONIX) 40 MG EC tablet Take 1 tablet by mouth 2 (Two) Times a Day Before Meals.   9/6/2017 at Unknown time   • sennosides-docusate sodium (SENOKOT-S) 8.6-50 MG tablet Take 1 tablet by mouth At Night As Needed for Constipation.   9/6/2017 at Unknown time   • tamsulosin (FLOMAX) 0.4 MG capsule 24 hr capsule Take 2 capsules by mouth Every Night. 30 capsule  9/6/2017 at Unknown time   • vancomycin (VANCOCIN) 1000 MG injection Infuse 1,000 mg into a venous catheter 2 (Two) Times a Day.   9/6/2017 at Unknown time   • vitamin C (ASCORBIC ACID) 500 MG tablet Take 500 mg by mouth 2 (Two) Times a Day.   9/6/2017 at Unknown time   • ramipril (ALTACE) 5 MG capsule Take 1 capsule by mouth Daily.   Unknown at Unknown time     Allergies:  Review of patient's allergies indicates no known allergies.    Review of Systems   Unable to perform ROS: Dementia        Objective    Vital Signs  Temp:  [98.7 °F (37.1 °C)-100.4 °F (38 °C)] 99.5 °F (37.5 °C)  Heart Rate:  [109-131] 109  Resp:  [20] 20  BP: ()/(58-93) 120/75  SpO2:  [88 %-98 %] 93 %  on   ;   O2 Device: room air  Body mass index is 23.73 kg/(m^2).    Physical Exam   Constitutional: He appears well-developed and well-nourished. He has a sickly appearance. He appears ill. No distress.   HENT:   Head: Normocephalic and atraumatic.   Eyes: Conjunctivae and EOM are normal. Pupils are equal, round, and reactive to light. No scleral icterus.   Neck: Normal range of motion. Neck supple. No JVD present.   Cardiovascular: Regular rhythm and normal heart sounds.  Tachycardia present.    No murmur heard.  Pulmonary/Chest: Effort normal. Tachypnea noted. No respiratory distress. He has decreased breath sounds.   Abdominal: Soft. Bowel sounds are  normal. He exhibits no distension. There is tenderness in the right lower quadrant. There is guarding. There is no rigidity and no rebound.   Musculoskeletal: Normal range of motion. He exhibits no edema.   Neurological: He is alert. He is disoriented. No cranial nerve deficit.   Skin: Skin is warm and dry.   Psychiatric: He has a normal mood and affect. His behavior is normal.   Vitals reviewed.      Results Review:   I reviewed the patient's new clinical results.    Results from last 7 days  Lab Units 09/06/17  1153 08/31/17  0544   WBC 10*3/mm3 35.45* 11.76*   HEMOGLOBIN g/dL 8.9* 7.1*   PLATELETS 10*3/mm3 474 320     Results from last 7 days  Lab Units 09/06/17  1153 08/31/17  0544   SODIUM mmol/L 129* 129*   POTASSIUM mmol/L 4.1 3.9   CHLORIDE mmol/L 94* 95*   CO2 mmol/L 26.1 22.7   BUN mg/dL 19 19   CREATININE mg/dL 0.86 0.81   GLUCOSE mg/dL 121* 108*   ALBUMIN g/dL 2.40*  --    BILIRUBIN mg/dL 0.7  --    ALK PHOS U/L 81  --    AST (SGOT) U/L 18  --    ALT (SGPT) U/L 19  --    Estimated Creatinine Clearance: 73.1 mL/min (by C-G formula based on Cr of 0.86).      Results from last 7 days  Lab Units 09/06/17  1337   NITRITE UA  Negative   WBC UA /HPF 3-5*   BACTERIA UA /HPF None Seen   SQUAM EPITHEL UA /HPF 0-2       CT Abdomen Pelvis Without Contrast   Preliminary Result   1.  Abnormal wall thickening of the cecum consistent with   colitis/typhlitis. Moderate stool throughout the colon.   2.     fluid with body wall edema, mesenteric stranding, mild ascites.   3.  Thick-walled decompressed urinary bladder, potential cystitis.   4.  Sacral decubitus ulcer along the superior margin of the gluteal   crease.   5.  Chronic sclerosis and insufficiency fractures involving the sacrum   and left iliac body without change.    6.  Small bilateral pleural effusions.    7.  Calcified pleural plaque right lung base.   8.     4.2 cm infrarenal abdominal aortic aneurysm.       Findings discussed with Shani Hernandez, APRN  on 09/06/2017 at 3:40   PM.       Radiation dose reduction techniques were utilized, including automated   exposure control and exposure modulation based on body size.              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       Plan:   Patient's WBC has risen dramatically.  This most likely due to sepsis from either Clostridium difficile colitis or bacteremia from a PICC line infection.  CT scan suggested right sided colitis and given his recent antibiotic use I will empirically cover for C. difficile colitis until this can be ruled out.  Will start oral vancomycin.  He currently is not having diarrhea but if develops will send for stool studies.  Blood cultures have been obtained.  Discussed with Dr. Ponce who suggested cefepime for now for possible line sepsis.  He also has a sacral wound that will be evaluated by the wound care team.  Discussed with family he is a conditional code does not want heroic measures.      I discussed the patients findings and my recommendations with patient, family and consulting provider.          Jay Jay Edmondson MD  [facility]  09/06/17  9:41 PM

## 2017-09-07 NOTE — PROGRESS NOTES
Adult Nutrition  Assessment    Patient Name:  Pavel Melgoza  YOB: 1934  MRN: [id number]  Admit Date:  9/6/2017    Assessment Date:  9/7/2017          Reason for Assessment       09/07/17 1114    Reason for Assessment    Reason For Assessment/Visit identified at risk by screening criteria    Identified At Risk By Screening Criteria large or nonhealing wound, burn or pressure ulcer    Diagnosis Diagnosis    Hematological Other (comment)   leukocytosis    Skin Pressure ulcer                  Anthropometrics       09/07/17 1117    Anthropometrics    Height Method Stated    RD Documented Weight on Admission 79.4 kg (175 lb 0.7 oz)    Body Mass Index (BMI)    BMI Grade 19.1 - 24.9 - normal            Labs/Tests/Procedures/Meds       09/07/17 1118    Labs/Tests/Procedures/Meds    Diagnostic Test/Procedure Review reviewed    Labs/Tests Review Reviewed;Hgb Hct;Na+;WBC    Medication Review Reviewed, pertinent;Antacid;Antibiotic    Significant Vitals reviewed            Physical Findings       09/07/17 1118    Physical Findings/Assessment    Additional Documentation Physical Appearance (Group)    Physical Appearance    Skin pressure ulcer(s);other (see comments)   unstageable PU coccyx; ulcerated area L anterior 5th toe; ulceration L anterior 4th toe; Archie score 14            Estimated/Assessed Needs       09/07/17 1120    Calculation Measurements    Weight Used For Calculations 79.4 kg (175 lb 0.7 oz)    Estimated/Assessed Energy Needs    Energy Need Method Kcal/kg    kcal/kg 30    30 Kcal/Kg (kcal) 2382    Estimated/Assessed Protein Needs    Weight Used for Protein Calculation 79.4 kg (175 lb 0.7 oz)    Protein (gm/kg) 1.2    1.2 Gm Protein (gm) 95.28    Estimated/Assessed Fluid Needs    Fluid Need Method Other (comment)   4510-1313 ml/24 hr d/t h/o CHF            Nutrition Prescription Ordered       09/07/17 1120    Nutrition Prescription PO    Current PO Diet Clear Liquid    Supplement Other  (comment)   OK to have Ensure            Evaluation of Received Nutrient/Fluid Intake       09/07/17 1121    Calculation Measurements    Weight Used For Calculations 79.4 kg (175 lb 0.7 oz)    Evaluation of Received Nutrient/Fluid Intake    Number of Days Evaluated 1 day    Nutrition Delivered Fluid Evaluation    Fluid Intake Evaluation    IV Fluid (mL) 2400    Total Fluid Intake (mL) 2400    Total Fluid Intake (mL/kg) 30.23 mL/kg    % Fluid Needs 126    PO Evaluation    Number of Days PO Intake Evaluated Insufficient Data   no intake recorded            Comments:  Completed nutrition assessment triggered by pressure ulcer coccyx.        Electronically signed by:  Maame Neville RD  09/07/17 11:26 AM

## 2017-09-07 NOTE — PROGRESS NOTES
Discharge Planning Assessment  [facility]     Patient Name: Pavel Melgoza  MRN: [id number]  Today's Date: 9/7/2017    Admit Date: 9/6/2017          Discharge Needs Assessment       09/07/17 1120    Living Environment    Lives With spouse    Living Arrangements house    Home Accessibility no concerns    Stair Railings at Home none    Type of Financial/Environmental Concern none    Living Environment    Provides Primary Care For no one    Quality Of Family Relationships supportive;helpful;involved    Able to Return to Prior Living Arrangements yes    Discharge Needs Assessment    Concerns To Be Addressed discharge planning concerns    Equipment Currently Used at Home walker, rolling;wheelchair    Discharge Facility/Level Of Care Needs nursing facility, skilled    Discharge Disposition skilled nursing facility    Discharge Planning Comments [facility]             Discharge Plan       09/07/17 1123    Case Management/Social Work Plan    Plan [facility]    Patient/Family In Agreement With Plan yes    Additional Comments IMM letter checked. CCP met with pt and wife to discuss d/c planning. Facesheet verified. CCP role explained. Pt resides with his wife in a single level home, and reports use of walker or wheelchair for mobility. Pt confirms pharmacy is [facility] on [address]. Pt admitted from [facility] where he is currently participating in sub-acute rehab, and states plan to return upon d/c. Per Jo, pt is skilled with no bed hold but can return. Packet on pt chart. Irma Logan LCSW        Discharge Placement     Facility/Agency Request Status Selected? Address Phone Number Fax Number    [facility] Accepted    Yes [address] [address] [phone]                Demographic Summary       09/07/17 1117    Referral Information    Admission Type inpatient    Arrived From admitted as an inpatient    Referral Source nursing;physician    Reason For Consult  discharge planning    Record Reviewed medical record    Primary Care Physician Information    Name Amanda Tanner            Functional Status       09/07/17 1118    Functional Status Current    Ambulation 3-->assistive equipment and person    Transferring 3-->assistive equipment and person    Toileting 3-->assistive equipment and person    Bathing 2-->assistive person    Dressing 2-->assistive person    Eating 0-->independent    Communication 0-->understands/communicates without difficulty    Swallowing (if score 2 or more for any item, consult Rehab Services) 0-->swallows foods/liquids without difficulty    Functional Status Prior    Ambulation 3-->assistive equipment and person    Transferring 3-->assistive equipment and person    Toileting 3-->assistive equipment and person    Bathing 2-->assistive person    Dressing 2-->assistive person    Eating 0-->independent    Communication 0-->understands/communicates without difficulty    Swallowing 0-->swallows foods/liquids without difficulty    Cognitive/Perceptual/Developmental    Current Mental Status/Cognitive Functioning no deficits noted            Psychosocial     None            Abuse/Neglect     None            Legal     None            Substance Abuse     None            Patient Forms     None          Tracy Logan LCSW

## 2017-09-07 NOTE — CONSULTS
"Referring Provider: Jay Jay Edmondson MD  [address]  [facility] [address]  [place] [address]    Reason for Consultation: leukocytosis    History of present illness:  Mr Abad is a 84 YO well known to me who I am asked to evaluate and give opinion for. History is obtained from the patient, daughter Sulma by phone, and review of the old medical records which I summarize/synthesize as follows: He had Enterococcus epidural abscess washed out July 4, 2017 and required a repeat washout on 8/10. He was initially on PCN G then switched to IV vancomycin via LUE PICC for an 8 week course set to end in early October. He had weekly labs down which showed an elevated WBC and reported \"stomach upset\" so was sent into the ER and WBC was 35k. He denies vomiting and diarrhea. He has no drainage from his lumbar incision. His back pain is at baseline. He denies fevers but has some chills. In the ER his CT showed ? Cecal colitis and cefepime was started after blood cultures drawn. His daughter says he hasn't been on steroids recently. He still has his spleen.     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 s/p I&D 7/4/17   meningioma      Social History:          Family History:  No family history of spine infections      Allergies:  NKDA    Medications:    Current Facility-Administered Medications:   •  acetaminophen (TYLENOL) tablet 650 mg, 650 mg, Oral, Q6H PRN, Jay Jay Edmondson MD  •  atorvastatin (LIPITOR) tablet 10 mg, 10 mg, Oral, Daily, Jay Jay Edmondson MD  •  cefepime (MAXIPIME) 1 g/100 mL 0.9% NS IVPB (mbp), 1 g, Intravenous, Q8H, Jay Jay Edmondson MD, 1 g at 09/06/17 2343  •  collagenase ointment, , Topical, Q12H, Jay Jay Edmondson MD  •  dakin's (HYSEPT) 0.25 % topical solution, , Topical, Q12H, Jay Jay Edmondson MD, 473 mL at 09/06/17 2347  •  diltiaZEM CD (CARDIZEM CD) 24 hr capsule 240 mg, 240 mg, Oral, Q24H, Jay Jay Edmondson MD  •  famotidine (PEPCID) injection 20 mg, 20 mg, " Intravenous, Q12H, Jay Jay Edmondson MD, 20 mg at 09/06/17 2344  •  fluconazole (DIFLUCAN) tablet 200 mg, 200 mg, Oral, Q24H, Jay Jay Edmondson MD, 200 mg at 09/06/17 2344  •  HYDROcodone-acetaminophen (NORCO) 5-325 MG per tablet 1 tablet, 1 tablet, Oral, Q4H PRN, Jay Jay Edmondson MD, 1 tablet at 09/07/17 0044  •  lidocaine (LIDODERM) 5 % 1 patch, 1 patch, Transdermal, Q24H, Jay Jay Edmondson MD  •  nitroglycerin (NITROSTAT) SL tablet 0.4 mg, 0.4 mg, Sublingual, Q5 Min PRN, Jay Jay Edmondson MD  •  ondansetron (ZOFRAN) injection 4 mg, 4 mg, Intravenous, Q6H PRN, Jay Jay Edmondson MD  •  Pharmacy to dose vancomycin, , Does not apply, Continuous PRN, Maame Mercado MD  •  sodium chloride 0.9 % infusion, 100 mL/hr, Intravenous, Continuous, Jay Jay Edmondson MD, Last Rate: 100 mL/hr at 09/06/17 2150, 100 mL/hr at 09/06/17 2150  •  tamsulosin (FLOMAX) 24 hr capsule 0.8 mg, 0.8 mg, Oral, Nightly, Jay Jay Edmondson MD, 0.8 mg at 09/06/17 2344  •  vancomycin 1250 mg/250 mL 0.9% NS IVPB ([facility]), 1,250 mg, Intravenous, Q12H, Maame Mercado MD, 1,250 mg at 09/07/17 0046      Review of Systems  All systems were reviewed and are negative unless otherwise stated above in the HPI    Objective   Vital Signs   Temp:  [98.7 °F (37.1 °C)-100.4 °F (38 °C)] 99.3 °F (37.4 °C)  Heart Rate:  [108-131] 108  Resp:  [20] 20  BP: ()/(58-93) 133/74    Physical Exam:   General: awake, alert, chronically ill appearing  Head: Normocephalic, atraumatic  Eyes: PERRL, EOMI, no scleral icterus, + conjunctival pallor, no conjunctival hemorrhages.   ENT: MMM, OP clear, no thrush. Fair dentition.   Neck: Supple, no visible thyromegaly  Cardiovascular: tachycardic, RR, no murmurs, rubs, or gallops; no LE edema  Respiratory: Lungs are clear to ascultation bilaterally, no rales or wheezing; normal work of breathing on ambient air  GI: Abdomen is soft, non-tender, non-distended, normal bowel sounds in all four quadrants; no hepatosplenomegaly, no masses palpated  : Gong catheter  present w/ clear yellow urine  Musculoskeletal: L spine incision well-healed; he has a stage 2-3 sacral ulcer  Skin: No rashes, lesions, or embolic phenomenon  Neurological: Alert and oriented x 3, cranial nerves 2-12 grossly intact, motor strength 5/5 in UEs and RLE; chronic 3/5 strength in LLE  Psychiatric: Normal mood and affect   Lymph: no pre-auricular, post-auricular, submandibular, cervical, supraclavicular  LAD  Vasc: no cyanosis; PICC in LUE w/o erythema    Labs:     Lab Results   Component Value Date    WBC 38.03 (C) 09/07/2017    HGB 8.2 (L) 09/07/2017    HCT 24.5 (L) 09/07/2017    MCV 86.0 09/07/2017     09/07/2017       Lab Results   Component Value Date    GLUCOSE 95 09/07/2017    BUN 19 09/07/2017    CREATININE 0.85 09/07/2017    EGFRIFNONA 86 09/07/2017    BCR 22.4 09/07/2017    CO2 19.7 (L) 09/07/2017    CALCIUM 7.2 (L) 09/07/2017    ALBUMIN 2.40 (L) 09/06/2017    LABIL2 0.8 09/06/2017    AST 18 09/06/2017    ALT 19 09/06/2017     Lab Results   Component Value Date    CRP 11.93 (H) 08/30/2017     UA negative    Microbiology:  9/6 BCx: NGTD    Radiology (personally reviewed images/report):  CT A/P read as possible cecal colitis per radiologist  CXR negative for PNA; PICC in place    Assessment/Plan   Sepsis due to unspecified organism  1. Leukocytosis  2. Anemia  3. Epidural and retroperitoneal abscess secondary to ampicillin-sensitive Enterococcus s/p washout 7/4/17 and s/p IR guided drainage of RP abscess on 7/5/17  4. History of Wound dehiscence and drainage  5. Recurrent epidural abscess s/p washout 8/10/17 - GPCs on gram stain but Cx negative  6. History of Pseudomonas CAUTI - recently treated  7. Sacral ulcer w/ Candida infection    Tough case at this point. Not clear why he keeps coming in with leukocytosis and anemia. Thinking through this sytematically, he has no symptoms of meningitis or pneumonia. His main complaint was generalized stomach upset without nausea, vomiting, or  diarrhea. CT A/P with mild colitis at best. With long-term PICC line, bacteremia must be considered while waiting on blood cultures. His urine is clear. I have some concern about his sacral area so will ask plastic surgery if a debridement is needed. His back incision looks great and his pain is at baseline. Without diarrhea, will cancel oral vanco and C diff testing. I will order 4 extremity dopplers. Repeat CRP. I will continue his prior vancomycin and PO fluconazole.     Thank you for this consult. ID will follow. Case d/w Dr Edmondson.

## 2017-09-07 NOTE — PLAN OF CARE
Problem: Patient Care Overview (Adult)  Goal: Plan of Care Review  Outcome: Ongoing (interventions implemented as appropriate)    09/07/17 4258   Coping/Psychosocial Response Interventions   Plan Of Care Reviewed With patient   Patient Care Overview   Progress no change   Outcome Evaluation   Outcome Summary/Follow up Plan BP low - see flowsheet - fluid bolus x2 given per Dr. Benjamin. Afib elevated rate. dopplers done today. pressure ulcer on coccyx debrided per MD. Wound RN saw pt. Low air loss mattress ordered. Dressing change complete. Family at bedside. Pain controlled with prn meds.  IV antibiotics continued. IV fluids. q2 turns encouraged.

## 2017-09-07 NOTE — PLAN OF CARE
Problem: Patient Care Overview (Adult)  Goal: Plan of Care Review  Outcome: Ongoing (interventions implemented as appropriate)    09/06/17 2002   Coping/Psychosocial Response Interventions   Plan Of Care Reviewed With patient   Patient Care Overview   Progress no change   Outcome Evaluation   Outcome Summary/Follow up Plan pt to [facility] today from ER. no orders received at this time. Called Dr. Edmondson and he will be here to see pt. Pressure ulcers present on admission and pictures taken in ER. VSS, will continue to monitor.        Goal: Adult Individualization and Mutuality  Outcome: Ongoing (interventions implemented as appropriate)  Goal: Discharge Needs Assessment  Outcome: Ongoing (interventions implemented as appropriate)    Problem: Infection, Risk/Actual (Adult)  Goal: Identify Related Risk Factors and Signs and Symptoms  Outcome: Ongoing (interventions implemented as appropriate)  Goal: Infection Prevention/Resolution  Outcome: Ongoing (interventions implemented as appropriate)    Problem: Pressure Ulcer (Adult)  Goal: Signs and Symptoms of Listed Potential Problems Will be Absent or Manageable (Pressure Ulcer)  Outcome: Ongoing (interventions implemented as appropriate)

## 2017-09-07 NOTE — NURSING NOTE
09/07/17 1546   Wound 08/29/17 2030 Left anterior fourth toe other (see comments)   Date first assessed/Time first assessed: 08/29/17 2030   Side: Left  Orientation: anterior  Location: fourth toe  Type: other (see comments)  Additional Comments: ulceration, crusted at edges   Wound WDL ex   Base moist;pink   Periwound Area macerated   Edges open   Length (cm) 0.2   Width (cm) 0.2   Depth (cm) 0.1   Drainage Characteristics/Odor serous   Drainage Amount scant   Picture taken On admission   Wound Cleaning cleansed with;other (see comments)  (NS and dried with gauze 2x2)   Wound Interventions antibiotic ointment applied   Dressing Dressing applied;gauze   Wound 08/29/17 2030 Left anterior fifth toe other (see comments)   Date first assessed/Time first assessed: 08/29/17 2030   Side: Left  Orientation: anterior  Location: fifth toe  Type: other (see comments)  Additional Comments: ulcerated area, moist crusted at edges,    Wound WDL ex   Base moist;pink   Periwound Area macerated   Edges open   Length (cm) 0.3   Width (cm) 0.3   Depth (cm) 0.1   Drainage Characteristics/Odor serous   Drainage Amount scant   Picture taken On admission   Wound Cleaning cleansed with;other (see comments)  (ns and dried with 2x2 gauze)   Wound Interventions antibiotic ointment applied   Dressing Dressing applied;gauze   Wound 09/07/17 1553 scrotum excoriation   Date first assessed/Time first assessed: 09/07/17 1553   Location: scrotum  Type: excoriation   Wound WDL WDL   Base moist;pink   Periwound Area pink   Edges irregular   Wound Interventions (MD to put in orders)   Pressure Ulcer 08/09/17 2014 coccyx unstageable   Date first assessed/Time first assessed: 08/09/17 2014   Present On Admission (Pressure Ulcer): yes  Location: coccyx  Stage: unstageable   Pressure Ulcer Appearance slough;yellow   Periwound Area contracted;pink   Pressure Ulcer Wound Care (RN applied dressing per MD orders post MD debridement)     Assessed patient with  Dr Flores who will be following sacral/coccyx pressure injury. Please notify wound care if additional skin issues arise. I ordered a low air loss mattress for patient.

## 2017-09-07 NOTE — DISCHARGE PLACEMENT REQUEST
"Pavel Subramanian (83 y.o. Male)     Date of Birth Social Security Number Address Home Phone MRN    1934  [id number] [id number] [phone] [id number]    Sikhism Marital Status          Catholic        Admission Date Admission Type Admitting Provider Attending Provider Department, Room/Bed    9/6/17 Emergency Ray, MD Sourav Goyal, Nadeem CARVALHO MD [address], [address]    Discharge Date Discharge Disposition Discharge Destination                      Attending Provider: Nadeem Benjamin MD     Allergies:  No Known Allergies    Isolation:  None   Infection:  None   Code Status:  Conditional    Ht:  72\" (182.9 cm)   Wt:  175 lb (79.4 kg)    Admission Cmt:  None   Principal Problem:  None                Active Insurance as of 9/6/2017     Primary Coverage     Payor Plan Insurance Group Employer/Plan Group    MEDICARE MEDICARE A & B      Payor Plan Address Payor Plan Phone Number Effective From Effective To    [address] [phone] 6/1/[address]     [place] [address]       Subscriber Name Subscriber Birth Date Member ID       PAVEL SUBRAMANIAN 1934 [id number]           Secondary Coverage     Payor Plan Insurance Group Employer/Plan Group    [facility] SUPP [id number]     Payor Plan Address Payor Plan Phone Number Effective From Effective To    [address]  12/1/2016     [place] [address]       Subscriber Name Subscriber Birth Date Member ID       PAVEL SUBRAMANIAN 1934 [id number]                 Emergency Contacts      (Rel.) Home Phone Work Phone Mobile Phone    BrennanSulma (Daughter) [phone] -- --    DaughterMartha main (Spouse) [phone] -- --              "

## 2017-09-08 PROBLEM — R60.9 EDEMA: Status: ACTIVE | Noted: 2017-01-01

## 2017-09-08 NOTE — CONSULTS
Patient Name: Pavel Melgoza  :1934  83 y.o.    Date of Admission: 2017  Date of Consultation:  17  Encounter Provider: Boogie Suggs III, MD  Place of Service: [facility] CARDIOLOGY  Referring Provider: Jay Jay Edmondson MD  Patient Care Team:  Amanda Tanner MD as PCP - General (Family Medicine)  Chandra Ponce MD as Consulting Physician (Infectious Diseases)  Nolberto Fraser Jr., MD as Consulting Physician (Urology)      Chief complaint: sepsis    Reason for consultation: atrial fibrillation, cardiomyopathy    History of Present Illness:   Pavel Melgoza is an 83 year old male with dementia, HTN, HLD, and paroxysmal atrial fibrillation that was diagnosed in 2017 at which time the patient was admitted for weakness, back pain, and nausea and vomiting. He ultimately underwent lumbar epidural steroid injection as well as laparoscopic hernia repair. He later, was found to have a lumbar epidural abscess and underwent laminectomy and abscess drainage. He had atrial fibrillation during that stay and was evaluated by Dr. Dudley. He was rate controlled and was not an anticoagulation candidate at that time due to falls and age. He was admitted again on  for continued infection and underwent lumbar wound I&D. His heart rate was stable at that time (No EKG or tele available to confirm rhythm). He had a brief admission from - for UTI and anemia. EKG on  demonstrated normal sinus rhythm. Patient was discharged to rehab.     On , the patient was admitted to [facility] from rehab with leukocytosis.CT of the abd was consistent with right sided colitis and he was treated empirically for cdiff and well as cefepime for possible line infection. On  he underwent debridement of a sacral wound by Plastic surgery. On , patient was noted to be persistently hypotensive despite fluid administration. He was also tachycardic  and EKG showed atrial fibrillation with RVR. He was moved to the CCU for closer monitoring.     Currently he denies any chest pain or chest discomfort.  No shortness of breath.  He denies any dizziness or lightheadedness.  Blood pressures remained somewhat borderline.  He has received so far 2 doses of IV digoxin-250 µg at 2332, 250 µg at 0 518.  He has oral diltiazem that he routinely takes at home and he did receive that this morning.  He was given IV fluid to help with fluid resuscitation and blood pressure support.  He denies any cough.  He is somewhat cold.  He says that that is normal for him.    Echo 9/7/17  · Left ventricular diastolic dysfunction (grade I) consistent with impaired relaxation.  · Left ventricular systolic function is mildly decreased.  · The left ventricular cavity is borderline dilated.  · Estimated EF appears to be in the range of 41 - 45%.  · There is left ventricular global hypokinesis noted.  · The study is technically difficult for diagnosis. The quality of the study is limited due to poor acoustic windows related to limited views obtained. Consider repeating exam with Definity when patient is stable    Past Medical History:   Diagnosis Date   • Arthritis    • Atrial fibrillation    • Coronary artery disease    • Dementia    • GERD (gastroesophageal reflux disease)    • History of transfusion    • Hyperlipidemia    • Hypertension    • Plasmacytoma    • Urinary retention        Past Surgical History:   Procedure Laterality Date   • ABDOMINAL SURGERY     • BACK SURGERY     • CERVICAL LAMINECTOMY DECOMPRESSION POSTERIOR N/A 8/10/2017    Procedure: OPEN DEBRIDEMENT OF EPIDURAL ABCCESS;  Surgeon: Nolberto Hernandez IV, MD;  Location: [facility];  Service:    • FRACTURE SURGERY     • HERNIA REPAIR     • INGUINAL HERNIA REPAIR Left 6/24/2017    Procedure: LAPAROSCOPIC LEFT INGUINAL HERNIA REPAIR WITH MESH;  Surgeon: Erwin Mei MD;  Location: [facility] OR;  Service:    • JOINT  REPLACEMENT      bilateral hips   • LUMBAR LAMINECTOMY DISCECTOMY DECOMPRESSION N/A 7/4/2017    Procedure: LUMBAR LAMINECTOMY FOR RESECTION OF EPIDURAL ABCESS ;  Surgeon: Nolberto Hernandez IV, MD;  Location: [facility];  Service:          Prior to Admission medications    Medication Sig Start Date End Date Taking? Authorizing Provider   atorvastatin (LIPITOR) 10 MG tablet Take 10 mg by mouth Daily.   Yes Historical Provider, MD   B Complex Vitamins (B COMPLEX-B12 PO) Take 1 tablet by mouth Daily.   Yes Historical Provider, MD   cholecalciferol 5000 UNITS tablet Take 5,000 Units by mouth Daily. 7/10/17  Yes Jay Jay Edmondson MD   collagenase (SANTYL) 250 UNIT/GM ointment Apply  topically 2 (Two) Times a Day.   Yes Historical Provider, MD   dakin's (HYSEPT) 0.25 % solution topical solution Apply  topically Every 12 (Twelve) Hours.   Yes Historical Provider, MD   diltiaZEM CD (CARDIZEM CD) 240 MG 24 hr capsule Take 1 capsule by mouth Daily. 7/10/17  Yes Jay Jay Edmondson MD   ferrous sulfate 325 (65 FE) MG tablet Take 325 mg by mouth Daily With Breakfast.   Yes Historical Provider, MD   fluconazole (DIFLUCAN) 200 MG tablet Take 1 tablet by mouth Daily for 13 doses. Indications: Skin and Soft Tissue Infection 9/1/17 9/14/17 Yes Adryan Hernandez MD   HYDROcodone-acetaminophen (NORCO) 5-325 MG per tablet Take 1 tablet by mouth Every 4 (Four) Hours As Needed for Moderate Pain  or Severe Pain . 8/31/17  Yes Adryan Hernandez MD   levoFLOXacin (LEVAQUIN) 500 MG tablet Take 1 tablet by mouth Daily for 7 days. Indications: Urinary Tract Infection 8/31/17 9/7/17 Yes Adryan Hernandez MD   lidocaine (LIDODERM) 5 % Place 1 patch on the skin Daily. Place on right hip. Remove & Discard patch within 12 hours or as directed by MD   Yes Historical Provider, MD   lisinopril (PRINIVIL,ZESTRIL) 20 MG tablet Take 20 mg by mouth Daily.   Yes Historical Provider, MD   Multiple Vitamins-Minerals (CENTRUM SILVER ULTRA MENS) tablet Take 1  tablet by mouth Daily.   Yes Historical Provider, MD   pantoprazole (PROTONIX) 40 MG EC tablet Take 1 tablet by mouth 2 (Two) Times a Day Before Meals. 7/10/17  Yes Jay Jay Edmondson MD   sennosides-docusate sodium (SENOKOT-S) 8.6-50 MG tablet Take 1 tablet by mouth At Night As Needed for Constipation. 8/14/17  Yes Nicanor Alvarez MD   tamsulosin (FLOMAX) 0.4 MG capsule 24 hr capsule Take 2 capsules by mouth Every Night. 8/14/17  Yes Nicanor Alvarez MD   vancomycin (VANCOCIN) 1000 MG injection Infuse 1,000 mg into a venous catheter 2 (Two) Times a Day. 8/19/17  Yes Historical Provider, MD   vitamin C (ASCORBIC ACID) 500 MG tablet Take 500 mg by mouth 2 (Two) Times a Day.   Yes Historical Provider, MD   ramipril (ALTACE) 5 MG capsule Take 1 capsule by mouth Daily. 7/10/17   Jay Jay Edmondson MD       No Known Allergies    Social History     Social History   • Marital status:      Spouse name: N/A   • Number of children: N/A   • Years of education: N/A     Occupational History   • retired      Social History Main Topics   • Smoking status: Former Smoker     Packs/day: 1.00     Types: Cigarettes     Quit date: 8/29/1995   • Smokeless tobacco: Never Used   • Alcohol use No   • Drug use: No   • Sexual activity: Defer     Other Topics Concern   • None     Social History Narrative       Family History   Problem Relation Age of Onset   • Cancer Sister    • Cancer Brother    • Cancer Child        REVIEW OF SYSTEMS:   All systems reviewed.  Pertinent positives identified in HPI.  All other systems are negative.      Objective:     Vitals:    09/08/17 1000 09/08/17 1100 09/08/17 1119 09/08/17 1203   BP: 117/59  102/66    BP Location:       Patient Position:       Pulse: (!) 134  (!) 134    Resp:       Temp:  97.9 °F (36.6 °C)     TempSrc:       SpO2:    98%   Weight:       Height:         Body mass index is 25.62 kg/(m^2).    Physical Exam:  General Appearance:    Alert, cooperative, in no acute distress   Head:     Normocephalic, without obvious abnormality, atraumatic   Eyes:            Lids and lashes normal, conjunctivae and sclerae normal, no   icterus, no pallor, corneas clear, PERRLA   Ears:    Ears appear intact with no abnormalities noted   Throat:   No oral lesions, no thrush, oral mucosa moist   Neck:   No adenopathy, supple, trachea midline, no thyromegaly, no   carotid bruit, no JVD   Back:     No kyphosis present, no scoliosis present, no skin lesions, erythema or scars, no tenderness to percussion or palpation, range of motion normal   Lungs:     Coarse BS,respirations regular, even and unlabored    Heart:    irregular rhythm and normal rate, normal S1 and S2, no murmur, no gallop, no rub, no click   Chest Wall:    No abnormalities observed   Abdomen:     Normal bowel sounds, no masses, no organomegaly, soft        non-tender, non-distended, no guarding, no rebound  tenderness   Extremities:   Moves all extremities well, no edema, no cyanosis, no redness   Pulses:   Pulses palpable and equal bilaterally. Normal radial, carotid, femoral, dorsalis pedis and posterior tibial pulses bilaterally. Normal abdominal aorta   Skin:  Psychiatric:   No bleeding, bruising or rash    Alert, normal mood and affect         Lab Review:       Results from last 7 days  Lab Units 09/08/17  0729   SODIUM mmol/L 131*   POTASSIUM mmol/L 3.5   CHLORIDE mmol/L 96*   CO2 mmol/L 21.2*   BUN mg/dL 22   CREATININE mg/dL 0.82   CALCIUM mg/dL 7.2*   BILIRUBIN mg/dL 0.8   ALK PHOS U/L 92   ALT (SGPT) U/L 21   AST (SGOT) U/L 29   GLUCOSE mg/dL 127*       Results from last 7 days  Lab Units 09/07/17  1911   TROPONIN T ng/mL 0.114*       Results from last 7 days  Lab Units 09/08/17  0729   WBC 10*3/mm3 48.46*   HEMOGLOBIN g/dL 9.4*   HEMATOCRIT % 28.5*   PLATELETS 10*3/mm3 416       Results from last 7 days  Lab Units 09/08/17  0729   INR  1.35*       Results from last 7 days  Lab Units 09/07/17  1911   MAGNESIUM mg/dL 1.7     Previous EKG  8/31/17                     I personally viewed and interpreted the patient's EKG/Telemetry data.        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     1.  Acute febrile illness/sepsis  2.  Leukocytosis  3.  Hypotension-agree with fluid hydration  4.  Paroxysmal atrial fibrillation-patient is back in atrial fibrillation with RVR.  I will give one additional dose of IV digoxin.    Boogie Suggs III, MD  09/08/17  12:53 PM

## 2017-09-08 NOTE — PLAN OF CARE
Problem: Patient Care Overview (Adult)  Goal: Plan of Care Review  Outcome: Ongoing (interventions implemented as appropriate)    09/08/17 6651   Coping/Psychosocial Response Interventions   Plan Of Care Reviewed With patient   Patient Care Overview   Progress no change   Outcome Evaluation   Outcome Summary/Follow up Plan transfer from the ccu today. patient confused. dressing changed x2 since being on floor due to BMs. pallative consult- family still deciding on best plan of care. family at the bedside.          Problem: Infection, Risk/Actual (Adult)  Goal: Identify Related Risk Factors and Signs and Symptoms  Outcome: Ongoing (interventions implemented as appropriate)  Goal: Infection Prevention/Resolution  Outcome: Ongoing (interventions implemented as appropriate)    Problem: Pressure Ulcer (Adult)  Goal: Signs and Symptoms of Listed Potential Problems Will be Absent or Manageable (Pressure Ulcer)  Outcome: Ongoing (interventions implemented as appropriate)    Problem: Fall Risk (Adult)  Goal: Identify Related Risk Factors and Signs and Symptoms  Outcome: Ongoing (interventions implemented as appropriate)  Goal: Absence of Falls  Outcome: Ongoing (interventions implemented as appropriate)

## 2017-09-08 NOTE — CONSULTS
Group: [facility]         CONSULT NOTE    Patient Identification:  Pavel Melgoza  83 y.o.  male  1934  [id number]            Requesting physician:     Reason for Consultation:  Resistant hypotension    CC: Persistent hypotension    History of Present Illness:  Mr. Zhao is a pleasant 83-year-old white male remote smoker with a past medical history significant for dementia, atrial fibrillation not on anticoagulation, coronary artery disease, plasmacytoma status post radiation recently admitted to the hospital for an ampicillin sensitive enterococcus epidural abscess requiring drainage ×2( 7/4/2017 and 8/10/2017) after undergoing a lumbar L4-L5 laminectomy and was discharged on on long-term intravenous antibiotic course through a PICC line.  His last admission was competent by a catheter associated urinary tract infection with Pseudomonas.  Patient was readmitted to hospital for nonspecific symptoms and an abnormal lab work showing elevated white count and a low hemoglobin.  He is essentially bedbound with chronic medical problems from what I understand.  He denied any fevers, chills, cough, shortness of breath, chest pain, palpitations, orthopnea, PND.  He did have some nausea but denied any diarrhea or vomiting or abdominal pain.  He has been afebrile during the hospitalization.  He was seen by plastic surgery and infectious diseases who were treating him for stage IV decubitus ulcer which needed bedside debridement.  He underwent a CT abdomen pelvis which showed right sided colitis/typhlitis along with significant stone burden and possible cystitis.  He also was noted to have addictive this ulcer with no evidence of a fluid collection.  He was started on broad-spectrum antibody screening vancomycin and cefepime.  He was initially started the treated as Clostridium difficile infection given the evidence of colitis and elevated white count which was stopped by infectious  diseases given the lack of diarrhea.      The last 24 hours patient was noted to be persistently hypotensive along with tachycardia to the 140s.  He was given IV fluid resuscitation ×2.  [facility] Pulmonary consult was called for persistent hypotension in spite of giving 2 L of IV fluids.  The primary team is concerned about cardiogenic pulmonary edema.  The patient off note is on room air and denies any shortness of breath.  He was undergoing an echocardiogram during my evaluation which shows a collapsible IVC.  The formal echo report is pending at this point.  I ordered a stat lactate Ringer's bolus.  The patient is  pleasant but extremely poor historian and uninterested in the interview.  He denies any chest pain shortness of breath cough for abdominal pain nausea or vomiting.  Of note the patient had multiple lactic acid checked which were all negative.  The ACTH stim relation test was ordered with a baseline cortisol of 18.  Patient has not received any steroids at this point.    Review of Systems   Unable to perform ROS: Dementia       Past Medical History:  Past Medical History:   Diagnosis Date   • Arthritis    • Atrial fibrillation    • Coronary artery disease    • Dementia    • GERD (gastroesophageal reflux disease)    • History of transfusion    • Hyperlipidemia    • Hypertension    • Plasmacytoma    • Urinary retention        Past Surgical History:  Past Surgical History:   Procedure Laterality Date   • ABDOMINAL SURGERY     • BACK SURGERY     • CERVICAL LAMINECTOMY DECOMPRESSION POSTERIOR N/A 8/10/2017    Procedure: OPEN DEBRIDEMENT OF EPIDURAL ABCCESS;  Surgeon: Nolberto Hernandez IV, MD;  Location: [facility] OR;  Service:    • FRACTURE SURGERY     • HERNIA REPAIR     • INGUINAL HERNIA REPAIR Left 6/24/2017    Procedure: LAPAROSCOPIC LEFT INGUINAL HERNIA REPAIR WITH MESH;  Surgeon: Erwin Mei MD;  Location: [facility];  Service:    • JOINT REPLACEMENT      bilateral hips   • LUMBAR LAMINECTOMY  DISCECTOMY DECOMPRESSION N/A 7/4/2017    Procedure: LUMBAR LAMINECTOMY FOR RESECTION OF EPIDURAL ABCESS ;  Surgeon: Nolberto Hernandez IV, MD;  Location: [facility] OR;  Service:         Home Meds:  Prescriptions Prior to Admission   Medication Sig Dispense Refill Last Dose   • atorvastatin (LIPITOR) 10 MG tablet Take 10 mg by mouth Daily.   9/6/2017 at Unknown time   • B Complex Vitamins (B COMPLEX-B12 PO) Take 1 tablet by mouth Daily.   9/6/2017 at Unknown time   • cholecalciferol 5000 UNITS tablet Take 5,000 Units by mouth Daily.   9/6/2017 at Unknown time   • collagenase (SANTYL) 250 UNIT/GM ointment Apply  topically 2 (Two) Times a Day.   9/6/2017 at Unknown time   • dakin's (HYSEPT) 0.25 % solution topical solution Apply  topically Every 12 (Twelve) Hours.   9/6/2017 at Unknown time   • diltiaZEM CD (CARDIZEM CD) 240 MG 24 hr capsule Take 1 capsule by mouth Daily.   9/6/2017 at Unknown time   • ferrous sulfate 325 (65 FE) MG tablet Take 325 mg by mouth Daily With Breakfast.   9/6/2017 at Unknown time   • fluconazole (DIFLUCAN) 200 MG tablet Take 1 tablet by mouth Daily for 13 doses. Indications: Skin and Soft Tissue Infection 13 tablet 0 9/6/2017 at Unknown time   • HYDROcodone-acetaminophen (NORCO) 5-325 MG per tablet Take 1 tablet by mouth Every 4 (Four) Hours As Needed for Moderate Pain  or Severe Pain . 18 tablet 0 9/6/2017 at Unknown time   • levoFLOXacin (LEVAQUIN) 500 MG tablet Take 1 tablet by mouth Daily for 7 days. Indications: Urinary Tract Infection 7 tablet 0 9/6/2017 at Unknown time   • lidocaine (LIDODERM) 5 % Place 1 patch on the skin Daily. Place on right hip. Remove & Discard patch within 12 hours or as directed by MD   9/6/2017 at Unknown time   • lisinopril (PRINIVIL,ZESTRIL) 20 MG tablet Take 20 mg by mouth Daily.   9/6/2017 at Unknown time   • Multiple Vitamins-Minerals (CENTRUM SILVER ULTRA MENS) tablet Take 1 tablet by mouth Daily.   9/6/2017 at Unknown time   • pantoprazole (PROTONIX)  "40 MG EC tablet Take 1 tablet by mouth 2 (Two) Times a Day Before Meals.   9/6/2017 at Unknown time   • sennosides-docusate sodium (SENOKOT-S) 8.6-50 MG tablet Take 1 tablet by mouth At Night As Needed for Constipation.   9/6/2017 at Unknown time   • tamsulosin (FLOMAX) 0.4 MG capsule 24 hr capsule Take 2 capsules by mouth Every Night. 30 capsule  9/6/2017 at Unknown time   • vancomycin (VANCOCIN) 1000 MG injection Infuse 1,000 mg into a venous catheter 2 (Two) Times a Day.   9/6/2017 at Unknown time   • vitamin C (ASCORBIC ACID) 500 MG tablet Take 500 mg by mouth 2 (Two) Times a Day.   9/6/2017 at Unknown time   • ramipril (ALTACE) 5 MG capsule Take 1 capsule by mouth Daily.   Unknown at Unknown time       Allergies:  No Known Allergies    Social History:   Social History     Social History   • Marital status:      Spouse name: N/A   • Number of children: N/A   • Years of education: N/A     Occupational History   • retired      Social History Main Topics   • Smoking status: Former Smoker     Packs/day: 1.00     Types: Cigarettes     Quit date: 8/29/1995   • Smokeless tobacco: Never Used   • Alcohol use No   • Drug use: No   • Sexual activity: Defer     Other Topics Concern   • Not on file     Social History Narrative       Family History:  Family History   Problem Relation Age of Onset   • Cancer Sister    • Cancer Brother    • Cancer Child        Physical Exam:  BP (!) 82/58 (BP Location: Right arm, Patient Position: Lying)  Pulse (!) 145  Temp 99.7 °F (37.6 °C) (Oral)   Resp 20  Ht 72\" (182.9 cm)  Wt 175 lb (79.4 kg)  SpO2 91%  BMI 23.73 kg/m2 Body mass index is 23.73 kg/(m^2). 91% 175 lb (79.4 kg)  Physical Exam   Constitutional: He appears well-developed. No distress.   HENT:   Head: Normocephalic and atraumatic.   Mouth/Throat: No oropharyngeal exudate.   Eyes: Conjunctivae and EOM are normal. Pupils are equal, round, and reactive to light. Right eye exhibits no discharge. Left eye exhibits no " discharge. No scleral icterus.   Neck: Normal range of motion. Neck supple. No JVD present. No tracheal deviation present. No thyromegaly present.   Cardiovascular: Normal rate.  Exam reveals no gallop and no friction rub.    Murmur heard.  Irregular heart rhythm with tachycardia up to 140s   Pulmonary/Chest: Effort normal and breath sounds normal. No respiratory distress. He has no wheezes. He has no rales. He exhibits no tenderness.   Abdominal: Soft. Bowel sounds are normal. He exhibits no distension and no mass. There is no rebound and no guarding.   Musculoskeletal: Normal range of motion. He exhibits no tenderness or deformity.   Bilateral lower extremity 2+ pitting edema   Lymphadenopathy:     He has no cervical adenopathy.   Neurological: He is alert. No cranial nerve deficit.   Skin: Skin is warm and dry. Rash noted. He is not diaphoretic. No erythema. No pallor.   Noted stage IV decubitus ulcer.  I have not personally looked at it       Lab Review:   LABS:  Lab Results   Component Value Date    CALCIUM 7.0 (L) 09/07/2017    PHOS 3.6 07/10/2017     Results from last 7 days  Lab Units 09/07/17  1911 09/07/17  0549 09/06/17  1153   MAGNESIUM mg/dL 1.7  --   --    SODIUM mmol/L 129* 130* 129*   POTASSIUM mmol/L 3.6 4.0 4.1   CHLORIDE mmol/L 97* 96* 94*   CO2 mmol/L 21.4* 19.7* 26.1   BUN mg/dL 21 19 19   CREATININE mg/dL 0.86 0.85 0.86   GLUCOSE mg/dL 133* 95 121*   CALCIUM mg/dL 7.0* 7.2* 7.6*   WBC 10*3/mm3 41.04* 38.03* 35.45*   HEMOGLOBIN g/dL 8.1* 8.2* 8.9*   PLATELETS 10*3/mm3 392 336 474   ALT (SGPT) U/L  --   --  19   AST (SGOT) U/L  --   --  18   PROBNP pg/mL 7023.0*  --   --    PROCALCITONIN ng/mL 0.20 0.19  --      Lab Results   Component Value Date    CKTOTAL 62 06/20/2017    TROPONINT 0.114 (C) 09/07/2017       Results from last 7 days  Lab Units 09/07/17  1911   TROPONIN T ng/mL 0.114*       Results from last 7 days  Lab Units 09/06/17  1314 09/06/17  1313   BLOODCX  No growth at 24 hours No  growth at 24 hours       Results from last 7 days  Lab Units 09/07/17  1911 09/07/17  0549 09/06/17  1313   PROCALCITONIN ng/mL 0.20 0.19  --    LACTATE mmol/L 1.7 0.9 1.8         Lab Results   Component Value Date    TSH 3.030 06/24/2017     Estimated Creatinine Clearance: 73.1 mL/min (by C-G formula based on Cr of 0.86).    Results from last 7 days  Lab Units 09/06/17  1337   NITRITE UA  Negative   WBC UA /HPF 3-5*   BACTERIA UA /HPF None Seen   SQUAM EPITHEL UA /HPF 0-2        Imaging: I personally visualized the images of scans/x-rays performed within last 3 days.        CT A/P   IMPRESSION:   1. Abnormal wall thickening of the cecum consistent with  colitis/typhlitis. Moderate stool throughout the colon.  2. Third spacing of fluid with body wall edema, mesenteric stranding,  mild ascites.  3. Thick-walled decompressed urinary bladder, potential cystitis.  4. Sacral decubitus ulcer along the superior margin of the gluteal  crease.  5. Chronic sclerosis and insufficiency fractures involving the sacrum  and left iliac body without change.   6. Small bilateral pleural effusions.   7. Calcified pleural plaque right lung base.  8. 4.2 cm infrarenal abdominal aortic aneurysm      Assessment:  Sepsis  Possible healthcare associated pneumonia versus typhlitis/colitis versus cystitis vs sacral decubitus ulcer  Persistent leukocytosis  Acute right upper extremity superficial thrombophlebitis  A. fib with RVR  Chronic hyponatremia   Incidental 4.2 cm infrarenal abdominal aortic aneurysm   Incidental intracranial tumor possible meningioma   Generalized debility     Recommendations:  I will transfer the patient to the intensive care unit for closer monitoring.  I will start aggressive IV fluid hydration.  Lactic acid has been stable at this point.  Patient has multiple sources for sepsis and possible septic shock.  If the patient does not respond to IV fluid hydration I will start him on vasopressor support.  Would hold off  on any steroids given baseline cortisol level is 18.  Would consider if he is requiring vasopressor support  We'll continue antibody recommendations per infectious diseases.  Persistent leukocytosis likely a leukemoid reaction.  Given the rapid ventricular rhythm I will give him a dose of digoxin.  Patient is not on chronic anticoagulation given recent spinal surgery so would avoid amiodarone for risk for conversion.  We will trend sodium for chronic hyponatremia during the volume resuscitation.  Patient does not have any clinical symptoms of colitis.  He unfortunately is a poor historian but no signs of abdominal guarding or rigidity. We'll start him on a bowel regimen given severe constipation and large stool were noted on the CT abdomen.     38 minutes of critical care provided. This time excludes other billable procedures. Time does include preparation of documents, medical consultations, review of old records, and direct bedside care. Patient was at high risk for life-threatening deterioration due to worsening hemodynamic status, concern for septic shock.       Jhonatan Temple MD  9/7/2017  8:54 PM    EMR Dragon/Transcription disclaimer:     Much of this encounter note is an electronic transcription/translation of spoken language to printed text. The electronic translation of spoken language may permit erroneous, or at times, nonsensical words or phrases to be inadvertently transcribed; Although I have reviewed the note for such errors, some may still exist.

## 2017-09-08 NOTE — PROGRESS NOTES
[facility]     Mar/chart reviewed  F/u septic shock. No complaints this morning.  Just generally says she doesn't feel well. bp better, off pressors, hr still elevated at times.  He is a very limited treatment, including no pressors.    Vital Sign Min/Max for last 24 hours  Temp  Min: 98 °F (36.7 °C)  Max: 100.6 °F (38.1 °C)   BP  Min: 63/52  Max: 126/101   Pulse  Min: 105  Max: 156   Resp  Min: 18  Max: 24   SpO2  Min: 85 %  Max: 96 %   Flow (L/min)  Min: 2  Max: 2   Weight  Min: 175 lb (79.4 kg)  Max: 188 lb 15 oz (85.7 kg)     Appears ill  perrl, eomi, no icterus,  mmm, no jvd, trachea midline, neck supple,  chest cta bilaterally, +crackles, no wheezes,   Tachy, irrg  soft, nt, nd +bs,  no c/c/ e    Labs:  Trop 0.114  procal 0.2  probnp 7023  Na 129  bicarb 21  Wbc 41  hgb 8.1   plts 392    Echo: EF 41-45%; diastolic dysfunction;   CXR: left basilar infiltrate; pulm vasc congestion    A/P:  1. Sepsis with septic shock  2. Pneumonia -- suspect aspiration  3. Epidural abcess  4. Sacral decub  5. UTI -- pseudomonas  6. AFib with RVR  7. Hyponatremia  8. Anemia  9. Chronic systolic CHF  10. Immobility syndrome  11.CKD  12. Colitis    Antibiotics have been adjusted by ID; Cards to re-visit to see if we can get better rate control.  He has such limited treatment goals there is really no role for ICU level care.  His decub that he is soiling into is likely fatal without diverting colostomy.  I think transition to hospice type care is appropriate.  Family seems to have disconnect with stated treatment limitations, would likely benefit from palliative care consult.      Can move out of ICU.

## 2017-09-08 NOTE — PROGRESS NOTES
LOS: 2 days     Chief Complaint:  Follow-up sepsis    History from patient, chart, and RN Yasmin.    Interval History:  Very eventful night. Moved to CCU for hypotension and tachycardia. He had some response to IVFs. Apparently he refused rate control medications. WBC up to 48k today and having fever. He did have bedside debridement of his sacral wound yesterday. He continues to stool into that area.    ROS: no chest pain, no SOA, no cough    Vital Signs  Temp:  [98 °F (36.7 °C)-100.6 °F (38.1 °C)] 98 °F (36.7 °C)  Heart Rate:  [105-172] 172  Resp:  [18-24] 20  BP: ()/() 108/96    Physical Exam:  General: awake, alert, chronically ill appearing  Head: Normocephalic, atraumatic  Eyes: PERRL, EOMI, no scleral icterus, + conjunctival pallor, no conjunctival hemorrhages.   ENT: MMM, OP clear, no thrush. Fair dentition.   Neck: Supple, no visible thyromegaly  Cardiovascular: tachycardic, irreg irreg rhythm, no murmurs, rubs, or gallops; no LE edema  Respiratory: Lungs are clear to ascultation bilaterally, no rales or wheezing; normal work of breathing on ambient air  GI: Abdomen is soft, non-tender, non-distended, normal bowel sounds in all four quadrants; no hepatosplenomegaly, no masses palpated  : Gong catheter present w/ clear yellow urine  Musculoskeletal: L spine incision well-healed; he has a stage 4 sacral ulcer which is bandaged  Skin: No rashes, lesions, or embolic phenomenon  Neurological: Alert and oriented x 3,  motor strength 5/5 in UEs and RLE; chronic 3/5 strength in LLE  Psychiatric: Normal mood and affect   Vasc: no cyanosis; PICC in LUE w/o erythema    Antibiotics:  •  cefepime (MAXIPIME) 1 g/100 mL 0.9% NS IVPB (mbp), 1 g, Intravenous, Q8H, Jay Jay Edmondson MD, 1 g at 09/08/17 0252  •  vancomycin (VANCOCIN) in iso-osmotic dextrose IVPB 1 g (premix) 200 mL, 1,000 mg, Intravenous, Q12H, Maame Mercado MD, 1,000 mg at 09/08/17 0021    LABS:  CBC, CMP, CRP, VTr, micro reviewed today  Lab Results    Component Value Date    WBC 48.46 (C) 09/08/2017    HGB 9.4 (L) 09/08/2017    HCT 28.5 (L) 09/08/2017    MCV 84.8 09/08/2017     09/08/2017     Lab Results   Component Value Date    GLUCOSE 127 (H) 09/08/2017    BUN 22 09/08/2017    CREATININE 0.82 09/08/2017    EGFRIFNONA 90 09/08/2017    BCR 26.8 (H) 09/08/2017    CO2 21.2 (L) 09/08/2017    CALCIUM 7.2 (L) 09/08/2017    ALBUMIN 1.90 (L) 09/08/2017    LABIL2 0.7 09/08/2017    AST 29 09/08/2017    ALT 21 09/08/2017    CRP 18.72 (H) 09/08/2017     Lab Results   Component Value Date    VANCOTROUGH 14.70 09/03/2017    VANCORANDOM 21.00 09/06/2017     UA negative     Microbiology:  9/6 BCx: NGTD     Radiology (personally reviewed images):  CXR w/ some LLL haziness  Dopplers negative for DVT    Assessment/Plan   Sepsis due to unspecified organism  1. Leukocytosis  2. Anemia  3. Epidural and retroperitoneal abscess secondary to ampicillin-sensitive Enterococcus s/p washout 7/4/17 and s/p IR guided drainage of RP abscess on 7/5/17  4. History of Wound dehiscence and drainage  5. Recurrent epidural abscess s/p washout 8/10/17 - GPCs on gram stain but Cx negative  6. History of Pseudomonas CAUTI - recently treated  7. Stage 4 sacral ulcer    I recommend that the patient agree to rate control agents. Given stooling into the stage 4 ulcer, I will change his cefepime to Zosyn. Thanks to plastic surgery for their help w/ bedside debridement. Continue supportive ICU care. Dopplers were negative for DVT yesterday. I added flow cytometry to AM labs though I think leukocytosis is due to infection.    Thank you for this consult. ID will follow. I have discussed this case with SHANEL Hoffman.

## 2017-09-08 NOTE — NURSING NOTE
Pt transferred to CCU per order from Pulmonologist; 's - 150s; Pt temp rising; BP 80s/50s; family called and in CCU waiting room

## 2017-09-08 NOTE — PROGRESS NOTES
"DAILY PROGRESS NOTE  [facility]    Patient Identification:  Name: Pavel Melgoza  Age: 83 y.o.  Sex: male  :  1934  MRN: [id number]         Primary Care Physician: Amanda Tanner MD      Subjective  Last nights events noted.  Pt presently w no acute c/o.      Objective:  General Appearance:  Comfortable, in no acute distress and not in pain.    Vital signs: (most recent): Blood pressure 104/59, pulse 118, temperature 97.9 °F (36.6 °C), resp. rate 20, height 72\" (182.9 cm), weight 188 lb 15 oz (85.7 kg), SpO2 98 %.    Lungs:  Normal respiratory rate and normal effort.  There are rhonchi.    Heart: Tachycardia.  Irregular rhythm.    Abdomen: Abdomen is soft.  Bowel sounds are normal.     Extremities: There is dependent edema (4+).    Neurological: Patient is alert.  (Oriented to person and place.  Pleasant and cooperative. ).    Skin:  Warm and dry.                Vital signs in last 24 hours:  Temp:  [97.9 °F (36.6 °C)-100.6 °F (38.1 °C)] 97.9 °F (36.6 °C)  Heart Rate:  [114-172] 118  Resp:  [20] 20  BP: ()/() 104/59    Intake/Output:    Intake/Output Summary (Last 24 hours) at 17 1800  Last data filed at 17 1558   Gross per 24 hour   Intake             2480 ml   Output              450 ml   Net             2030 ml           Results from last 7 days  Lab Units 17  0549 17  1153   WBC 10*3/mm3 48.46* 41.04* 38.03* 35.45*   HEMOGLOBIN g/dL 9.4* 8.1* 8.2* 8.9*   PLATELETS 10*3/mm3 416 392 336 474     Results from last 7 days  Lab Units 17  0549 17  1153   SODIUM mmol/L 131* 129* 130* 129*   POTASSIUM mmol/L 3.5 3.6 4.0 4.1   CHLORIDE mmol/L 96* 97* 96* 94*   CO2 mmol/L 21.2* 21.4* 19.7* 26.1   BUN mg/dL 22 21 19 19   CREATININE mg/dL 0.82 0.86 0.85 0.86   GLUCOSE mg/dL 127* 133* 95 121*   Estimated Creatinine Clearance: 82.7 mL/min (by C-G formula based on Cr of " 0.82).  Results from last 7 days  Lab Units 09/08/17  0729 09/07/17  1911 09/07/17  0549 09/06/17  1153   CALCIUM mg/dL 7.2* 7.0* 7.2* 7.6*   ALBUMIN g/dL 1.90*  --   --  2.40*   MAGNESIUM mg/dL  --  1.7  --   --    PHOSPHORUS mg/dL 3.7  --   --   --      Results from last 7 days  Lab Units 09/08/17  0729 09/06/17  1153   ALBUMIN g/dL 1.90* 2.40*   BILIRUBIN mg/dL 0.8 0.7   ALK PHOS U/L 92 81   AST (SGOT) U/L 29 18   ALT (SGPT) U/L 21 19       Assessment:  Principal Problem:    Sepsis  Active Problems:    Hypotension    CKD (chronic kidney disease) stage 2, GFR 60-89 ml/min    A-fib    SIADH (syndrome of inappropriate ADH production)    Hyponatremia    Epidural abscess    Sacral decubitus ulcer    HTN (hypertension)    Leukocytosis    Colitis    Anemia due to multiple mechanisms    Edema -pt is 3rd spacing his fluids.        Plan:  Prognosis very poor.  Pt and family have requested palliative care but are presently not quit ready to make the move.  RN will inform me when they do.   Over 35 min spent w over 1/2 in counseling.     Nadeem Benjamin MD  9/8/2017  6:00 PM

## 2017-09-08 NOTE — PROGRESS NOTES
"Pharmacokinetic Evaluation - Vancomycin    Pavel Melgoza is a 83 y.o. male on vancomycin pharmacy to dose.  MRN: [id number]  : 1934    Day of vancomycin therapy: 2 (during this hospitalization). Pt was on vancomycin prior to admission for planned 8 week course with stop date in October.  Indication: epidural abscess, sacral decubitus ulcer.  Consulted by: Dr. Mercado  Goal trough: 15-20 mcg/ml    Current dose: 1000mg IV q12h  Other antimicrobials: Zosyn 3.375gm IV q8h    Blood pressure 102/66, pulse (!) 134, temperature 97.9 °F (36.6 °C), resp. rate 20, height 72\" (182.9 cm), weight 188 lb 15 oz (85.7 kg), SpO2 98 %.    Results from last 7 days  Lab Units 17  0729 17  0549   CREATININE mg/dL 0.82 0.86 0.85     Estimated Creatinine Clearance: 82.7 mL/min (by C-G formula based on Cr of 0.82).    Results from last 7 days  Lab Units 17  0729 17  0549   WBC 10*3/mm3 48.46* 41.04* 38.03*   HEMOGLOBIN g/dL 9.4* 8.1* 8.2*   HEMATOCRIT % 28.5* 24.4* 24.5*   PLATELETS 10*3/mm3 416 392 336     In CCU for hypotension/tachycardia. S/p debridement of sacral ulcer. Lumbar epidural abscess under treatment per ID, s/p I&D x2 during previous admissions.    Cultures:    blood cx x2: NGTD    Dosing hx (include troughs if drawn):  **On vancomycin prior to admission - 1000mg IV q12h per admission med rec   0046: 1250mg IV x1, then 1000mg IV q12h   1138 trough = 21.1 mcg/ml (~11.5 hr post-dose)    Assessment:  Level is slightly above desired range of 15-20 mcg/ml; drawn appropriately prior to 3rd dose during this admission.  Renal function is stable.    Plan:  1) Decrease vancomycin dose slightly to 750 mg every 12 hours starting tonight at 1800.  2) Next trough on  at 0530 after 5 doses of new regimen.  Pharmacy will continue to follow and adjust as needed.    Lissa Hood, PharmD, BCPS  2017    "

## 2017-09-08 NOTE — CONSULTS
Purpose of the visit was to evaluate for: goals of care/advanced care planning. Spoke with RN as well as family and HCS and discussed palliative care, goals of care, care options, [facility], [facility] bed status and discharge options.      Assessment:  Patient is palliative care appropriate for inpatient care given septic shock exacerbation last night, to CCU with low BP, PNA, sacral decub with infection, stooling constantly, HF, immobility syndrome, CKD, UTI, Afib, 3-4+ edema of feet and ankles, disoriented to time and place, but knows family, wants to eat, has pain with movement, severely impaired movement and decreased sensation in legs. He had an epidural in June and got infection after that, has had multiple infections and decrease in ability to walk and care for himself since then.      Recommendations/Plan: Family is leaning toward Palliative Care and [facility] evaluation. They need some time to talk to other family members to make decision.     Other Comments: His Wife and daughter participated in conversation. They want him to be comfortable at this time and they know he is not going to recover from this type of mass infection that he has. He would not want to die in a NH, but they are not physically able to take him home. Discussed option for [facility] scattered bed if accepted by [facility] after transfer to Palliative Care.     Will follow, Thank you for the referral.     Total time: 30 minutes.

## 2017-09-09 NOTE — PROGRESS NOTES
[facility]     Mar/chart reviewed  F/u septic shock. No complaints this morning.  Discussed with wife at bedside who says she does not want the pt to suffer anymore.  Wants comfort care.    Vital Sign Min/Max for last 24 hours  Temp  Min: 98.7 °F (37.1 °C)  Max: 99.1 °F (37.3 °C)   BP  Min: 77/49  Max: 107/67   Pulse  Min: 100  Max: 113   Resp  Min: 16  Max: 20   SpO2  Min: 98 %  Max: 98 %   No Data Recorded   No Data Recorded     Appears ill  perrl, eomi, no icterus,  mmm, no jvd, trachea midline, neck supple,  chest cta bilaterally, +crackles, no wheezes,   Tachy, irrg  soft, nt, nd +bs,  no c/c/ e    Labs/films reviewed in EPIC      Echo: EF 41-45%; diastolic dysfunction;   CXR: left basilar infiltrate; pulm vasc congestion    A/P:  1. Sepsis with septic shock  2. Pneumonia -- suspect aspiration  3. Epidural abcess  4. Sacral decub  5. UTI -- pseudomonas  6. AFib with RVR  7. Hyponatremia  8. Anemia  9. Chronic systolic CHF  10. Immobility syndrome  11.CKD  12. Colitis    I think transition to hospice type care is appropriate.  Wife seems interested in this.  She says comfort is her main goal.    Jarrod Fuentes MD

## 2017-09-09 NOTE — PLAN OF CARE
Problem: Fall Risk (Adult)  Intervention: Review Medications/Identify Contributors to Fall Risk    09/09/17 0564   Safety Interventions   Medication Review/Management medications reviewed. VS stable. Pt complained of pain, medicated as ordered. 3x BM. IVF, F/C. IV ABX. Changed dressing on coccyx. Will continue to monitor.

## 2017-09-09 NOTE — PLAN OF CARE
Problem: Patient Care Overview (Adult)  Goal: Plan of Care Review  Outcome: Ongoing (interventions implemented as appropriate)    09/09/17 0328   Coping/Psychosocial Response Interventions   Plan Of Care Reviewed With patient   Patient Care Overview   Progress no change   Outcome Evaluation   Outcome Summary/Follow up Plan Patients vitals stable. Patient reports pain and PRN pain medications given. Patient has some confusion. Dressings changed as ordered. Patient tolerating IV antibiotics without difficulty. Gong cath to bedside drainage bag and draing dark coloreed urine. Will continue to monitor.         Problem: Infection, Risk/Actual (Adult)  Goal: Identify Related Risk Factors and Signs and Symptoms  Outcome: Ongoing (interventions implemented as appropriate)  Goal: Infection Prevention/Resolution  Outcome: Ongoing (interventions implemented as appropriate)    Problem: Pressure Ulcer (Adult)  Goal: Signs and Symptoms of Listed Potential Problems Will be Absent or Manageable (Pressure Ulcer)  Outcome: Ongoing (interventions implemented as appropriate)    Problem: Fall Risk (Adult)  Goal: Identify Related Risk Factors and Signs and Symptoms  Outcome: Outcome(s) achieved Date Met:  09/09/17  Goal: Absence of Falls  Outcome: Ongoing (interventions implemented as appropriate)

## 2017-09-09 NOTE — PROGRESS NOTES
INFECTIOUS DISEASES PROGRESS NOTE    CC: f/u sepsis    S:   Not having diarrhea but having bowel movements per family.  He remains somewhat confused today.  He is not having any fevers or chills or night sweats and seems to have stabilized just a bit compared to how it was a couple days ago.    O:  Physical Exam:  Temp:  [97.9 °F (36.6 °C)-99.1 °F (37.3 °C)] 99.1 °F (37.3 °C)  Heart Rate:  [102-134] 102  Resp:  [16-20] 20  BP: ()/(49-66) 77/49  Physical Exam  Actually looks in no acute distress  Regular rate and rhythm  Lungs clear  Abdomen distended, nontender  2-3+ lower extremity edema  Confused with minimal insight.     Diagnostics:    WBC 48.5-->42.6    H/H 8/24  Cr 0.8    9/6 bcx ngtd     Assessment/Plan   1. Sepsis due to unspecified organism  2. Anemia  3. Epidural and retroperitoneal abscess secondary to ampicillin-sensitive Enterococcus s/p washout 7/4/17 and s/p IR guided drainage of RP abscess on 7/5/17  4. History of Wound dehiscence and drainage  5. Recurrent epidural abscess s/p washout 8/10/17 - GPCs on gram stain but Cx negative  6. History of Pseudomonas CAUTI - recently treated  7. Stage 4 sacral ulcer    WBC down to 42.6 today. Afebrile.  Cont vanc and zosyn.  Discussed with patient's wife and son today.  They understand his frequent hospitalizations are not encouraging.  They're prepared if he deteriorates to make decisions regarding goals of care.  I told him I think we should give him another day or 2 to see how he does before making any rash decisions.  Hopefully his white count will continue to improve and remained afebrile.        Kulwinder Perla MD  8:12 AM  09/09/17

## 2017-09-09 NOTE — PROGRESS NOTES
"DAILY PROGRESS NOTE  [facility]    Patient Identification:  Name: Pavel Melgoza  Age: 83 y.o.  Sex: male  :  1934  MRN: [id number]         Primary Care Physician: Amanda Tanner MD      Subjective  No new c/o.      Objective:  General Appearance:  Comfortable, well-appearing, in no acute distress and not in pain.    Vital signs: (most recent): Blood pressure 94/49, pulse 100, temperature 97.9 °F (36.6 °C), temperature source Oral, resp. rate 20, height 72\" (182.9 cm), weight 188 lb 15 oz (85.7 kg), SpO2 98 %.    Lungs:  Normal respiratory rate and normal effort.  Breath sounds clear to auscultation.  (Anteriorly )  Heart: Irregular rhythm.  (Borderline tachy)  Abdomen: Abdomen is soft.  Bowel sounds are normal.     Extremities: There is dependent edema.    Neurological: Patient is alert.  (Oriented to person and place. ).    Skin:  Warm and dry.                Vital signs in last 24 hours:  Temp:  [97.9 °F (36.6 °C)-99.1 °F (37.3 °C)] 97.9 °F (36.6 °C)  Heart Rate:  [100-113] 100  Resp:  [16-20] 20  BP: ()/(49-67) 94/49    Intake/Output:    Intake/Output Summary (Last 24 hours) at 17 1518  Last data filed at 17 1245   Gross per 24 hour   Intake             4420 ml   Output                0 ml   Net             4420 ml           Results from last 7 days  Lab Units 17  0541 17  0729 17  0549 17  1153   WBC 10*3/mm3 42.55* 48.46* 41.04* 38.03* 35.45*   HEMOGLOBIN g/dL 8.0* 9.4* 8.1* 8.2* 8.9*   PLATELETS 10*3/mm3 306 416 392 336 474     Results from last 7 days  Lab Units 17  0541 17  0729 17  0549 17  1153   SODIUM mmol/L 130* 131* 129* 130* 129*   POTASSIUM mmol/L 3.4* 3.5 3.6 4.0 4.1   CHLORIDE mmol/L 99 96* 97* 96* 94*   CO2 mmol/L 19.0* 21.2* 21.4* 19.7* 26.1   BUN mg/dL 27* 22 21 19 19   CREATININE mg/dL 0.80 0.82 0.86 0.85 0.86   GLUCOSE mg/dL 122* 127* 133* 95 121*   Estimated " Creatinine Clearance: 84.8 mL/min (by C-G formula based on Cr of 0.8).  Results from last 7 days  Lab Units 09/09/17  0541 09/08/17  0729 09/07/17  1911 09/07/17  0549 09/06/17  1153   CALCIUM mg/dL 7.1* 7.2* 7.0* 7.2* 7.6*   ALBUMIN g/dL 1.50* 1.90*  --   --  2.40*   MAGNESIUM mg/dL  --   --  1.7  --   --    PHOSPHORUS mg/dL  --  3.7  --   --   --      Results from last 7 days  Lab Units 09/09/17  0541 09/08/17  0729 09/06/17  1153   ALBUMIN g/dL 1.50* 1.90* 2.40*   BILIRUBIN mg/dL 0.6 0.8 0.7   ALK PHOS U/L 82 92 81   AST (SGOT) U/L 28 29 18   ALT (SGPT) U/L 23 21 19       Assessment:  Principal Problem:    Sepsis  Active Problems:    Hypotension    CKD (chronic kidney disease) stage 2, GFR 60-89 ml/min    A-fib    SIADH (syndrome of inappropriate ADH production)    Hyponatremia    Epidural abscess    Sacral decubitus ulcer    HTN (hypertension)    Leukocytosis    Colitis    Anemia due to multiple mechanisms    Edema -pt is 3rd spacing his fluids.        Plan:  Family discussed goals of care again. They are still torn but the pt himself is comfortable with the idea of continuing w aggressive care for the time being.  Will take it on a day by day basis.     Nadeem Benjamin MD  9/9/2017  3:18 PM

## 2017-09-10 NOTE — PROGRESS NOTES
INFECTIOUS DISEASES PROGRESS NOTE    CC: f/u sepsis    S:   Patient with no complaints this morning.  He denies pain.  Says he is not having any fevers or chills or night sweats.    O:  Physical Exam:  Temp:  [97.4 °F (36.3 °C)-98.3 °F (36.8 °C)] 97.6 °F (36.4 °C)  Heart Rate:  [] 79  Resp:  [16-20] 16  BP: ()/(49-67) 99/61  Physical Exam   Constitutional: No distress.   Cardiovascular: Normal rate.    Pulmonary/Chest: Effort normal and breath sounds normal.   Abdominal: Soft. He exhibits distension. There is no tenderness.   Musculoskeletal: He exhibits edema.   Neurological: He is alert.   Skin: Skin is warm and dry.        Diagnostics:    WBC 30.2 (p85, L 6, M 7, E1)    H/H 7.7/24  Cr 0.86    9/6 BCx NGTD    Assessment/Plan   1. Sepsis due to unspecified organism  2. Anemia  3. Epidural and retroperitoneal abscess secondary to ampicillin-sensitive Enterococcus s/p washout 7/4/17 and s/p IR guided drainage of RP abscess on 7/5/17  4. History of Wound dehiscence and drainage  5. Recurrent epidural abscess s/p washout 8/10/17 - GPCs on gram stain but Cx negative  6. History of Pseudomonas CAUTI - recently treated  7. Stage 4 sacral ulcer    White count continues to improve and he remains afebrile.  We'll continue on vancomycin and Zosyn which he seems to be tolerating satisfactorily.  Dr. Ponce will return tomorrow.      Kulwinder Perla MD  8:05 AM  09/10/17

## 2017-09-10 NOTE — PROGRESS NOTES
"    Patient Name: Pavel Melgoza  :1934  83 y.o.      Patient Care Team:  Amanda Tanner MD as PCP - General (Family Medicine)  Chandra Ponce MD as Consulting Physician (Infectious Diseases)  Nolberto Fraser Jr., MD as Consulting Physician (Urology)    Chief Complaint: PAF    Interval History: remains in AFib with HR , resting comfortably. Some abdominal distension overnight       Objective   Vital Signs  Temp:  [97.4 °F (36.3 °C)-98.3 °F (36.8 °C)] 97.6 °F (36.4 °C)  Heart Rate:  [] 79  Resp:  [16-20] 16  BP: ()/(49-61) 99/61    Intake/Output Summary (Last 24 hours) at 09/10/17 0930  Last data filed at 09/10/17 0516   Gross per 24 hour   Intake             1820 ml   Output              760 ml   Net             1060 ml     Flowsheet Rows         First Filed Value    Admission Height  72\" (182.9 cm) Documented at 2017 1114    Admission Weight  175 lb (79.4 kg) Documented at 2017 1114          Physical Exam:   General Appearance:    lethargic   Lungs:     Coarse BS.  Normal respiratory effort and rate.      Heart:    irregular rhythm and normal rate, normal S1 and S2, no murmurs, gallops or rubs.     Chest Wall:    No abnormalities observed   Abdomen:     Soft, nontender, positive bowel sounds, mildly distended   Extremities:   no cyanosis, clubbing or edema.  No marked joint deformities.      Results Review:      Results from last 7 days  Lab Units 09/10/17  0625   SODIUM mmol/L 131*   POTASSIUM mmol/L 3.3*   CHLORIDE mmol/L 97*   CO2 mmol/L 20.6*   BUN mg/dL 27*   CREATININE mg/dL 0.86   GLUCOSE mg/dL 99   CALCIUM mg/dL 7.2*       Results from last 7 days  Lab Units 17  1911   TROPONIN T ng/mL 0.114*       Results from last 7 days  Lab Units 09/10/17  0625   WBC 10*3/mm3 30.24*   HEMOGLOBIN g/dL 7.7*   HEMATOCRIT % 23.5*   PLATELETS 10*3/mm3 356       Results from last 7 days  Lab Units 17  0729   INR  1.35*       Results from last  " days  Lab Units 09/07/17  1911   MAGNESIUM mg/dL 1.7                   Medication Review:     atorvastatin 10 mg Oral Daily   cosyntropin 0.25 mg Intravenous Once   dakin's  Topical Q12H   diltiaZEM  mg Oral Q24H   famotidine 20 mg Intravenous Q12H   fluconazole 200 mg Oral Q24H   lidocaine 1 patch Transdermal Q24H   piperacillin-tazobactam 3.375 g Intravenous Q8H   polyethylene glycol 17 g Oral Daily   sennosides-docusate sodium 2 tablet Oral Nightly   tamsulosin 0.8 mg Oral Nightly   vancomycin 750 mg Intravenous Q12H          lactated ringers 50 mL/hr Last Rate: 50 mL/hr (09/10/17 0516)   Pharmacy to dose vancomycin         Assessment/Plan     Active Hospital Problems (** Indicates Principal Problem)    Diagnosis Date Noted   • **Sepsis [A41.9] 09/06/2017   • Edema [R60.9] 09/08/2017   • Hypotension [I95.9] 09/07/2017   • Colitis [K52.9] 09/07/2017   • Anemia due to multiple mechanisms [D64.9] 09/07/2017   • Leukocytosis [D72.829] 08/29/2017   • HTN (hypertension) [I10] 08/29/2017   • Sacral decubitus ulcer [L89.159] 08/09/2017   • Epidural abscess [G06.2] 07/04/2017   • Hyponatremia [E87.1] 06/26/2017   • SIADH (syndrome of inappropriate ADH production) [E22.2] 06/25/2017   • A-fib [I48.91] 06/23/2017   • CKD (chronic kidney disease) stage 2, GFR 60-89 ml/min [N18.2] 06/20/2017      Resolved Hospital Problems    Diagnosis Date Noted Date Resolved   No resolved problems to display.     1.  Acute febrile illness/sepsis  2.  Leukocytosis  3.  Hypotension-fluid hydration  4.  Paroxysmal atrial fibrillation-patient is back in atrial fibrillation - rate controlled, continue same  5. colitis    Boogie Suggs III, MD  [facility]  09/10/17  9:30 AM

## 2017-09-10 NOTE — PLAN OF CARE
Problem: Patient Care Overview (Adult)  Goal: Plan of Care Review  Outcome: Ongoing (interventions implemented as appropriate)    09/10/17 0432   Coping/Psychosocial Response Interventions   Plan Of Care Reviewed With patient   Patient Care Overview   Progress no change   Outcome Evaluation   Outcome Summary/Follow up Plan Patients vitals stable. Patient reports pain and PRN pain medication given. Patient still having some confusion. Patient tolerating IV antibiotics without difficulty. FC to bedside drainage bag. Wound dressings changed. Patient asleep most of shift. Will continue to monitor.         Problem: Infection, Risk/Actual (Adult)  Goal: Identify Related Risk Factors and Signs and Symptoms  Outcome: Ongoing (interventions implemented as appropriate)  Goal: Infection Prevention/Resolution  Outcome: Ongoing (interventions implemented as appropriate)    Problem: Pressure Ulcer (Adult)  Goal: Signs and Symptoms of Listed Potential Problems Will be Absent or Manageable (Pressure Ulcer)  Outcome: Ongoing (interventions implemented as appropriate)    Problem: Fall Risk (Adult)  Goal: Absence of Falls  Outcome: Ongoing (interventions implemented as appropriate)

## 2017-09-10 NOTE — PROGRESS NOTES
[facility]     Mar/chart reviewed  F/u septic shock.   Discussed with wife and family at bedside who says she does not want the pt to suffer anymore.  Wants comfort care.    Vital Sign Min/Max for last 24 hours  Temp  Min: 97.4 °F (36.3 °C)  Max: 98.3 °F (36.8 °C)   BP  Min: 87/61  Max: 100/51   Pulse  Min: 79  Max: 94   Resp  Min: 16  Max: 20   SpO2  Min: 97 %  Max: 98 %   No Data Recorded   No Data Recorded     Appears ill  perrl, eomi, no icterus,  mmm, no jvd, trachea midline, neck supple,  chest cta bilaterally, +crackles, no wheezes,   Tachy, irrg  soft, nt, nd +bs,  no c/c/ e    Labs/films reviewed in EPIC      Echo: EF 41-45%; diastolic dysfunction;   CXR: left basilar infiltrate; pulm vasc congestion    A/P:  1. Sepsis with septic shock  2. Pneumonia -- suspect aspiration  3. Epidural abcess  4. Sacral decub  5. UTI -- pseudomonas  6. AFib with RVR  7. Hyponatremia  8. Anemia  9. Chronic systolic CHF  10. Immobility syndrome  11.CKD  12. Colitis    I think transition to hospice type care is appropriate.  Wife and family interested in this.  She says comfort is her main goal.  They would like to talk to the palliative care service.    Jarrod Fuentes MD

## 2017-09-10 NOTE — PROGRESS NOTES
"DAILY PROGRESS NOTE  [facility]    Patient Identification:  Name: Pavel Melgoza  Age: 83 y.o.  Sex: male  :  1934  MRN: [id number]         Primary Care Physician: Amanda Tanenr MD      Subjective  Pt w no c/o.  Fm at bedside.  They note that he has been confused today.  Fm is tearful and requesting palliative care only for the pt now.      Objective:  General Appearance:  Comfortable, well-appearing, in no acute distress and not in pain.    Vital signs: (most recent): Blood pressure 97/56, pulse 82, temperature 97.6 °F (36.4 °C), temperature source Oral, resp. rate 16, height 72\" (182.9 cm), weight 188 lb 15 oz (85.7 kg), SpO2 97 %.    Lungs:  Normal respiratory rate and normal effort.    Heart: Normal rate.  Irregular rhythm.    Abdomen: Abdomen is soft and distended.    Extremities: There is dependent edema.    Neurological: (Awake.  Oriented only to person.  ).    Skin:  Warm and dry.                Vital signs in last 24 hours:  Temp:  [97.4 °F (36.3 °C)-98.3 °F (36.8 °C)] 97.6 °F (36.4 °C)  Heart Rate:  [79-94] 82  Resp:  [16-20] 16  BP: ()/(51-61) 97/56    Intake/Output:    Intake/Output Summary (Last 24 hours) at 09/10/17 1511  Last data filed at 09/10/17 0516   Gross per 24 hour   Intake             1610 ml   Output              760 ml   Net              850 ml           Results from last 7 days  Lab Units 09/10/17  0625 17  0541 17  0729 17  1911 17  0549 17  1153   WBC 10*3/mm3 30.24* 42.55* 48.46* 41.04* 38.03* 35.45*   HEMOGLOBIN g/dL 7.7* 8.0* 9.4* 8.1* 8.2* 8.9*   PLATELETS 10*3/mm3 356 306 416 392 336 474     Results from last 7 days  Lab Units 09/10/17  0625 17  0541 17  0729 17  1911 17  0549 17  1153   SODIUM mmol/L 131* 130* 131* 129* 130* 129*   POTASSIUM mmol/L 3.3* 3.4* 3.5 3.6 4.0 4.1   CHLORIDE mmol/L 97* 99 96* 97* 96* 94*   CO2 mmol/L 20.6* 19.0* 21.2* 21.4* 19.7* 26.1   BUN mg/dL 27* " 27* 22 21 19 19   CREATININE mg/dL 0.86 0.80 0.82 0.86 0.85 0.86   GLUCOSE mg/dL 99 122* 127* 133* 95 121*   Estimated Creatinine Clearance: 78.9 mL/min (by C-G formula based on Cr of 0.86).  Results from last 7 days  Lab Units 09/10/17  0625 09/09/17  0541 09/08/17  0729 09/07/17  1911 09/07/17  0549 09/06/17  1153   CALCIUM mg/dL 7.2* 7.1* 7.2* 7.0* 7.2* 7.6*   ALBUMIN g/dL  --  1.50* 1.90*  --   --  2.40*   MAGNESIUM mg/dL  --   --   --  1.7  --   --    PHOSPHORUS mg/dL  --   --  3.7  --   --   --      Results from last 7 days  Lab Units 09/09/17  0541 09/08/17  0729 09/06/17  1153   ALBUMIN g/dL 1.50* 1.90* 2.40*   BILIRUBIN mg/dL 0.6 0.8 0.7   ALK PHOS U/L 82 92 81   AST (SGOT) U/L 28 29 18   ALT (SGPT) U/L 23 21 19       Assessment:  Principal Problem:    Sepsis  Active Problems:    Hypotension    CKD (chronic kidney disease) stage 2, GFR 60-89 ml/min    A-fib    SIADH (syndrome of inappropriate ADH production)    Hyponatremia    Epidural abscess    Sacral decubitus ulcer    HTN (hypertension)    Leukocytosis    Colitis    Anemia due to multiple mechanisms    Edema -pt is 3rd spacing his fluids.         Plan:  Discussed w pt wife and fm.  Discussed [facility], Palliative....  The are requesting palliative care.    Over 35 min w over 1/2 in counseling and medical management.     Nadeem Benjamin MD  9/10/2017  3:11 PM

## 2017-09-10 NOTE — PLAN OF CARE
Problem: Patient Care Overview (Adult)  Goal: Plan of Care Review  Outcome: Ongoing (interventions implemented as appropriate)    09/10/17 5461   Coping/Psychosocial Response Interventions   Plan Of Care Reviewed With patient;spouse;daughter   Patient Care Overview   Progress declining   Outcome Evaluation   Outcome Summary/Follow up Plan ptn transferred to [facility] for full palliative care, family wants patient to be painfree and comfortable. educated on unit and palliative care. medicated with 2 mg of morphine and 1 mg of ativatn for pain and anxiiety, Gong catheter for wound, continue to monitor for comfort per palliative protocol        Goal: Adult Individualization and Mutuality  Outcome: Ongoing (interventions implemented as appropriate)  Goal: Discharge Needs Assessment  Outcome: Ongoing (interventions implemented as appropriate)    Problem: Infection, Risk/Actual (Adult)  Goal: Identify Related Risk Factors and Signs and Symptoms  Outcome: Ongoing (interventions implemented as appropriate)  Goal: Infection Prevention/Resolution  Outcome: Ongoing (interventions implemented as appropriate)    Problem: Pressure Ulcer (Adult)  Goal: Signs and Symptoms of Listed Potential Problems Will be Absent or Manageable (Pressure Ulcer)  Outcome: Ongoing (interventions implemented as appropriate)    Problem: Fall Risk (Adult)  Goal: Absence of Falls  Outcome: Ongoing (interventions implemented as appropriate)    Problem: Dying Patient, Actively (Adult)  Goal: Identify Related Risk Factors and Signs and Symptoms  Outcome: Ongoing (interventions implemented as appropriate)  Goal: Comfort/Pain Control  Outcome: Ongoing (interventions implemented as appropriate)  Goal: Dying Process, Peace and Dignity  Outcome: Ongoing (interventions implemented as appropriate)

## 2017-09-11 NOTE — PLAN OF CARE
Problem: Patient Care Overview (Adult)  Goal: Plan of Care Review  Outcome: Ongoing (interventions implemented as appropriate)    09/10/17 1933 09/10/17 2228 09/11/17 0346   Coping/Psychosocial Response Interventions   Plan Of Care Reviewed With --  patient --    Patient Care Overview   Progress declining --  --    Outcome Evaluation   Outcome Summary/Follow up Plan --  --  Pt rested well after comfort meds. Family at bedside. Continue comfort care.        Goal: Adult Individualization and Mutuality  Outcome: Ongoing (interventions implemented as appropriate)  Goal: Discharge Needs Assessment  Outcome: Ongoing (interventions implemented as appropriate)    Problem: Infection, Risk/Actual (Adult)  Goal: Identify Related Risk Factors and Signs and Symptoms  Outcome: Ongoing (interventions implemented as appropriate)  Goal: Infection Prevention/Resolution  Outcome: Ongoing (interventions implemented as appropriate)    Problem: Pressure Ulcer (Adult)  Goal: Signs and Symptoms of Listed Potential Problems Will be Absent or Manageable (Pressure Ulcer)  Outcome: Ongoing (interventions implemented as appropriate)    Problem: Fall Risk (Adult)  Goal: Absence of Falls  Outcome: Ongoing (interventions implemented as appropriate)    Problem: Dying Patient, Actively (Adult)  Goal: Identify Related Risk Factors and Signs and Symptoms  Outcome: Outcome(s) achieved Date Met:  09/11/17  Goal: Comfort/Pain Control  Outcome: Ongoing (interventions implemented as appropriate)  Goal: Dying Process, Peace and Dignity  Outcome: Ongoing (interventions implemented as appropriate)

## 2017-09-11 NOTE — PROGRESS NOTES
INFECTIOUS DISEASES PROGRESS NOTE    CC: f/u sepsis    S:   Transitioned to palliative care. History from wife. He is comfortable. He is minimally responsive. No labs checked today.    ROS: can't obtain due to mental status    O:  Physical Exam:  Temp:  [98.7 °F (37.1 °C)] 98.7 °F (37.1 °C)  Heart Rate:  [82-94] 94  Resp:  [16-20] 20  BP: ()/(56-60) 97/60  Physical Exam   Constitutional: He appears well-developed and well-nourished.   Calm, resting comfortably   HENT:   Head: Normocephalic.   Eyes: No scleral icterus.   Cardiovascular: Normal rate.    Pulmonary/Chest: Breath sounds normal.   tachypneic on room air   Abdominal: Soft.   Musculoskeletal: He exhibits edema.   Neurological:   lethargic   Skin: Skin is warm and dry.        Diagnostics:    CBC, Crt, micro reviewed today  Lab Results   Component Value Date    WBC 30.24 (C) 09/10/2017    HGB 7.7 (L) 09/10/2017    HCT 23.5 (L) 09/10/2017    MCV 83.3 09/10/2017     09/10/2017     Lab Results   Component Value Date    CREATININE 0.86 09/10/2017     9/6 BCx negative    9/8 CXR reviewed with pulmonary congestion    Assessment/Plan   1. Sepsis due to unspecified organism  2. Anemia  3. Epidural and retroperitoneal abscess secondary to ampicillin-sensitive Enterococcus s/p washout 7/4/17 and s/p IR guided drainage of RP abscess on 7/5/17  4. History of Wound dehiscence and drainage  5. Recurrent epidural abscess s/p washout 8/10/17 - GPCs on gram stain but Cx negative  6. History of Pseudomonas CAUTI - recently treated  7. Stage 4 sacral ulcer    Patient now transitioned to comfort measures with no further need for the antibiotics which have been DC'ed. I did my best to offer comfort to his wife as this has been a tough few months for her. Thank you for allowing me to be involved in the care of Mr Melgoza.       Chandra Ponce MD  [phone]  09/11/17

## 2017-09-11 NOTE — PROGRESS NOTES
Discharge Planning Assessment  [facility]     Patient Name: Pavel Melgoza  MRN: [id number]  Today's Date: 9/11/2017    Admit Date: 9/6/2017          Discharge Needs Assessment     None            Discharge Plan       09/11/17 1341    Case Management/Social Work Plan    Additional Comments The patient transferred to [facility] from [address] [place] [address] on 9/10/17 @ 17:27. The patient is palliative. CCP will follow for any needs that may arise. Discharge plan is for the patient to go to [facility] at discharge. CORINNE Jason RN, CCP        Discharge Placement     Facility/Agency Request Status Selected? Address Phone Number Fax Number    [facility] Accepted    Yes [address] [facility] [id number] [phone]                Demographic Summary     None            Functional Status     None            Psychosocial     None            Abuse/Neglect     None            Legal     None            Substance Abuse     None            Patient Forms     None          Ilana Jason RN

## 2017-09-11 NOTE — CONSULTS
Patient previously seen by Humaira Suggs - please see note from 9/8. Patient transferred on 9/10 as palliative care patient with full comfort measures.

## 2017-09-11 NOTE — PLAN OF CARE
Problem: Patient Care Overview (Adult)  Goal: Plan of Care Review  Outcome: Ongoing (interventions implemented as appropriate)    09/11/17 5668   Coping/Psychosocial Response Interventions   Plan Of Care Reviewed With patient   Patient Care Overview   Progress declining   Outcome Evaluation   Outcome Summary/Follow up Plan pt premedicated AC turns. continue comfort care.       Goal: Adult Individualization and Mutuality  Outcome: Ongoing (interventions implemented as appropriate)  Goal: Discharge Needs Assessment  Outcome: Ongoing (interventions implemented as appropriate)    Problem: Infection, Risk/Actual (Adult)  Goal: Identify Related Risk Factors and Signs and Symptoms  Outcome: Ongoing (interventions implemented as appropriate)  Goal: Infection Prevention/Resolution  Outcome: Ongoing (interventions implemented as appropriate)    Problem: Pressure Ulcer (Adult)  Goal: Signs and Symptoms of Listed Potential Problems Will be Absent or Manageable (Pressure Ulcer)  Outcome: Ongoing (interventions implemented as appropriate)    Problem: Fall Risk (Adult)  Goal: Absence of Falls  Outcome: Ongoing (interventions implemented as appropriate)    Problem: Dying Patient, Actively (Adult)  Goal: Comfort/Pain Control  Outcome: Ongoing (interventions implemented as appropriate)  Goal: Dying Process, Peace and Dignity  Outcome: Ongoing (interventions implemented as appropriate)

## 2017-09-11 NOTE — PROGRESS NOTES
[facility]     Mar/chart reviewed  F/u septic shock.   Discussed with wife and family at bedside.  Now full palliative.  Vital Sign Min/Max for last 24 hours  Temp  Min: 98.7 °F (37.1 °C)  Max: 98.7 °F (37.1 °C)   BP  Min: 97/60  Max: 104/57   Pulse  Min: 82  Max: 94   Resp  Min: 16  Max: 20   SpO2  Min: 95 %  Max: 97 %   No Data Recorded   No Data Recorded       No distress  chest course bilaterally    A/P:  1. Sepsis with septic shock  2. Pneumonia -- suspect aspiration  3. Epidural abcess  4. Sacral decub  5. UTI -- pseudomonas  6. AFib with RVR  7. Hyponatremia  8. Anemia  9. Chronic systolic CHF  10. Immobility syndrome  11.CKD  12. Colitis    Comfort is now primary goal.  Discussed with wife and family at bedside.    Progressing.  Will sign off.    Jarrod Fuentes MD

## 2017-09-11 NOTE — PROGRESS NOTES
LOS: 5 days     Name: Pavel Melgoza  Age/Sex: 83 y.o. male  :  1934        PCP: Amanda Tanner MD    Subjective   Comfortably per nursing and family at the bedside.  No new issues to address today.  Questions were answered regarding prognosis and plan moving forward.    Unable to assess review of systems      dakin's  Topical Q12H   lidocaine 1 patch Transdermal Q24H          Objective   Vital Signs  Temp:  [98.7 °F (37.1 °C)] 98.7 °F (37.1 °C)  Heart Rate:  [82-94] 94  Resp:  [16-20] 20  BP: ()/(56-60) 97/60  Body mass index is 25.62 kg/(m^2).    Intake/Output Summary (Last 24 hours) at 17 0746  Last data filed at 17 0448   Gross per 24 hour   Intake                0 ml   Output              450 ml   Net             -450 ml       Physical Exam   Constitutional: No distress.   He is resting quietly and encephalopathic does not open his eyes to voice or stimuli   HENT:   Head: Normocephalic and atraumatic.   Eyes: No scleral icterus.   Eyes closed resting comfortably   Neck: Neck supple. No JVD present.   Cardiovascular: Normal rate and regular rhythm.    Pulmonary/Chest: Effort normal and breath sounds normal.   He is a little tachypnic some upper airway rhonchi noted   Abdominal: Soft. Bowel sounds are normal.   Musculoskeletal: He exhibits no edema.   Neurological:   Encephalopathic   Skin: Skin is warm and dry. No rash noted. No erythema.   Psychiatric:   Encephalopathic unable to assess   Nursing note and vitals reviewed.        Results Review:       I reviewed the patient's new clinical results.    Results from last 7 days  Lab Units 09/10/17  0625 09/09/17  0541 17  0729 17  1911 17  0549 17  1153   WBC 10*3/mm3 30.24* 42.55* 48.46* 41.04* 38.03* 35.45*   HEMOGLOBIN g/dL 7.7* 8.0* 9.4* 8.1* 8.2* 8.9*   PLATELETS 10*3/mm3 356 306 416 392 336 474     Results from last 7 days  Lab Units 09/10/17  0625 17  0541 17  0729  09/07/17  1911 09/07/17  0549 09/06/17  1153   SODIUM mmol/L 131* 130* 131* 129* 130* 129*   POTASSIUM mmol/L 3.3* 3.4* 3.5 3.6 4.0 4.1   CHLORIDE mmol/L 97* 99 96* 97* 96* 94*   CO2 mmol/L 20.6* 19.0* 21.2* 21.4* 19.7* 26.1   BUN mg/dL 27* 27* 22 21 19 19   CREATININE mg/dL 0.86 0.80 0.82 0.86 0.85 0.86   CALCIUM mg/dL 7.2* 7.1* 7.2* 7.0* 7.2* 7.6*   MAGNESIUM mg/dL  --   --   --  1.7  --   --    PHOSPHORUS mg/dL  --   --  3.7  --   --   --    Estimated Creatinine Clearance: 78.9 mL/min (by C-G formula based on Cr of 0.86).      Assessment/Plan   Principal Problem:    Sepsis  Active Problems:    CKD (chronic kidney disease) stage 2, GFR 60-89 ml/min    A-fib    SIADH (syndrome of inappropriate ADH production)    Hyponatremia    Epidural abscess    Sacral decubitus ulcer    HTN (hypertension)    Leukocytosis    Hypotension    Colitis    Anemia due to multiple mechanisms    Edema      PLAN  - Schedule present care with family.  He is requiring medications every 2-4 hours.  They would prefer him not to return to the nursing home but are unable to take him home.  We discussed hospice scattered bed versus hospice inpatient unit and they're agreeable to meeting with hospice at the present time.  Consult was placed in the computer.  - Schedule nursing is requiring meds about every 4 hours at this point.  Has been resting relatively stable and medicated.      Disposition  Plan hospice scattered bed      Adryan Hernandez MD  [facility]  09/11/17  7:46 AM

## 2017-09-12 PROBLEM — Z51.5 ADMISSION FOR HOSPICE CARE: Status: ACTIVE | Noted: 2017-01-01

## 2017-09-12 NOTE — CONSULTS
Admitted to [facility] today.   Reviewed Care Guide with kori.  Thank you for this referral.  Kady Uribe RN  [facility]  [phone]

## 2017-09-12 NOTE — DISCHARGE SUMMARY
Date of Admission: 9/6/2017  Date of Discharge:  9/12/2017    PCP: Amanda Tanner MD      DISCHARGE DIAGNOSIS  Principal Problem:    Sepsis  Active Problems:    CKD (chronic kidney disease) stage 2, GFR 60-89 ml/min    A-fib    SIADH (syndrome of inappropriate ADH production)    Hyponatremia    Epidural abscess    Sacral decubitus ulcer    HTN (hypertension)    Leukocytosis    Hypotension    Colitis    Anemia due to multiple mechanisms    Edema      SECONDARY DIAGNOSES  Past Medical History:   Diagnosis Date   • Arthritis    • Atrial fibrillation    • Coronary artery disease    • Dementia    • GERD (gastroesophageal reflux disease)    • History of transfusion    • Hyperlipidemia    • Hypertension    • Plasmacytoma    • Urinary retention        CONSULTS   Consults     Date and Time Order Name Status Description    9/8/2017 1008 Inpatient Consult to Cardiology Completed     9/7/2017 1956 Inpatient Consult to Pulmonology Completed     9/7/2017 0840 Inpatient Consult to Plastic Surgery Completed     9/6/2017 1518 Inpatient Consult to Infectious Diseases Completed     9/6/2017 1450 [facility] (on-call MD unless specified) Completed     8/30/2017 1313 Inpatient Consult to Infectious Diseases Completed     8/29/2017 1739 [facility] (on-call MD unless specified) Completed     8/11/2017 1011 Inpatient Consult to Urology Completed     8/9/2017 2059 Inpatient Consult to Nephrology Completed     8/9/2017 1457 [facility] (on-call MD unless specified) Completed     8/9/2017 1429 Neurosurgery (on-call MD unless specified) Completed           PROCEDURES PERFORMED      HOSPITAL COURSE  Patient is a 83 y.o. male presented to [facility] complaining of Abnormal labs at the nursing home.  Please see the admitting history and physical for further details.  Is admitted to the hospital for an unknown infection.  Infectious disease was consulted and continued broad antibiotics.  Ultimately over the course of his hospitalization  "and multiple complications requiring transfer to the ICU.  The family at that point decided they would not want heroic measures done and he was transferred back out to the telemetry unit.  Most of this stems from complications related to an epidural abscess that he's had for quite some time.  He's been on antibiotics for this for a few weeks now.  He also developed colitis likely related to either C. difficile or other infectious type.  At that point goals of care were discussed with the family and he was transitioned to palliative care.  At the time of discharge to hospice scattered bed he has hours to days remaining.  Is requiring large doses of pain medications for comfort and will likely remain in the hospital.      CONDITION ON DISCHARGE  Stable.      VITAL SIGNS  BP (!) 80/48 (BP Location: Right arm, Patient Position: Lying)  Pulse 109  Temp 98.2 °F (36.8 °C) (Oral)   Resp (!) 32  Ht 72\" (182.9 cm)  Wt 188 lb 15 oz (85.7 kg)  SpO2 92%  BMI 25.62 kg/m2  Objective:  General Appearance:  Comfortable.    Vital signs: (most recent): Blood pressure (!) 80/48, pulse 109, temperature 98.2 °F (36.8 °C), temperature source Oral, resp. rate (!) 32, height 72\" (182.9 cm), weight 188 lb 15 oz (85.7 kg), SpO2 92 %.  Vital signs are normal.    Output: Producing urine.    HEENT: Normal HEENT exam.    Lungs:  Normal effort.  Breath sounds clear to auscultation.    Heart: Normal rate.  S1 normal and S2 normal.    Abdomen: Abdomen is soft.  Bowel sounds are normal.   There is no abdominal tenderness.     Extremities: Normal range of motion.    Pulses: Distal pulses are intact.    Neurological: Patient is alert and oriented to person, place and time.                DISCHARGE DISPOSITION   Hospice/Medical Facility ([facility])      DISCHARGE MEDICATIONS  Continue Palliative medications    TEST  RESULTS PENDING AT DISCHARGE         Adryan Hernandez MD  [facility]  09/12/17  2:12 " PM      Time: greater than 30 minutes.

## 2017-09-12 NOTE — PLAN OF CARE
Problem: Patient Care Overview (Adult)  Goal: Plan of Care Review  Outcome: Ongoing (interventions implemented as appropriate)    09/12/17 8010   Coping/Psychosocial Response Interventions   Plan Of Care Reviewed With patient;family   Patient Care Overview   Progress declining   Outcome Evaluation   Outcome Summary/Follow up Plan Pt turned 9,1, 5. Pt premedicated before turns. pt swtiched to dilaudid today and has had good pain control from it. pt made a HSB today.Continue comfort care.          Goal: Adult Individualization and Mutuality  Outcome: Ongoing (interventions implemented as appropriate)  Goal: Discharge Needs Assessment  Outcome: Ongoing (interventions implemented as appropriate)    Problem: Dying Patient, Actively (Adult)  Goal: Comfort/Pain Control  Outcome: Ongoing (interventions implemented as appropriate)  Goal: Dying Process, Peace and Dignity  Outcome: Ongoing (interventions implemented as appropriate)    Problem: Fall Risk (Adult)  Goal: Absence of Falls  Outcome: Ongoing (interventions implemented as appropriate)    Problem: Pressure Ulcer (Adult)  Goal: Signs and Symptoms of Listed Potential Problems Will be Absent or Manageable (Pressure Ulcer)  Outcome: Ongoing (interventions implemented as appropriate)

## 2017-09-12 NOTE — PLAN OF CARE
Problem: Patient Care Overview (Adult)  Goal: Plan of Care Review  Outcome: Ongoing (interventions implemented as appropriate)    09/12/17 045   Coping/Psychosocial Response Interventions   Plan Of Care Reviewed With patient   Patient Care Overview   Progress declining   Outcome Evaluation   Outcome Summary/Follow up Plan Patients daughter did not want us to turn patient or medicate him unless she asked. Checkd several times overnight, daughter feels he looks comforable and does not want to disturb him. Patient appears to be resying comfortably.        Goal: Discharge Needs Assessment  Outcome: Ongoing (interventions implemented as appropriate)    Problem: Infection, Risk/Actual (Adult)  Goal: Identify Related Risk Factors and Signs and Symptoms  Outcome: Ongoing (interventions implemented as appropriate)  Goal: Infection Prevention/Resolution  Outcome: Ongoing (interventions implemented as appropriate)    Problem: Pressure Ulcer (Adult)  Goal: Signs and Symptoms of Listed Potential Problems Will be Absent or Manageable (Pressure Ulcer)  Outcome: Ongoing (interventions implemented as appropriate)    Problem: Fall Risk (Adult)  Goal: Absence of Falls  Outcome: Ongoing (interventions implemented as appropriate)    Problem: Dying Patient, Actively (Adult)  Goal: Comfort/Pain Control  Outcome: Ongoing (interventions implemented as appropriate)  Goal: Dying Process, Peace and Dignity  Outcome: Ongoing (interventions implemented as appropriate)

## 2017-09-13 PROBLEM — J69.0 ASPIRATION PNEUMONIA (HCC): Status: ACTIVE | Noted: 2017-01-01

## 2017-09-13 NOTE — H&P
Patient Care Team:  Amanda Tanner MD as PCP - General (Family Medicine)  Chandra Ponce MD as Consulting Physician (Infectious Diseases)  Nolberto Fraser Jr., MD as Consulting Physician (Urology)    Chief complaint: AMS    Subjective     History of Present Illness    Patient is a 83 y.o. male presented to [facility] complaining of Abnormal labs at the nursing home.  Please see the admitting history and physical for further details- he has complicated history including recent epidural and retroperitoneal abscess.  Infectious disease was consulted and continued broad antibiotics. He had colitis and aspiration pneumonia.  Ultimately over the course of his hospitalization and multiple complications requiring transfer to the ICU.  The family at that point decided they would not want heroic measures done and he was transferred back out to the telemetry unit.  Most of this stems from complications related to an epidural abscess that he's had for quite some time.  He's been on antibiotics for this for a few weeks now.  He also developed colitis likely related to either C. difficile or other infectious type.  At that point goals of care were discussed with the family and he was transitioned to palliative care. He had received IV pain medications and other agents for comfort with [facility].    Review of Systems   Unable to perform ROS: Mental status change        Past Medical History:   Diagnosis Date   • Arthritis    • Atrial fibrillation    • Coronary artery disease    • Dementia    • GERD (gastroesophageal reflux disease)    • History of transfusion    • Hyperlipidemia    • Hypertension    • Plasmacytoma    • Urinary retention      Past Surgical History:   Procedure Laterality Date   • ABDOMINAL SURGERY     • BACK SURGERY     • CERVICAL LAMINECTOMY DECOMPRESSION POSTERIOR N/A 8/10/2017    Procedure: OPEN DEBRIDEMENT OF EPIDURAL ABCCESS;  Surgeon: Nolberto Hernandez IV, MD;   Location: [facility] OR;  Service:    • FRACTURE SURGERY     • HERNIA REPAIR     • INGUINAL HERNIA REPAIR Left 6/24/2017    Procedure: LAPAROSCOPIC LEFT INGUINAL HERNIA REPAIR WITH MESH;  Surgeon: Erwin Mei MD;  Location: [facility] OR;  Service:    • JOINT REPLACEMENT      bilateral hips   • LUMBAR LAMINECTOMY DISCECTOMY DECOMPRESSION N/A 7/4/2017    Procedure: LUMBAR LAMINECTOMY FOR RESECTION OF EPIDURAL ABCESS ;  Surgeon: Nolberto Hernandez IV, MD;  Location: [facility] OR;  Service:      Family History   Problem Relation Age of Onset   • Cancer Sister    • Cancer Brother    • Cancer Child      Social History   Substance Use Topics   • Smoking status: Former Smoker     Packs/day: 1.00     Types: Cigarettes     Quit date: 8/29/1995   • Smokeless tobacco: Never Used   • Alcohol use No     Prescriptions Prior to Admission   Medication Sig Dispense Refill Last Dose   • atorvastatin (LIPITOR) 10 MG tablet Take 10 mg by mouth Daily.   9/6/2017 at Unknown time   • B Complex Vitamins (B COMPLEX-B12 PO) Take 1 tablet by mouth Daily.   9/6/2017 at Unknown time   • cholecalciferol 5000 UNITS tablet Take 5,000 Units by mouth Daily.   9/6/2017 at Unknown time   • collagenase (SANTYL) 250 UNIT/GM ointment Apply  topically 2 (Two) Times a Day.   9/6/2017 at Unknown time   • dakin's (HYSEPT) 0.25 % solution topical solution Apply  topically Every 12 (Twelve) Hours.   9/6/2017 at Unknown time   • diltiaZEM CD (CARDIZEM CD) 240 MG 24 hr capsule Take 1 capsule by mouth Daily.   9/6/2017 at Unknown time   • ferrous sulfate 325 (65 FE) MG tablet Take 325 mg by mouth Daily With Breakfast.   9/6/2017 at Unknown time   • fluconazole (DIFLUCAN) 200 MG tablet Take 1 tablet by mouth Daily for 13 doses. Indications: Skin and Soft Tissue Infection 13 tablet 0 9/6/2017 at Unknown time   • HYDROcodone-acetaminophen (NORCO) 5-325 MG per tablet Take 1 tablet by mouth Every 4 (Four) Hours As Needed for Moderate Pain  or Severe Pain . 18  tablet 0 2017 at Unknown time   • lidocaine (LIDODERM) 5 % Place 1 patch on the skin Daily. Place on right hip. Remove & Discard patch within 12 hours or as directed by MD   2017 at Unknown time   • lisinopril (PRINIVIL,ZESTRIL) 20 MG tablet Take 20 mg by mouth Daily.   2017 at Unknown time   • Multiple Vitamins-Minerals (CENTRUM SILVER ULTRA MENS) tablet Take 1 tablet by mouth Daily.   2017 at Unknown time   • pantoprazole (PROTONIX) 40 MG EC tablet Take 1 tablet by mouth 2 (Two) Times a Day Before Meals.   2017 at Unknown time   • ramipril (ALTACE) 5 MG capsule Take 1 capsule by mouth Daily.   Unknown at Unknown time   • sennosides-docusate sodium (SENOKOT-S) 8.6-50 MG tablet Take 1 tablet by mouth At Night As Needed for Constipation.   2017 at Unknown time   • tamsulosin (FLOMAX) 0.4 MG capsule 24 hr capsule Take 2 capsules by mouth Every Night. 30 capsule  2017 at Unknown time   • vancomycin (VANCOCIN) 1000 MG injection Infuse 1,000 mg into a venous catheter 2 (Two) Times a Day.   2017 at Unknown time   • vitamin C (ASCORBIC ACID) 500 MG tablet Take 500 mg by mouth 2 (Two) Times a Day.   2017 at Unknown time     Allergies:  Review of patient's allergies indicates no known allergies.    Objective      Vital Signs  Temp:  [97.7 °F (36.5 °C)] 97.7 °F (36.5 °C)  Heart Rate:  [0-122] 0  Resp:  [0-32] 0  BP: (71)/(44) 71/44    Physical Exam    Ashen color  Not breathing, no RR  No pulses, +cyanosis  +decubitus ulcer  No heart sounds  2-3+ LE edema    Pt is , remainder of exam is deferred out of respect    Results Review:   I reviewed the patient's new clinical results.  CT Abdomen Pelvis Without Contrast [[id number]] Not Reviewed       Order Status: Completed Collected: 17 1707      Updated: 17 1051     Narrative:       CT ABDOMEN PELVIS WITHOUT CONTRAST     HISTORY: Nausea. Elevated white blood cell count. Abdominal pain.      TECHNIQUE: CT of the abdomen and  pelvis without IV or oral contrast.     COMPARISON: CT of the abdomen and pelvis without contrast 06/23/2017.     FINDINGS: There is abnormal wall thickening of the cecum consistent with  colitis. Moderate stool is present throughout the colon. There is  mesenteric stranding with mild ascites and fluid extends into the  pelvis. No dilated small bowel loops are evident.     There is also perinephric stranding which may be chronic.  There is no  hydronephrosis. Bilateral renal low density lesions are not well  evaluated without contrast though are most likely cysts. The spleen,  liver and adrenal glands appear within normal limits.     There is an infrarenal abdominal aortic aneurysm measuring 4.2 x 3.8 cm.  There is chronic sclerosis involving the sacrum and left iliac body  where there are chronic and united fractures.     Urinary bladder is thick-walled and mostly decompressed. A Gong  catheter is present. Along the superior margin of the gluteal crease  there appears to be a sacral ulcer with overlying bandaging material.   No CT evidence for underlying osteomyelitis. Calcified pleural plaques  present along the right lung base. There is generalized body wall edema.  There are compression deformities at T10, T12 and all levels of the  lumbar spine without evidence for significant change.        Impression:       1.  Abnormal wall thickening of the cecum consistent with  colitis/typhlitis. Moderate stool throughout the colon.  2. Third spacing of fluid with body wall edema, mesenteric stranding,  mild ascites.  3.  Thick-walled decompressed urinary bladder, potential cystitis.  4.  Sacral decubitus ulcer along the superior margin of the gluteal  crease.  5.  Chronic sclerosis and insufficiency fractures involving the sacrum  and left iliac body without change.   6.  Small bilateral pleural effusions.   7.  Calcified pleural plaque right lung base.  8.  4.2 cm infrarenal abdominal aortic aneurysm.     XR Chest 1  View [[id number]] Not Reviewed        Order Status: Completed Collected: 17        Updated: 17       Narrative:         ONE VIEW PORTABLE CHEST     HISTORY: Wheezing. Hypotension.     FINDINGS: There is cardiomegaly with mild-to-moderate vascular  congestion suspicious for mild congestive heart failure that is similar  to yesterday's exam. There is slight worsening of some haziness at the  left base that may represent an area of developing pneumonia and  continued follow-up evaluation is recommended.               Assessment/Plan     Active Problems:    A-fib    Epidural abscess    Sacral decubitus ulcer    Dementia    Sepsis    Colitis    Admission for hospice care    Aspiration pneumonia    Mr Melgoza was discharged from acute care hospitalization and admitted to [facility]. He was kept comfortable with IV pain medication and other agents. Counseling to family. Ultimately he did  on 17 at 12:50AM.     Assessment & Plan    I discussed the patients findings and my recommendations with nursing staff. Reviewed previous records    Wagner Charles MD  17  2:23 AM

## 2017-09-13 NOTE — DISCHARGE SUMMARY
Name: Pavel Melgoza ADMIT: 2017   : 1934  PCP: Amanda Tanner MD    MRN: [id number] LOS: 1 days   AGE/SEX: 83 y.o. male  ROOM: [facility] [place]     Date of Admission: 2017  Date of Death:  2017  Time of Death:  00:50    PCP: Amanda Tanner MD    Principle Cause of Death:   Aspiration pneumonia    Secondary Diagnoses:   Principal Problem:    Admission for hospice care  Active Problems:    A-fib    Epidural abscess    Sacral decubitus ulcer    Dementia    Sepsis    Colitis    Aspiration pneumonia        Presenting Problem/History of Present Illness  Admission for hospice care [Z51.5]    Physical   Ashen color  Not breathing, no RR  No pulses, +cyanosis  +decubitus ulcer  No heart sounds  2-3+ LE edema    Hospital Course  Patient is a 83 y.o. male presented to [facility] complaining of Abnormal labs at the nursing home.  Please see the admitting history and physical for further details- he has complicated history including recent epidural and retroperitoneal abscess.  Infectious disease was consulted and continued broad antibiotics. He had colitis and aspiration pneumonia.  Ultimately over the course of his hospitalization and multiple complications requiring transfer to the ICU.  The family at that point decided they would not want heroic measures done and he was transferred back out to the telemetry unit.  Most of this stems from complications related to an epidural abscess that he's had for quite some time.  He's been on antibiotics for this for a few weeks now.  He also developed colitis likely related to either C. difficile or other infectious type.  At that point goals of care were discussed with the family and he was transitioned to palliative care. He had received IV pain medications and other agents for comfort with [facility]. Ultimately he  on 17 at 12:50AM.      Wagner Charles MD  17  2:25 AM

## 2017-09-13 NOTE — PROGRESS NOTES
Case Management Discharge Note    Final Note: The patient  on 17 @ 00:50. BMary Jason RN, CCP    Discharge Placement     No information found             Discharge Codes: Hospice discharge status code 41  in a medical facility, such as hospital, SNF, ICF or free-standing hospice

## 2018-01-10 NOTE — PROGRESS NOTES
[facility]               ASSOCIATES     LOS: 11 days     Name: Pavel Melgoza  Age: 83 y.o.  Sex: male  :  1934  MRN: [id number]         Primary Care Physician: Amanda Tanner MD    Subjective    Patient no new complaints.    Diet Regular; Daily Fluid Restriction; 1500 mL Fluid    Objective   Temp:  [98 °F (36.7 °C)-99.6 °F (37.6 °C)] 98.4 °F (36.9 °C)  Heart Rate:  [76-90] 90  Resp:  [16-20] 18  BP: (109-138)/(57-84) 109/66  SpO2:  [96 %-98 %] 96 %  on   ;   O2 Device: room air  Body mass index is 23.61 kg/(m^2).    Physical Exam   Constitutional: No distress.   Cardiovascular: Normal rate.  An irregular rhythm present.   Pulmonary/Chest: Effort normal and breath sounds normal. No respiratory distress.   Abdominal: Soft. He exhibits no distension. There is no tenderness. There is no rebound and no guarding.   Musculoskeletal: He exhibits no edema.   Neurological: He is alert. He has normal strength.   Skin: Skin is warm and dry. No erythema.   Psychiatric: He is not aggressive.     Reviewed medications and new clinical results    cholecalciferol 5,000 Units Oral Daily   demeclocycline 300 mg Oral Q12H   diltiaZEM  mg Oral Q24H   lidocaine 1 mL Intradermal Once   pantoprazole 40 mg Oral BID AC   ramipril 10 mg Oral Daily   vitamin B-12 2,000 mcg Oral Daily        Results from last 7 days  Lab Units 17  0455 17  0508 17  0536 17  0401 17  0019 17  0553 17  0529   WBC 10*3/mm3 18.02* 17.00* 11.18* 12.28* 12.94* 13.01* 15.30*   HEMOGLOBIN g/dL 11.0* 10.3* 10.2* 10.7* 10.9* 9.9* 9.7*   PLATELETS 10*3/mm3 690* 627* 562* 466 337 227 207       Results from last 7 days  Lab Units 17  0455 17  0508 17  0536 17  0401 17  0019 17  1759 17  1155   SODIUM mmol/L 123* 125* 125* 129* 127*  127* 127* 126*   POTASSIUM mmol/L 4.5 4.8 4.7 4.6 4.6  4.6 4.7 4.5   CHLORIDE mmol/L 88* 90* 89*  OK. What is he requesting? Proair?    91* 88*  88* 89* 89*   CO2 mmol/L 23.3 21.7* 25.3 25.2 24.5  24.5 25.3 23.7   BUN mg/dL 29* 25* 27* 26* 22  22 23 21   CREATININE mg/dL 0.79 0.82 0.87 0.79 0.84  0.84 0.73* 0.78   CALCIUM mg/dL 8.5* 8.2* 8.1* 8.1* 8.2*  8.2* 8.2* 8.1*   GLUCOSE mg/dL 103* 100* 103* 113* 108*  108* 118* 126*     Estimated Creatinine Clearance: 76.8 mL/min (by C-G formula based on Cr of 0.79).     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] 06/25/2017   • Left inguinal hernia [K40.90] 06/24/2017   • Meningioma [D32.9] 06/24/2017   • Metabolic acidosis [E87.2] 06/23/2017   • Hypocalcemia [E83.51] 06/23/2017   • A-fib [I48.91] 06/23/2017   • B12 deficiency [E53.8] 06/21/2017   • Generalized weakness [R53.1] 06/20/2017   • Plasmocytoma [C90.30] 06/20/2017   • Nausea & vomiting [R11.2] 06/20/2017   • Diarrhea [R19.7] 06/20/2017   • Fall [W19.XXXA] 06/20/2017   • Noncompliance with medication regimen [Z91.14] 06/20/2017   • Left leg weakness [M62.81] 06/20/2017   • CKD (chronic kidney disease) stage 2, GFR 60-89 ml/min [N18.2] 06/20/2017      Resolved Hospital Problems    Diagnosis Date Noted Date Resolved   No resolved problems to display.     · S/P RAHUL 6/22/17.  · S/P Laparoscopic left inguinal hernia repair, transabdominal preperitoneal approach 6/23/17  · B12 replacement.  · SIADH per nephrology.  · Afib: not anticoag candidate d/t falls. Rate control per card.  · Urine culture with pseudomonas, finished 3 days of cefepime.  · Leukocytosis: no  obvious infection. Ask ID to see.Thrombocytosis may be reactive also.  · SNF on discharge ([facility])    Gilbert Marx MD   07/01/17  12:52 PM

## 2018-11-26 NOTE — ED NOTES
Spoke with Patience in the lab to add on PTT, PT, INR to blood in lab.  A rainbow was drawn and sent to the lab for analysis.     Paola Villegas RN  08/09/17 [id number]     Yes

## 2019-07-31 NOTE — NURSING NOTE
Went down to surgery to take pictures of newly discovered wound in bilateral ankles. Filled out wound documentation form.   Detail Level: Simple Detail Level: Zone

## 2024-08-09 NOTE — PROGRESS NOTES
Case Management Discharge Note    Final Note: The patient was admitted to a [facility] scattered bed on 9/12/17. CORINNE Jason RN, CCP    Discharge Placement     Facility/Agency Request Status Selected? Address Phone Number Fax Number    [facility] Accepted    Yes [address], [facility] [address] [phone]    [facility] Accepted     [address], [facility] [facility] [address] [phone]             Discharge Codes: 51  Hospice - medical facility   done

## 2024-11-15 NOTE — TELEPHONE ENCOUNTER
----- Message from ANITRA Garcia sent at 8/14/2017 10:00 AM EDT -----  Will you please arrange OP follow up in 2 weeks for staple removal. We can cancel his appt this Thurs and move it to the week after next, preferably that Monday the 28th when FATOUMATA is in office. Thanks!    Medical Week 1 Survey      Flowsheet Row Responses   [facility] patient discharged from? [place]   Does the patient have one of the following disease processes/diagnoses(primary or secondary)? Other   Week 1 attempt successful? No   Unsuccessful attempts Attempt 1   Revoke Readmitted            Lizbeth COMBS - Registered Nurse

## (undated) DEVICE — FLOSEAL MATRIX IS INDICATED IN SURGICAL PROCEDURES (OTHER THAN IN OPHTHALMIC) AS AN ADJUNCT TO HEMOSTASIS WHEN CONTROL OF BLEEDING BY LIGATURE OR CONVENTIONAL PROCEDURES IS INEFFECTIVE OR IMPRACTICAL.: Brand: FLOSEAL HEMOSTATIC MATRIX

## (undated) DEVICE — 4.0MM PRECISION ROUND

## (undated) DEVICE — SUT MNCRYL PLS ANTIB UD 4/0 PS2 18IN

## (undated) DEVICE — CONN TBG Y 5 IN 1 LF STRL

## (undated) DEVICE — NDL HYPO PRECISIONGLIDE REG 25G 1 1/2

## (undated) DEVICE — ANTIBACTERIAL UNDYED BRAIDED (POLYGLACTIN 910), SYNTHETIC ABSORBABLE SUTURE: Brand: COATED VICRYL

## (undated) DEVICE — VISUALIZATION SYSTEM: Brand: CLEARIFY

## (undated) DEVICE — DRSNG WND GZ PAD BORDERED 4X8IN STRL

## (undated) DEVICE — APPL CHLORAPREP W/TINT 26ML ORNG

## (undated) DEVICE — GLV SURG BIOGEL LTX PF 8 1/2

## (undated) DEVICE — NDL SPINE 22G 31/2IN BLK

## (undated) DEVICE — VIOLET BRAIDED (POLYGLACTIN 910), SYNTHETIC ABSORBABLE SUTURE: Brand: COATED VICRYL

## (undated) DEVICE — DRP MICROSCP LEICA W/GLASS LENS

## (undated) DEVICE — 3.0MM PRECISION NEURO (MATCH HEAD)

## (undated) DEVICE — 3M™ STERI-DRAPE™ INSTRUMENT POUCH 1018: Brand: STERI-DRAPE™

## (undated) DEVICE — PK NEURO SPINE 40

## (undated) DEVICE — STPLR SKIN VISISTAT WD 35CT

## (undated) DEVICE — SOL ISO/ALC RUB 70PCT 4OZ

## (undated) DEVICE — DISPOSABLE BIPOLAR CABLE 12FT. (3.6M): Brand: KIRWAN

## (undated) DEVICE — MARKR SKIN W/RULR AND LBL

## (undated) DEVICE — GLV SURG SENSICARE ALOE LF PF SZ7.5 GRN

## (undated) DEVICE — DRSNG TELFA PAD NONADH STR 1S 3X4IN

## (undated) DEVICE — CODMAN® SURGICAL PATTIES 1/2" X 1/2" (1.27CM X 1.27CM): Brand: CODMAN®

## (undated) DEVICE — DISPOSABLE 9450003 ELECTRO TWST PAIR 8CH

## (undated) DEVICE — GLV SURG BIOGEL LTX PF 8

## (undated) DEVICE — TOTAL TRAY, 16FR 10ML SIL FOLEY, URN: Brand: MEDLINE

## (undated) DEVICE — PENCL E/S HNDSWCH ROCKR CB

## (undated) DEVICE — LAPAROSCOPIC GAS CONDITIONING DEVICE.: Brand: INSUFLOW

## (undated) DEVICE — SUT MNCRYL 4/0 PS2 18 IN

## (undated) DEVICE — DRSNG TELFA PAD NONADH STR 1S 3X8IN

## (undated) DEVICE — NDL SPINE 18G 31/2IN PNK

## (undated) DEVICE — 1010 S-DRAPE TOWEL DRAPE 10/BX: Brand: STERI-DRAPE™

## (undated) DEVICE — TOWEL,OR,DSP,ST,BLUE,STD,4/PK,20PK/CS: Brand: MEDLINE

## (undated) DEVICE — LOU LAP CHOLE: Brand: MEDLINE INDUSTRIES, INC.

## (undated) DEVICE — UNDYED BRAIDED (POLYGLACTIN 910), SYNTHETIC ABSORBABLE SUTURE: Brand: COATED VICRYL

## (undated) DEVICE — SMOKE EVACUATION TUBING WITH 7/8 IN TO 1/4 IN REDUCER: Brand: BUFFALO FILTER

## (undated) DEVICE — ENDOPATH XCEL BLUNT TIP TROCARS WITH SMOOTH SLEEVES: Brand: ENDOPATH XCEL

## (undated) DEVICE — GLV SURG TRIUMPH CLASSIC PF LTX 8.5 STRL

## (undated) DEVICE — SPNG GZ WOVN 4X4IN 12PLY 10/BX STRL

## (undated) DEVICE — GOWN,PREVENTION PLUS,XLARGE,STERILE: Brand: MEDLINE

## (undated) DEVICE — 3M™ STERI-STRIP™ REINFORCED ADHESIVE SKIN CLOSURES, R1547, 1/2 IN X 4 IN (12 MM X 100 MM), 6 STRIPS/ENVELOPE: Brand: 3M™ STERI-STRIP™

## (undated) DEVICE — CONTAINER,SPECIMEN,OR STERILE,4OZ: Brand: MEDLINE

## (undated) DEVICE — APPL CHLORAPREP W/TINT 10.5ML PERC STRL

## (undated) DEVICE — DRP C/ARM 41X74IN

## (undated) DEVICE — BNDG ADHS FABRC 1X3IN

## (undated) DEVICE — CULT AER/ANAEROB FASTIDIOUS BACT

## (undated) DEVICE — CODMAN® SURGICAL PATTIES 3/4" X 3/4" (1.91CM X 1.91CM): Brand: CODMAN®

## (undated) DEVICE — ADHS SKIN DERMABOND TOP ADVANCED

## (undated) DEVICE — DRP SLUSH WARMR MACH 52X66IN OM-ORS-301

## (undated) DEVICE — MAYFIELD® DISPOSABLE ADULT SKULL PIN (PLASTIC BASE): Brand: MAYFIELD®

## (undated) DEVICE — TUBING, SUCTION, 1/4" X 20', STRAIGHT: Brand: MEDLINE INDUSTRIES, INC.

## (undated) DEVICE — ELECTRD BLD EDGE/INSUL1P 2.4X5.1MM STRL

## (undated) DEVICE — ENDOPATH XCEL BLADELESS TROCARS WITH STABILITY SLEEVES: Brand: ENDOPATH XCEL

## (undated) DEVICE — ENDOCUT SCISSOR TIP, DISPOSABLE: Brand: RENEW

## (undated) DEVICE — ENDOPATH XCEL UNIVERSAL TROCAR STABLILITY SLEEVES: Brand: ENDOPATH XCEL